# Patient Record
Sex: MALE | Race: WHITE | NOT HISPANIC OR LATINO | Employment: OTHER | URBAN - METROPOLITAN AREA
[De-identification: names, ages, dates, MRNs, and addresses within clinical notes are randomized per-mention and may not be internally consistent; named-entity substitution may affect disease eponyms.]

---

## 2017-02-07 ENCOUNTER — TRANSCRIBE ORDERS (OUTPATIENT)
Dept: ADMINISTRATIVE | Facility: HOSPITAL | Age: 77
End: 2017-02-07

## 2017-02-07 ENCOUNTER — HOSPITAL ENCOUNTER (OUTPATIENT)
Dept: GENERAL RADIOLOGY | Facility: HOSPITAL | Age: 77
Discharge: HOME | End: 2017-02-07
Attending: INTERNAL MEDICINE | Admitting: INTERNAL MEDICINE

## 2017-02-07 DIAGNOSIS — M19.072 PRIMARY OSTEOARTHRITIS OF LEFT ANKLE: Primary | ICD-10-CM

## 2017-02-07 PROCEDURE — 73610 X-RAY EXAM OF ANKLE: CPT

## 2018-10-14 ENCOUNTER — APPOINTMENT (OUTPATIENT)
Dept: GENERAL RADIOLOGY | Facility: HOSPITAL | Age: 78
End: 2018-10-14

## 2018-10-14 ENCOUNTER — APPOINTMENT (OUTPATIENT)
Dept: CT IMAGING | Facility: HOSPITAL | Age: 78
End: 2018-10-14

## 2018-10-14 ENCOUNTER — HOSPITAL ENCOUNTER (EMERGENCY)
Facility: HOSPITAL | Age: 78
Discharge: HOME | End: 2018-10-15
Attending: EMERGENCY MEDICINE | Admitting: EMERGENCY MEDICINE

## 2018-10-14 DIAGNOSIS — I48.92 ATRIAL FLUTTER, UNSPECIFIED TYPE (HCC): ICD-10-CM

## 2018-10-14 DIAGNOSIS — E03.9 HYPOTHYROIDISM, UNSPECIFIED TYPE: ICD-10-CM

## 2018-10-14 DIAGNOSIS — R10.30 LOWER ABDOMINAL PAIN: Primary | ICD-10-CM

## 2018-10-14 LAB
ALBUMIN SERPL-MCNC: 4.64 G/DL (ref 3.2–4.8)
ALBUMIN/GLOB SERPL: 1.8 G/DL (ref 1.5–2.5)
ALP SERPL-CCNC: 57 U/L (ref 25–100)
ALT SERPL W P-5'-P-CCNC: 25 U/L (ref 7–40)
ANION GAP SERPL CALCULATED.3IONS-SCNC: 9 MMOL/L (ref 3–11)
APTT PPP: 36 SECONDS (ref 24–31)
AST SERPL-CCNC: 32 U/L (ref 0–33)
BASOPHILS # BLD AUTO: 0.02 10*3/MM3 (ref 0–0.2)
BASOPHILS NFR BLD AUTO: 0.3 % (ref 0–1)
BILIRUB SERPL-MCNC: 0.6 MG/DL (ref 0.3–1.2)
BNP SERPL-MCNC: 337 PG/ML (ref 0–100)
BUN BLD-MCNC: 15 MG/DL (ref 9–23)
BUN/CREAT SERPL: 14.7 (ref 7–25)
CALCIUM SPEC-SCNC: 9.4 MG/DL (ref 8.7–10.4)
CHLORIDE SERPL-SCNC: 105 MMOL/L (ref 99–109)
CO2 SERPL-SCNC: 24 MMOL/L (ref 20–31)
CREAT BLD-MCNC: 1.02 MG/DL (ref 0.6–1.3)
DEPRECATED RDW RBC AUTO: 46.4 FL (ref 37–54)
EOSINOPHIL # BLD AUTO: 0.12 10*3/MM3 (ref 0–0.3)
EOSINOPHIL NFR BLD AUTO: 1.9 % (ref 0–3)
ERYTHROCYTE [DISTWIDTH] IN BLOOD BY AUTOMATED COUNT: 14.3 % (ref 11.3–14.5)
GFR SERPL CREATININE-BSD FRML MDRD: 70 ML/MIN/1.73
GLOBULIN UR ELPH-MCNC: 2.6 GM/DL
GLUCOSE BLD-MCNC: 108 MG/DL (ref 70–100)
HCT VFR BLD AUTO: 47.5 % (ref 38.9–50.9)
HGB BLD-MCNC: 15.7 G/DL (ref 13.1–17.5)
HOLD SPECIMEN: NORMAL
HOLD SPECIMEN: NORMAL
IMM GRANULOCYTES # BLD: 0.05 10*3/MM3 (ref 0–0.03)
IMM GRANULOCYTES NFR BLD: 0.8 % (ref 0–0.6)
INR PPP: 1.16 (ref 0.91–1.09)
LYMPHOCYTES # BLD AUTO: 2.21 10*3/MM3 (ref 0.6–4.8)
LYMPHOCYTES NFR BLD AUTO: 35.5 % (ref 24–44)
MAGNESIUM SERPL-MCNC: 2 MG/DL (ref 1.3–2.7)
MCH RBC QN AUTO: 29.8 PG (ref 27–31)
MCHC RBC AUTO-ENTMCNC: 33.1 G/DL (ref 32–36)
MCV RBC AUTO: 90.1 FL (ref 80–99)
MONOCYTES # BLD AUTO: 0.38 10*3/MM3 (ref 0–1)
MONOCYTES NFR BLD AUTO: 6.1 % (ref 0–12)
NEUTROPHILS # BLD AUTO: 3.49 10*3/MM3 (ref 1.5–8.3)
NEUTROPHILS NFR BLD AUTO: 56.2 % (ref 41–71)
PLATELET # BLD AUTO: 216 10*3/MM3 (ref 150–450)
PMV BLD AUTO: 10.4 FL (ref 6–12)
POTASSIUM BLD-SCNC: 4.2 MMOL/L (ref 3.5–5.5)
PROT SERPL-MCNC: 7.2 G/DL (ref 5.7–8.2)
PROTHROMBIN TIME: 12.2 SECONDS (ref 9.6–11.5)
RBC # BLD AUTO: 5.27 10*6/MM3 (ref 4.2–5.76)
SODIUM BLD-SCNC: 138 MMOL/L (ref 132–146)
T4 FREE SERPL-MCNC: 0.84 NG/DL (ref 0.89–1.76)
TROPONIN I SERPL-MCNC: 0.07 NG/ML
TROPONIN I SERPL-MCNC: 0.07 NG/ML
TSH SERPL DL<=0.05 MIU/L-ACNC: 14.42 MIU/ML (ref 0.35–5.35)
WBC NRBC COR # BLD: 6.22 10*3/MM3 (ref 3.5–10.8)
WHOLE BLOOD HOLD SPECIMEN: NORMAL
WHOLE BLOOD HOLD SPECIMEN: NORMAL

## 2018-10-14 PROCEDURE — 96374 THER/PROPH/DIAG INJ IV PUSH: CPT

## 2018-10-14 PROCEDURE — 74177 CT ABD & PELVIS W/CONTRAST: CPT

## 2018-10-14 PROCEDURE — 85730 THROMBOPLASTIN TIME PARTIAL: CPT | Performed by: EMERGENCY MEDICINE

## 2018-10-14 PROCEDURE — 25010000002 IOPAMIDOL 61 % SOLUTION: Performed by: EMERGENCY MEDICINE

## 2018-10-14 PROCEDURE — 25010000002 HYDROMORPHONE PER 4 MG: Performed by: EMERGENCY MEDICINE

## 2018-10-14 PROCEDURE — 71045 X-RAY EXAM CHEST 1 VIEW: CPT

## 2018-10-14 PROCEDURE — 83735 ASSAY OF MAGNESIUM: CPT | Performed by: EMERGENCY MEDICINE

## 2018-10-14 PROCEDURE — 85025 COMPLETE CBC W/AUTO DIFF WBC: CPT

## 2018-10-14 PROCEDURE — 96375 TX/PRO/DX INJ NEW DRUG ADDON: CPT

## 2018-10-14 PROCEDURE — 84443 ASSAY THYROID STIM HORMONE: CPT | Performed by: EMERGENCY MEDICINE

## 2018-10-14 PROCEDURE — 83880 ASSAY OF NATRIURETIC PEPTIDE: CPT

## 2018-10-14 PROCEDURE — 80053 COMPREHEN METABOLIC PANEL: CPT

## 2018-10-14 PROCEDURE — 84439 ASSAY OF FREE THYROXINE: CPT | Performed by: EMERGENCY MEDICINE

## 2018-10-14 PROCEDURE — 81003 URINALYSIS AUTO W/O SCOPE: CPT | Performed by: EMERGENCY MEDICINE

## 2018-10-14 PROCEDURE — 36415 COLL VENOUS BLD VENIPUNCTURE: CPT

## 2018-10-14 PROCEDURE — 25010000002 ONDANSETRON PER 1 MG: Performed by: EMERGENCY MEDICINE

## 2018-10-14 PROCEDURE — 85610 PROTHROMBIN TIME: CPT | Performed by: EMERGENCY MEDICINE

## 2018-10-14 PROCEDURE — 96361 HYDRATE IV INFUSION ADD-ON: CPT

## 2018-10-14 PROCEDURE — 93005 ELECTROCARDIOGRAM TRACING: CPT | Performed by: EMERGENCY MEDICINE

## 2018-10-14 PROCEDURE — 0 DIATRIZOATE MEGLUMINE & SODIUM PER 1 ML: Performed by: EMERGENCY MEDICINE

## 2018-10-14 PROCEDURE — 84484 ASSAY OF TROPONIN QUANT: CPT | Performed by: EMERGENCY MEDICINE

## 2018-10-14 PROCEDURE — 99285 EMERGENCY DEPT VISIT HI MDM: CPT

## 2018-10-14 RX ORDER — LEVOTHYROXINE SODIUM 0.07 MG/1
75 TABLET ORAL DAILY
COMMUNITY
End: 2018-10-19

## 2018-10-14 RX ORDER — ESMOLOL HYDROCHLORIDE 10 MG/ML
50-300 INJECTION, SOLUTION INTRAVENOUS
Status: DISCONTINUED | OUTPATIENT
Start: 2018-10-14 | End: 2018-10-15 | Stop reason: HOSPADM

## 2018-10-14 RX ORDER — HYDROMORPHONE HYDROCHLORIDE 1 MG/ML
0.25 INJECTION, SOLUTION INTRAMUSCULAR; INTRAVENOUS; SUBCUTANEOUS ONCE
Status: COMPLETED | OUTPATIENT
Start: 2018-10-14 | End: 2018-10-14

## 2018-10-14 RX ORDER — ONDANSETRON 2 MG/ML
4 INJECTION INTRAMUSCULAR; INTRAVENOUS ONCE
Status: COMPLETED | OUTPATIENT
Start: 2018-10-14 | End: 2018-10-14

## 2018-10-14 RX ORDER — SODIUM CHLORIDE 0.9 % (FLUSH) 0.9 %
10 SYRINGE (ML) INJECTION AS NEEDED
Status: DISCONTINUED | OUTPATIENT
Start: 2018-10-14 | End: 2018-10-15 | Stop reason: HOSPADM

## 2018-10-14 RX ORDER — SODIUM CHLORIDE 9 MG/ML
125 INJECTION, SOLUTION INTRAVENOUS CONTINUOUS
Status: DISCONTINUED | OUTPATIENT
Start: 2018-10-14 | End: 2018-10-15 | Stop reason: HOSPADM

## 2018-10-14 RX ADMIN — ONDANSETRON HYDROCHLORIDE 4 MG: 2 INJECTION, SOLUTION INTRAMUSCULAR; INTRAVENOUS at 21:37

## 2018-10-14 RX ADMIN — HYDROMORPHONE HYDROCHLORIDE 0.25 MG: 1 INJECTION, SOLUTION INTRAMUSCULAR; INTRAVENOUS; SUBCUTANEOUS at 21:38

## 2018-10-14 RX ADMIN — IOPAMIDOL 95 ML: 612 INJECTION, SOLUTION INTRAVENOUS at 22:47

## 2018-10-14 RX ADMIN — SODIUM CHLORIDE 125 ML/HR: 9 INJECTION, SOLUTION INTRAVENOUS at 22:22

## 2018-10-14 RX ADMIN — SODIUM CHLORIDE 1000 ML: 9 INJECTION, SOLUTION INTRAVENOUS at 21:32

## 2018-10-14 RX ADMIN — DIATRIZOATE MEGLUMINE AND DIATRIZOATE SODIUM 15 ML: 660; 100 LIQUID ORAL; RECTAL at 21:34

## 2018-10-15 VITALS
DIASTOLIC BLOOD PRESSURE: 98 MMHG | TEMPERATURE: 97.4 F | SYSTOLIC BLOOD PRESSURE: 165 MMHG | WEIGHT: 230 LBS | BODY MASS INDEX: 30.48 KG/M2 | HEIGHT: 73 IN | RESPIRATION RATE: 20 BRPM | OXYGEN SATURATION: 95 % | HEART RATE: 88 BPM

## 2018-10-15 LAB
BILIRUB UR QL STRIP: NEGATIVE
CLARITY UR: CLEAR
COLOR UR: YELLOW
GLUCOSE UR STRIP-MCNC: NEGATIVE MG/DL
HGB UR QL STRIP.AUTO: NEGATIVE
KETONES UR QL STRIP: NEGATIVE
LEUKOCYTE ESTERASE UR QL STRIP.AUTO: NEGATIVE
NITRITE UR QL STRIP: NEGATIVE
PH UR STRIP.AUTO: 5.5 [PH] (ref 5–8)
PROT UR QL STRIP: NEGATIVE
SP GR UR STRIP: 1.02 (ref 1–1.03)
UROBILINOGEN UR QL STRIP: NORMAL

## 2018-10-15 RX ORDER — DILTIAZEM HYDROCHLORIDE 120 MG/1
120 CAPSULE, COATED, EXTENDED RELEASE ORAL
Status: DISCONTINUED | OUTPATIENT
Start: 2018-10-15 | End: 2018-10-15 | Stop reason: HOSPADM

## 2018-10-15 RX ORDER — DILTIAZEM HYDROCHLORIDE 120 MG/1
120 CAPSULE, COATED, EXTENDED RELEASE ORAL DAILY
Qty: 30 CAPSULE | Refills: 0 | Status: ON HOLD | OUTPATIENT
Start: 2018-10-15 | End: 2018-11-26

## 2018-10-15 RX ORDER — POLYETHYLENE GLYCOL 3350 17 G/17G
17 POWDER, FOR SOLUTION ORAL DAILY
Qty: 225 G | Refills: 0 | Status: ON HOLD | OUTPATIENT
Start: 2018-10-15 | End: 2018-11-26

## 2018-10-15 RX ORDER — ONDANSETRON 8 MG/1
8 TABLET, ORALLY DISINTEGRATING ORAL EVERY 8 HOURS PRN
Qty: 15 TABLET | Refills: 0 | Status: ON HOLD | OUTPATIENT
Start: 2018-10-15 | End: 2018-11-26

## 2018-10-15 RX ORDER — HYDROCODONE BITARTRATE AND ACETAMINOPHEN 5; 325 MG/1; MG/1
1 TABLET ORAL EVERY 4 HOURS PRN
Qty: 15 TABLET | Refills: 0 | Status: ON HOLD | OUTPATIENT
Start: 2018-10-15 | End: 2018-11-26

## 2018-10-15 RX ADMIN — APIXABAN 5 MG: 5 TABLET, FILM COATED ORAL at 01:04

## 2018-10-15 RX ADMIN — DILTIAZEM HYDROCHLORIDE 120 MG: 120 CAPSULE, COATED, EXTENDED RELEASE ORAL at 01:04

## 2018-10-15 NOTE — DISCHARGE INSTRUCTIONS
Rest with no stimulant medications including decongestants or caffeine.  Push plenty fluids    Take the Eliquis 5 mg twice a day as prescribed.  This is a blood thinner and increases her risk of bleeding.  If you have any rectal bleeding stop this at once and come to the ED for evaluation.    Take the Cardizem  mg daily.  Your next dose will be Monday evening    Take Norco for pain only as needed.  This is a narcotic and may nauseate you are constipate you.  He may take Zofran if you get nauseated.  Take MiraLAX to prevent constipation.    Follow-up in the atrial fibrillation clinic of the [facility] [place] tomorrow.  They should call you by 10:00.  If they do not call by noon please call them.    Call Dr. Campbell tomorrow in the office to make an appointment for follow-up, but also for instructions on your thyroid medicine as you're still hypothyroid today    Return to the ED at once if you have worsening abdominal pain, fever with abdominal pain, vomiting, or any other acute, urgent emergent or progressive symptoms as discussed including GI bleeding.

## 2018-10-15 NOTE — ED PROVIDER NOTES
Subjective   Saul Sal is a 78 y.o.male who presents to the ED with his wife with c/o flank pain with onset 2 days ago that has worsened. He developed left sided dull flank pain 2 days ago then experienced right side flank pain yesterday and now experiences bilateral flank pain. His flank pain is a 7/10 in severity and denies any aggravating or alleviating factors. He denies any abdominal pain experiences abdominal distension that he attributed to gas build up. He has a h/o bloating and gassiness so he fasted today with mild relief of his distension. He normally has a bowel movement everyday at 1900 and today he was unable to move his bowels. He took a suppository with no relief. He has experienced increasing HTN and tachycardia. He reports he normally has a rate in the 70s to 80s and a normal blood pressure with medication but states his pain is causing him HTN and tachycardia. He denies any bloody stools, melena, bowel or bladder incontinence, dysuria, frequency, hematuria, cough, congestion, fevers, chills, dyspnea, chest pain, lower extremity edema or any additional acute complaints at this time. He has a h/o cardiac bypass x5 in 2003 and is a patient of Dr. Azul MD but reports he has not had an appointment with him in many years.         History provided by:  Patient  Flank Pain   Pain location:  L flank and R flank  Pain quality: dull    Pain radiates to:  Does not radiate  Pain severity:  Moderate  Onset quality:  Gradual  Duration:  2 days  Timing:  Constant  Progression:  Worsening  Chronicity:  New  Context: not eating, not recent illness and not sick contacts    Relieved by:  Nothing  Worsened by:  Nothing  Ineffective treatments:  Eating, lying down and movement  Associated symptoms: constipation    Associated symptoms: no chest pain, no chills, no cough, no diarrhea, no dysuria, no fever, no hematemesis, no hematochezia, no hematuria, no melena, no nausea, no shortness of breath, no sore throat  and no vomiting    Risk factors: obesity    Risk factors: has not had multiple surgeries and no recent hospitalization        Review of Systems   Constitutional: Negative for appetite change, chills and fever.   HENT: Negative for congestion, rhinorrhea and sore throat.    Respiratory: Negative for cough and shortness of breath.    Cardiovascular: Positive for palpitations (fast). Negative for chest pain.   Gastrointestinal: Positive for abdominal distention and constipation. Negative for abdominal pain, blood in stool, diarrhea, hematemesis, hematochezia, melena, nausea and vomiting.   Genitourinary: Positive for flank pain. Negative for decreased urine volume, dysuria, hematuria and urgency.   Skin: Negative for rash.   All other systems reviewed and are negative.      Past Medical History:   Diagnosis Date   • Disease of thyroid gland    • Hypertension        No Known Allergies    Past Surgical History:   Procedure Laterality Date   • CARDIAC SURGERY      BYPASS X5       History reviewed. No pertinent family history.    Social History     Social History   • Marital status:      Social History Main Topics   • Smoking status: Former Smoker   • Smokeless tobacco: Never Used   • Alcohol use No   • Drug use: No   • Sexual activity: Defer     Other Topics Concern   • Not on file         Objective   Physical Exam   Constitutional: He is oriented to person, place, and time. He appears well-developed and well-nourished. No distress.   HENT:   Head: Normocephalic and atraumatic.   Right Ear: External ear normal.   Left Ear: External ear normal.   Nose: Nose normal.   Eyes: Conjunctivae are normal. No scleral icterus.   Neck: Normal range of motion. Neck supple.   Cardiovascular: Regular rhythm, normal heart sounds and intact distal pulses.  Tachycardia present.  Exam reveals no gallop and no friction rub.    No murmur heard.  Good femoral pulses.    Pulmonary/Chest: Effort normal and breath sounds normal. No  "respiratory distress. He has no wheezes. He has no rales.   Abdominal: Soft. Bowel sounds are normal. He exhibits no distension. There is no tenderness. There is no rebound, no guarding and no CVA tenderness.   No CVA tenderness.   No abdominal tenderness.    Musculoskeletal: Normal range of motion. He exhibits no edema or tenderness.   No C/C or E.   No stigmata of DVT.    Neurological: He is alert and oriented to person, place, and time.   Skin: Skin is warm and dry. No rash noted. He is not diaphoretic.   Psychiatric: He has a normal mood and affect. His behavior is normal.   Nursing note and vitals reviewed.      Procedures         ED Course  ED Course as of Oct 15 0132   Mon Oct 15, 2018   0104 Patient is serially reevaluated throughout the ED course with last reevaluation now.  He is treated with a very small amount of analgesics here in the ED.  History with Zofran 4 mg IV and hydrated and feels significant improved and well enough to go home.I discussed his atrial flutter.  He had a history of \"skipped beats\" in the remote past but has not had a Holter in years.  He did have a GI bleed after taking aspirin but his wife felt that this was from another medication as well.  He was tachycardic on arrival but is heart rate normalized without any specific rate limiting medications.  He remained in the high 80s and 90s from heart rate that would increase greater than 100 transiently.I discussed case in depth with Dr. Sampson, on call for Dr. Liang and cardiology.  We discussed his troponins which are stable and intermediate, thyroids, and laboratory evaluation.  He is recommended anticoagulation and I already discussed this with the patient.  His recommend follow-up in the dysrhythmia clinic tomorrow, with Cardizem for rate control.I discussed this with the patient and discussed the risk of bleeding with the anticoagulation.  At the current time however I still feel this is indicated.  His chadsVasc is 3, and it " appears he'll need cardioversion, and will need to be anticoagulated prior to that.  In further discussion with the patient he has had his elevated heart rate and palpitations for at least 48 hours, probably longer, and with no date of onset would be out of the range for cardioversion here in the ED.I discussed follow-up in the office with Dr. Campbell as well for attention to his hypothyroidism and follow-up of his abdominal pain.I discussed indications for immediate return with the patient and spouse and stressed the need for prompt follow-up with both cardiology and Dr. Campbell.Patient at the time of last reevaluation and examination has no peritoneal findings, minimal tenderness at best.  []   0104 Patient feels well enough for the current time to go home.Very pleasant and responsible patient verbalizes understanding agreement with plan of care, need for follow-up, and indications for return.  Wife is in agreement.  []      ED Course User Index  [] Rory Mckeon MD     Recent Results (from the past 24 hour(s))   Comprehensive Metabolic Panel    Collection Time: 10/14/18  9:01 PM   Result Value Ref Range    Glucose 108 (H) 70 - 100 mg/dL    BUN 15 9 - 23 mg/dL    Creatinine 1.02 0.60 - 1.30 mg/dL    Sodium 138 132 - 146 mmol/L    Potassium 4.2 3.5 - 5.5 mmol/L    Chloride 105 99 - 109 mmol/L    CO2 24.0 20.0 - 31.0 mmol/L    Calcium 9.4 8.7 - 10.4 mg/dL    Total Protein 7.2 5.7 - 8.2 g/dL    Albumin 4.64 3.20 - 4.80 g/dL    ALT (SGPT) 25 7 - 40 U/L    AST (SGOT) 32 0 - 33 U/L    Alkaline Phosphatase 57 25 - 100 U/L    Total Bilirubin 0.6 0.3 - 1.2 mg/dL    eGFR Non African Amer 70 >60 mL/min/1.73    Globulin 2.6 gm/dL    A/G Ratio 1.8 1.5 - 2.5 g/dL    BUN/Creatinine Ratio 14.7 7.0 - 25.0    Anion Gap 9.0 3.0 - 11.0 mmol/L   BNP    Collection Time: 10/14/18  9:01 PM   Result Value Ref Range    .0 (H) 0.0 - 100.0 pg/mL   Light Blue Top    Collection Time: 10/14/18  9:01 PM   Result Value Ref Range     Extra Tube hold for add-on    Green Top (Gel)    Collection Time: 10/14/18  9:01 PM   Result Value Ref Range    Extra Tube Hold for add-ons.    Lavender Top    Collection Time: 10/14/18  9:01 PM   Result Value Ref Range    Extra Tube hold for add-on    Gold Top - SST    Collection Time: 10/14/18  9:01 PM   Result Value Ref Range    Extra Tube Hold for add-ons.    CBC Auto Differential    Collection Time: 10/14/18  9:01 PM   Result Value Ref Range    WBC 6.22 3.50 - 10.80 10*3/mm3    RBC 5.27 4.20 - 5.76 10*6/mm3    Hemoglobin 15.7 13.1 - 17.5 g/dL    Hematocrit 47.5 38.9 - 50.9 %    MCV 90.1 80.0 - 99.0 fL    MCH 29.8 27.0 - 31.0 pg    MCHC 33.1 32.0 - 36.0 g/dL    RDW 14.3 11.3 - 14.5 %    RDW-SD 46.4 37.0 - 54.0 fl    MPV 10.4 6.0 - 12.0 fL    Platelets 216 150 - 450 10*3/mm3    Neutrophil % 56.2 41.0 - 71.0 %    Lymphocyte % 35.5 24.0 - 44.0 %    Monocyte % 6.1 0.0 - 12.0 %    Eosinophil % 1.9 0.0 - 3.0 %    Basophil % 0.3 0.0 - 1.0 %    Immature Grans % 0.8 (H) 0.0 - 0.6 %    Neutrophils, Absolute 3.49 1.50 - 8.30 10*3/mm3    Lymphocytes, Absolute 2.21 0.60 - 4.80 10*3/mm3    Monocytes, Absolute 0.38 0.00 - 1.00 10*3/mm3    Eosinophils, Absolute 0.12 0.00 - 0.30 10*3/mm3    Basophils, Absolute 0.02 0.00 - 0.20 10*3/mm3    Immature Grans, Absolute 0.05 (H) 0.00 - 0.03 10*3/mm3   Protime-INR    Collection Time: 10/14/18  9:01 PM   Result Value Ref Range    Protime 12.2 (H) 9.6 - 11.5 Seconds    INR 1.16 (H) 0.91 - 1.09   aPTT    Collection Time: 10/14/18  9:01 PM   Result Value Ref Range    PTT 36.0 (H) 24.0 - 31.0 seconds   Magnesium    Collection Time: 10/14/18  9:01 PM   Result Value Ref Range    Magnesium 2.0 1.3 - 2.7 mg/dL   TSH    Collection Time: 10/14/18  9:01 PM   Result Value Ref Range    TSH 14.424 (H) 0.350 - 5.350 mIU/mL   T4, Free    Collection Time: 10/14/18  9:01 PM   Result Value Ref Range    Free T4 0.84 (L) 0.89 - 1.76 ng/dL   Troponin    Collection Time: 10/14/18  9:01 PM   Result Value Ref  Range    Troponin I 0.069 (H) <=0.039 ng/mL   Troponin    Collection Time: 10/14/18 11:14 PM   Result Value Ref Range    Troponin I 0.066 (H) <=0.039 ng/mL   Urinalysis With Microscopic If Indicated (No Culture) - Urine, Clean Catch    Collection Time: 10/14/18 11:48 PM   Result Value Ref Range    Color, UA Yellow Yellow, Straw    Appearance, UA Clear Clear    pH, UA 5.5 5.0 - 8.0    Specific Gravity, UA 1.025 1.001 - 1.030    Glucose, UA Negative Negative    Ketones, UA Negative Negative    Bilirubin, UA Negative Negative    Blood, UA Negative Negative    Protein, UA Negative Negative    Leuk Esterase, UA Negative Negative    Nitrite, UA Negative Negative    Urobilinogen, UA 0.2 E.U./dL 0.2 - 1.0 E.U./dL     Note: In addition to lab results from this visit, the labs listed above may include labs taken at another facility or during a different encounter within the last 24 hours. Please correlate lab times with ED admission and discharge times for further clarification of the services performed during this visit.    XR Chest 1 View   Final Result      Hazy density LEFT lung base, likely secondary to patient positioning although    minimal LEFT pleural effusion not totally excluded on this image.      Cardiac silhouette appears enlarged, again, likely accentuated by patient    positioning.      Please note that limited imaging of the lung bases on CT abdomen pelvis    performed same day demonstrated no significant pleural effusion, infiltrate, or    atelectasis in the lung bases and heart appears normal in size; therefore, the    above findings are positional/technical and no repeat imaging is needed at this    time unless there are subsequent changes in patient's clinical condition.      THIS DOCUMENT HAS BEEN ELECTRONICALLY SIGNED BY VICTOR HUGO TALBOT MD      CT Abdomen Pelvis With Contrast   Final Result      No definite acute abnormality demonstrated.      Nonacute findings as noted above.      THIS DOCUMENT HAS BEEN  ELECTRONICALLY SIGNED BY VICTOR HUGO TALBOT MD        Vitals:    10/14/18 2200 10/14/18 2230 10/15/18 0030 10/15/18 0102   BP: (!) 137/106 135/100  165/98   BP Location:    Left arm   Patient Position:       Pulse: 78 85 119 88   Resp:       Temp:       TempSrc:       SpO2: 94% 93% 95% 95%   Weight:       Height:         Medications   sodium chloride 0.9 % flush 10 mL (not administered)   sodium chloride 0.9 % infusion (0 mL/hr Intravenous Stopped 10/15/18 0106)   esmolol (BREVIBLOC) 2500 mg/250 mL (10 mg/mL) infusion (0 mcg/kg/min × 104 kg Intravenous Hold 10/14/18 2205)   diltiaZEM CD (CARDIZEM CD) 24 hr capsule 120 mg (120 mg Oral Given 10/15/18 0104)   diatrizoate meglumine-sodium (GASTROGRAFIN) 66-10 % solution 15 mL (15 mL Oral Given 10/14/18 2134)   sodium chloride 0.9 % bolus 1,000 mL (0 mL Intravenous Stopped 10/14/18 2221)   HYDROmorphone (DILAUDID) injection 0.25 mg (0.25 mg Intravenous Given 10/14/18 2138)   ondansetron (ZOFRAN) injection 4 mg (4 mg Intravenous Given 10/14/18 2137)   iopamidol (ISOVUE-300) 61 % injection 100 mL (95 mL Intravenous Given 10/14/18 2247)   apixaban (ELIQUIS) tablet 5 mg (5 mg Oral Given 10/15/18 0104)     ECG/EMG Results (last 24 hours)     Procedure Component Value Units Date/Time    ECG 12 Lead [[id number]] Collected:  10/14/18 2100     Updated:  10/14/18 2136    Narrative:       Test Reason : soa  Blood Pressure : **/** mmHG  Vent. Rate : 102 BPM     Atrial Rate : 250 BPM     P-R Int : 000 ms          QRS Dur : 158 ms      QT Int : 384 ms       P-R-T Axes : 000 -51 -12 degrees     QTc Int : 500 ms    Atrial flutter with variable AV block  Right bundle branch block  Left anterior fascicular block  ** Bifascicular block **  Cannot rule out Inferior infarct (masked by fascicular block?) , age  undetermined  Abnormal ECG  No previous ECGs available  Confirmed by FRANSISCO PÉREZ MD ([id number]) on 10/14/2018 9:36:16 PM    Referred By:  ED MD           Confirmed By:FRANSISCO PÉREZ MD    ECG 12  Lead [[id number]] Collected:  10/14/18 2219     Updated:  10/14/18 2219                        [facility]    Final diagnoses:   Lower abdominal pain   Atrial flutter, unspecified type (CMS/HCC)   Hypothyroidism, unspecified type       Documentation assistance provided by conner Sheehan.  Information recorded by the scribe was done at my direction and has been verified and validated by me.     Elliot Sheehan  10/14/18 2106       Elliot Sheehan  10/14/18 2257       Rory Mckeon MD  10/15/18 0132

## 2018-10-19 ENCOUNTER — HOSPITAL ENCOUNTER (OUTPATIENT)
Dept: CARDIOLOGY | Facility: HOSPITAL | Age: 78
Discharge: HOME | End: 2018-10-19
Admitting: NURSE PRACTITIONER

## 2018-10-19 ENCOUNTER — OFFICE VISIT (OUTPATIENT)
Dept: CARDIOLOGY | Facility: HOSPITAL | Age: 78
End: 2018-10-19

## 2018-10-19 VITALS
TEMPERATURE: 97.8 F | HEIGHT: 72 IN | DIASTOLIC BLOOD PRESSURE: 85 MMHG | WEIGHT: 235.2 LBS | OXYGEN SATURATION: 97 % | SYSTOLIC BLOOD PRESSURE: 114 MMHG | RESPIRATION RATE: 18 BRPM | BODY MASS INDEX: 31.86 KG/M2 | HEART RATE: 60 BPM

## 2018-10-19 DIAGNOSIS — E03.9 HYPOTHYROIDISM, UNSPECIFIED TYPE: ICD-10-CM

## 2018-10-19 DIAGNOSIS — G47.33 OSA (OBSTRUCTIVE SLEEP APNEA): ICD-10-CM

## 2018-10-19 DIAGNOSIS — Z87.19 HISTORY OF GI BLEED: ICD-10-CM

## 2018-10-19 DIAGNOSIS — I25.10 CORONARY ARTERY DISEASE INVOLVING NATIVE CORONARY ARTERY OF NATIVE HEART, ANGINA PRESENCE UNSPECIFIED: ICD-10-CM

## 2018-10-19 DIAGNOSIS — I48.3 TYPICAL ATRIAL FLUTTER (HCC): ICD-10-CM

## 2018-10-19 DIAGNOSIS — I10 ESSENTIAL HYPERTENSION: ICD-10-CM

## 2018-10-19 DIAGNOSIS — B02.9 HERPES ZOSTER WITHOUT COMPLICATION: ICD-10-CM

## 2018-10-19 DIAGNOSIS — I47.29 NSVT (NONSUSTAINED VENTRICULAR TACHYCARDIA) (HCC): ICD-10-CM

## 2018-10-19 DIAGNOSIS — I48.3 TYPICAL ATRIAL FLUTTER (HCC): Primary | ICD-10-CM

## 2018-10-19 PROBLEM — E78.5 DYSLIPIDEMIA: Status: ACTIVE | Noted: 2018-10-19

## 2018-10-19 PROBLEM — I48.92 ATRIAL FLUTTER (HCC): Status: ACTIVE | Noted: 2018-10-19

## 2018-10-19 PROCEDURE — 93005 ELECTROCARDIOGRAM TRACING: CPT | Performed by: NURSE PRACTITIONER

## 2018-10-19 PROCEDURE — 93010 ELECTROCARDIOGRAM REPORT: CPT | Performed by: INTERNAL MEDICINE

## 2018-10-19 PROCEDURE — 99214 OFFICE O/P EST MOD 30 MIN: CPT | Performed by: NURSE PRACTITIONER

## 2018-10-19 RX ORDER — LEVOTHYROXINE SODIUM 88 UG/1
88 TABLET ORAL DAILY
COMMUNITY
End: 2019-04-08 | Stop reason: SDUPTHER

## 2018-10-19 NOTE — PROGRESS NOTES
[facility]  [facility]      Encounter Date:10/19/2018     [address] [place] [address]  [phone]    1940    Saul Campbell MD    Saul Sal is a 78 y.o. male.      Subjective:     Chief Complaint:  Atrial Flutter       HPI     Patient presented to [facility] ED on 10/14/18 with groin and flank pain.  Described as painful, stabbing pain. Now associated with rash. Patient had noted increased hypertension and tachycardia.  Normal heart rates her 70s to 80s.  Symptoms worsened over 2 days.  Revealed to be in atrial flutter.  Pt started on CCB and Eliquis.  f/u today.  Has a history of CAD status post bypass 2003.  Former patient of Dr. Liang that has not seen in many years.   has a history of a GI bleed several years ago on aspirin. Pt has been tolerating ASA 81 mg over the last 3 months, no problems.  History of obstructive sleep apnea, unable to tolerate CPAP.    Denies chest pain, pressure, dyspnea, palpitations, dizziness, syncope, edema.  Unaware of atrial fibrillation/atrial flutter.    Patient Active Problem List    Diagnosis   • CAD (coronary artery disease) [I25.10]   • Atrial flutter (CMS/HCC) [I48.92]   • Hypertension [I10]   • Hypothyroid [E03.9]   • Dyslipidemia [E78.5]   • ELIE (obstructive sleep apnea) [G47.33]         Past Surgical History:   Procedure Laterality Date   • CARDIAC SURGERY      BYPASS X5       No Known Allergies      Current Outpatient Prescriptions:   •  apixaban (ELIQUIS) 5 MG tablet tablet, Take 1 tablet by mouth 2 (Two) Times a Day., Disp: 60 tablet, Rfl: 0  •  diltiaZEM CD (CARDIZEM CD) 120 MG 24 hr capsule, Take 1 capsule by mouth Daily., Disp: 30 capsule, Rfl: 0  •  HYDROcodone-acetaminophen (NORCO) 5-325 MG per tablet, Take 1 tablet by mouth Every 4 (Four) Hours As Needed for Moderate Pain ., Disp: 15 tablet, Rfl: 0  •  levothyroxine (SYNTHROID, LEVOTHROID) 88 MCG tablet, Take 88 mcg by mouth Daily., Disp: , Rfl:  "  •  ondansetron ODT (ZOFRAN-ODT) 8 MG disintegrating tablet, Take 1 tablet by mouth Every 8 (Eight) Hours As Needed for Nausea or Vomiting., Disp: 15 tablet, Rfl: 0  •  polyethylene glycol (MIRALAX) powder, Take 17 g by mouth Daily., Disp: 225 g, Rfl: 0    The following portions of the patient's history were reviewed and updated as appropriate: allergies, current medications, past family history, past medical history, past social history, past surgical history and problem list.    Review of Systems   Cardiovascular: Positive for irregular heartbeat.   Musculoskeletal: Positive for back pain.   All other systems reviewed and are negative.      Objective:     Vitals:    10/19/18 1240 10/19/18 1243 10/19/18 1245   BP: 142/97 141/92 114/85   BP Location: Right arm Left arm Left arm   Patient Position: Sitting Sitting Standing   Pulse: 120  60   Resp: 18     Temp: 97.8 °F (36.6 °C)     TempSrc: Temporal Artery      SpO2: 97%     Weight: 107 kg (235 lb 3.2 oz)     Height: 182.9 cm (72\")           Physical Exam   Constitutional: He is oriented to person, place, and time. He appears well-developed and well-nourished. No distress.   HENT:   Head: Normocephalic and atraumatic.   Mouth/Throat: Oropharynx is clear and moist.   Eyes: Pupils are equal, round, and reactive to light. Conjunctivae are normal. No scleral icterus.   Neck: No hepatojugular reflux and no JVD present. Carotid bruit is not present. No tracheal deviation present. No thyromegaly present.   Cardiovascular: Normal rate, normal heart sounds and intact distal pulses.  An irregularly irregular rhythm present. Exam reveals no friction rub.    No murmur heard.  Pulmonary/Chest: Effort normal and breath sounds normal.   Abdominal: Soft. Bowel sounds are normal. He exhibits no distension. There is no tenderness.   Musculoskeletal: He exhibits no edema.   Lymphadenopathy:     He has no cervical adenopathy.   Neurological: He is alert and oriented to person, place, " and time.   Skin: Skin is warm, dry and intact. Rash (grouped Vesicular rash following L3 dermatone.) noted. No cyanosis or erythema. No pallor.   Psychiatric: He has a normal mood and affect. His behavior is normal. Thought content normal.   Vitals reviewed.      Lab and Diagnostic Review:  10/14/18: Atrial flutter with variable A-V block, PVCs, heart rate 86 bpm    Admission on 10/14/2018, Discharged on 10/15/2018   Component Date Value Ref Range Status   • Glucose 10/14/2018 108* 70 - 100 mg/dL Final   • BUN 10/14/2018 15  9 - 23 mg/dL Final   • Creatinine 10/14/2018 1.02  0.60 - 1.30 mg/dL Final   • Sodium 10/14/2018 138  132 - 146 mmol/L Final   • Potassium 10/14/2018 4.2  3.5 - 5.5 mmol/L Final   • Chloride 10/14/2018 105  99 - 109 mmol/L Final   • CO2 10/14/2018 24.0  20.0 - 31.0 mmol/L Final   • Calcium 10/14/2018 9.4  8.7 - 10.4 mg/dL Final   • Total Protein 10/14/2018 7.2  5.7 - 8.2 g/dL Final   • Albumin 10/14/2018 4.64  3.20 - 4.80 g/dL Final   • ALT (SGPT) 10/14/2018 25  7 - 40 U/L Final   • AST (SGOT) 10/14/2018 32  0 - 33 U/L Final   • Alkaline Phosphatase 10/14/2018 57  25 - 100 U/L Final   • Total Bilirubin 10/14/2018 0.6  0.3 - 1.2 mg/dL Final   • eGFR Non African Amer 10/14/2018 70  >60 mL/min/1.73 Final   • Globulin 10/14/2018 2.6  gm/dL Final   • A/G Ratio 10/14/2018 1.8  1.5 - 2.5 g/dL Final   • BUN/Creatinine Ratio 10/14/2018 14.7  7.0 - 25.0 Final   • Anion Gap 10/14/2018 9.0  3.0 - 11.0 mmol/L Final   • BNP 10/14/2018 337.0* 0.0 - 100.0 pg/mL Final    Results may be falsely decreased if patient taking Biotin.   • Extra Tube 10/14/2018 hold for add-on   Final    Auto resulted   • Extra Tube 10/14/2018 Hold for add-ons.   Final    Auto resulted.   • Extra Tube 10/14/2018 hold for add-on   Final    Auto resulted   • Extra Tube 10/14/2018 Hold for add-ons.   Final    Auto resulted.   • WBC 10/14/2018 6.22  3.50 - 10.80 10*3/mm3 Final   • RBC 10/14/2018 5.27  4.20 - 5.76 10*6/mm3 Final   •  Hemoglobin 10/14/2018 15.7  13.1 - 17.5 g/dL Final   • Hematocrit 10/14/2018 47.5  38.9 - 50.9 % Final   • MCV 10/14/2018 90.1  80.0 - 99.0 fL Final   • MCH 10/14/2018 29.8  27.0 - 31.0 pg Final   • MCHC 10/14/2018 33.1  32.0 - 36.0 g/dL Final   • RDW 10/14/2018 14.3  11.3 - 14.5 % Final   • RDW-SD 10/14/2018 46.4  37.0 - 54.0 fl Final   • MPV 10/14/2018 10.4  6.0 - 12.0 fL Final   • Platelets 10/14/2018 216  150 - 450 10*3/mm3 Final   • Neutrophil % 10/14/2018 56.2  41.0 - 71.0 % Final   • Lymphocyte % 10/14/2018 35.5  24.0 - 44.0 % Final   • Monocyte % 10/14/2018 6.1  0.0 - 12.0 % Final   • Eosinophil % 10/14/2018 1.9  0.0 - 3.0 % Final   • Basophil % 10/14/2018 0.3  0.0 - 1.0 % Final   • Immature Grans % 10/14/2018 0.8* 0.0 - 0.6 % Final   • Neutrophils, Absolute 10/14/2018 3.49  1.50 - 8.30 10*3/mm3 Final   • Lymphocytes, Absolute 10/14/2018 2.21  0.60 - 4.80 10*3/mm3 Final   • Monocytes, Absolute 10/14/2018 0.38  0.00 - 1.00 10*3/mm3 Final   • Eosinophils, Absolute 10/14/2018 0.12  0.00 - 0.30 10*3/mm3 Final   • Basophils, Absolute 10/14/2018 0.02  0.00 - 0.20 10*3/mm3 Final   • Immature Grans, Absolute 10/14/2018 0.05* 0.00 - 0.03 10*3/mm3 Final   • Color, UA 10/14/2018 Yellow  Yellow, Straw Final   • Appearance, UA 10/14/2018 Clear  Clear Final   • pH, UA 10/14/2018 5.5  5.0 - 8.0 Final   • Specific Gravity, UA 10/14/2018 1.025  1.001 - 1.030 Final   • Glucose, UA 10/14/2018 Negative  Negative Final   • Ketones, UA 10/14/2018 Negative  Negative Final   • Bilirubin, UA 10/14/2018 Negative  Negative Final   • Blood, UA 10/14/2018 Negative  Negative Final   • Protein, UA 10/14/2018 Negative  Negative Final   • Leuk Esterase, UA 10/14/2018 Negative  Negative Final   • Nitrite, UA 10/14/2018 Negative  Negative Final   • Urobilinogen, UA 10/14/2018 0.2 E.U./dL  0.2 - 1.0 E.U./dL Final   • Protime 10/14/2018 12.2* 9.6 - 11.5 Seconds Final   • INR 10/14/2018 1.16* 0.91 - 1.09 Final   • PTT 10/14/2018 36.0* 24.0 -  31.0 seconds Final   • Magnesium 10/14/2018 2.0  1.3 - 2.7 mg/dL Final   • TSH 10/14/2018 14.424* 0.350 - 5.350 mIU/mL Final   • Free T4 10/14/2018 0.84* 0.89 - 1.76 ng/dL Final   • Troponin I 10/14/2018 0.069* <=0.039 ng/mL Final    Results may be falsely decreased if patient taking Biotin.   • Troponin I 10/14/2018 0.066* <=0.039 ng/mL Final    Results may be falsely decreased if patient taking Biotin.       Assessment and Plan:         1. Typical atrial flutter (CMS/HCC)    - ECG 12 Lead;Atrial flutter with verbal AV block, PVCs, 99 bpm  Continue:  - apixaban (ELIQUIS) 5 MG tablet tablet; Take 1 tablet by mouth 2 (Two) Times a Day.  Dispense: 60 tablet; Refill: 0    Continue CBB    A. fib education completed: What is atrial fibrillation/flutter, causes, triggers, signs and symptoms, medication management (rate control versus rhythm control) and stroke prevention, procedural management and indications, and the role of the atrial fibrillation center and when to call.    Ambulatory referral to cardiology    Echo to be scheduled.   2. Coronary artery disease involving native coronary artery of native heart, angina presence unspecified  Pt currently not on statin or asa.  Not willing to start until seeing Cardiology    3. Essential hypertension  stable    4. Hypothyroidism, unspecified type  Recent increase of synthroid    5.  Hx of NSVT    6,.  Hx of GI bleed.  Discussed starting PPI.  Patient refused.    7.  Shingles  F/u with PCP later today for treatment    F/u PRN  *Please note that portions of this note were completed with a voice recognition program. Efforts were made to edit the dictations, but occasionally words are mistranscribed.

## 2018-11-02 ENCOUNTER — HOSPITAL ENCOUNTER (OUTPATIENT)
Dept: CARDIOLOGY | Facility: HOSPITAL | Age: 78
Discharge: HOME | End: 2018-11-02
Admitting: NURSE PRACTITIONER

## 2018-11-02 VITALS — HEIGHT: 72 IN | BODY MASS INDEX: 31.83 KG/M2 | WEIGHT: 235 LBS

## 2018-11-02 DIAGNOSIS — I47.29 NSVT (NONSUSTAINED VENTRICULAR TACHYCARDIA) (HCC): ICD-10-CM

## 2018-11-02 DIAGNOSIS — I48.3 TYPICAL ATRIAL FLUTTER (HCC): ICD-10-CM

## 2018-11-02 LAB
BH CV ECHO MEAS - AI DEC SLOPE: 238 CM/SEC^2
BH CV ECHO MEAS - AI MAX PG: 75 MMHG
BH CV ECHO MEAS - AI MAX VEL: 433 CM/SEC
BH CV ECHO MEAS - AI P1/2T: 532.9 MSEC
BH CV ECHO MEAS - AO MAX PG (FULL): 7.1 MMHG
BH CV ECHO MEAS - AO MAX PG: 9 MMHG
BH CV ECHO MEAS - AO MEAN PG (FULL): 5 MMHG
BH CV ECHO MEAS - AO MEAN PG: 6 MMHG
BH CV ECHO MEAS - AO ROOT AREA (BSA CORRECTED): 1.8
BH CV ECHO MEAS - AO ROOT AREA: 12.6 CM^2
BH CV ECHO MEAS - AO ROOT DIAM: 4 CM
BH CV ECHO MEAS - AO V2 MAX: 153 CM/SEC
BH CV ECHO MEAS - AO V2 MEAN: 112 CM/SEC
BH CV ECHO MEAS - AO V2 VTI: 26.4 CM
BH CV ECHO MEAS - AVA(I,A): 2.5 CM^2
BH CV ECHO MEAS - AVA(I,D): 2.5 CM^2
BH CV ECHO MEAS - AVA(V,A): 2.6 CM^2
BH CV ECHO MEAS - AVA(V,D): 2.6 CM^2
BH CV ECHO MEAS - BSA(HAYCOCK): 2.4 M^2
BH CV ECHO MEAS - BSA: 2.3 M^2
BH CV ECHO MEAS - BZI_BMI: 31.9 KILOGRAMS/M^2
BH CV ECHO MEAS - BZI_METRIC_HEIGHT: 182.9 CM
BH CV ECHO MEAS - BZI_METRIC_WEIGHT: 106.6 KG
BH CV ECHO MEAS - EDV(CUBED): 232.6 ML
BH CV ECHO MEAS - EDV(MOD-SP2): 142 ML
BH CV ECHO MEAS - EDV(MOD-SP4): 218 ML
BH CV ECHO MEAS - EDV(TEICH): 190.4 ML
BH CV ECHO MEAS - EF(CUBED): 20.4 %
BH CV ECHO MEAS - EF(MOD-BP): 39 %
BH CV ECHO MEAS - EF(MOD-SP2): 39.4 %
BH CV ECHO MEAS - EF(MOD-SP4): 47.7 %
BH CV ECHO MEAS - EF(TEICH): 16 %
BH CV ECHO MEAS - ESV(CUBED): 185.2 ML
BH CV ECHO MEAS - ESV(MOD-SP2): 86 ML
BH CV ECHO MEAS - ESV(MOD-SP4): 114 ML
BH CV ECHO MEAS - ESV(TEICH): 160 ML
BH CV ECHO MEAS - FS: 7.3 %
BH CV ECHO MEAS - IVS/LVPW: 1
BH CV ECHO MEAS - IVSD: 1.4 CM
BH CV ECHO MEAS - LA DIMENSION: 3.7 CM
BH CV ECHO MEAS - LA/AO: 0.93
BH CV ECHO MEAS - LAD MAJOR: 6.7 CM
BH CV ECHO MEAS - LAT PEAK E' VEL: 9 CM/SEC
BH CV ECHO MEAS - LATERAL E/E' RATIO: 6.6
BH CV ECHO MEAS - LV DIASTOLIC VOL/BSA (35-75): 95.5 ML/M^2
BH CV ECHO MEAS - LV MASS(C)D: 395.6 GRAMS
BH CV ECHO MEAS - LV MASS(C)DI: 173.4 GRAMS/M^2
BH CV ECHO MEAS - LV MAX PG: 1.9 MMHG
BH CV ECHO MEAS - LV MEAN PG: 1 MMHG
BH CV ECHO MEAS - LV SYSTOLIC VOL/BSA (12-30): 50 ML/M^2
BH CV ECHO MEAS - LV V1 MAX: 68.4 CM/SEC
BH CV ECHO MEAS - LV V1 MEAN: 44.6 CM/SEC
BH CV ECHO MEAS - LV V1 VTI: 11.5 CM
BH CV ECHO MEAS - LVIDD: 6.2 CM
BH CV ECHO MEAS - LVIDS: 5.7 CM
BH CV ECHO MEAS - LVLD AP2: 9.4 CM
BH CV ECHO MEAS - LVLD AP4: 8.9 CM
BH CV ECHO MEAS - LVLS AP2: 7.7 CM
BH CV ECHO MEAS - LVLS AP4: 8.1 CM
BH CV ECHO MEAS - LVOT AREA (M): 5.7 CM^2
BH CV ECHO MEAS - LVOT AREA: 5.7 CM^2
BH CV ECHO MEAS - LVOT DIAM: 2.7 CM
BH CV ECHO MEAS - LVPWD: 1.4 CM
BH CV ECHO MEAS - MED PEAK E' VEL: 9.8 CM/SEC
BH CV ECHO MEAS - MEDIAL E/E' RATIO: 6.1
BH CV ECHO MEAS - MV A MAX VEL: 63.7 CM/SEC
BH CV ECHO MEAS - MV E MAX VEL: 59.2 CM/SEC
BH CV ECHO MEAS - MV E/A: 0.93
BH CV ECHO MEAS - PA ACC SLOPE: 773 CM/SEC^2
BH CV ECHO MEAS - PA ACC TIME: 0.12 SEC
BH CV ECHO MEAS - PA MAX PG: 9.7 MMHG
BH CV ECHO MEAS - PA PR(ACCEL): 26.8 MMHG
BH CV ECHO MEAS - PA V2 MAX: 156 CM/SEC
BH CV ECHO MEAS - PI END-D VEL: 149 CM/SEC
BH CV ECHO MEAS - RAP SYSTOLE: 3 MMHG
BH CV ECHO MEAS - RVSP: 23 MMHG
BH CV ECHO MEAS - SI(AO): 145.4 ML/M^2
BH CV ECHO MEAS - SI(CUBED): 20.8 ML/M^2
BH CV ECHO MEAS - SI(LVOT): 28.9 ML/M^2
BH CV ECHO MEAS - SI(MOD-SP2): 24.5 ML/M^2
BH CV ECHO MEAS - SI(MOD-SP4): 45.6 ML/M^2
BH CV ECHO MEAS - SI(TEICH): 13.3 ML/M^2
BH CV ECHO MEAS - SV(AO): 331.8 ML
BH CV ECHO MEAS - SV(CUBED): 47.4 ML
BH CV ECHO MEAS - SV(LVOT): 65.8 ML
BH CV ECHO MEAS - SV(MOD-SP2): 56 ML
BH CV ECHO MEAS - SV(MOD-SP4): 104 ML
BH CV ECHO MEAS - SV(TEICH): 30.4 ML
BH CV ECHO MEAS - TAPSE (>1.6): 1.3 CM2
BH CV ECHO MEAS - TR MAX PG: 20 MMHG
BH CV ECHO MEAS - TR MAX VEL: 226 CM/SEC
BH CV ECHO MEASUREMENTS AVERAGE E/E' RATIO: 6.3
BH CV VAS BP RIGHT ARM: NORMAL MMHG
BH CV XLRA - RV BASE: 3.9 CM
BH CV XLRA - RV LENGTH: 7.9 CM
BH CV XLRA - RV MID: 3.1 CM
BH CV XLRA - TDI S': 12 CM/SEC
LV EF 2D ECHO EST: 30 %

## 2018-11-02 PROCEDURE — 93306 TTE W/DOPPLER COMPLETE: CPT

## 2018-11-02 PROCEDURE — 93306 TTE W/DOPPLER COMPLETE: CPT | Performed by: INTERNAL MEDICINE

## 2018-11-05 DIAGNOSIS — I42.0 DILATED CARDIOMYOPATHY (HCC): ICD-10-CM

## 2018-11-05 DIAGNOSIS — I50.21 ACUTE SYSTOLIC HEART FAILURE (HCC): ICD-10-CM

## 2018-11-05 DIAGNOSIS — I48.3 TYPICAL ATRIAL FLUTTER (HCC): Primary | ICD-10-CM

## 2018-11-05 NOTE — PROGRESS NOTES
Called and reviewed echo results with pt.     · The left ventricular cavity is moderately dilated.  · Left ventricular wall thickness is consistent with mild-to-moderate concentric hypertrophy.  · Mild aortic valve regurgitation is present.  · Mild-to-moderate mitral valve regurgitation is present.  · Mild tricuspid valve regurgitation is present.  · Mild pulmonic valve regurgitation is present.  · Calculated right ventricular systolic pressure from tricuspid regurgitation is 23 mmHg.  · Left ventricular systolic function is severely decreased. Estimated EF = 30%.  · The findings are consistent with dilated cardiomyopathy.  · Mildly reduced right ventricular systolic function noted.  · Mild dilation of the aortic root and sinuses of Valsalva present.  · There is no evidence of pericardial effusion.  · No evidence of pulmonary hypertension is present.  · The aortic valve exhibits sclerosis.      Pt denies CP, pressure, dyspnea.      Pt has agreed to proceed with stress testing.  F/u with scheduled with [facility]

## 2018-11-16 ENCOUNTER — HOSPITAL ENCOUNTER (OUTPATIENT)
Dept: CARDIOLOGY | Facility: HOSPITAL | Age: 78
Discharge: HOME | End: 2018-11-16

## 2018-11-16 VITALS
DIASTOLIC BLOOD PRESSURE: 82 MMHG | BODY MASS INDEX: 31.95 KG/M2 | SYSTOLIC BLOOD PRESSURE: 140 MMHG | WEIGHT: 235.89 LBS | HEART RATE: 65 BPM | HEIGHT: 72 IN

## 2018-11-16 DIAGNOSIS — I42.0 DILATED CARDIOMYOPATHY (HCC): ICD-10-CM

## 2018-11-16 DIAGNOSIS — I48.3 TYPICAL ATRIAL FLUTTER (HCC): ICD-10-CM

## 2018-11-16 DIAGNOSIS — I50.21 ACUTE SYSTOLIC HEART FAILURE (HCC): ICD-10-CM

## 2018-11-16 LAB
BH CV NUCLEAR PRIOR STUDY: 3
BH CV STRESS BP STAGE 2: NORMAL
BH CV STRESS BP STAGE 4: NORMAL
BH CV STRESS COMMENTS STAGE 1: NORMAL
BH CV STRESS DOSE REGADENOSON STAGE 1: 0.4
BH CV STRESS DURATION MIN STAGE 1: 1
BH CV STRESS DURATION MIN STAGE 2: 1
BH CV STRESS DURATION MIN STAGE 3: 1
BH CV STRESS DURATION MIN STAGE 4: 1
BH CV STRESS DURATION SEC STAGE 1: 0
BH CV STRESS DURATION SEC STAGE 2: 0
BH CV STRESS DURATION SEC STAGE 3: 0
BH CV STRESS DURATION SEC STAGE 4: 0
BH CV STRESS HR STAGE 1: 118
BH CV STRESS HR STAGE 2: 120
BH CV STRESS HR STAGE 3: 120
BH CV STRESS HR STAGE 4: 120
BH CV STRESS O2 STAGE 1: 98
BH CV STRESS O2 STAGE 2: 98
BH CV STRESS O2 STAGE 3: 98
BH CV STRESS O2 STAGE 4: 98
BH CV STRESS PROTOCOL 1: NORMAL
BH CV STRESS RECOVERY BP: NORMAL MMHG
BH CV STRESS RECOVERY HR: 114 BPM
BH CV STRESS RECOVERY O2: 97 %
BH CV STRESS STAGE 1: 1
BH CV STRESS STAGE 2: 2
BH CV STRESS STAGE 3: 3
BH CV STRESS STAGE 4: 4
LV EF NUC BP: 28 %
MAXIMAL PREDICTED HEART RATE: 141 BPM
PERCENT MAX PREDICTED HR: 91.49 %
STRESS BASELINE BP: NORMAL MMHG
STRESS BASELINE HR: 100 BPM
STRESS O2 SAT REST: 93 %
STRESS PERCENT HR: 108 %
STRESS POST ESTIMATED WORKLOAD: 1 METS
STRESS POST EXERCISE DUR MIN: 4 MIN
STRESS POST EXERCISE DUR SEC: 0 SEC
STRESS POST O2 SAT PEAK: 98 %
STRESS POST PEAK BP: NORMAL MMHG
STRESS POST PEAK HR: 129 BPM
STRESS TARGET HR: 120 BPM

## 2018-11-16 PROCEDURE — 78452 HT MUSCLE IMAGE SPECT MULT: CPT

## 2018-11-16 PROCEDURE — 0 TECHNETIUM SESTAMIBI: Performed by: NURSE PRACTITIONER

## 2018-11-16 PROCEDURE — 25010000002 REGADENOSON 0.4 MG/5ML SOLUTION: Performed by: NURSE PRACTITIONER

## 2018-11-16 PROCEDURE — 93017 CV STRESS TEST TRACING ONLY: CPT

## 2018-11-16 PROCEDURE — 93018 CV STRESS TEST I&R ONLY: CPT | Performed by: INTERNAL MEDICINE

## 2018-11-16 PROCEDURE — 78452 HT MUSCLE IMAGE SPECT MULT: CPT | Performed by: INTERNAL MEDICINE

## 2018-11-16 PROCEDURE — A9500 TC99M SESTAMIBI: HCPCS | Performed by: NURSE PRACTITIONER

## 2018-11-16 RX ADMIN — TECHNETIUM TC 99M SESTAMIBI 1 DOSE: 1 INJECTION INTRAVENOUS at 09:45

## 2018-11-16 RX ADMIN — REGADENOSON 0.4 MG: 0.08 INJECTION, SOLUTION INTRAVENOUS at 09:45

## 2018-11-16 RX ADMIN — TECHNETIUM TC 99M SESTAMIBI 1 DOSE: 1 INJECTION INTRAVENOUS at 08:00

## 2018-11-19 ENCOUNTER — DOCUMENTATION (OUTPATIENT)
Dept: CARDIOLOGY | Facility: HOSPITAL | Age: 78
End: 2018-11-19

## 2018-11-19 NOTE — PROGRESS NOTES
Patient has follow-up appointment tomorrow on 11/20/18.  Dr. Sampson will address stress test results and further management of atrial flutter

## 2018-11-20 ENCOUNTER — CONSULT (OUTPATIENT)
Dept: CARDIOLOGY | Facility: CLINIC | Age: 78
End: 2018-11-20

## 2018-11-20 ENCOUNTER — APPOINTMENT (OUTPATIENT)
Dept: PREADMISSION TESTING | Facility: HOSPITAL | Age: 78
End: 2018-11-20

## 2018-11-20 VITALS
HEART RATE: 121 BPM | HEIGHT: 72 IN | DIASTOLIC BLOOD PRESSURE: 108 MMHG | BODY MASS INDEX: 31.56 KG/M2 | WEIGHT: 233 LBS | SYSTOLIC BLOOD PRESSURE: 164 MMHG

## 2018-11-20 DIAGNOSIS — I48.3 TYPICAL ATRIAL FLUTTER (HCC): Primary | ICD-10-CM

## 2018-11-20 DIAGNOSIS — I50.22 CHRONIC SYSTOLIC CONGESTIVE HEART FAILURE (HCC): ICD-10-CM

## 2018-11-20 DIAGNOSIS — I10 ESSENTIAL HYPERTENSION: ICD-10-CM

## 2018-11-20 DIAGNOSIS — I48.3 TYPICAL ATRIAL FLUTTER (HCC): ICD-10-CM

## 2018-11-20 DIAGNOSIS — I25.10 CORONARY ARTERY DISEASE INVOLVING NATIVE CORONARY ARTERY OF NATIVE HEART, ANGINA PRESENCE UNSPECIFIED: ICD-10-CM

## 2018-11-20 LAB
ALBUMIN SERPL-MCNC: 4.54 G/DL (ref 3.2–4.8)
ALBUMIN/GLOB SERPL: 2.1 G/DL (ref 1.5–2.5)
ALP SERPL-CCNC: 51 U/L (ref 25–100)
ALT SERPL W P-5'-P-CCNC: 21 U/L (ref 7–40)
ANION GAP SERPL CALCULATED.3IONS-SCNC: 10 MMOL/L (ref 3–11)
AST SERPL-CCNC: 31 U/L (ref 0–33)
BILIRUB SERPL-MCNC: 0.5 MG/DL (ref 0.3–1.2)
BUN BLD-MCNC: 16 MG/DL (ref 9–23)
BUN/CREAT SERPL: 14.7 (ref 7–25)
CALCIUM SPEC-SCNC: 9 MG/DL (ref 8.7–10.4)
CHLORIDE SERPL-SCNC: 105 MMOL/L (ref 99–109)
CO2 SERPL-SCNC: 25 MMOL/L (ref 20–31)
CREAT BLD-MCNC: 1.09 MG/DL (ref 0.6–1.3)
DEPRECATED RDW RBC AUTO: 49 FL (ref 37–54)
ERYTHROCYTE [DISTWIDTH] IN BLOOD BY AUTOMATED COUNT: 14.6 % (ref 11.3–14.5)
GFR SERPL CREATININE-BSD FRML MDRD: 65 ML/MIN/1.73
GLOBULIN UR ELPH-MCNC: 2.2 GM/DL
GLUCOSE BLD-MCNC: 106 MG/DL (ref 70–100)
HCT VFR BLD AUTO: 47.5 % (ref 38.9–50.9)
HGB BLD-MCNC: 15.5 G/DL (ref 13.1–17.5)
INR PPP: 1.12 (ref 0.91–1.09)
MCH RBC QN AUTO: 30.1 PG (ref 27–31)
MCHC RBC AUTO-ENTMCNC: 32.6 G/DL (ref 32–36)
MCV RBC AUTO: 92.2 FL (ref 80–99)
PLATELET # BLD AUTO: 187 10*3/MM3 (ref 150–450)
PMV BLD AUTO: 10.3 FL (ref 6–12)
POTASSIUM BLD-SCNC: 4.7 MMOL/L (ref 3.5–5.5)
PROT SERPL-MCNC: 6.7 G/DL (ref 5.7–8.2)
PROTHROMBIN TIME: 11.8 SECONDS (ref 9.6–11.5)
RBC # BLD AUTO: 5.15 10*6/MM3 (ref 4.2–5.76)
SODIUM BLD-SCNC: 140 MMOL/L (ref 132–146)
WBC NRBC COR # BLD: 8.18 10*3/MM3 (ref 3.5–10.8)

## 2018-11-20 PROCEDURE — 93000 ELECTROCARDIOGRAM COMPLETE: CPT | Performed by: INTERNAL MEDICINE

## 2018-11-20 PROCEDURE — 99205 OFFICE O/P NEW HI 60 MIN: CPT | Performed by: INTERNAL MEDICINE

## 2018-11-20 PROCEDURE — 85027 COMPLETE CBC AUTOMATED: CPT | Performed by: INTERNAL MEDICINE

## 2018-11-20 PROCEDURE — 85610 PROTHROMBIN TIME: CPT | Performed by: INTERNAL MEDICINE

## 2018-11-20 PROCEDURE — 80053 COMPREHEN METABOLIC PANEL: CPT | Performed by: INTERNAL MEDICINE

## 2018-11-20 PROCEDURE — 36415 COLL VENOUS BLD VENIPUNCTURE: CPT

## 2018-11-20 NOTE — DISCHARGE INSTRUCTIONS
"Dear Patient,    Do NOT eat, drink, or smoke after midnight the night before your procedure.   Take your medications as instructed by your doctor.    Glasses and jewelry may be worn, but dentures must be removed prior to your procedure.    Leave any items you consider valuable at home.      MORNING of your Procedure, please bring the following:     -Photo ID and insurance card(s)    -ALL medications in their ORIGINAL CONTAINERS    -Co-pay and/or deductible required by your insurance   -Copy of living will or power of  document (if not brought to    Pre-Admission Testing department)   -CPAP mask and tubing, not your machine (if applicable)    -Relaxation aids (music, books, magazines)   -Skin Prep Instruction Sheet (if applicable)   -Relaxation Aids    Check in on the 2nd floor in the [address] [facility].  Your procedure will be performed in the cath lab or EP lab.  During your procedure, your family will wait in the cath lab waiting area where you checked in.      Need to make arrangements for transportation prior to discharge.    A handout regarding \"Heart Healthy Eating\" was provided today to encourage healthy eating habits.    Booklet published by [employer] was given in Pre-Admission testing.  This booklet is for informational purposes only.  If you have any questions about your procedure, please speak with your physician.      Please note:  If you are scheduled to have one of the following procedures: Pulmonary Vein Ablation, Lead Extraction, MitraClip, Cerebral Coilings or Embolization, please let your family know that after your procedure you will be going to recovery unit on the 2nd floor of the [facility].  When the physician is finished speaking with your family after your procedure is completed, your family will be directed or escorted to the surgery waiting area in the [facility].  This is where your family will wait until you are given a room assignment and then your family will be directed to the " appropriate unit.    The following instructions given during Pre Admission Testing visit:    Do not eat or drink anything after MN except for sips of water with your a.m. Prescription meds unless otherwise instructed by your physician.    Glasses and jewelry may be worn, but dentures must be removed prior to cath/procedure.    Leave any items you consider valuable at home.    Family members may wait in [facility] waiting area during procedure.    Bring all medications in their original containers the day of procedure.    Bring photo ID and insurance cards on the day of procedure.    Need to make arrangements for transportation prior to discharge.    The following handouts were given:     Heart Cath pathway (if applicable)   Cardiac Cath booklet published by [employer]    OR appropriate [facility] procedure booklet    If applicable, pt instructed to bring CPAP mask and tubing the day of procedure.

## 2018-11-20 NOTE — PAT
"Pt states \" I really do not know what he is doing exactly, I just know I am supposed to have something done on my heart\". Permit not signed in pat.    5 weeks ago patient was treated for shingles. When inspected in PAT, no active open skin lesions. Left hip to groin and upper thigh .   "

## 2018-11-26 ENCOUNTER — HOSPITAL ENCOUNTER (OUTPATIENT)
Facility: HOSPITAL | Age: 78
Discharge: HOME | End: 2018-11-27
Attending: INTERNAL MEDICINE | Admitting: INTERNAL MEDICINE

## 2018-11-26 DIAGNOSIS — I48.3 TYPICAL ATRIAL FLUTTER (HCC): ICD-10-CM

## 2018-11-26 PROCEDURE — 99152 MOD SED SAME PHYS/QHP 5/>YRS: CPT | Performed by: INTERNAL MEDICINE

## 2018-11-26 PROCEDURE — 93621 COMP EP EVL L PAC&REC C SINS: CPT | Performed by: INTERNAL MEDICINE

## 2018-11-26 PROCEDURE — C1894 INTRO/SHEATH, NON-LASER: HCPCS | Performed by: INTERNAL MEDICINE

## 2018-11-26 PROCEDURE — S0260 H&P FOR SURGERY: HCPCS | Performed by: INTERNAL MEDICINE

## 2018-11-26 PROCEDURE — 25010000002 FENTANYL CITRATE (PF) 100 MCG/2ML SOLUTION: Performed by: INTERNAL MEDICINE

## 2018-11-26 PROCEDURE — 93609 INTRA-VNTR MAPG TCHYCAR SITE: CPT | Performed by: INTERNAL MEDICINE

## 2018-11-26 PROCEDURE — 94770: CPT

## 2018-11-26 PROCEDURE — 93653 COMPRE EP EVAL TX SVT: CPT | Performed by: INTERNAL MEDICINE

## 2018-11-26 PROCEDURE — C1733 CATH, EP, OTHR THAN COOL-TIP: HCPCS | Performed by: INTERNAL MEDICINE

## 2018-11-26 PROCEDURE — C1730 CATH, EP, 19 OR FEW ELECT: HCPCS | Performed by: INTERNAL MEDICINE

## 2018-11-26 PROCEDURE — 25010000002 MIDAZOLAM PER 1 MG: Performed by: INTERNAL MEDICINE

## 2018-11-26 PROCEDURE — C1731 CATH, EP, 20 OR MORE ELEC: HCPCS | Performed by: INTERNAL MEDICINE

## 2018-11-26 RX ORDER — MIDAZOLAM HYDROCHLORIDE 1 MG/ML
INJECTION INTRAMUSCULAR; INTRAVENOUS AS NEEDED
Status: DISCONTINUED | OUTPATIENT
Start: 2018-11-26 | End: 2018-11-26 | Stop reason: HOSPADM

## 2018-11-26 RX ORDER — OXYCODONE HYDROCHLORIDE AND ACETAMINOPHEN 5; 325 MG/1; MG/1
1 TABLET ORAL EVERY 4 HOURS PRN
Status: DISCONTINUED | OUTPATIENT
Start: 2018-11-26 | End: 2018-11-27 | Stop reason: HOSPADM

## 2018-11-26 RX ORDER — FENTANYL CITRATE 50 UG/ML
INJECTION, SOLUTION INTRAMUSCULAR; INTRAVENOUS AS NEEDED
Status: DISCONTINUED | OUTPATIENT
Start: 2018-11-26 | End: 2018-11-26 | Stop reason: HOSPADM

## 2018-11-26 RX ORDER — ACETAMINOPHEN 650 MG/1
650 SUPPOSITORY RECTAL EVERY 4 HOURS PRN
Status: DISCONTINUED | OUTPATIENT
Start: 2018-11-26 | End: 2018-11-27 | Stop reason: HOSPADM

## 2018-11-26 RX ORDER — GABAPENTIN 100 MG/1
100 CAPSULE ORAL 2 TIMES DAILY
Status: DISCONTINUED | OUTPATIENT
Start: 2018-11-26 | End: 2018-11-27 | Stop reason: HOSPADM

## 2018-11-26 RX ORDER — LEVOTHYROXINE SODIUM 88 UG/1
88 TABLET ORAL
Status: DISCONTINUED | OUTPATIENT
Start: 2018-11-27 | End: 2018-11-27 | Stop reason: HOSPADM

## 2018-11-26 RX ORDER — IBUPROFEN 400 MG/1
400 TABLET ORAL EVERY 6 HOURS PRN
Status: DISCONTINUED | OUTPATIENT
Start: 2018-11-26 | End: 2018-11-27 | Stop reason: HOSPADM

## 2018-11-26 RX ORDER — ACETAMINOPHEN 325 MG/1
650 TABLET ORAL EVERY 4 HOURS PRN
Status: DISCONTINUED | OUTPATIENT
Start: 2018-11-26 | End: 2018-11-27 | Stop reason: HOSPADM

## 2018-11-26 RX ORDER — ACETAMINOPHEN 160 MG/5ML
650 SOLUTION ORAL EVERY 4 HOURS PRN
Status: DISCONTINUED | OUTPATIENT
Start: 2018-11-26 | End: 2018-11-27 | Stop reason: HOSPADM

## 2018-11-26 RX ORDER — GABAPENTIN 100 MG/1
100 CAPSULE ORAL 2 TIMES DAILY
COMMUNITY
End: 2018-12-18

## 2018-11-26 RX ORDER — OXYCODONE AND ACETAMINOPHEN 7.5; 325 MG/1; MG/1
2 TABLET ORAL EVERY 4 HOURS PRN
Status: DISCONTINUED | OUTPATIENT
Start: 2018-11-26 | End: 2018-11-27 | Stop reason: HOSPADM

## 2018-11-26 RX ORDER — SODIUM CHLORIDE 9 MG/ML
INJECTION, SOLUTION INTRAVENOUS CONTINUOUS PRN
Status: COMPLETED | OUTPATIENT
Start: 2018-11-26 | End: 2018-11-26

## 2018-11-26 NOTE — PROCEDURES
PRE-ELECTROPHYSIOLOGY STUDY DIAGNOSES  1. Typical atrial flutter.     PROCEDURE PERFORMED  1. Electrophysiology testing with right-sided atrial flutter ablation.  2. Left atrial pacing recording from the coronary sinus.  3. Interatrial mapping.    Anesthesia:    I was present with the patient for the duration of moderate sedation and supervised staff who had no other duties and monitored the patient for the entire procedure     Name of independent trained observer: Mechelle Holly RN  Intra-Service start time: 1253  Intra-Service end time: 1323     Estimated Blood Loss: Less than 10 mL     Specimens: None     PROCEDURE IN DETAIL: The patient was brought into the EP lab in a fasting  state. The right and left groins were prepped and draped in the usual  sterile fashion. Access was obtained in the right femoral vein via the  Seldinger technique over which an 8 and 7-Chilean sheath was placed.  Access was obtained in the left femoral vein via the Seldinger technique  over which a 5-Chilean sheath was placed. Through the 7-Chilean sheath, a  Halo mapping catheter was placed in the high right atrium. Through the  5-Chilean sheath, a 5-Chilean catheter was placed at the RV apex. Through  the 8-Chilean sheath, a large curved 8 mm Blazer II ablation catheter was  placed in the coronary sinus. The flutter circuit was mapped out, was  found to be right-sided isthmus-dependent in nature. Right-sided atrial  flutter line was then performed using 70 W of energy, 60 degree heat for  120 seconds. Two lines were placed. The flutter terminated at the  completion of the first line to normal sinus heart rhythm. Ablation  catheter was then placed back into the coronary sinus. Pacing from the  coronary sinus showed bidirectional block across the tricuspid annulus.  The His bundle was then mapped out and formal electrophysiology test was  performed.     1. Baseline rhythm showed normal sinus heart rhythm with an R-R interval of 846,   2. SD  interval of 220,   3. QRS of 161,   4. QT of 440,   5. AH of 110,   6. HV of 56.    7. Sinus node recovery time at 600 was 1023 at 400 was 765.   8. The AV Wenckebach cycle length was 500.   9. AV node refractory period at 600 was 370,  10. The VA Wenckebach cycle length was 450.   11. VA node refractory period at 600 was <270,   12. The ventricular effective refractory period at 600 was 270, at 400 was 250.     The sheaths were then pulled. Hemostasis was achieved. The patient recovered from his sedation, transferred from the lab in a stable condition.     IMPRESSION: Successful catheter mapping ablation of right-sided  isthmus-dependent atrial flutter.

## 2018-11-26 NOTE — H&P
Referring Physician: DOMONIQUE Villarreal     Chief Complaint: aflutter       Subjective:     Patient is a 78 y.o. male who presents with atrial flutter for EPS +/- RFA. He was seen in the office last week and has not had any changes. No issues with fevers, chills, syncope, chest pain.      History:  This is a 78-year-old patient who presented to the emergency room in 2018.  His found to be in atrial flutter at the time.  He was started on anti-coagulation and rate control.  He reports having ongoing issues with shortness of breath and tachycardia despite medical therapy.  He has symptoms on a near daily basis.        Cardiac PMH: (Old records have been reviewed and summarized below)  1.  Coronary artery disease-status post CABG  2.  Atrial flutter  3.  Nuclear Stress Test - 2018 - Left ventricular ejection fraction is severely reduced (calculated EF = 28%); Myocardial perfusion imaging indicates a medium-sized infarct located in the inferior wall and basal inferior lateral wall with no significant ischemia noted         Past Medical History:   Diagnosis Date   • Arthritis    • Atrial flutter (CMS/HCC)    • CHF (congestive heart failure) (CMS/HCC)    • Coronary artery disease    • Disease of thyroid gland    • GI bleed    • Hyperlipidemia    • Hypertension    • Shingles    • Skin cancer     squamous    • Wears eyeglasses     readers   • Wears hearing aid       Past Surgical History:   Procedure Laterality Date   • CARDIAC CATHETERIZATION     • CARDIAC SURGERY      BYPASS X5   • COLONOSCOPY     • CORONARY ARTERY BYPASS GRAFT      x5 [facility]    • EYE SURGERY Right     cataract   • SKIN BIOPSY     • VASECTOMY        Allergies   Allergen Reactions   • Milk-Related Compounds Other (See Comments)     Headache     Social History     Tobacco Use   • Smoking status: Former Smoker     Types: Cigarettes     Last attempt to quit: 1964     Years since quittin.0   • Smokeless tobacco: Never Used    Substance Use Topics   • Alcohol use: No      FH:   Family History   Problem Relation Age of Onset   • Diabetes Mother    • Heart disease Mother         No current facility-administered medications for this encounter.     Review of Systems  Review of Systems:  General: no recent weight loss/gain, weakness or fatigue  Skin: no rashes, lumps, or other skin changes  HEENT: no dizziness, lightheadedness, or vision changes  Respiratory: no cough or hemoptysis  Cardiovascular: + palpitations, and tachycardia  Gastrointestinal: no black/tarry stools or diarrhea  Urinary: no change in frequency or urgency  Peripheral Vascular: no claudication or leg cramps  Musculoskeletal: no muscle or joint pain/stiffness  Psychiatric: no depression or excessive stress  Neurological: no sensory or motor loss, no syncope  Hematologic: no anemia, easy bruising or bleeding  Endocrine: no thyroid problems, nor heat or cold intolerance        Objective:       There were no vitals taken for this visit.    No intake/output data recorded.  No intake/output data recorded.    Physical Exam:  General-Well Nourished, Well developed  Eyes - PERRLA  Neck- supple, No mass  CV- irregular rate and rhythm, no MRG  Lung- clear bilaterally  Abd- soft, +BS  Musc/skel - Norm strength and range of motion  Skin- warm and dry  Neuro - Alert & Oriented x 3, appropriate mood.      ECG: (I have reviewed the EKG/Tracing from today and listed the findings below) unchanged from previous tracings     Results Review:     I reviewed the patient's new clinical results.    Results from last 7 days   Lab Units  11/20/18   1309   WBC 10*3/mm3  8.18   HEMOGLOBIN g/dL  15.5   HEMATOCRIT %  47.5   PLATELETS 10*3/mm3  187     Results from last 7 days   Lab Units  11/20/18   1309   SODIUM mmol/L  140   POTASSIUM mmol/L  4.7   CHLORIDE mmol/L  105   CO2 mmol/L  25.0   BUN mg/dL  16   CREATININE mg/dL  1.09   CALCIUM mg/dL  9.0   BILIRUBIN mg/dL  0.5   ALK PHOS U/L  51   ALT  (SGPT) U/L  21   AST (SGOT) U/L  31   GLUCOSE mg/dL  106*     Results from last 7 days   Lab Units  11/20/18   1309   SODIUM mmol/L  140   POTASSIUM mmol/L  4.7   CHLORIDE mmol/L  105   CO2 mmol/L  25.0   BUN mg/dL  16   CREATININE mg/dL  1.09   GLUCOSE mg/dL  106*   CALCIUM mg/dL  9.0     Results from last 7 days   Lab Units  11/20/18   1309   INR   1.12*     No results found for: TROPONINT                  Assessment:     Typical atrial flutter (CMS/HCC)         Plan:   1. Typical Atrial Flutter- ongoing symptoms with difficulty controlling rates. Patient will undergo EPS +/- RFA today. Risks, benefits, and alternatives have been discussed and patient wishes to proceed. Continue Eliquis 5mg BID for anticoagulation.     2. Chronic systolic heart failure- recheck echo after ablation once rates are controlled.       Electronically signed by MAYNOR Patton, 11/26/18, 10:03 AM.

## 2018-11-27 VITALS
HEIGHT: 73 IN | RESPIRATION RATE: 16 BRPM | TEMPERATURE: 98.1 F | HEART RATE: 83 BPM | WEIGHT: 235.2 LBS | DIASTOLIC BLOOD PRESSURE: 90 MMHG | BODY MASS INDEX: 31.17 KG/M2 | OXYGEN SATURATION: 95 % | SYSTOLIC BLOOD PRESSURE: 128 MMHG

## 2018-11-27 PROCEDURE — A9270 NON-COVERED ITEM OR SERVICE: HCPCS | Performed by: INTERNAL MEDICINE

## 2018-11-27 PROCEDURE — 93005 ELECTROCARDIOGRAM TRACING: CPT | Performed by: INTERNAL MEDICINE

## 2018-11-27 PROCEDURE — 63710000001 ACETAMINOPHEN 325 MG TABLET: Performed by: INTERNAL MEDICINE

## 2018-11-27 PROCEDURE — 63710000001 LEVOTHYROXINE 88 MCG TABLET: Performed by: INTERNAL MEDICINE

## 2018-11-27 PROCEDURE — 63710000001 GABAPENTIN 100 MG CAPSULE: Performed by: INTERNAL MEDICINE

## 2018-11-27 PROCEDURE — 93010 ELECTROCARDIOGRAM REPORT: CPT | Performed by: INTERNAL MEDICINE

## 2018-11-27 PROCEDURE — 99213 OFFICE O/P EST LOW 20 MIN: CPT | Performed by: INTERNAL MEDICINE

## 2018-11-27 PROCEDURE — 63710000001 APIXABAN 5 MG TABLET: Performed by: INTERNAL MEDICINE

## 2018-11-27 RX ORDER — METOPROLOL SUCCINATE 25 MG/1
12.5 TABLET, EXTENDED RELEASE ORAL DAILY
Qty: 30 TABLET | Refills: 11 | Status: SHIPPED | OUTPATIENT
Start: 2018-11-27 | End: 2018-11-28 | Stop reason: SDUPTHER

## 2018-11-27 RX ORDER — LISINOPRIL 5 MG/1
5 TABLET ORAL DAILY
Qty: 30 TABLET | Refills: 11 | Status: SHIPPED | OUTPATIENT
Start: 2018-11-27 | End: 2018-11-28 | Stop reason: SDUPTHER

## 2018-11-27 RX ADMIN — LEVOTHYROXINE SODIUM 88 MCG: 88 TABLET ORAL at 06:55

## 2018-11-27 RX ADMIN — GABAPENTIN 100 MG: 100 CAPSULE ORAL at 09:06

## 2018-11-27 RX ADMIN — ACETAMINOPHEN 650 MG: 325 TABLET ORAL at 03:59

## 2018-11-27 RX ADMIN — APIXABAN 5 MG: 5 TABLET, FILM COATED ORAL at 09:06

## 2018-11-27 NOTE — PROGRESS NOTES
Discharge Planning Assessment  [facility]     Patient Name: Saul Sal  MRN: [id number]  Today's Date: 11/27/2018    Admit Date: 11/26/2018    Discharge Needs Assessment     Row Name 11/27/18 1136       Living Environment    Lives With  spouse    Name(s) of Who Lives With Patient  Shelia Sal [phone]    Current Living Arrangements  home/apartment/condo Lives in [facility]    Primary Care Provided by  self    Provides Primary Care For  no one    Family Caregiver if Needed  spouse    Family Caregiver Names  Shelia-spouse    Quality of Family Relationships  supportive;involved    Able to Return to Prior Arrangements  yes       Resource/Environmental Concerns    Resource/Environmental Concerns  none    Transportation Concerns  car, none       Transition Planning    Patient/Family Anticipates Transition to  home with family    Patient/Family Anticipated Services at Transition  none    Transportation Anticipated  family or friend will provide       Discharge Needs Assessment    Concerns to be Addressed  no discharge needs identified    Equipment Currently Used at Home  none    Anticipated Changes Related to Illness  none    Equipment Needed After Discharge  none        Discharge Plan     Row Name 11/27/18 1137       Plan    Plan  Home with wife    Patient/Family in Agreement with Plan  yes    Plan Comments  Pt lives with his wife in [facility]. Is independent of ADL's. No needs. CM will cont to follow.     Final Discharge Disposition Code  01 - home or self-care        Destination      No service coordination in this encounter.      Durable Medical Equipment      No service coordination in this encounter.      Dialysis/Infusion      No service coordination in this encounter.      Home Medical Care      No service coordination in this encounter.      Community Resources      No service coordination in this encounter.        Expected Discharge Date and Time     Expected Discharge Date Expected Discharge  Time    Nov 27, 2018         Demographic Summary     Row Name 11/27/18 1134       General Information    Referral Source  admission list    Preferred Language  English     Used During This Interaction  no    General Information Comments  PCP is Saul Campbell       Contact Information    Permission Granted to Share Info With          Functional Status     Row Name 11/27/18 1134       Functional Status    Usual Activity Tolerance  moderate    Current Activity Tolerance  moderate       Functional Status, IADL    Medications  independent    Meal Preparation  independent    Housekeeping  independent    Laundry  independent    Shopping  independent       Employment/    Employment/ Comments  Has [employer] Medicare with no concerns.         Psychosocial    No documentation.       Abuse/Neglect    No documentation.       Legal    No documentation.       Substance Abuse    No documentation.       Patient Forms    No documentation.           Shelia Miles

## 2018-11-27 NOTE — DISCHARGE SUMMARY
Physician Discharge Summary     Patient ID:  Saul Sal  [id number]  78 y.o.  1940    Admit date: 11/26/2018    Discharge date and time: No discharge date for patient encounter.     Admitting Physician: Tino Sampson MD     Primary Physician: Saul Campbell MD    Discharge Physician: Tino Sampson MD    Admission Diagnoses: Typical atrial flutter (CMS/HCC) [I48.3]  Typical atrial flutter (CMS/HCC) [I48.3]    Discharge Diagnoses:   Patient Active Problem List    Diagnosis   • *Typical atrial flutter (CMS/HCC) [I48.3]   • CAD (coronary artery disease) [I25.10]   • Atrial flutter (CMS/HCC) [I48.92]   • Hypertension [I10]   • Hypothyroid [E03.9]   • Dyslipidemia [E78.5]   • ELIE (obstructive sleep apnea) [G47.33]       Cardiology Procedures this admission:    1. Atrial Flutter Ablation    Hospital Course: This is a 78-year-old patient who presented to the emergency room in October 2018.  His found to be in atrial flutter at the time.  He was started on anti-coagulation and rate control.  He reports having ongoing issues with shortness of breath and tachycardia despite medical therapy.  He has symptoms on a near daily basis. As a result he under went an EP study with an Atrial Flutter Ablation        Discharge Exam:     Vitals:    11/27/18 0344   BP: 147/98   Pulse: 90   Resp: 16   Temp: 97.8 °F (36.6 °C)   SpO2: 92%     General-Well Nourished, Well developed  Eyes - PERRLA  Neck- supple, No mass  CV- regular rate and rhythm, no MRG  Lung- clear bilaterally  Abd- soft, +BS  Musc/skel - Norm strength and range of motion  Skin- warm and dry  Neuro - Alert & Oriented x 3, appropriate mood.    Disposition:   Home    Patient Instructions:      Your medication list      CONTINUE taking these medications      Instructions Last Dose Given Next Dose Due   CARDIOVID PLUS PO      Take 6 tablet/day by mouth.       COD LIVER OIL PO      Take 3 tablet/day by mouth.       ELIQUIS 5 MG tablet tablet  Generic drug:   apixaban      Take 1 tablet by mouth 2 (Two) Times a Day.       gabapentin 100 MG capsule  Commonly known as:  NEURONTIN      Take 100 mg by mouth 2 (Two) Times a Day.       levothyroxine 88 MCG tablet  Commonly known as:  SYNTHROID, LEVOTHROID      Take 88 mcg by mouth Daily.       NON FORMULARY      3 tablet/day. Mini amaro supplement       NON FORMULARY      6 tablet/day. cataplex f       NON FORMULARY      CALCIUM LACTATE 1885  6 TABLETS            Referenced discharge instructions provided by nursing for diet and activity.    Follow-up with Dr. Sampson in 3-4 weeks    Signed:  Tino Sampson MD  11/27/2018  8:25 AM

## 2018-11-28 RX ORDER — LISINOPRIL 5 MG/1
5 TABLET ORAL DAILY
Qty: 30 TABLET | Refills: 11 | Status: SHIPPED | OUTPATIENT
Start: 2018-11-28 | End: 2018-12-18 | Stop reason: ALTCHOICE

## 2018-11-28 RX ORDER — METOPROLOL SUCCINATE 25 MG/1
12.5 TABLET, EXTENDED RELEASE ORAL DAILY
Qty: 15 TABLET | Refills: 11 | Status: SHIPPED | OUTPATIENT
Start: 2018-11-28 | End: 2019-02-26

## 2018-12-04 ENCOUNTER — OFFICE VISIT (OUTPATIENT)
Dept: CARDIOLOGY | Facility: HOSPITAL | Age: 78
End: 2018-12-04

## 2018-12-04 ENCOUNTER — HOSPITAL ENCOUNTER (OUTPATIENT)
Dept: CARDIOLOGY | Facility: HOSPITAL | Age: 78
Discharge: HOME | End: 2018-12-04
Admitting: NURSE PRACTITIONER

## 2018-12-04 VITALS
SYSTOLIC BLOOD PRESSURE: 138 MMHG | BODY MASS INDEX: 28.23 KG/M2 | HEART RATE: 80 BPM | RESPIRATION RATE: 18 BRPM | OXYGEN SATURATION: 98 % | DIASTOLIC BLOOD PRESSURE: 88 MMHG | HEIGHT: 73 IN | WEIGHT: 213 LBS | TEMPERATURE: 97.5 F

## 2018-12-04 DIAGNOSIS — I48.3 TYPICAL ATRIAL FLUTTER (HCC): Primary | ICD-10-CM

## 2018-12-04 DIAGNOSIS — I48.3 TYPICAL ATRIAL FLUTTER (HCC): ICD-10-CM

## 2018-12-04 DIAGNOSIS — I25.10 CORONARY ARTERY DISEASE INVOLVING NATIVE CORONARY ARTERY OF NATIVE HEART, ANGINA PRESENCE UNSPECIFIED: ICD-10-CM

## 2018-12-04 DIAGNOSIS — I10 ESSENTIAL HYPERTENSION: ICD-10-CM

## 2018-12-04 DIAGNOSIS — I42.0 DILATED CARDIOMYOPATHY (HCC): ICD-10-CM

## 2018-12-04 PROCEDURE — 99214 OFFICE O/P EST MOD 30 MIN: CPT | Performed by: NURSE PRACTITIONER

## 2018-12-04 PROCEDURE — 93005 ELECTROCARDIOGRAM TRACING: CPT | Performed by: NURSE PRACTITIONER

## 2018-12-04 NOTE — PROGRESS NOTES
[facility]      Encounter Date:12/04/2018     [address] [place] [address]  [unfilled]    1940    Saul Campbell MD    Saul Sal is a 78 y.o. male.      Subjective:     Chief Complaint:  Follow-up (s/p PVA for Atrial Flutter 11/26/18)       HPI     78-year-old male with a history of atrial flutter diagnosed October 2018.  Patient remains symptomatic despite rate control.  No history of CVA TIA.  Patient status post flutter ablation   on 11/27/18    Pt reports worsen dyspnea the first 1-3 days after d/c.  Now improving.  Denies CP, pressure, feelings of palpitations, dizziness, syncope, edema, Dysphagia, dyspepsia, abdominal pain, bruising and insertion sites, melena, hematuria, dysuria.     Patient Active Problem List    Diagnosis Date Noted   • Typical atrial flutter (CMS/HCC) 11/20/2018     Note Last Updated: 11/20/2018     Added automatically from request for surgery [id number]     • CAD (coronary artery disease) 10/19/2018     Note Last Updated: 10/19/2018     · CABG X5 2003     • Atrial flutter (CMS/HCC) 10/19/2018     Note Last Updated: 11/19/2018     · Dx 10/14/18  · Chadsvasc 4 (Age, HTN, CAD)  · Echocardiogram 11-18: EF 30%, mild AR, mild to moderate MR  · MPS 11/16/18: Atrial flutter with AV block EF 28%, medium-sized infarct in the inferior wall and basal inferior lateral wall with no significant ischemia     • Hypertension 10/19/2018   • Hypothyroid 10/19/2018   • Dyslipidemia 10/19/2018   • ELIE (obstructive sleep apnea) 10/19/2018         Past Surgical History:   Procedure Laterality Date   • CARDIAC CATHETERIZATION     • CARDIAC SURGERY      BYPASS X5   • COLONOSCOPY     • CORONARY ARTERY BYPASS GRAFT      x5 [facility] 2003   • EYE SURGERY Right     cataract   • SKIN BIOPSY     • VASECTOMY         Allergies   Allergen Reactions   • Milk-Related Compounds Other (See Comments)     Headache         Current Outpatient Medications:   •   "apixaban (ELIQUIS) 5 MG tablet tablet, Take 1 tablet by mouth 2 (Two) Times a Day., Disp: 60 tablet, Rfl: 0  •  COD LIVER OIL PO, Take 3 tablet/day by mouth., Disp: , Rfl:   •  DHA-EPA-Vit B6-B12-Folic Acid (CARDIOVID PLUS PO), Take 6 tablet/day by mouth., Disp: , Rfl:   •  gabapentin (NEURONTIN) 100 MG capsule, Take 100 mg by mouth 2 (Two) Times a Day., Disp: , Rfl:   •  levothyroxine (SYNTHROID, LEVOTHROID) 88 MCG tablet, Take 88 mcg by mouth Daily., Disp: , Rfl:   •  lisinopril (PRINIVIL,ZESTRIL) 5 MG tablet, Take 1 tablet by mouth Daily., Disp: 30 tablet, Rfl: 11  •  metoprolol succinate XL (TOPROL-XL) 25 MG 24 hr tablet, Take 0.5 tablets by mouth Daily., Disp: 15 tablet, Rfl: 11  •  NON FORMULARY, 3 tablet/day. Mini amaro supplement, Disp: , Rfl:   •  NON FORMULARY, 6 tablet/day. cataplex f, Disp: , Rfl:   •  NON FORMULARY, CALCIUM LACTATE 1885 6 TABLETS, Disp: , Rfl:     The following portions of the patient's history were reviewed and updated as appropriate: allergies, current medications, past family history, past medical history, past social history, past surgical history and problem list.    Review of Systems   Respiratory: Positive for shortness of breath.    All other systems reviewed and are negative.      Objective:     Vitals:    12/04/18 1500   BP: 138/88   BP Location: Left arm   Patient Position: Sitting   Pulse: 80   Resp: 18   Temp: 97.5 °F (36.4 °C)   TempSrc: Temporal   SpO2: 98%   Weight: 96.6 kg (213 lb)   Height: 185.4 cm (72.99\")         Physical Exam   Constitutional: He is oriented to person, place, and time. He appears well-developed and well-nourished. No distress.   HENT:   Head: Normocephalic and atraumatic.   Mouth/Throat: Oropharynx is clear and moist.   Eyes: Conjunctivae are normal. Pupils are equal, round, and reactive to light. No scleral icterus.   Neck: No hepatojugular reflux and no JVD present. Carotid bruit is not present. No tracheal deviation present. No thyromegaly " present.   Cardiovascular: Normal rate, regular rhythm, normal heart sounds and intact distal pulses. Exam reveals no friction rub.   No murmur heard.  Pulmonary/Chest: Effort normal and breath sounds normal.   Abdominal: Soft. Bowel sounds are normal. He exhibits no distension. There is no tenderness.   Musculoskeletal: He exhibits no edema.   Lymphadenopathy:     He has no cervical adenopathy.   Neurological: He is alert and oriented to person, place, and time.   Skin: Skin is warm, dry and intact. No rash noted. No cyanosis or erythema. No pallor.   Psychiatric: He has a normal mood and affect. His behavior is normal. Thought content normal.   Vitals reviewed.      Lab and Diagnostic Review:    Assessment and Plan:         1. Typical atrial flutter (CMS/HCC)  1 wee post flutter ablation    - ECG 12 Lead; ? Atrial flutter with PVC (final read per cardiology, pending)    2. Dilated cardiomyopathy (CMS/HCC)  EF 30 %  Felt tachyinduced.  Recent stress, no LHC per cardiology    Reviewed s/s HF worsening and when to call  Ace  BB    3. Essential hypertension  Continue to monitor    4. Coronary artery disease involving native coronary artery of native heart, angina presence unspecified    F/u 3 months or sooner if needed.          *Please note that portions of this note were completed with a voice recognition program. Efforts were made to edit the dictations, but occasionally words are mistranscribed.

## 2018-12-18 ENCOUNTER — OFFICE VISIT (OUTPATIENT)
Dept: CARDIOLOGY | Facility: CLINIC | Age: 78
End: 2018-12-18

## 2018-12-18 VITALS
HEART RATE: 86 BPM | BODY MASS INDEX: 30.67 KG/M2 | WEIGHT: 231.4 LBS | HEIGHT: 73 IN | DIASTOLIC BLOOD PRESSURE: 90 MMHG | SYSTOLIC BLOOD PRESSURE: 142 MMHG

## 2018-12-18 DIAGNOSIS — I25.10 CORONARY ARTERY DISEASE INVOLVING NATIVE CORONARY ARTERY OF NATIVE HEART, ANGINA PRESENCE UNSPECIFIED: ICD-10-CM

## 2018-12-18 DIAGNOSIS — I48.3 TYPICAL ATRIAL FLUTTER (HCC): Primary | ICD-10-CM

## 2018-12-18 DIAGNOSIS — I10 ESSENTIAL HYPERTENSION: ICD-10-CM

## 2018-12-18 PROCEDURE — 99214 OFFICE O/P EST MOD 30 MIN: CPT | Performed by: INTERNAL MEDICINE

## 2018-12-18 RX ORDER — LOSARTAN POTASSIUM 25 MG/1
25 TABLET ORAL DAILY
Qty: 30 TABLET | Refills: 5 | Status: SHIPPED | OUTPATIENT
Start: 2018-12-18 | End: 2019-02-26

## 2018-12-18 NOTE — PROGRESS NOTES
Saul Sal  1940  PCP: Saul Campbell MD    SUBJECTIVE:   Saul Sal is a 78 y.o. male seen for a follow up visit regarding the following:     Chief Complaint: Follow up for typical atrial flutter     HPI:    Since last visit the patient has continued to have shortness of breath and occasional tachycardia since his RFA. His EF was noted to be 30% on echo when he was in atrial flutter. No CP, syncope, or LE edema     History:     This is a 78-year-old patient who presented to the emergency room in October 2018.  His found to be in atrial flutter at the time.  He was started on anti-coagulation and rate control.  He reports having ongoing issues with shortness of breath and tachycardia despite medical therapy.  He has symptoms on a near daily basis.        Cardiac PMH: (Old records have been reviewed and summarized below)    1.  Coronary artery disease-status post CABG  2.  Atrial flutter  3.  Nuclear Stress Test - Nov 2018 - Left ventricular ejection fraction is severely reduced (calculated EF = 28%); Myocardial perfusion imaging indicates a medium-sized infarct located in the inferior wall and basal inferior lateral wall with no significant ischemia noted   4. RFA of Typical Atrial Flutter 11/26/18             Current Outpatient Medications:   •  apixaban (ELIQUIS) 5 MG tablet tablet, Take 1 tablet by mouth 2 (Two) Times a Day., Disp: 60 tablet, Rfl: 0  •  COD LIVER OIL PO, Take 3 tablet/day by mouth Daily., Disp: , Rfl:   •  DHA-EPA-Vit B6-B12-Folic Acid (CARDIOVID PLUS PO), Take 6 tablet/day by mouth., Disp: , Rfl:   •  levothyroxine (SYNTHROID, LEVOTHROID) 88 MCG tablet, Take 88 mcg by mouth Daily., Disp: , Rfl:   •  metoprolol succinate XL (TOPROL-XL) 25 MG 24 hr tablet, Take 0.5 tablets by mouth Daily. (Patient taking differently: Take 12.5 mg by mouth As Needed.), Disp: 15 tablet, Rfl: 11  •  NON FORMULARY, 3 tablet/day. Mini amaro supplement, Disp: , Rfl:   •  NON FORMULARY, 6 tablet/day.  "cataplex f, Disp: , Rfl:   •  NON FORMULARY, CALCIUM LACTATE 1885 6 TABLETS, Disp: , Rfl:   •  losartan (COZAAR) 25 MG tablet, Take 1 tablet by mouth Daily., Disp: 30 tablet, Rfl: 5    Past Medical History, Past Surgical History, Family history, Social History, and Medications were all reviewed with the patient today and updated as necessary.       Patient Active Problem List   Diagnosis   • CAD (coronary artery disease)   • Atrial flutter (CMS/HCC)   • Hypertension   • Hypothyroid   • Dyslipidemia   • ELIE (obstructive sleep apnea)   • Typical atrial flutter (CMS/HCC)     Allergies   Allergen Reactions   • Milk-Related Compounds Other (See Comments)     Headache     Past Medical History:   Diagnosis Date   • Arthritis    • Atrial flutter (CMS/HCC)    • CHF (congestive heart failure) (CMS/HCC)    • Coronary artery disease    • Disease of thyroid gland    • GI bleed    • Hyperlipidemia    • Hypertension    • Shingles    • Skin cancer     squamous    • Wears eyeglasses     readers   • Wears hearing aid      Past Surgical History:   Procedure Laterality Date   • CARDIAC CATHETERIZATION     • CARDIAC SURGERY      BYPASS X5   • COLONOSCOPY     • CORONARY ARTERY BYPASS GRAFT      x5 [facility]    • EYE SURGERY Right     cataract   • SKIN BIOPSY     • VASECTOMY       Family History   Problem Relation Age of Onset   • Diabetes Mother    • Heart disease Mother      Social History     Tobacco Use   • Smoking status: Former Smoker     Years: 8.00     Types: Cigarettes     Last attempt to quit: 1964     Years since quittin.1   • Smokeless tobacco: Never Used   Substance Use Topics   • Alcohol use: No         PHYSICAL EXAM:    /90 (BP Location: Left arm, Patient Position: Sitting)   Pulse 86   Ht 185.4 cm (73\")   Wt 105 kg (231 lb 6.4 oz)   BMI 30.53 kg/m²        Wt Readings from Last 5 Encounters:   18 105 kg (231 lb 6.4 oz)   18 96.6 kg (213 lb)   18 107 kg (235 lb 3.2 oz)   18 " 106 kg (233 lb)   11/16/18 107 kg (235 lb 14.3 oz)       BP Readings from Last 5 Encounters:   12/18/18 142/90   12/04/18 138/88   11/27/18 128/90   11/20/18 (!) 164/108   11/16/18 140/82       General-Well Nourished, Well developed  Eyes - PERRLA  Neck- supple, No mass  CV- regular rate and rhythm, no MRG, No edema  Lung- clear bilaterally  Abd- soft, +BS  Musc/skel - Norm strength and range of motion  Skin- warm and dry  Neuro - Alert & Oriented x 3, appropriate mood.        Medical problems and test results were reviewed with the patient today.     No results found for this or any previous visit (from the past 672 hour(s)).      EKG: (EKG has been independently visualized by me and summarized below)    Procedures     ASSESSMENT and PLAN    1. Atrial Flutter- s/p RFA of typical appearing aflutter. Patient still reporting occasional episodes of tachycardia. Will place Zio patch to assess for continued atrial arrhythmias. Continue Eliquis 5mg BID   2. Chronic Systolic heart failure - Recheck ECHO. Continue BB and ACE/ARB.  3. CAD/CABG - Followed by Dr. Liang in the past.   4. HTN - will change Lisinopril to Losartan with reports of cough.           Return in about 6 weeks (around 1/29/2019).        Shelia Patel PA-C  Cardiology/Electrophysiology  12/18/2018  1:52 PM

## 2019-01-16 ENCOUNTER — LAB REQUISITION (OUTPATIENT)
Dept: LAB | Facility: HOSPITAL | Age: 79
End: 2019-01-16

## 2019-01-16 ENCOUNTER — OFFICE VISIT (OUTPATIENT)
Dept: INTERNAL MEDICINE | Facility: CLINIC | Age: 79
End: 2019-01-16

## 2019-01-16 VITALS
WEIGHT: 231 LBS | DIASTOLIC BLOOD PRESSURE: 90 MMHG | TEMPERATURE: 98 F | SYSTOLIC BLOOD PRESSURE: 172 MMHG | HEART RATE: 72 BPM | BODY MASS INDEX: 30.62 KG/M2 | HEIGHT: 73 IN

## 2019-01-16 DIAGNOSIS — I10 BENIGN ESSENTIAL HYPERTENSION: ICD-10-CM

## 2019-01-16 DIAGNOSIS — I48.3 TYPICAL ATRIAL FLUTTER (HCC): ICD-10-CM

## 2019-01-16 DIAGNOSIS — G47.33 OSA (OBSTRUCTIVE SLEEP APNEA): ICD-10-CM

## 2019-01-16 DIAGNOSIS — Z00.00 ROUTINE GENERAL MEDICAL EXAMINATION AT A HEALTH CARE FACILITY: ICD-10-CM

## 2019-01-16 DIAGNOSIS — E03.9 HYPOTHYROIDISM (ACQUIRED): ICD-10-CM

## 2019-01-16 DIAGNOSIS — I25.10 CORONARY ARTERY DISEASE INVOLVING NATIVE CORONARY ARTERY OF NATIVE HEART, ANGINA PRESENCE UNSPECIFIED: Primary | ICD-10-CM

## 2019-01-16 DIAGNOSIS — I10 ESSENTIAL HYPERTENSION: ICD-10-CM

## 2019-01-16 DIAGNOSIS — E78.2 MIXED HYPERLIPIDEMIA: ICD-10-CM

## 2019-01-16 LAB
ALBUMIN SERPL-MCNC: 4.68 G/DL (ref 3.2–4.8)
ALBUMIN/GLOB SERPL: 2.2 G/DL (ref 1.5–2.5)
ALP SERPL-CCNC: 55 U/L (ref 25–100)
ALT SERPL W P-5'-P-CCNC: 22 U/L (ref 7–40)
ANION GAP SERPL CALCULATED.3IONS-SCNC: 8 MMOL/L (ref 3–11)
ARTICHOKE IGE QN: 159 MG/DL (ref 0–130)
AST SERPL-CCNC: 28 U/L (ref 0–33)
BILIRUB SERPL-MCNC: 0.7 MG/DL (ref 0.3–1.2)
BUN BLD-MCNC: 21 MG/DL (ref 9–23)
BUN/CREAT SERPL: 18.4 (ref 7–25)
CALCIUM SPEC-SCNC: 9.6 MG/DL (ref 8.7–10.4)
CHLORIDE SERPL-SCNC: 103 MMOL/L (ref 99–109)
CHOLEST SERPL-MCNC: 206 MG/DL (ref 0–200)
CO2 SERPL-SCNC: 27 MMOL/L (ref 20–31)
CREAT BLD-MCNC: 1.14 MG/DL (ref 0.6–1.3)
GFR SERPL CREATININE-BSD FRML MDRD: 62 ML/MIN/1.73
GLOBULIN UR ELPH-MCNC: 2.1 GM/DL
GLUCOSE BLD-MCNC: 98 MG/DL (ref 70–100)
HDLC SERPL-MCNC: 41 MG/DL (ref 40–60)
POTASSIUM BLD-SCNC: 5.2 MMOL/L (ref 3.5–5.5)
PROT SERPL-MCNC: 6.8 G/DL (ref 5.7–8.2)
SODIUM BLD-SCNC: 138 MMOL/L (ref 132–146)
TRIGL SERPL-MCNC: 184 MG/DL (ref 0–150)

## 2019-01-16 PROCEDURE — 80053 COMPREHEN METABOLIC PANEL: CPT | Performed by: INTERNAL MEDICINE

## 2019-01-16 PROCEDURE — 80061 LIPID PANEL: CPT | Performed by: INTERNAL MEDICINE

## 2019-01-16 PROCEDURE — 36415 COLL VENOUS BLD VENIPUNCTURE: CPT | Performed by: INTERNAL MEDICINE

## 2019-01-16 PROCEDURE — 99214 OFFICE O/P EST MOD 30 MIN: CPT | Performed by: INTERNAL MEDICINE

## 2019-01-16 RX ORDER — LISINOPRIL 5 MG/1
1 TABLET ORAL DAILY
COMMUNITY
Start: 2018-11-29 | End: 2019-02-26

## 2019-01-16 NOTE — PROGRESS NOTES
[facility]     Saul GALLEGOS Doron  1940   [id number]      Patient Care Team:  Saul Campbell MD as PCP - General (Internal Medicine)    Chief Complaint::   Chief Complaint   Patient presents with   • Follow-up     fasting lab             HPI  Patient is a 79-year-old male presents presents for follow-up of his atrial flutter and symptomatic herpes zoster left hip and underwent an ablation at [facility] by Dr. Colunga on 18 December for atrial flutter he's now and sinus rhythm.  The patient developed herpes zoster left hip was treated with acyclovir developed some post herpetic neuralgia that is improved he's no longer on medication.  Does have some mild scarring but that is improved.    Chronic Conditions:  Patient's chronic conditions were discussed including atrial flutter which is now sinus rhythm after ablation coronary disease is hypertension hypothyroidism and his mixed hyperlipidemia which she is not on therapy    Patient Active Problem List   Diagnosis   • CAD (coronary artery disease)   • Atrial flutter (CMS/HCC)   • Hypertension   • Hypothyroid   • Dyslipidemia   • ELIE (obstructive sleep apnea)   • Typical atrial flutter (CMS/HCC)   • Hypothyroidism (acquired)   • Benign essential hypertension   • Mixed hyperlipidemia   • Obesity (BMI 30-39.9)        Past Medical History:   Diagnosis Date   • Arthritis    • Atrial flutter (CMS/HCC)    • Benign essential hypertension    • CHF (congestive heart failure) (CMS/HCC)    • Coronary artery disease    • Disease of thyroid gland    • GI bleed    • Hyperlipidemia    • Hypertension    • Hypothyroidism (acquired)    • Mixed hyperlipidemia    • Obesity (BMI 30-39.9)    • Shingles    • Skin cancer     squamous    • Wears eyeglasses     readers   • Wears hearing aid        Past Surgical History:   Procedure Laterality Date   • ANGIOPLASTY  1985   • CARDIAC CATHETERIZATION     • CARDIAC ELECTROPHYSIOLOGY PROCEDURE N/A 11/26/2018    Procedure:  Ablation atrial flutter;  Surgeon: Tino Sampson MD;  Location: [facility] INVASIVE LOCATION;  Service: Cardiovascular   • CARDIAC SURGERY      BYPASS X5   • COLONOSCOPY     • CORONARY [facility] GRAFT  2003    [facility]   • DENTAL PROCEDURE      dental surg and crowns   • EYE SURGERY Right     cataract   • SKIN BIOPSY     • VASECTOMY         Family History   Problem Relation Age of Onset   • Diabetes Mother    • Heart disease Mother    • Coronary artery disease Other    • Stroke Other    • Diabetes Other    • Rheumatic fever Other    • Other Other         Valvular CV Disease       Social History     Socioeconomic History   • Marital status:      Spouse name: Not on file   • Number of children: Not on file   • Years of education: Not on file   • Highest education level: Not on file   Social Needs   • Financial resource strain: Not hard at all   • Food insecurity - worry: Never true   • Food insecurity - inability: Never true   • Transportation needs - medical: No   • Transportation needs - non-medical: No   Occupational History   • Not on file   Tobacco Use   • Smoking status: Former Smoker     Years: 8.00     Types: Cigarettes     Last attempt to quit: 1964     Years since quittin.1   • Smokeless tobacco: Never Used   Substance and Sexual Activity   • Alcohol use: No   • Drug use: No   • Sexual activity: Defer   Other Topics Concern   • Not on file   Social History Narrative    Caffeine: None    Patient lives at his home with   His wife       Allergies   Allergen Reactions   • Amlodipine Besylate Unknown (See Comments)     unknown   • Atenolol Unknown (See Comments)     unknown   • Metoprolol Unknown (See Comments)     unknown   • Milk-Related Compounds Other (See Comments)     Headache       Review of Systems    HEENT: Denies headache dizziness lightheadedness ear nose mouth or throat trouble  NECK:  Denies dysphagia tenderness or soreness  CHEST: Denies cough or wheeze is  "mildly short of breath with extended effort  CARDIAC: Denies chest pain or pressure remote stenting and remote abdomen ablation for atrial flutter currently in sinus rhythm denies chest pain pressure tightness or palpitations.  ABD: Denies nausea vomiting constipation or diarrhea  : Denies dysuria  NEURO:  Denies syncope or paresthesias  PSYCH:  Stable  EXTREM: Mild arthritic change some of edema    Vital Signs  Vitals:    01/16/19 0929   BP: 172/90   BP Location: Left arm   Patient Position: Sitting   Cuff Size: Adult   Pulse: 72   Temp: 98 °F (36.7 °C)   TempSrc: Temporal   Weight: 105 kg (231 lb)   Height: 185.4 cm (72.99\")   PainSc: 0-No pain         Current Outpatient Medications:   •  apixaban (ELIQUIS) 5 MG tablet tablet, Take 1 tablet by mouth 2 (Two) Times a Day., Disp: 60 tablet, Rfl: 0  •  COD LIVER OIL PO, Take 3 tablet/day by mouth Daily., Disp: , Rfl:   •  DHA-EPA-Vit B6-B12-Folic Acid (CARDIOVID PLUS PO), Take 6 tablet/day by mouth., Disp: , Rfl:   •  levothyroxine (SYNTHROID, LEVOTHROID) 88 MCG tablet, Take 88 mcg by mouth Daily., Disp: , Rfl:   •  lisinopril (PRINIVIL,ZESTRIL) 5 MG tablet, Take 1 tablet by mouth Daily., Disp: , Rfl:   •  metoprolol succinate XL (TOPROL-XL) 25 MG 24 hr tablet, Take 0.5 tablets by mouth Daily. (Patient taking differently: Take 12.5 mg by mouth As Needed.), Disp: 15 tablet, Rfl: 11  •  NON FORMULARY, 3 tablet/day. Mini amaro supplement, Disp: , Rfl:   •  NON FORMULARY, 6 tablet/day. cataplex f, Disp: , Rfl:   •  NON FORMULARY, CALCIUM LACTATE 1885 6 TABLETS, Disp: , Rfl:   •  losartan (COZAAR) 25 MG tablet, Take 1 tablet by mouth Daily., Disp: 30 tablet, Rfl: 5    Physical Exam:  HEENT: No asymmetry pharynx is clear  NECK:  No masses or bruit  CHEST: Distant breath sounds but clear to P&A without rales wheezes or rhonchi  CARDIAC: Regular sinus rhythm soft systolic murmur no gallop or rub  ABD: Obese liver spleen normal  : Deferred  NEURO:  Intact  PSYCH:  " Normal  EXTREM: Mild arthritic change left hip healing scars of herpes zoster  Skin: Left hip and flank healing scars of herpes zoster     Results Review:    Fasting lab of CMP and lipid are pending  Procedures    Medication Review: Medications reviewed and noted    Patient wellness counseling  Exercise: Patient is physically active he does not exercise on a regular daily basis encouraged to do so  Diet: Encouraged more of a heart healthy diet with reduced carbs and weight loss  Smoking: Nonsmoker  Alcohol: Nonalcohol  Screening: Screening done    Assessment/Plan:  #1 history of coronary disease and history of atrial flutter having recently undergone an ablation December 18 now in sinus rhythm without symptoms  #2 essential hypertension blood pressure repeated 138/86 and 132/82 no change therapy  #3 hypothyroidism stable on therapy  #4 mixed hyperlipidemia not on therapy fasting lab pending  #5 overweight obese suggest diet and weight loss with BMI 30.5.  #6 resolved herpes zoster infection left flank and hip with residual scars no evidence of postherpetic neuropathy.    Plan fasting lab pending of CMP and lipid notify results continue therapy exercise weight loss suggested see the patient back in 6 months or when necessary.  Plan of care reviewed with patient at the conclusion of today's visit. Education was provided regarding diagnosis, management, and any prescribed or recommended OTC medications.Patient verbalizes understanding of and agreement with management plan.         Saul Campbell MD

## 2019-02-05 ENCOUNTER — APPOINTMENT (OUTPATIENT)
Dept: CARDIOLOGY | Facility: HOSPITAL | Age: 79
End: 2019-02-05

## 2019-02-12 ENCOUNTER — HOSPITAL ENCOUNTER (OUTPATIENT)
Dept: CARDIOLOGY | Facility: HOSPITAL | Age: 79
Discharge: HOME | End: 2019-02-12
Admitting: PHYSICIAN ASSISTANT

## 2019-02-12 VITALS — HEIGHT: 72 IN | WEIGHT: 231 LBS | BODY MASS INDEX: 31.29 KG/M2

## 2019-02-12 DIAGNOSIS — I48.3 TYPICAL ATRIAL FLUTTER (HCC): ICD-10-CM

## 2019-02-12 PROCEDURE — 93306 TTE W/DOPPLER COMPLETE: CPT | Performed by: INTERNAL MEDICINE

## 2019-02-12 PROCEDURE — 93306 TTE W/DOPPLER COMPLETE: CPT

## 2019-02-18 LAB
BH CV ECHO MEAS - AO ROOT AREA (BSA CORRECTED): 1.9
BH CV ECHO MEAS - AO ROOT AREA: 14.9 CM^2
BH CV ECHO MEAS - AO ROOT DIAM: 4.3 CM
BH CV ECHO MEAS - BSA(HAYCOCK): 2.3 M^2
BH CV ECHO MEAS - BSA: 2.3 M^2
BH CV ECHO MEAS - BZI_BMI: 31.3 KILOGRAMS/M^2
BH CV ECHO MEAS - BZI_METRIC_HEIGHT: 182.9 CM
BH CV ECHO MEAS - BZI_METRIC_WEIGHT: 104.8 KG
BH CV ECHO MEAS - EDV(CUBED): 232.2 ML
BH CV ECHO MEAS - EDV(MOD-SP2): 194 ML
BH CV ECHO MEAS - EDV(MOD-SP4): 239 ML
BH CV ECHO MEAS - EDV(TEICH): 190.2 ML
BH CV ECHO MEAS - EF(CUBED): 0.63 %
BH CV ECHO MEAS - EF(MOD-BP): 24 %
BH CV ECHO MEAS - EF(MOD-SP2): 28.4 %
BH CV ECHO MEAS - EF(MOD-SP4): 21.3 %
BH CV ECHO MEAS - EF(TEICH): 0.48 %
BH CV ECHO MEAS - ESV(CUBED): 230.7 ML
BH CV ECHO MEAS - ESV(MOD-SP2): 139 ML
BH CV ECHO MEAS - ESV(MOD-SP4): 188 ML
BH CV ECHO MEAS - ESV(TEICH): 189.3 ML
BH CV ECHO MEAS - FS: 0.21 %
BH CV ECHO MEAS - IVS/LVPW: 0.77
BH CV ECHO MEAS - IVSD: 1.1 CM
BH CV ECHO MEAS - LA DIMENSION: 4.1 CM
BH CV ECHO MEAS - LA/AO: 0.93
BH CV ECHO MEAS - LAD MAJOR: 5.8 CM
BH CV ECHO MEAS - LAT PEAK E' VEL: 5.4 CM/SEC
BH CV ECHO MEAS - LATERAL E/E' RATIO: 9.3
BH CV ECHO MEAS - LV DIASTOLIC VOL/BSA (35-75): 105.5 ML/M^2
BH CV ECHO MEAS - LV MASS(C)D: 336.4 GRAMS
BH CV ECHO MEAS - LV MASS(C)DI: 148.5 GRAMS/M^2
BH CV ECHO MEAS - LV MAX PG: 1.4 MMHG
BH CV ECHO MEAS - LV MEAN PG: 0.81 MMHG
BH CV ECHO MEAS - LV SYSTOLIC VOL/BSA (12-30): 83 ML/M^2
BH CV ECHO MEAS - LV V1 MAX: 59.7 CM/SEC
BH CV ECHO MEAS - LV V1 MEAN: 42.8 CM/SEC
BH CV ECHO MEAS - LV V1 VTI: 11.8 CM
BH CV ECHO MEAS - LVIDD: 6.1 CM
BH CV ECHO MEAS - LVIDS: 6.1 CM
BH CV ECHO MEAS - LVLD AP2: 8.9 CM
BH CV ECHO MEAS - LVLD AP4: 9.8 CM
BH CV ECHO MEAS - LVLS AP2: 8.1 CM
BH CV ECHO MEAS - LVLS AP4: 9.3 CM
BH CV ECHO MEAS - LVOT AREA (M): 6.2 CM^2
BH CV ECHO MEAS - LVOT AREA: 6.1 CM^2
BH CV ECHO MEAS - LVOT DIAM: 2.8 CM
BH CV ECHO MEAS - LVPWD: 1.4 CM
BH CV ECHO MEAS - MED PEAK E' VEL: 4.4 CM/SEC
BH CV ECHO MEAS - MEDIAL E/E' RATIO: 11.3
BH CV ECHO MEAS - MV A MAX VEL: 79 CM/SEC
BH CV ECHO MEAS - MV DEC TIME: 0.18 SEC
BH CV ECHO MEAS - MV E MAX VEL: 51.3 CM/SEC
BH CV ECHO MEAS - MV E/A: 0.65
BH CV ECHO MEAS - PA ACC SLOPE: 542 CM/SEC^2
BH CV ECHO MEAS - PA ACC TIME: 0.11 SEC
BH CV ECHO MEAS - PA PR(ACCEL): 31.5 MMHG
BH CV ECHO MEAS - PI END-D VEL: 141 CM/SEC
BH CV ECHO MEAS - RAP SYSTOLE: 8 MMHG
BH CV ECHO MEAS - RVDD: 3.6 CM
BH CV ECHO MEAS - RVSP: 32 MMHG
BH CV ECHO MEAS - SI(CUBED): 0.65 ML/M^2
BH CV ECHO MEAS - SI(LVOT): 31.8 ML/M^2
BH CV ECHO MEAS - SI(MOD-SP2): 24.3 ML/M^2
BH CV ECHO MEAS - SI(MOD-SP4): 22.5 ML/M^2
BH CV ECHO MEAS - SI(TEICH): 0.4 ML/M^2
BH CV ECHO MEAS - SV(CUBED): 1.5 ML
BH CV ECHO MEAS - SV(LVOT): 71.9 ML
BH CV ECHO MEAS - SV(MOD-SP2): 55 ML
BH CV ECHO MEAS - SV(MOD-SP4): 51 ML
BH CV ECHO MEAS - SV(TEICH): 0.91 ML
BH CV ECHO MEAS - TAPSE (>1.6): 1 CM2
BH CV ECHO MEAS - TR MAX PG: 24 MMHG
BH CV ECHO MEAS - TR MAX VEL: 244.3 CM/SEC
BH CV ECHO MEASUREMENTS AVERAGE E/E' RATIO: 10.47
BH CV VAS BP LEFT ARM: NORMAL MMHG
BH CV XLRA - RV BASE: 5.4 CM
BH CV XLRA - RV LENGTH: 7.6 CM
BH CV XLRA - RV MID: 3.8 CM
BH CV XLRA - TDI S': 12 CM/SEC
LEFT ATRIUM VOLUME INDEX: 28.7 ML/M^2
LEFT ATRIUM VOLUME: 65 ML
MAXIMAL PREDICTED HEART RATE: 140 BPM
STRESS TARGET HR: 119 BPM

## 2019-02-26 ENCOUNTER — OFFICE VISIT (OUTPATIENT)
Dept: CARDIOLOGY | Facility: CLINIC | Age: 79
End: 2019-02-26

## 2019-02-26 VITALS
BODY MASS INDEX: 31.01 KG/M2 | WEIGHT: 234 LBS | SYSTOLIC BLOOD PRESSURE: 160 MMHG | HEART RATE: 69 BPM | DIASTOLIC BLOOD PRESSURE: 80 MMHG | HEIGHT: 73 IN | OXYGEN SATURATION: 96 %

## 2019-02-26 DIAGNOSIS — I50.22 CHRONIC SYSTOLIC CONGESTIVE HEART FAILURE (HCC): ICD-10-CM

## 2019-02-26 DIAGNOSIS — I48.19 PERSISTENT ATRIAL FIBRILLATION (HCC): Primary | ICD-10-CM

## 2019-02-26 PROCEDURE — 99214 OFFICE O/P EST MOD 30 MIN: CPT | Performed by: INTERNAL MEDICINE

## 2019-02-26 RX ORDER — LISINOPRIL 10 MG/1
10 TABLET ORAL DAILY
Qty: 90 TABLET | Refills: 3 | Status: SHIPPED | OUTPATIENT
Start: 2019-02-26 | End: 2019-04-08 | Stop reason: DRUGHIGH

## 2019-02-26 RX ORDER — AMIODARONE HYDROCHLORIDE 200 MG/1
200 TABLET ORAL 2 TIMES DAILY
Qty: 60 TABLET | Refills: 5 | Status: SHIPPED | OUTPATIENT
Start: 2019-02-26 | End: 2019-07-17

## 2019-02-26 RX ORDER — METOPROLOL SUCCINATE 25 MG/1
25 TABLET, EXTENDED RELEASE ORAL DAILY
Qty: 30 TABLET | Refills: 11 | Status: SHIPPED | OUTPATIENT
Start: 2019-02-26 | End: 2019-07-17

## 2019-02-26 NOTE — PROGRESS NOTES
Saul Sal  1940  PCP: Saul Campbell MD    SUBJECTIVE:   Saul Sal is a 79 y.o. male seen for a consultation visit regarding the following:     Chief Complaint:   Chief Complaint   Patient presents with   • Atrial Flutter        HPI:  Patient has been stable from tachycardia but having periods of SOB/LUNDBERG    History:  This is a 79-year-old patient who presented to the emergency room in October 2018.  His found to be in atrial flutter at the time.  He was started on anti-coagulation and rate control.  He reports having ongoing issues with shortness of breath and tachycardia despite medical therapy.  He has symptoms on a near daily basis.      Cardiac PMH: (Old records have been reviewed and summarized below)  1.  Coronary artery disease-status post CABG  2.  Atrial flutter  3.  Nuclear Stress Test - Nov 2018 - Left ventricular ejection fraction is severely reduced (calculated EF = 28%); Myocardial perfusion imaging indicates a medium-sized infarct located in the inferior wall and basal inferior lateral wall with no significant ischemia noted   4.  Monitor - Dec 2018 - 63% A. Fib [place] -   5.  ECHO - Dec 2018 - Estimated EF appears to be in the range of 21 - 25%    Past Medical History, Past Surgical History, Family history, Social History, and Medications were all reviewed with the patient today and updated as necessary.       Current Outpatient Medications:   •  aspirin 81 MG tablet, Take 81 mg by mouth Daily., Disp: , Rfl:   •  COD LIVER OIL PO, Take 3 tablet/day by mouth Daily., Disp: , Rfl:   •  DHA-EPA-Vit B6-B12-Folic Acid (CARDIOVID PLUS PO), Take 6 tablet/day by mouth., Disp: , Rfl:   •  levothyroxine (SYNTHROID, LEVOTHROID) 88 MCG tablet, Take 88 mcg by mouth Daily., Disp: , Rfl:   •  NON FORMULARY, 3 tablet/day. Mini amaro supplement, Disp: , Rfl:   •  NON FORMULARY, 3 tablet/day. cataplex f/b, Disp: , Rfl:   •  amiodarone (PACERONE) 200 MG tablet, Take 1 tablet by mouth 2 (Two) Times a Day.,  Disp: 60 tablet, Rfl: 5  •  lisinopril (PRINIVIL,ZESTRIL) 10 MG tablet, Take 1 tablet by mouth Daily., Disp: 90 tablet, Rfl: 3  •  metoprolol succinate XL (TOPROL-XL) 25 MG 24 hr tablet, Take 1 tablet by mouth Daily., Disp: 30 tablet, Rfl: 11    Allergies   Allergen Reactions   • Amlodipine Besylate Unknown (See Comments)     unknown   • Atenolol Unknown (See Comments)     unknown   • Eliquis [Apixaban] Other (See Comments)     Excessive bleeding   • Metoprolol Unknown (See Comments)     unknown   • Milk-Related Compounds Other (See Comments)     Headache         Past Medical History:   Diagnosis Date   • Arthritis    • Atrial flutter (CMS/HCC)    • Benign essential hypertension    • CHF (congestive heart failure) (CMS/HCC)    • Coronary artery disease    • Disease of thyroid gland    • GI bleed    • Hyperlipidemia    • Hypertension    • Hypothyroidism (acquired)    • Mixed hyperlipidemia    • Obesity (BMI 30-39.9)    • Shingles    • Skin cancer     squamous    • Wears eyeglasses     readers   • Wears hearing aid      Past Surgical History:   Procedure Laterality Date   • ANGIOPLASTY  1985   • CARDIAC CATHETERIZATION     • CARDIAC ELECTROPHYSIOLOGY PROCEDURE N/A 11/26/2018    Procedure: Ablation atrial flutter;  Surgeon: Tino Sampson MD;  Location: [facility] INVASIVE LOCATION;  Service: Cardiovascular   • CARDIAC SURGERY      BYPASS X5   • COLONOSCOPY     • CORONARY ARTERY BYPASS GRAFT  2003    x5 [facility] 2003   • DENTAL PROCEDURE  2010    dental surg and crowns   • EYE SURGERY Right     cataract   • SKIN BIOPSY     • VASECTOMY       Family History   Problem Relation Age of Onset   • Diabetes Mother    • Heart disease Mother    • Coronary artery disease Other    • Stroke Other    • Diabetes Other    • Rheumatic fever Other    • Other Other         Valvular CV Disease     Social History     Tobacco Use   • Smoking status: Former Smoker     Years: 8.00     Types: Cigarettes     Last attempt to quit:  "1964     Years since quittin.3   • Smokeless tobacco: Never Used   Substance Use Topics   • Alcohol use: No           PHYSICAL EXAM:   /80 (BP Location: Right arm, Patient Position: Sitting)   Pulse 69   Ht 185.4 cm (73\")   Wt 106 kg (234 lb)   SpO2 96%   BMI 30.87 kg/m²      Wt Readings from Last 5 Encounters:   19 106 kg (234 lb)   19 105 kg (231 lb)   19 105 kg (231 lb)   18 105 kg (231 lb 6.4 oz)   18 96.6 kg (213 lb)     BP Readings from Last 5 Encounters:   19 160/80   19 172/90   18 142/90   18 138/88   18 128/90       General-Well Nourished, Well developed  Eyes - PERRLA  Neck- supple, No mass  CV- Tachycardia rate and rhythm, no MRG  Lung- clear bilaterally  Abd- soft, +BS  Musc/skel - Norm strength and range of motion  Skin- warm and dry  Neuro - Alert & Oriented x 3, appropriate mood.    Medical problems and test results were reviewed with the patient today.     Results for orders placed or performed during the hospital encounter of 19   Adult Transthoracic Echo Complete W/ Cont if Necessary Per Protocol   Result Value Ref Range    BSA 2.3 m^2     CV ECHO MARIA GUADALUPE - RVDD 3.6 cm    IVSd 1.1 cm    LVIDd 6.1 cm    LVIDs 6.1 cm    LVPWd 1.4 cm    IVS/LVPW 0.77     FS 0.21 %    EDV(Teich) 190.2 ml    ESV(Teich) 189.3 ml    EF(Teich) 0.48 %    EDV(cubed) 232.2 ml    ESV(cubed) 230.7 ml    EF(cubed) 0.63 %    LV mass(C)d 336.4 grams    LV mass(C)dI 148.5 grams/m^2    SV(Teich) 0.91 ml    SI(Teich) 0.4 ml/m^2    SV(cubed) 1.5 ml    SI(cubed) 0.65 ml/m^2    Ao root diam 4.3 cm    Ao root area 14.9 cm^2    LA dimension 4.1 cm    LA/Ao 0.93     LVOT diam 2.8 cm    LVOT area 6.1 cm^2    LVOT area(traced) 6.2 cm^2    LAd major 5.8 cm    LVLd ap4 9.8 cm    EDV(MOD-sp4) 239.0 ml    LVLs ap4 9.3 cm    ESV(MOD-sp4) 188.0 ml    EF(MOD-sp4) 21.3 %    LVLd ap2 8.9 cm    EDV(MOD-sp2) 194.0 ml    LVLs ap2 8.1 cm    ESV(MOD-sp2) 139.0 ml    " EF(MOD-sp2) 28.4 %    LA volume 65.0 ml    EF(MOD-bp) 24.0 %    SV(MOD-sp4) 51.0 ml    SI(MOD-sp4) 22.5 ml/m^2    SV(MOD-sp2) 55.0 ml    SI(MOD-sp2) 24.3 ml/m^2    Ao root area (BSA corrected) 1.9     LV Forrester Vol (BSA corrected) 105.5 ml/m^2    LV Sys Vol (BSA corrected) 83.0 ml/m^2    LA Volume Index 28.7 ml/m^2    MV E max cornell 51.3 cm/sec    MV A max cornell 79.0 cm/sec    MV E/A 0.65     MV dec time 0.18 sec    LV V1 max PG 1.4 mmHg    LV V1 mean PG 0.81 mmHg    LV V1 max 59.7 cm/sec    LV V1 mean 42.8 cm/sec    LV V1 VTI 11.8 cm    SV(LVOT) 71.9 ml    SI(LVOT) 31.8 ml/m^2    PA acc slope 542.0 cm/sec^2    PA acc time 0.11 sec    PI end-d cornell 141.0 cm/sec    TR max cornell 244.3 cm/sec     CV ECHO MARIA GUADALUPE - TR MAX PG 24.0 mmHg    RVSP(TR) 32.0 mmHg    RAP systole 8.0 mmHg    PA pr(Accel) 31.5 mmHg    Lat E/e'  9.3     Med E/e' 11.3     Lat Peak E' Cornell 5.4 cm/sec    Med Peak E' Cornell 4.4 cm/sec     CV ECHO MARIA GUADALUPE - BZI_BMI 31.3 kilograms/m^2     CV ECHO MARIA GUADALUPE - BSA([place]OCK) 2.3 m^2     CV ECHO MARIA GUADALUPE - BZI_METRIC_WEIGHT 104.8 kg     CV ECHO MARIA GUADALUPE - BZI_METRIC_HEIGHT 182.9 cm    Avg E/e' ratio 10.47     Target HR (85%) 119 bpm    Max. Pred. HR (100%) 140 bpm     CV VAS BP LEFT /123 mmHg    TDI S' 12.00 cm/sec    RV Base 5.40 cm    RV Length 7.60 cm    RV Mid 3.80 cm    TAPSE (>1.6) 1.00 cm2         Lab Results   Component Value Date    CHOL 206 (H) 01/16/2019    HDL 41 01/16/2019     (H) 01/16/2019       EKG:  (EKG/Tracing has been independently visualized by me and summarized below)    Procedures    ASSESSMENT and PLAN  1. Atrial Flutter- Post Ablation  2. A. Fib with RVR - Unable to tolerate NOAC secondary to bleeding. On ASA, Start Amio 200mg BID  3. Chronic Systolic heart failure - Recheck ECHO after controled rates. May need Biv/ICD with AV Node Ablation. Start Metoprolol XL 25mg qam and Lisinopril 10mg qam  4. CAD/CABG - Followed by Dr. Liang in the past.   5. Non-compliance - Patient has refused  medications in the past. And reports that all medications cause him problems. Will retry.       Return in about 6 weeks (around 4/9/2019).          Tino Sampson M.D., F.ANTON.C.C, F.H.R.S.  Cardiology/Electrophysiology  02/26/19  9:35 AM

## 2019-02-28 ENCOUNTER — TELEPHONE (OUTPATIENT)
Dept: INTERNAL MEDICINE | Facility: CLINIC | Age: 79
End: 2019-02-28

## 2019-03-01 NOTE — TELEPHONE ENCOUNTER
I call patient this day and discussed with him his thyroid functions and the reason for not increasing his levothyroxine because of his history of the atrial fibrillation and I think slightly undertreated hypothyroidism is the best for him at this time he also reiterated that he was not going to take the amiodarone because of the potential side effects that he read picked up the medication and he was rather adamant about not taking the medication suggested no other change in medication or therapy.

## 2019-04-08 RX ORDER — LISINOPRIL 40 MG/1
40 TABLET ORAL DAILY
Status: CANCELLED | OUTPATIENT
Start: 2019-04-08

## 2019-04-08 RX ORDER — LEVOTHYROXINE SODIUM 88 UG/1
88 TABLET ORAL DAILY
Qty: 90 TABLET | Refills: 3 | Status: SHIPPED | OUTPATIENT
Start: 2019-04-08 | End: 2020-04-20

## 2019-04-08 RX ORDER — LISINOPRIL 5 MG/1
5 TABLET ORAL DAILY
Qty: 90 TABLET | Refills: 3 | Status: SHIPPED | OUTPATIENT
Start: 2019-04-08 | End: 2019-07-17

## 2019-04-08 NOTE — TELEPHONE ENCOUNTER
There was some confusion over the dose of the patient's Lisinopril.  I confirmed with him that he is on 5 mg daily.  Will refill as requested.

## 2019-04-08 NOTE — TELEPHONE ENCOUNTER
PT'S WIFE CALLED STATING THAT PT WAS GIVEN LISINOPRIL 5 MG  IN THE ER PT WANTS YOU TO TAKE OVER THE PRESCRIPTION    PT  HAS REFILLS BUT THE PHARMACY INFORMED THEM THAT THEY NEEDED A NEW PRESCRIPTION FROM YOU

## 2019-07-02 LAB
ALBUMIN SERPL-MCNC: 4.68 G/DL (ref 3.5–5.2)
ALBUMIN/GLOB SERPL: 2.2 G/DL
ALP SERPL-CCNC: 55 U/L (ref 39–117)
AST SERPL-CCNC: 28 U/L (ref 1–40)
BILIRUB SERPL-MCNC: 0.7 MG/DL (ref 0.2–1.2)
BUN SERPL-MCNC: 21 MG/DL (ref 8–23)
BUN/CREAT SERPL: 18.4 (ref 7–25)
CALCIUM SERPL-MCNC: 9.6 MG/DL (ref 8.6–10.5)
CHLORIDE SERPL-SCNC: 103 MMOL/L (ref 98–107)
CHOLEST SERPL-MCNC: 206 MG/DL (ref 0–200)
CREAT SERPL-MCNC: 1.14 MG/DL (ref 0.76–1.27)
GLOBULIN SER CALC-MCNC: 2.1 G/DL
GLUCOSE SERPL-MCNC: 98 MG/DL (ref 65–99)
HDLC SERPL-MCNC: 41 MG/DL (ref 40–60)
LDLC SERPL CALC-MCNC: 159 MG/DL (ref 0–99)
POTASSIUM SERPL-SCNC: 5.2 MMOL/L (ref 3.5–5.2)
PROT SERPL-MCNC: 6.8 G/DL (ref 6–8.5)
SODIUM SERPL-SCNC: 138 MMOL/L (ref 136–145)
TRIGL SERPL-MCNC: 184 MG/DL (ref 0–150)
VLDLC SERPL CALC-MCNC: 37 MG/DL

## 2019-07-09 PROBLEM — M35.00 SJOGREN'S SYNDROME (HCC): Status: ACTIVE | Noted: 2019-07-09

## 2019-07-09 PROBLEM — M19.90 OSTEOARTHRITIS: Status: ACTIVE | Noted: 2019-07-09

## 2019-07-09 PROBLEM — K21.9 GERD (GASTROESOPHAGEAL REFLUX DISEASE): Status: ACTIVE | Noted: 2019-07-09

## 2019-07-09 PROBLEM — I77.819 AORTIC ECTASIA (HCC): Status: ACTIVE | Noted: 2019-07-09

## 2019-07-09 PROBLEM — B02.9 HERPES ZOSTER WITHOUT COMPLICATION: Status: ACTIVE | Noted: 2019-07-09

## 2019-07-09 PROBLEM — I50.9 HEART FAILURE, UNSPECIFIED (HCC): Status: ACTIVE | Noted: 2019-07-09

## 2019-07-09 PROBLEM — I25.9 ISCHEMIC HEART DISEASE: Status: ACTIVE | Noted: 2019-07-09

## 2019-07-09 PROBLEM — Z77.090 H/O ASBESTOS EXPOSURE: Status: ACTIVE | Noted: 2019-07-09

## 2019-07-09 PROBLEM — Z86.010 HISTORY OF COLON POLYPS: Status: ACTIVE | Noted: 2019-07-09

## 2019-07-09 PROBLEM — B02.29 POSTHERPETIC NEURALGIA: Status: ACTIVE | Noted: 2019-07-09

## 2019-07-09 PROBLEM — S69.92XA LEFT WRIST INJURY: Status: ACTIVE | Noted: 2019-07-09

## 2019-07-09 PROBLEM — M15.9 PRIMARY OSTEOARTHRITIS INVOLVING MULTIPLE JOINTS: Status: ACTIVE | Noted: 2019-07-09

## 2019-07-09 PROBLEM — E78.00 HYPERCHOLESTEROLEMIA: Status: ACTIVE | Noted: 2019-07-09

## 2019-07-09 PROBLEM — I48.92 PAROXYSMAL ATRIAL FLUTTER: Status: ACTIVE | Noted: 2019-07-09

## 2019-07-09 PROBLEM — I50.32 CHRONIC DIASTOLIC (CONGESTIVE) HEART FAILURE (HCC): Status: ACTIVE | Noted: 2019-07-09

## 2019-07-17 ENCOUNTER — OFFICE VISIT (OUTPATIENT)
Dept: INTERNAL MEDICINE | Facility: CLINIC | Age: 79
End: 2019-07-17

## 2019-07-17 ENCOUNTER — LAB REQUISITION (OUTPATIENT)
Dept: LAB | Facility: HOSPITAL | Age: 79
End: 2019-07-17

## 2019-07-17 VITALS
WEIGHT: 226 LBS | HEART RATE: 84 BPM | BODY MASS INDEX: 30.61 KG/M2 | SYSTOLIC BLOOD PRESSURE: 142 MMHG | HEIGHT: 72 IN | DIASTOLIC BLOOD PRESSURE: 86 MMHG

## 2019-07-17 DIAGNOSIS — Z00.00 ROUTINE GENERAL MEDICAL EXAMINATION AT A HEALTH CARE FACILITY: ICD-10-CM

## 2019-07-17 DIAGNOSIS — I10 ESSENTIAL HYPERTENSION: ICD-10-CM

## 2019-07-17 DIAGNOSIS — Z00.00 MEDICARE ANNUAL WELLNESS VISIT, SUBSEQUENT: Primary | ICD-10-CM

## 2019-07-17 DIAGNOSIS — E78.2 MIXED HYPERLIPIDEMIA: ICD-10-CM

## 2019-07-17 DIAGNOSIS — Z12.5 PROSTATE CANCER SCREENING: ICD-10-CM

## 2019-07-17 PROCEDURE — 96160 PT-FOCUSED HLTH RISK ASSMT: CPT | Performed by: NURSE PRACTITIONER

## 2019-07-17 PROCEDURE — G0439 PPPS, SUBSEQ VISIT: HCPCS | Performed by: NURSE PRACTITIONER

## 2019-07-17 PROCEDURE — 36415 COLL VENOUS BLD VENIPUNCTURE: CPT | Performed by: NURSE PRACTITIONER

## 2019-07-17 NOTE — PATIENT INSTRUCTIONS
Medicare Wellness  Personal Prevention Plan of Service     Date of Office Visit:  2019  Encounter Provider:  DOMONIQUE Nicole  Place of Service:  [facility] INTERNAL MEDICINE  Patient Name: Salu Sal  :  1940    As part of the Medicare Wellness portion of your visit today, we are providing you with this personalized preventive plan of services (PPPS). This plan is based upon recommendations of the [place] Preventive Services Task Force (USPSTF) and the Advisory Committee on Immunization Practices (ACIP).    This lists the preventive care services that should be considered, and provides dates of when you are due. Items listed as completed are up-to-date and do not require any further intervention.    Health Maintenance   Topic Date Due   • ZOSTER VACCINE (1 of 2) 1989   • PNEUMOCOCCAL VACCINES (65+ LOW/MEDIUM RISK) (2 of 2 - PPSV23) 2016   • MEDICARE ANNUAL WELLNESS  2019   • INFLUENZA VACCINE  2019   • LIPID PANEL  2020   • TDAP/TD VACCINES (2 - Td) 2022       Orders Placed This Encounter   Procedures   • Comprehensive Metabolic Panel     Standing Status:   Future     Standing Expiration Date:   2020   • Lipid Panel     Standing Status:   Future     Standing Expiration Date:   2020   • TSH Rfx On Abnormal To Free T4     Standing Status:   Future     Standing Expiration Date:   2020   • Microalbumin / Creatinine Urine Ratio - Urine, Clean Catch     Standing Status:   Future     Standing Expiration Date:   2020   • PSA Screen     Standing Status:   Future     Standing Expiration Date:   2020   • CBC & Differential     Standing Status:   Future     Standing Expiration Date:   2020     Order Specific Question:   Manual Differential     Answer:   No       No Follow-up on file.      BMI for Adults  Body mass index (BMI) is a number that is calculated from a person's weight and height. In most adults, the  number is used to find how much of an adult's weight is made up of fat. BMI is not as accurate as a direct measure of body fat.  How is BMI calculated?  BMI is calculated by dividing weight in kilograms by height in meters squared. It can also be calculated by dividing weight in pounds by height in inches squared, then multiplying the resulting number by 703. Charts are available to help you find your BMI quickly and easily without doing this calculation.  How is BMI interpreted?  Health care professionals use BMI charts to identify whether an adult is underweight, at a normal weight, or overweight based on the following guidelines:  · Underweight: BMI less than 18.5.  · Normal weight: BMI between 18.5 and 24.9.  · Overweight: BMI between 25 and 29.9.  · Obese: BMI of 30 and above.    BMI is usually interpreted the same for males and females.  Weight includes both fat and muscle, so someone with a muscular build, such as an athlete, may have a BMI that is higher than 24.9. In cases like these, BMI may not accurately depict body fat. To determine if excess body fat is the cause of a BMI of 25 or higher, further assessments may need to be done by a health care provider.  Why is BMI a useful tool?  BMI is used to identify a possible weight problem that may be related to a medical problem or may increase the risk for medical problems. BMI can also be used to promote changes to reach a healthy weight.  This information is not intended to replace advice given to you by your health care provider. Make sure you discuss any questions you have with your health care provider.  Document Released: 08/29/2005 Document Revised: 04/27/2017 Document Reviewed: 05/15/2015  [employer] Patient Education © 2018 [employer]

## 2019-07-17 NOTE — PROGRESS NOTES
The ABCs of the Annual Wellness Visit  Subsequent Medicare Wellness Visit    Chief Complaint   Patient presents with   • Annual Exam     Patient is fasting        Subjective   History of Present Illness:  Saul Sal is a 79 y.o. male who presents for a Subsequent Medicare Wellness Visit.    HEALTH RISK ASSESSMENT    Recent Hospitalizations:  Recently treated at the following:  [facility]    Current Medical Providers:  Patient Care Team:  Saul Campbell MD as PCP - General (Internal Medicine)  Tino Sampson MD as Consulting Physician (Cardiology)    Smoking Status:  Social History     Tobacco Use   Smoking Status Former Smoker   • Packs/day: 1.00   • Years: 8.00   • Pack years: 8.00   • Types: Cigarettes   • Last attempt to quit: 1964   • Years since quittin.6   Smokeless Tobacco Never Used       Alcohol Consumption:  Social History     Substance and Sexual Activity   Alcohol Use No       Depression Screen:   PHQ-2/PHQ-9 Depression Screening 2019   Little interest or pleasure in doing things 0   Feeling down, depressed, or hopeless 1   Total Score 1       Fall Risk Screen:  STEADI Fall Risk Assessment was completed, and patient is at MODERATE risk for falls. Assessment completed on:2019    Health Habits and Functional and Cognitive Screening:  Functional & Cognitive Status 2019   Do you have difficulty preparing food and eating? No   Do you have difficulty bathing yourself, getting dressed or grooming yourself? No   Do you have difficulty using the toilet? No   Do you have difficulty moving around from place to place? No   Do you have trouble with steps or getting out of a bed or a chair? No   Current Diet Well Balanced Diet   Dental Exam Up to date   Eye Exam Up to date   Exercise (times per week) 3 times per week   Current Exercise Activities Include Stationary Bicycling/Spin Class   Do you need help using the phone?  No   Are you deaf or do you have serious  difficulty hearing?  Yes   Do you need help with transportation? No   Do you need help shopping? No   Do you need help preparing meals?  No   Do you need help with housework?  No   Do you need help with laundry? No   Do you need help taking your medications? No   Do you need help managing money? No   Do you ever drive or ride in a car without wearing a seat belt? No   Have you felt unusual stress, anger or loneliness in the last month? No   Who do you live with? Spouse   If you need help, do you have trouble finding someone available to you? No   Have you been bothered in the last four weeks by sexual problems? No   Do you have difficulty concentrating, remembering or making decisions? No         Does the patient have evidence of cognitive impairment? No    Asprin use counseling:Taking ASA appropriately as indicated    Age-appropriate Screening Schedule:  Refer to the list below for future screening recommendations based on patient's age, sex and/or medical conditions. Orders for these recommended tests are listed in the plan section. The patient has been provided with a written plan.    Health Maintenance   Topic Date Due   • ZOSTER VACCINE (1 of 2) 01/04/1989   • PNEUMOCOCCAL VACCINES (65+ LOW/MEDIUM RISK) (2 of 2 - PPSV23) 09/29/2016   • INFLUENZA VACCINE  08/01/2019   • LIPID PANEL  01/16/2020   • TDAP/TD VACCINES (2 - Td) 08/16/2022          The following portions of the patient's history were reviewed and updated as appropriate: allergies, current medications, past family history, past medical history, past social history, past surgical history and problem list.    Outpatient Medications Prior to Visit   Medication Sig Dispense Refill   • aspirin 81 MG tablet Take 81 mg by mouth Daily.     • COD LIVER OIL PO Take 3 tablet/day by mouth Daily.     • DHA-EPA-Vit B6-B12-Folic Acid (CARDIOVID PLUS PO) Take 6 tablet/day by mouth.     • levothyroxine (SYNTHROID, LEVOTHROID) 88 MCG tablet Take 1 tablet by mouth Daily.  90 tablet 3   • NON FORMULARY 3 tablet/day. Mini amaro supplement     • NON FORMULARY 3 tablet/day. cataplex f/b     • amiodarone (PACERONE) 200 MG tablet Take 1 tablet by mouth 2 (Two) Times a Day. 60 tablet 5   • lisinopril (PRINIVIL,ZESTRIL) 5 MG tablet Take 1 tablet by mouth Daily. 90 tablet 3   • metoprolol succinate XL (TOPROL-XL) 25 MG 24 hr tablet Take 1 tablet by mouth Daily. 30 tablet 11     No facility-administered medications prior to visit.        Patient Active Problem List   Diagnosis   • CAD (coronary artery disease)   • Atrial flutter (CMS/HCC)   • Hypertension   • Hypothyroid   • Dyslipidemia   • ELIE (obstructive sleep apnea)   • Typical atrial flutter (CMS/HCC)   • Hypothyroidism (acquired)   • Benign essential hypertension   • Mixed hyperlipidemia   • Obesity (BMI 30-39.9)   • GERD (gastroesophageal reflux disease)   • Aortic ectasia (CMS/HCC)   • Chronic diastolic (congestive) heart failure (CMS/HCC)   • H/O asbestos exposure   • Heart failure, unspecified (CMS/HCC)   • Herpes zoster without complication   • History of colon polyps   • Hypercholesterolemia   • Ischemic heart disease   • Left wrist injury   • Osteoarthritis   • Paroxysmal atrial flutter (CMS/HCC)   • Postherpetic neuralgia   • Primary osteoarthritis involving multiple joints   • Sjogren's syndrome (CMS/HCC)       Advanced Care Planning:  Patient has an advance directive - a copy has been provided and is visible in patient header    Review of Systems   Constitutional: Negative for chills, fatigue and fever.   HENT: Negative for congestion, ear pain and sinus pressure.    Respiratory: Positive for shortness of breath. Negative for cough, chest tightness and wheezing.    Cardiovascular: Negative for chest pain and palpitations.   Gastrointestinal: Negative for abdominal pain, blood in stool and constipation.   Skin: Negative for color change.   Allergic/Immunologic: Positive for environmental allergies.   Neurological: Negative for  "dizziness, speech difficulty and headaches.   Psychiatric/Behavioral: Negative for confusion. The patient is not nervous/anxious.        Compared to one year ago, the patient feels his physical health is the same.  Compared to one year ago, the patient feels his mental health is the same.    Reviewed chart for potential of high risk medication in the elderly: no  Reviewed chart for potential of harmful drug interactions in the elderly:no       Vitals:    07/17/19 0926   BP: 142/86   BP Location: Left arm   Patient Position: Sitting   Cuff Size: Adult   Pulse: 84   Weight: 103 kg (226 lb)   Height: 183 cm (72.05\")   PainSc: 0-No pain       Body mass index is 30.61 kg/m².  Discussed the patient's BMI with him. The BMI is above average; no BMI management plan is appropriate..          Assessment/Plan   Medicare Risks and Personalized Health Plan  CMS Preventative Services Quick Reference  Cardiovascular risk    The above risks/problems have been discussed with the patient.  Pertinent information has been shared with the patient in the After Visit Summary.  Follow up plans and orders are seen below in the Assessment/Plan Section.    Diagnoses and all orders for this visit:    1. Medicare annual wellness visit, subsequent (Primary)    2. Essential hypertension  -     CBC & Differential; Future  -     Comprehensive Metabolic Panel; Future    3. Mixed hyperlipidemia  -     Lipid Panel; Future  -     TSH Rfx On Abnormal To Free T4; Future  -     Microalbumin / Creatinine Urine Ratio - Urine, Clean Catch; Future    4. Prostate cancer screening  -     PSA Screen; Future      Follow Up:  Return for Annual physical, Next scheduled follow up, Labs this visit.     An After Visit Summary and PPPS were given to the patient.             "

## 2019-07-18 LAB
ALBUMIN SERPL-MCNC: 4.8 G/DL (ref 3.5–5.2)
ALBUMIN/CREAT UR: 116.1 MG/G CREAT (ref 0–30)
ALBUMIN/GLOB SERPL: 2.4 G/DL
ALP SERPL-CCNC: 44 U/L (ref 39–117)
ALT SERPL-CCNC: 20 U/L (ref 1–41)
AST SERPL-CCNC: 28 U/L (ref 1–40)
BASOPHILS # BLD AUTO: 0.02 10*3/MM3 (ref 0–0.2)
BASOPHILS NFR BLD AUTO: 0.3 % (ref 0–1.5)
BILIRUB SERPL-MCNC: 0.7 MG/DL (ref 0.2–1.2)
BUN SERPL-MCNC: 15 MG/DL (ref 8–23)
BUN/CREAT SERPL: 11.5 (ref 7–25)
CALCIUM SERPL-MCNC: 9 MG/DL (ref 8.6–10.5)
CHLORIDE SERPL-SCNC: 102 MMOL/L (ref 98–107)
CHOLEST SERPL-MCNC: 168 MG/DL (ref 0–200)
CO2 SERPL-SCNC: 25.8 MMOL/L (ref 22–29)
CREAT SERPL-MCNC: 1.3 MG/DL (ref 0.76–1.27)
CREAT UR-MCNC: 53.9 MG/DL
EOSINOPHIL # BLD AUTO: 0.08 10*3/MM3 (ref 0–0.4)
EOSINOPHIL NFR BLD AUTO: 1.2 % (ref 0.3–6.2)
ERYTHROCYTE [DISTWIDTH] IN BLOOD BY AUTOMATED COUNT: 15 % (ref 12.3–15.4)
GLOBULIN SER CALC-MCNC: 2 GM/DL
GLUCOSE SERPL-MCNC: 107 MG/DL (ref 65–99)
HCT VFR BLD AUTO: 48 % (ref 37.5–51)
HDLC SERPL-MCNC: 51 MG/DL (ref 40–60)
HGB BLD-MCNC: 15 G/DL (ref 13–17.7)
IMM GRANULOCYTES # BLD AUTO: 0.02 10*3/MM3 (ref 0–0.05)
IMM GRANULOCYTES NFR BLD AUTO: 0.3 % (ref 0–0.5)
LDLC SERPL CALC-MCNC: 93 MG/DL (ref 0–100)
LYMPHOCYTES # BLD AUTO: 2.34 10*3/MM3 (ref 0.7–3.1)
LYMPHOCYTES NFR BLD AUTO: 35.4 % (ref 19.6–45.3)
MCH RBC QN AUTO: 29.8 PG (ref 26.6–33)
MCHC RBC AUTO-ENTMCNC: 31.3 G/DL (ref 31.5–35.7)
MCV RBC AUTO: 95.4 FL (ref 79–97)
MICROALBUMIN UR-MCNC: 62.6 UG/ML
MONOCYTES # BLD AUTO: 0.56 10*3/MM3 (ref 0.1–0.9)
MONOCYTES NFR BLD AUTO: 8.5 % (ref 5–12)
NEUTROPHILS # BLD AUTO: 3.59 10*3/MM3 (ref 1.7–7)
NEUTROPHILS NFR BLD AUTO: 54.3 % (ref 42.7–76)
NRBC BLD AUTO-RTO: 0 /100 WBC (ref 0–0.2)
PLATELET # BLD AUTO: 165 10*3/MM3 (ref 140–450)
POTASSIUM SERPL-SCNC: 4.6 MMOL/L (ref 3.5–5.2)
PROT SERPL-MCNC: 6.8 G/DL (ref 6–8.5)
PSA SERPL-MCNC: 0.9 NG/ML (ref 0–4)
RBC # BLD AUTO: 5.03 10*6/MM3 (ref 4.14–5.8)
SODIUM SERPL-SCNC: 142 MMOL/L (ref 136–145)
T4 FREE SERPL-MCNC: 1.07 NG/DL (ref 0.93–1.7)
TRIGL SERPL-MCNC: 122 MG/DL (ref 0–150)
TSH SERPL DL<=0.005 MIU/L-ACNC: 13.1 MIU/ML (ref 0.27–4.2)
VLDLC SERPL CALC-MCNC: 24.4 MG/DL
WBC # BLD AUTO: 6.61 10*3/MM3 (ref 3.4–10.8)

## 2019-07-24 ENCOUNTER — OFFICE VISIT (OUTPATIENT)
Dept: INTERNAL MEDICINE | Facility: CLINIC | Age: 79
End: 2019-07-24

## 2019-07-24 VITALS
SYSTOLIC BLOOD PRESSURE: 140 MMHG | WEIGHT: 226 LBS | BODY MASS INDEX: 30.61 KG/M2 | DIASTOLIC BLOOD PRESSURE: 78 MMHG | HEART RATE: 80 BPM | HEIGHT: 72 IN

## 2019-07-24 DIAGNOSIS — I25.10 CORONARY ARTERY DISEASE INVOLVING NATIVE CORONARY ARTERY OF NATIVE HEART, ANGINA PRESENCE UNSPECIFIED: ICD-10-CM

## 2019-07-24 DIAGNOSIS — I10 BENIGN ESSENTIAL HYPERTENSION: Primary | ICD-10-CM

## 2019-07-24 DIAGNOSIS — E78.2 MIXED HYPERLIPIDEMIA: ICD-10-CM

## 2019-07-24 DIAGNOSIS — I48.92 PAROXYSMAL ATRIAL FLUTTER (HCC): ICD-10-CM

## 2019-07-24 DIAGNOSIS — E66.9 OBESITY (BMI 30-39.9): ICD-10-CM

## 2019-07-24 DIAGNOSIS — I48.3 TYPICAL ATRIAL FLUTTER (HCC): ICD-10-CM

## 2019-07-24 DIAGNOSIS — R06.02 SHORTNESS OF BREATH: ICD-10-CM

## 2019-07-24 DIAGNOSIS — E03.9 HYPOTHYROIDISM (ACQUIRED): ICD-10-CM

## 2019-07-24 DIAGNOSIS — I77.819 AORTIC ECTASIA (HCC): ICD-10-CM

## 2019-07-24 PROCEDURE — 93000 ELECTROCARDIOGRAM COMPLETE: CPT | Performed by: INTERNAL MEDICINE

## 2019-07-24 PROCEDURE — 99214 OFFICE O/P EST MOD 30 MIN: CPT | Performed by: INTERNAL MEDICINE

## 2019-07-24 NOTE — PATIENT INSTRUCTIONS
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

## 2019-07-24 NOTE — ASSESSMENT & PLAN NOTE
Patient's current weight is 226 pounds he is down from 234 his current BMI is 30.6 encouraged low carbohydrate smaller portion diet and weight loss.

## 2019-07-24 NOTE — ASSESSMENT & PLAN NOTE
Patient has a history of coronary artery disease and remote elevation of LDL and cholesterol cholesterol most recently was 168 and LDL was 93 patient has declined statin therapy encouraged weight loss and low-carb low-cholesterol diet.

## 2019-07-24 NOTE — ASSESSMENT & PLAN NOTE
Essential hypertension is currently not on therapy he does check his blood pressure in the morning and generally mildly elevated as high as 145 90 but by later in the afternoon on his recheck it is normal.  Suggest no change in potential therapy which he declines.

## 2019-07-24 NOTE — ASSESSMENT & PLAN NOTE
She had a 5 Vessel Coronary Pass grafting in 2003 currently stable without chest pain or pressure EKG is labile hypertension and a remote history of mildly elevated cholesterol and LDL he declines statin clearance blood pressure medication ejection fraction declines Entresto.

## 2019-07-24 NOTE — ASSESSMENT & PLAN NOTE
Echocardiogram results discussed with low EF she declines therapy but this is the cause of his breathlessness and shortness of breath with minor physical activity and weight loss and continue medications

## 2019-07-24 NOTE — ASSESSMENT & PLAN NOTE
Patient is a history of mild shortness of breath occurring with physical exercise and sustained activity denying chest pain or pressure he was a 5-year smoker quitting around age mid 20s no recent respiratory infections I believe this is related to his low ejection fraction no change therapy.

## 2019-07-24 NOTE — PROGRESS NOTES
"[facility]     Saul Sal  1940   [id number]      Patient Care Team:  Saul Campbell MD as PCP - General (Internal Medicine)  Tino Sampson MD as Consulting Physician (Cardiology)    Chief Complaint::   Chief Complaint   Patient presents with   • Annual Exam     patient had preventative visit on 7/17/19 with DOMONIQUE Nicole   • Shortness of Breath     intermittent and not associated with exertion.  Patient states this is related to his \"nerves\"           HPI  Patient is an 80 yo male presenting with shortness of breath. He rides a bike 7 miles three days a week. He gets a little short of breath sometimes regardless of what activity he's doing. He states he thinks it is due to his nerves being affected from shingles. He does not feel palpitations. He takes his medications regularly. He had right cataract surgery previously, around 2006-07. He has new hearing aids coming in August. He got tested and they said his hearing hasn't changed too much but he is getting better quality hearing aids. He does not drink alcohol or caffeine. His left ankle swells some and it hurts to walk. He has hurt it playing sports as a young man. No leg cramping and it doesn't give way. He thinks his last colonoscopy was 10 years ago and his last cologuard was 2-3 years ago. He checks his blood pressure at home and it runs 148-150/88-92 in the am then goes down and is around 109/69 in the evening. He is not taking any blood pressure medication. He has been drinking lots of water every day.       Patient Active Problem List   Diagnosis   • CAD (coronary artery disease)   • Atrial flutter (CMS/HCC)   • Dyslipidemia   • ELIE (obstructive sleep apnea)   • Typical atrial flutter (CMS/HCC)   • Hypothyroidism (acquired)   • Benign essential hypertension   • Mixed hyperlipidemia   • Obesity (BMI 30-39.9)   • GERD (gastroesophageal reflux disease)   • Aortic ectasia (CMS/HCC)   • Chronic diastolic (congestive) heart " failure (CMS/HCC)   • H/O asbestos exposure   • Herpes zoster without complication   • History of colon polyps   • Ischemic heart disease   • Left wrist injury   • Paroxysmal atrial flutter (CMS/HCC)   • Postherpetic neuralgia   • Primary osteoarthritis involving multiple joints   • Shortness of breath        Past Medical History:   Diagnosis Date   • Arthritis    • Atrial flutter (CMS/HCC)      LOW EF=28 %   • Benign essential hypertension    • CHF (congestive heart failure) (CMS/HCC)    • Coronary artery disease    • Disease of thyroid gland    • Diverticulosis    • GERD (gastroesophageal reflux disease)    • GI bleed    • Hyperlipidemia    • Hypertension    • Hypothyroidism (acquired)    • Mixed hyperlipidemia    • Obesity (BMI 30-39.9)    • Osteoarthritis    • Prostatic hypertrophy     Benign    • Shingles    • Skin cancer     squamous    • Wears eyeglasses     readers   • Wears hearing aid        Past Surgical History:   Procedure Laterality Date   • ANGIOPLASTY  1985   • CARDIAC CATHETERIZATION     • CARDIAC ELECTROPHYSIOLOGY PROCEDURE N/A 11/26/2018    Procedure: Ablation atrial flutter;  Surgeon: Tino Sampson MD;  Location: [facility] INVASIVE LOCATION;  Service: Cardiovascular   • CARDIAC SURGERY      BYPASS X5   • COLONOSCOPY  2009   • COLONOSCOPY  2005   • CORONARY ARTERY BYPASS GRAFT  2003    x5 [facility] 2003   • DENTAL PROCEDURE  2010    dental surg and crowns   • EYE SURGERY Right     cataract   • SKIN BIOPSY     • VASECTOMY         Family History   Problem Relation Age of Onset   • Diabetes Mother    • Heart disease Mother    • Coronary artery disease Other    • Stroke Other    • Diabetes Other    • Rheumatic fever Other    • Other Other         Valvular CV Disease       Social History     Socioeconomic History   • Marital status:      Spouse name: Not on file   • Number of children: Not on file   • Years of education: Not on file   • Highest education level: Not on file   Social Needs    • Financial resource strain: Not hard at all   • Food insecurity:     Worry: Never true     Inability: Never true   • Transportation needs:     Medical: No     Non-medical: No   Tobacco Use   • Smoking status: Former Smoker     Packs/day: 1.00     Years: 8.00     Pack years: 8.00     Types: Cigarettes     Last attempt to quit: 1964     Years since quittin.7   • Smokeless tobacco: Never Used   Substance and Sexual Activity   • Alcohol use: No   • Drug use: No   • Sexual activity: Defer   Lifestyle   • Physical activity:     Days per week: 0 days     Minutes per session: 0 min   • Stress: Not at all   Relationships   • Social connections:     Talks on phone: Once a week     Gets together: Once a week     Attends Religion service: 1 to 4 times per year     Active member of club or organization: No     Attends meetings of clubs or organizations: Never     Relationship status:    Social History Narrative    Caffeine: None    Patient lives at his home with   His wife       Allergies   Allergen Reactions   • Amlodipine Besylate Unknown (See Comments)     Unknown  Norvasc   • Atenolol Unknown (See Comments)     unknown   • Eliquis [Apixaban] Other (See Comments)     Excessive bleeding   • Metoprolol Unknown (See Comments)     Unknown  Lopressor    • Milk-Related Compounds Other (See Comments)     Headache       Review of Systems   Constitutional: Negative for chills, fatigue and fever.   HENT: Negative for congestion, ear pain and sinus pressure.    Respiratory: Positive for shortness of breath. Negative for cough, chest tightness and wheezing.    Cardiovascular: Negative for chest pain and palpitations.   Gastrointestinal: Negative for abdominal pain, blood in stool and constipation.   Musculoskeletal: Negative for back pain.   Skin: Negative for color change.   Allergic/Immunologic: Negative for environmental allergies.   Neurological: Negative for dizziness, speech difficulty and headache.  "  Psychiatric/Behavioral: Negative for decreased concentration. The patient is not nervous/anxious.        Vital Signs  Vitals:    07/24/19 0859 07/24/19 0928 07/24/19 0929 07/24/19 0934   BP: 140/86 140/82 140/82 140/78   BP Location: Left arm Right arm Left arm Right arm   Patient Position: Sitting Sitting Sitting Lying   Cuff Size: Adult Adult Adult Adult   Pulse: 80      Weight: 103 kg (226 lb)      Height: 183 cm (72.05\")      PainSc: 0-No pain        Body mass index is 30.61 kg/m².      Current Outpatient Medications:   •  aspirin 81 MG tablet, Take 81 mg by mouth Daily., Disp: , Rfl:   •  COD LIVER OIL PO, Take 3 tablet/day by mouth Daily., Disp: , Rfl:   •  DHA-EPA-Vit B6-B12-Folic Acid (CARDIOVID PLUS PO), Take 6 tablet/day by mouth., Disp: , Rfl:   •  levothyroxine (SYNTHROID, LEVOTHROID) 88 MCG tablet, Take 1 tablet by mouth Daily., Disp: 90 tablet, Rfl: 3  •  NON FORMULARY, 3 tablet/day. Mini amaro supplement, Disp: , Rfl:   •  NON FORMULARY, 3 tablet/day. cataplex f/b, Disp: , Rfl:     Physical Exam   Constitutional: He is oriented to person, place, and time.   HENT:   Head: Normocephalic.   Nose: Nose normal.   Eyes: Conjunctivae are normal. Pupils are equal, round, and reactive to light.   Neck: Normal range of motion. Carotid bruit is not present. No thyroid mass and no thyromegaly present.   Cardiovascular: Normal rate, regular rhythm and normal heart sounds.   No murmur heard.  Pulmonary/Chest: Effort normal and breath sounds normal.   Abdominal: Soft. Bowel sounds are normal. There is no splenomegaly or hepatomegaly. There is no tenderness.   Musculoskeletal: Normal range of motion. He exhibits no edema.     Vascular Status -  His right foot exhibits normal foot vasculature  and no edema. His left foot exhibits normal foot vasculature  and no edema.  Neurological: He is alert and oriented to person, place, and time.   Skin: Skin is warm and dry.   Psychiatric: He has a normal mood and affect. His " behavior is normal.        ACE III MINI         Results Review:    Lab of July 17 noted copy given discussed  EKG sinus rhythm with frequent ventricular premature complexes and right bundle branch block with left anterior fascicular block no ischemia no change from previous    ECG 12 Lead  Date/Time: 7/24/2019 5:53 PM  Performed by: Saul Campbell MD  Authorized by: Saul Campbell MD   Comparison: not compared with previous ECG   Rhythm: sinus rhythm  Ectopy: infrequent PVCs  Rate: normal  Conduction: right bundle branch block and left anterior fascicular block    Clinical impression: abnormal EKG  Comments: EKG of July 24 shows sinus rhythm with frequent premature ventricular complexes, right bundle branch block, left anterior fascicular block, no acute ischemic change Intermatic.          Patient Wellness Counseling:   Plan of care reviewed with patient at the conclusion of today's visit. Education was provided in regards to diagnosis, diet and exercise, prostate cancer screening discussed including benefit of early detection, potential need for follow-up, and harms associated with additional management. Patient agrees to screening.    Nutrition, physical activity, healthy weight,ways to reduce stress, adequate sleep, injury prevention, misuse of tobacco, alcohol and drugs, sexual behavior and STD's, dental health, mental health, and immunizations.    Plan of care reviewed with patient at the conclusion of today's visit. Education was provided regarding diagnosis, management and any prescribed or recommended OTC medications.  Patient verbalizes understanding of and agreement with management plan.      Medication Review: Medications reviewed and noted    Patient wellness counseling  Exercise: Continue regular exercise  Diet: Encouraged low-fat and low-sugar diet  Smoking: Not current smoker  Alcohol: No alcohol use  Screening:    Assessment/Plan:    Problem List Items Addressed This Visit        Cardiovascular and  Mediastinum    CAD (coronary artery disease)    Overview     · CABG X5 2003         Current Assessment & Plan     She had a 5 Vessel Coronary Pass grafting in 2003 currently stable without chest pain or pressure EKG is labile hypertension and a remote history of mildly elevated cholesterol and LDL he declines statin clearance blood pressure medication ejection fraction declines Entresto.  Patient had a presentation of atrial flutter fall 2018 underwent nuclear stress and echo with low ejection fraction in the 25% range we again discussed these findings cardiology the patient declines therapy as he states he feels well.         Benign essential hypertension - Primary    Current Assessment & Plan     Essential hypertension is currently not on therapy he does check his blood pressure in the morning and generally mildly elevated as high as 145 90 but by later in the afternoon on his recheck it is normal.  Suggest no change in potential therapy which he declines.         Relevant Orders    ECG 12 Lead    Mixed hyperlipidemia    Current Assessment & Plan     Patient has a history of coronary artery disease and remote elevation of LDL and cholesterol cholesterol most recently was 168 and LDL was 93 patient has declined statin therapy encouraged weight loss and low-carb low-cholesterol diet.         Aortic ectasia (CMS/HCC)    Overview     CT scan of October 2018 of abdomen and pelvis aortic ectasia no aneurysm stable         Paroxysmal atrial flutter (CMS/HCC)    Current Assessment & Plan     Past history of paroxysmal atrial flutter currently in sinus rhythm denies palpitations chest pain pressure tightness he is on aspirin 81 mg continue current therapy.            Respiratory    Shortness of breath    Current Assessment & Plan     Patient is a history of mild shortness of breath occurring with physical exercise and sustained activity denying chest pain or pressure he was a 5-year smoker quitting around age mid 20s no  recent respiratory infections I believe this is related to his low ejection fraction no change therapy.            Digestive    Obesity (BMI 30-39.9)    Current Assessment & Plan     Patient's current weight is 226 pounds he is down from 234 his current BMI is 30.6 encouraged low carbohydrate smaller portion diet and weight loss.            Endocrine    Hypothyroidism (acquired)    Current Assessment & Plan     History of hypothyroidism currently on replacement therapy of levothyroxine 88 MCG's recent T4 level was normal, and TSH level was elevated, would not suggest increasing the levothyroxine- T4 or adding T3 therapy as the patient has ectopy on his EKG and would rather the patient be slightly hypo-treated.         Relevant Medications    levothyroxine (SYNTHROID, LEVOTHROID) 88 MCG tablet      Other Visit Diagnoses                   Patient Instructions   Lab work of July 17 discussed copy given  He discussed with right bundle branch block left anterior fascicular block and ventricular ectopy patient is asymptomatic declines therapy  Encouraged healthy cardiac diet with reduced carbs and weight loss  To continue all current medications  Return visit in 6 months or as needed       Plan of care reviewed with patient at the conclusion of today's visit. Education was provided regarding diagnosis, management, and any prescribed or recommended OTC medications.Patient verbalizes understanding of and agreement with management plan.         Saul Campbell MD      Note: Part of this note may be an electronic transcription/translation of spoken language to printed text using the Dragon Dictation system.

## 2019-10-01 ENCOUNTER — TELEPHONE (OUTPATIENT)
Dept: INTERNAL MEDICINE | Facility: CLINIC | Age: 79
End: 2019-10-01

## 2019-10-01 ENCOUNTER — HOSPITAL ENCOUNTER (OUTPATIENT)
Dept: GENERAL RADIOLOGY | Facility: HOSPITAL | Age: 79
Discharge: HOME | End: 2019-10-01
Admitting: INTERNAL MEDICINE

## 2019-10-01 ENCOUNTER — OFFICE VISIT (OUTPATIENT)
Dept: INTERNAL MEDICINE | Facility: CLINIC | Age: 79
End: 2019-10-01

## 2019-10-01 VITALS
HEIGHT: 72 IN | WEIGHT: 228 LBS | BODY MASS INDEX: 30.88 KG/M2 | DIASTOLIC BLOOD PRESSURE: 94 MMHG | HEART RATE: 92 BPM | SYSTOLIC BLOOD PRESSURE: 138 MMHG | OXYGEN SATURATION: 96 %

## 2019-10-01 DIAGNOSIS — E78.2 MIXED HYPERLIPIDEMIA: ICD-10-CM

## 2019-10-01 DIAGNOSIS — I10 BENIGN ESSENTIAL HYPERTENSION: ICD-10-CM

## 2019-10-01 DIAGNOSIS — I50.23 ACUTE ON CHRONIC SYSTOLIC CONGESTIVE HEART FAILURE (HCC): ICD-10-CM

## 2019-10-01 DIAGNOSIS — I48.92 PAROXYSMAL ATRIAL FLUTTER (HCC): ICD-10-CM

## 2019-10-01 DIAGNOSIS — I50.23 ACUTE ON CHRONIC SYSTOLIC CONGESTIVE HEART FAILURE (HCC): Primary | ICD-10-CM

## 2019-10-01 PROCEDURE — 99214 OFFICE O/P EST MOD 30 MIN: CPT | Performed by: INTERNAL MEDICINE

## 2019-10-01 PROCEDURE — 71046 X-RAY EXAM CHEST 2 VIEWS: CPT

## 2019-10-01 RX ORDER — FUROSEMIDE 40 MG/1
40 TABLET ORAL DAILY
Qty: 30 TABLET | Refills: 1 | Status: SHIPPED | OUTPATIENT
Start: 2019-10-01 | End: 2019-10-24 | Stop reason: SDUPTHER

## 2019-10-01 NOTE — TELEPHONE ENCOUNTER
"Spouse calls.  States patient is SOB and up \"most of the night\".  Appt scheduled with Dr. Campbell for today at 3:45.  Wife aware.   "

## 2019-10-01 NOTE — PROGRESS NOTES
"[facility]     Saul Sal  1940   [id number]      Patient Care Team:  Saul Campbell MD as PCP - General (Internal Medicine)  Tino Sampson MD as Consulting Physician (Cardiology)    Chief Complaint::   Chief Complaint   Patient presents with   • Shortness of Breath     not exertional.  Will occur spontaneously while sitting.  Not sleeping well because he gets breathless. Sleeping sitting up. Some lower extremity edema   • Bloated     Not eating much because he \"feels so bloated\"  Bowels not moving regularly.   • Hypothyroidism     per [facility]. Has had multiple lab test            HPI  Patient is an 79-year-old male with a history of coronary disease and coronary bypass grafting labile hypertension mixed hyperlipidemia hypothyroidism cardiac arrhythmia of atrial fibrillation flutter with a low ejection fraction of 25 to 30% complaining of shortness of breath dyspnea leg swelling abdominal bloating is a consultations with cardiology at [facility] and consultations with cardiology and internal medicine at the [facility] therapeutic regimes have been suggested the patient is uncomfortable taking amiodarone and has declined therapy.  Currently the patient at rest is comfortable but with physical exertion become short of breath he does have significant lower leg edema and abdominal bloating he is trying to eat low-salt diet remains overweight.  He is currently denying chest pain pressure or palpitations.      Patient Active Problem List   Diagnosis   • CAD (coronary artery disease)   • Atrial flutter (CMS/HCC)   • Dyslipidemia   • ELIE (obstructive sleep apnea)   • Typical atrial flutter (CMS/HCC)   • Hypothyroidism (acquired)   • Benign essential hypertension   • Mixed hyperlipidemia   • Obesity (BMI 30-39.9)   • GERD (gastroesophageal reflux disease)   • Aortic ectasia (CMS/HCC)   • Chronic diastolic (congestive) heart failure (CMS/HCC)   • H/O asbestos exposure   • Herpes zoster without complication   • " History of colon polyps   • Ischemic heart disease   • Left wrist injury   • Paroxysmal atrial flutter (CMS/HCC)   • Postherpetic neuralgia   • Primary osteoarthritis involving multiple joints   • Shortness of breath   • Acute on chronic systolic congestive heart failure (CMS/HCC)   • Acute on chronic systolic congestive heart failure (CMS/HCC)        Past Medical History:   Diagnosis Date   • Arthritis    • Atrial flutter (CMS/HCC)      LOW EF=28 %   • Benign essential hypertension    • CHF (congestive heart failure) (CMS/HCC)    • Coronary artery disease    • Disease of thyroid gland    • Diverticulosis    • GERD (gastroesophageal reflux disease)    • GI bleed    • Hyperlipidemia    • Hypertension    • Hypothyroidism (acquired)    • Mixed hyperlipidemia    • Obesity (BMI 30-39.9)    • Osteoarthritis    • Prostatic hypertrophy     Benign    • Shingles    • Skin cancer     squamous    • Wears eyeglasses     readers   • Wears hearing aid        Past Surgical History:   Procedure Laterality Date   • ANGIOPLASTY  1985   • CARDIAC CATHETERIZATION     • CARDIAC ELECTROPHYSIOLOGY PROCEDURE N/A 11/26/2018    Procedure: Ablation atrial flutter;  Surgeon: Tion Sampson MD;  Location: [facility] INVASIVE LOCATION;  Service: Cardiovascular   • CARDIAC SURGERY      BYPASS X5   • COLONOSCOPY  2009   • COLONOSCOPY  2005   • CORONARY ARTERY BYPASS GRAFT  2003    x5 [facility] 2003   • DENTAL PROCEDURE  2010    dental surg and crowns   • EYE SURGERY Right     cataract   • SKIN BIOPSY     • VASECTOMY         Family History   Problem Relation Age of Onset   • Diabetes Mother    • Heart disease Mother    • Coronary artery disease Other    • Stroke Other    • Diabetes Other    • Rheumatic fever Other    • Other Other         Valvular CV Disease       Social History     Socioeconomic History   • Marital status:      Spouse name: Not on file   • Number of children: Not on file   • Years of education: Not on file   • Highest  education level: Not on file   Social Needs   • Financial resource strain: Not hard at all   • Food insecurity:     Worry: Never true     Inability: Never true   • Transportation needs:     Medical: No     Non-medical: No   Tobacco Use   • Smoking status: Former Smoker     Packs/day: 1.00     Years: 8.00     Pack years: 8.00     Types: Cigarettes     Last attempt to quit: 1964     Years since quittin.9   • Smokeless tobacco: Never Used   Substance and Sexual Activity   • Alcohol use: No   • Drug use: No   • Sexual activity: Defer   Lifestyle   • Physical activity:     Days per week: 0 days     Minutes per session: 0 min   • Stress: Not at all   Relationships   • Social connections:     Talks on phone: Once a week     Gets together: Once a week     Attends Restorationist service: 1 to 4 times per year     Active member of club or organization: No     Attends meetings of clubs or organizations: Never     Relationship status:    Social History Narrative    Caffeine: None    Patient lives at his home with   His wife       Allergies   Allergen Reactions   • Amlodipine Besylate Unknown (See Comments)     Unknown  Norvasc   • Atenolol Unknown (See Comments)     unknown   • Eliquis [Apixaban] Other (See Comments)     Excessive bleeding   • Metoprolol Unknown (See Comments)     Unknown  Lopressor    • Milk-Related Compounds Other (See Comments)     Headache       Review of Systems     HEENT: Denies headache or dizzy  NECK: Denies dysphasia or reflux  CHEST: Non-smoker complains of shortness of breath  CARDIAC: Denies chest pain or pressure complains of LUNDBERG  ABD: Complains of bloating  : Denies dysuria frequency  NEURO: No syncope or concussion  PSYCH: No significant anxiety depression  EXTREM: Complains of edema both lower legs    Vital Signs  Vitals:    10/01/19 1558   BP: 138/94   BP Location: Left arm   Patient Position: Sitting   Cuff Size: Adult   Pulse: 92   SpO2: 96%   Weight: 103 kg (228 lb)   Height:  "183 cm (72.05\")   PainSc: 0-No pain     Body mass index is 30.88 kg/m².    Current Outpatient Medications:   •  aspirin 81 MG tablet, Take 81 mg by mouth Daily., Disp: , Rfl:   •  COD LIVER OIL PO, Take 3 tablet/day by mouth Daily., Disp: , Rfl:   •  DHA-EPA-Vit B6-B12-Folic Acid (CARDIOVID PLUS PO), Take 6 tablet/day by mouth., Disp: , Rfl:   •  levothyroxine (SYNTHROID, LEVOTHROID) 88 MCG tablet, Take 1 tablet by mouth Daily., Disp: 90 tablet, Rfl: 3  •  NON FORMULARY, 3 tablet/day. Mini amaro supplement, Disp: , Rfl:   •  NON FORMULARY, 3 tablet/day. cataplex f/b, Disp: , Rfl:   •  furosemide (LASIX) 40 MG tablet, Take 1 tablet by mouth Daily., Disp: 30 tablet, Rfl: 1    Physical Exam     ACE III MINI         HEENT: Pupils equal and reactive no asymmetry  NECK: Neck veins are slightly distended  CHEST: Distant breath sounds with a few crackles at the base  CARDIAC: Regular rhythm with occasional ectopic  ABD: Obese  :   NEURO: CNS is intact no neuropathy  PSYCH: Patient has mild anxiety  EXTREM: 2+ lower leg edema  Skin: Clear     Results Review:    No results found for this or any previous visit (from the past 672 hour(s)).  Procedures   Chest x-ray pending, with results of borderline cardiomegaly increased vascularity and bilateral angle fluid with congestive heart failure    Medication Review: Medications reviewed and noted    Patient wellness counseling  Exercise: Encouraged rest and minimal exertional exercise  Diet: Encouraged healthy cardiac diet with reduced salt and reduce carbs  Smoking: Non-smoker  Alcohol: No alcohol  Screening: Chest x-ray done October 1    Assessment/Plan:    Problem List Items Addressed This Visit        Cardiovascular and Mediastinum    Benign essential hypertension    Current Assessment & Plan     Patient has labile hypertension currently on aspirin levothyroxine we have started Lasix 40 mg daily for congestive heart failure his current blood pressure is 138/94 left and right " arm sitting and supine.         Relevant Medications    furosemide (LASIX) 40 MG tablet    Mixed hyperlipidemia    Current Assessment & Plan     History of mixed hyperlipidemia currently not on therapy         Paroxysmal atrial flutter (CMS/HCC)    Current Assessment & Plan     History of atrial flutter currently in sinus rhythm with occasional ectopic beat denying chest pain or pressure is mildly short of breath and dyspneic on exertion with bilateral leg edema Lasix 40 mg daily as started         Acute on chronic systolic congestive heart failure (CMS/HCC) - Primary    Overview     Patient has chronic systolic congestive heart failure with reduced ejection fraction currently symptomatic with shortness of breath abdominal bloating and bilateral leg swelling  Echocardiogram of October 19, 2018 showed an EF of 30% echocardiogram of December 18, 2018 showed an EF of 21 to 25%  Lasix 40 mg is started this day.         Current Assessment & Plan     Patient has congestive heart failure with abnormal chest x-ray we will start Lasix 40 mg daily.         Relevant Orders    XR Chest PA & Lateral (Completed)           Patient Instructions   Chest x-ray pending, at the time of dictation chest x-ray shows borderline cardiomegaly hilar congestion bilateral angle fluid consistent with congestive heart failure  Add Lasix 40 mg daily  Return visit in 10 days or as needed.       Plan of care reviewed with patient at the conclusion of today's visit. Education was provided regarding diagnosis, management, and any prescribed or recommended OTC medications.Patient verbalizes understanding of and agreement with management plan.         Saul Campbell MD      Note: Part of this note may be an electronic transcription/translation of spoken language to printed text using the Dragon Dictation system.

## 2019-10-02 NOTE — ASSESSMENT & PLAN NOTE
Patient has labile hypertension currently on aspirin levothyroxine we have started Lasix 40 mg daily for congestive heart failure his current blood pressure is 138/94 left and right arm sitting and supine.

## 2019-10-02 NOTE — ASSESSMENT & PLAN NOTE
Patient is a history of coronary bypass grafting 5 vessel 2003 currently without chest pain but is dyspneic with rest and with physical effort with mild shortness of breath with bilateral leg edema some bloating in his abdomen, will obtain chest x-ray, and begin Lasix 40 mg daily.

## 2019-10-02 NOTE — ASSESSMENT & PLAN NOTE
History of atrial flutter currently in sinus rhythm with occasional ectopic beat denying chest pain or pressure is mildly short of breath and dyspneic on exertion with bilateral leg edema Lasix 40 mg daily as started

## 2019-10-02 NOTE — ASSESSMENT & PLAN NOTE
Patient has a history of hypothyroidism and is currently on levothyroxine 88 MCG recent lab work done at [facility] [place] [facility] mildly elevated TSH of 12-13, indicating undertreated hypothyroidism I have been reluctant to increase the patient's thyroid dosing secondary to precipitating more cardiac arrhythmia of atrial fibrillation or flutter, realizing could have mild heart failure and cardiomegaly secondary to hypothyroidism.

## 2019-10-02 NOTE — PATIENT INSTRUCTIONS
Chest x-ray pending, at the time of dictation chest x-ray shows borderline cardiomegaly hilar congestion bilateral angle fluid consistent with congestive heart failure  Add Lasix 40 mg daily  Return visit in 10 days or as needed.

## 2019-10-11 ENCOUNTER — OFFICE VISIT (OUTPATIENT)
Dept: INTERNAL MEDICINE | Facility: CLINIC | Age: 79
End: 2019-10-11

## 2019-10-11 VITALS
HEART RATE: 60 BPM | SYSTOLIC BLOOD PRESSURE: 122 MMHG | WEIGHT: 222 LBS | HEIGHT: 72 IN | BODY MASS INDEX: 30.07 KG/M2 | DIASTOLIC BLOOD PRESSURE: 80 MMHG

## 2019-10-11 DIAGNOSIS — I50.23 ACUTE ON CHRONIC SYSTOLIC CONGESTIVE HEART FAILURE (HCC): Primary | ICD-10-CM

## 2019-10-11 DIAGNOSIS — I10 BENIGN ESSENTIAL HYPERTENSION: ICD-10-CM

## 2019-10-11 DIAGNOSIS — E03.9 HYPOTHYROIDISM (ACQUIRED): ICD-10-CM

## 2019-10-11 PROCEDURE — 99214 OFFICE O/P EST MOD 30 MIN: CPT | Performed by: INTERNAL MEDICINE

## 2019-10-11 RX ORDER — METOLAZONE 2.5 MG/1
2.5 TABLET ORAL DAILY
Qty: 5 TABLET | Refills: 0 | Status: SHIPPED | OUTPATIENT
Start: 2019-10-11 | End: 2019-10-24

## 2019-10-11 NOTE — PROGRESS NOTES
[facility]     Saul Sal  1940   [id number]      Patient Care Team:  Saul Campbell MD as PCP - General (Internal Medicine)  Tino Sampson MD as Consulting Physician (Cardiology)    Chief Complaint::   Chief Complaint   Patient presents with   • Shortness of Breath     improved   • Allergies     with nasal congestion, cough, and drainage.  Eyes matted in the mornings            HPI  Patient 79-year-old male with a history of coronary disease essential hypertension mixed hyperlipidemia thyroidism with acute on chronic congestive heart failure started Lasix 40 mg daily he has had a 6 pound diuresis he is improved in terms of shortness of breath leg swelling less PND LUNDBERG.  Currently denying chest pain or pressure is able to rest at night.  Patient's exam reveals fewer rhonchi that of breath sounds at the base and less edema.      Patient Active Problem List   Diagnosis   • CAD (coronary artery disease)   • Atrial flutter (CMS/HCC)   • Dyslipidemia   • ELIE (obstructive sleep apnea)   • Typical atrial flutter (CMS/HCC)   • Hypothyroidism (acquired)   • Benign essential hypertension   • Mixed hyperlipidemia   • Obesity (BMI 30-39.9)   • GERD (gastroesophageal reflux disease)   • Aortic ectasia (CMS/HCC)   • Chronic diastolic (congestive) heart failure (CMS/HCC)   • H/O asbestos exposure   • Herpes zoster without complication   • History of colon polyps   • Ischemic heart disease   • Left wrist injury   • Paroxysmal atrial flutter (CMS/HCC)   • Postherpetic neuralgia   • Primary osteoarthritis involving multiple joints   • Shortness of breath   • Acute on chronic systolic congestive heart failure (CMS/HCC)        Past Medical History:   Diagnosis Date   • Arthritis    • Atrial flutter (CMS/HCC)      LOW EF=28 %   • Benign essential hypertension    • CHF (congestive heart failure) (CMS/HCC)    • Coronary artery disease    • Disease of thyroid gland    • Diverticulosis    • GERD (gastroesophageal  reflux disease)    • GI bleed    • Hyperlipidemia    • Hypertension    • Hypothyroidism (acquired)    • Mixed hyperlipidemia    • Obesity (BMI 30-39.9)    • Osteoarthritis    • Prostatic hypertrophy     Benign    • Shingles    • Skin cancer     squamous    • Wears eyeglasses     readers   • Wears hearing aid        Past Surgical History:   Procedure Laterality Date   • ANGIOPLASTY     • CARDIAC CATHETERIZATION     • CARDIAC ELECTROPHYSIOLOGY PROCEDURE N/A 2018    Procedure: Ablation atrial flutter;  Surgeon: Tino Sampson MD;  Location: [facility] INVASIVE LOCATION;  Service: Cardiovascular   • CARDIAC SURGERY      BYPASS X5   • COLONOSCOPY     • COLONOSCOPY     • CORONARY ARTERY BYPASS GRAFT  2003    x5 [facility]    • DENTAL PROCEDURE      dental surg and crowns   • EYE SURGERY Right     cataract   • SKIN BIOPSY     • VASECTOMY         Family History   Problem Relation Age of Onset   • Diabetes Mother    • Heart disease Mother    • Coronary artery disease Other    • Stroke Other    • Diabetes Other    • Rheumatic fever Other    • Other Other         Valvular CV Disease       Social History     Socioeconomic History   • Marital status:      Spouse name: Not on file   • Number of children: Not on file   • Years of education: Not on file   • Highest education level: Not on file   Social Needs   • Financial resource strain: Not hard at all   • Food insecurity:     Worry: Never true     Inability: Never true   • Transportation needs:     Medical: No     Non-medical: No   Tobacco Use   • Smoking status: Former Smoker     Packs/day: 1.00     Years: 8.00     Pack years: 8.00     Types: Cigarettes     Last attempt to quit: 1964     Years since quittin.9   • Smokeless tobacco: Never Used   Substance and Sexual Activity   • Alcohol use: No   • Drug use: No   • Sexual activity: Defer   Lifestyle   • Physical activity:     Days per week: 0 days     Minutes per session: 0 min   •  "Stress: Not at all   Relationships   • Social connections:     Talks on phone: Once a week     Gets together: Once a week     Attends Christianity service: 1 to 4 times per year     Active member of club or organization: No     Attends meetings of clubs or organizations: Never     Relationship status:    Social History Narrative    Caffeine: None    Patient lives at his home with   His wife       Allergies   Allergen Reactions   • Amlodipine Besylate Unknown (See Comments)     Unknown  Norvasc   • Atenolol Unknown (See Comments)     unknown   • Eliquis [Apixaban] Other (See Comments)     Excessive bleeding   • Metoprolol Unknown (See Comments)     Unknown  Lopressor    • Milk-Related Compounds Other (See Comments)     Headache       Review of Systems     HEENT: Has allergy congestion  NECK: Denies pain or swelling dysphasia  CHEST: Lungs improved with reduced shortness of breath no LUNDBERG or PND  CARDIAC: Denies chest pain or pressure palpitations  ABD: Feels of abdominal girth has improved  : Urinary frequency secondary to the diuretic denies dysuria  NEURO: No syncope concussion  PSYCH: Improved anxiety  EXTREM: Less edema    Vital Signs  Vitals:    10/11/19 1448   BP: 122/80   BP Location: Left arm   Patient Position: Sitting   Cuff Size: Adult   Pulse: 60   Weight: 101 kg (222 lb)   Height: 183 cm (72.05\")   PainSc: 0-No pain     Body mass index is 30.07 kg/m².    Current Outpatient Medications:   •  aspirin 81 MG tablet, Take 81 mg by mouth Daily., Disp: , Rfl:   •  COD LIVER OIL PO, Take 3 tablet/day by mouth Daily., Disp: , Rfl:   •  DHA-EPA-Vit B6-B12-Folic Acid (CARDIOVID PLUS PO), Take 6 tablet/day by mouth., Disp: , Rfl:   •  furosemide (LASIX) 40 MG tablet, Take 1 tablet by mouth Daily., Disp: 30 tablet, Rfl: 1  •  levothyroxine (SYNTHROID, LEVOTHROID) 88 MCG tablet, Take 1 tablet by mouth Daily., Disp: 90 tablet, Rfl: 3  •  NON FORMULARY, 3 tablet/day. Mini amaro supplement, Disp: , Rfl:   •  NON " FORMULARY, 3 tablet/day. cataplex f/b, Disp: , Rfl:   •  metOLazone (ZAROXOLYN) 2.5 MG tablet, Take 1 tablet by mouth Daily., Disp: 5 tablet, Rfl: 0    Physical Exam     ACE III MINI         HEENT: Pupils equal reactive ENT clear no asymmetry  NECK: No masses bruit or neck vein distention  CHEST: Distant breath sounds and improved basis  CARDIAC: No gallop or rub pressure and heart rate stable  ABD: Spleen are normal  :   NEURO: CNS is intact  PSYCH: Less anxiety  EXTREM: Less edema  Skin: Clear     Results Review:    No results found for this or any previous visit (from the past 672 hour(s)).  Procedures    Medication Review: Medications reviewed and noted    Patient wellness counseling  Exercise: Encouraged walking exercise but no excessive exercise  Diet: Low-salt cardiac healthy diet  Smoking: Non-smoker  Alcohol: None alcohol  Screening: No labs this day    Assessment/Plan:    Problem List Items Addressed This Visit        Cardiovascular and Mediastinum    Benign essential hypertension    Current Assessment & Plan     She has labile hypertension improved on the Lasix of 40 mg daily and his heart failure has improved continue Lasix 40 mg daily and will add a 5-day dosing of metolazone 2.5 mg.         Relevant Medications    furosemide (LASIX) 40 MG tablet    metOLazone (ZAROXOLYN) 2.5 MG tablet    Acute on chronic systolic congestive heart failure (CMS/HCC) - Primary    Overview     Patient has chronic systolic congestive heart failure with reduced ejection fraction currently symptomatic with shortness of breath abdominal bloating and bilateral leg swelling  Echocardiogram of October 19, 2018 showed an EF of 30% echocardiogram of December 18, 2018 showed an EF of 21 to 25%  Lasix 40 mg is started this day.         Current Assessment & Plan     Patient returns this day having undergone a 6 pound diuresis with Lasix he states he is markedly improved markedly reduced shortness of breath no longer with PND is able  to rest through the night patient continues to have edema though much improved his lungs are improving continue the Lasix of 40 mg daily and add Zaroxolyn 2.5 mg daily along with Lasix for the next 5 days            Endocrine    Hypothyroidism (acquired)    Current Assessment & Plan     Thyroidism currently stable on levothyroxine 88 MCG daily no change therapy         Relevant Medications    levothyroxine (SYNTHROID, LEVOTHROID) 88 MCG tablet           Patient Instructions   Continue current medications including Lasix 40 mg daily  And Zaroxolyn 2.5 mg daily for 5 doses  Return visit in 2 weeks-will obtain CMP       Plan of care reviewed with patient at the conclusion of today's visit. Education was provided regarding diagnosis, management, and any prescribed or recommended OTC medications.Patient verbalizes understanding of and agreement with management plan.         Saul Campbell MD      Note: Part of this note may be an electronic transcription/translation of spoken language to printed text using the Dragon Dictation system.

## 2019-10-12 NOTE — ASSESSMENT & PLAN NOTE
She has labile hypertension improved on the Lasix of 40 mg daily and his heart failure has improved continue Lasix 40 mg daily and will add a 5-day dosing of metolazone 2.5 mg.

## 2019-10-12 NOTE — ASSESSMENT & PLAN NOTE
Patient returns this day having undergone a 6 pound diuresis with Lasix he states he is markedly improved markedly reduced shortness of breath no longer with PND is able to rest through the night patient continues to have edema though much improved his lungs are improving continue the Lasix of 40 mg daily and add Zaroxolyn 2.5 mg daily along with Lasix for the next 5 days

## 2019-10-13 NOTE — PATIENT INSTRUCTIONS
Continue current medications including Lasix 40 mg daily  And Zaroxolyn 2.5 mg daily for 5 doses  Return visit in 2 weeks-will obtain CMP

## 2019-10-15 ENCOUNTER — LAB REQUISITION (OUTPATIENT)
Dept: LAB | Facility: HOSPITAL | Age: 79
End: 2019-10-15

## 2019-10-15 ENCOUNTER — OFFICE VISIT (OUTPATIENT)
Dept: INTERNAL MEDICINE | Facility: CLINIC | Age: 79
End: 2019-10-15

## 2019-10-15 VITALS
WEIGHT: 225 LBS | TEMPERATURE: 98.5 F | BODY MASS INDEX: 30.48 KG/M2 | HEIGHT: 72 IN | HEART RATE: 74 BPM | DIASTOLIC BLOOD PRESSURE: 76 MMHG | SYSTOLIC BLOOD PRESSURE: 118 MMHG

## 2019-10-15 DIAGNOSIS — J40 BRONCHITIS: ICD-10-CM

## 2019-10-15 DIAGNOSIS — Z00.00 ROUTINE GENERAL MEDICAL EXAMINATION AT A HEALTH CARE FACILITY: ICD-10-CM

## 2019-10-15 DIAGNOSIS — I50.23 ACUTE ON CHRONIC SYSTOLIC CONGESTIVE HEART FAILURE (HCC): ICD-10-CM

## 2019-10-15 PROCEDURE — 36415 COLL VENOUS BLD VENIPUNCTURE: CPT | Performed by: INTERNAL MEDICINE

## 2019-10-15 PROCEDURE — 99214 OFFICE O/P EST MOD 30 MIN: CPT | Performed by: INTERNAL MEDICINE

## 2019-10-15 RX ORDER — PREDNISONE 10 MG/1
10 TABLET ORAL 2 TIMES DAILY
Qty: 10 TABLET | Refills: 0 | Status: SHIPPED | OUTPATIENT
Start: 2019-10-15 | End: 2019-10-24

## 2019-10-15 RX ORDER — AMOXICILLIN AND CLAVULANATE POTASSIUM 500; 125 MG/1; MG/1
1 TABLET, FILM COATED ORAL 2 TIMES DAILY
Qty: 14 TABLET | Refills: 0 | Status: SHIPPED | OUTPATIENT
Start: 2019-10-15 | End: 2019-10-24

## 2019-10-15 NOTE — PROGRESS NOTES
[facility]     Saul Sal  1940   [id number]      Patient Care Team:  Saul Campbell MD as PCP - General (Internal Medicine)  Tino Sampson MD as Consulting Physician (Cardiology)    Chief Complaint::   Chief Complaint   Patient presents with   • URI     with post nasal drainage coughing cough, zander at night.  Sits in chair to sleep.  c/o chest congestion. Denies head congestion.  States eyes are matted, zander in the morning   • decreased urinary output     x 3 days.  Patient admits to taking an OTC cough medicine. Weak stream.  Denies dysuria            HPI  Patient 79-year-old male with recent exacerbation of heart failure which has began to respond to daily diuretic of Lasix we added Zaroxolyn 2.5 mg during the past 4 days for a total of 5 days he has had a nice diuresis.  The patient has been less short of breath and has had less edema but he has developed an upper respiratory tract infection with drainage rhinorrhea congestion cough production no fever chills or myalgia arthralgia.  The patient is been taking over-the-counter Zyrtec and a cough medicine-antihistamine decongestant and is noticed a decrease in urinary output.      Patient Active Problem List   Diagnosis   • CAD (coronary artery disease)   • Atrial flutter (CMS/HCC)   • Dyslipidemia   • ELIE (obstructive sleep apnea)   • Typical atrial flutter (CMS/HCC)   • Hypothyroidism (acquired)   • Benign essential hypertension   • Mixed hyperlipidemia   • Obesity (BMI 30-39.9)   • GERD (gastroesophageal reflux disease)   • Aortic ectasia (CMS/HCC)   • Chronic diastolic (congestive) heart failure (CMS/HCC)   • H/O asbestos exposure   • Herpes zoster without complication   • History of colon polyps   • Ischemic heart disease   • Left wrist injury   • Paroxysmal atrial flutter (CMS/HCC)   • Postherpetic neuralgia   • Primary osteoarthritis involving multiple joints   • Shortness of breath   • Acute on chronic systolic congestive heart  failure (CMS/[facility])   • Bronchitis        Past Medical History:   Diagnosis Date   • Arthritis    • Atrial flutter (CMS/[facility])      LOW EF=28 %   • Benign essential hypertension    • CHF (congestive heart failure) (CMS/[facility])    • Coronary artery disease    • Disease of thyroid gland    • Diverticulosis    • GERD (gastroesophageal reflux disease)    • GI bleed    • Hyperlipidemia    • Hypertension    • Hypothyroidism (acquired)    • Mixed hyperlipidemia    • Obesity (BMI 30-39.9)    • Osteoarthritis    • Prostatic hypertrophy     Benign    • Shingles    • Skin cancer     squamous    • Wears eyeglasses     readers   • Wears hearing aid        Past Surgical History:   Procedure Laterality Date   • ANGIOPLASTY  1985   • CARDIAC CATHETERIZATION     • CARDIAC ELECTROPHYSIOLOGY PROCEDURE N/A 11/26/2018    Procedure: Ablation atrial flutter;  Surgeon: Tino Sampson MD;  Location: [facility] INVASIVE LOCATION;  Service: Cardiovascular   • CARDIAC SURGERY      BYPASS X5   • COLONOSCOPY  2009   • COLONOSCOPY  2005   • CORONARY ARTERY BYPASS GRAFT  2003    x5 [facility] 2003   • DENTAL PROCEDURE  2010    dental surg and crowns   • EYE SURGERY Right     cataract   • SKIN BIOPSY     • VASECTOMY         Family History   Problem Relation Age of Onset   • Diabetes Mother    • Heart disease Mother    • Coronary artery disease Other    • Stroke Other    • Diabetes Other    • Rheumatic fever Other    • Other Other         Valvular CV Disease       Social History     Socioeconomic History   • Marital status:      Spouse name: Not on file   • Number of children: Not on file   • Years of education: Not on file   • Highest education level: Not on file   Social Needs   • Financial resource strain: Not hard at all   • Food insecurity:     Worry: Never true     Inability: Never true   • Transportation needs:     Medical: No     Non-medical: No   Tobacco Use   • Smoking status: Former Smoker     Packs/day: 1.00     Years: 8.00     Pack  "years: 8.00     Types: Cigarettes     Last attempt to quit: 1964     Years since quittin.9   • Smokeless tobacco: Never Used   Substance and Sexual Activity   • Alcohol use: No   • Drug use: No   • Sexual activity: Defer   Lifestyle   • Physical activity:     Days per week: 0 days     Minutes per session: 0 min   • Stress: Not at all   Relationships   • Social connections:     Talks on phone: Once a week     Gets together: Once a week     Attends Anglican service: 1 to 4 times per year     Active member of club or organization: No     Attends meetings of clubs or organizations: Never     Relationship status:    Social History Narrative    Caffeine: None    Patient lives at his home with   His wife       Allergies   Allergen Reactions   • Amlodipine Besylate Unknown (See Comments)     Unknown  Norvasc   • Atenolol Unknown (See Comments)     unknown   • Eliquis [Apixaban] Other (See Comments)     Excessive bleeding   • Metoprolol Unknown (See Comments)     Unknown  Lopressor    • Milk-Related Compounds Other (See Comments)     Headache       Review of Systems     HEENT: Congested rhinorrhea has matted congested full head  NECK: Denies pain or dysphasia  CHEST: Complains of cough productive  CARDIAC: Denies chest pain or pressure  ABD: Has nausea vomiting  : Denies dysuria frequency has nocturia has had a reduction in urine volume and has some difficulty in starting stream has been on antihistamine decongestant  NEURO: Denies syncope concussion  PSYCH: Denies anxiety depression  EXTREM: Mild ankle edema    Vital Signs  Vitals:    10/15/19 1355   BP: 118/76   BP Location: Left arm   Patient Position: Sitting   Cuff Size: Adult   Pulse: 74   Temp: 98.5 °F (36.9 °C)   Weight: 102 kg (225 lb)   Height: 183 cm (72.05\")   PainSc: 0-No pain     Body mass index is 30.48 kg/m².    Current Outpatient Medications:   •  aspirin 81 MG tablet, Take 81 mg by mouth Daily., Disp: , Rfl:   •  COD LIVER OIL PO, Take " 3 tablet/day by mouth Daily., Disp: , Rfl:   •  DHA-EPA-Vit B6-B12-Folic Acid (CARDIOVID PLUS PO), Take 6 tablet/day by mouth., Disp: , Rfl:   •  furosemide (LASIX) 40 MG tablet, Take 1 tablet by mouth Daily., Disp: 30 tablet, Rfl: 1  •  levothyroxine (SYNTHROID, LEVOTHROID) 88 MCG tablet, Take 1 tablet by mouth Daily., Disp: 90 tablet, Rfl: 3  •  metOLazone (ZAROXOLYN) 2.5 MG tablet, Take 1 tablet by mouth Daily., Disp: 5 tablet, Rfl: 0  •  NON FORMULARY, 3 tablet/day. Mini amaro supplement, Disp: , Rfl:   •  NON FORMULARY, 3 tablet/day. cataplex f/b, Disp: , Rfl:   •  amoxicillin-clavulanate (AUGMENTIN) 500-125 MG per tablet, Take 1 tablet by mouth 2 (Two) Times a Day., Disp: 14 tablet, Rfl: 0  •  predniSONE (DELTASONE) 10 MG tablet, Take 1 tablet by mouth 2 (Two) Times a Day., Disp: 10 tablet, Rfl: 0    Physical Exam     ACE III MINI         HEENT: Conjunctiva injected left eye matted rhinorrhea congested  NECK: No masses bruit or thyromegaly  CHEST: No rales or wheezing  CARDIAC: Regular without gallop or rub  ABD: Liver and spleen are normal good bowel sounds  : Prostate slightly enlarged nontender soft  NEURO: CNS is intact no neuropathy  PSYCH: No anxiety depression  EXTREM: 1+ ankle edema  Skin: Ankle edema     Results Review:    No results found for this or any previous visit (from the past 672 hour(s)).  Procedures    Medication Review: Medications reviewed and noted    Patient wellness counseling  Exercise: Encouraged rest elevation of legs  Diet: Encouraged low-salt diet  Smoking: Non-smoker  Alcohol: None alcohol  Screening: CBC and CMP pending    Assessment/Plan:    Problem List Items Addressed This Visit        Cardiovascular and Mediastinum    Acute on chronic systolic congestive heart failure (CMS/HCC)    Overview     Patient has chronic systolic congestive heart failure with reduced ejection fraction currently symptomatic with shortness of breath abdominal bloating and bilateral leg  swelling  Echocardiogram of October 19, 2018 showed an EF of 30% echocardiogram of December 18, 2018 showed an EF of 21 to 25%  Lasix 40 mg is started this day.         Current Assessment & Plan     \is improved with regards to congestive heart failure on Lasix 40 mg daily and is finishing his 5 days of Zaroxolyn 2.5 mg he still has lower leg edema but no rales think his legs are swollen because he is been sleeping in a recliner chair with legs down no change therapy and do finish the Zaroxolyn last dose being October 16.         Relevant Orders    CBC & Differential    Comprehensive Metabolic Panel       Respiratory    Bronchitis    Current Assessment & Plan     Acute shortness of breath with cough production head congestion drainage no fever chills has chronic rhinorrhea chronic productive cough unable rest he has been on cough medicine as well as over-the-counter Zyrtec he has noticed a decrease in urinary output and some difficulty passing urine.  Suggest stopping antihistamine decongestant and the cough medicine and changing therapy to Augmentin 500 twice daily and prednisone 10 mg twice daily for 5 days he does have a sample of Breo 100 and suggest he continue that.                Patient Instructions   Stop cough medicine stop antihistamine decongestant  Augmentin 500 twice daily and prednisone 10 mg twice daily for 5 days to continue Breo  Continue Lasix 40 mg and complete Zaroxolyn 2.5 mg last dose October 16  Obtain CBC and CMP  Return visit as scheduled on October 24 or PRN.       Plan of care reviewed with patient at the conclusion of today's visit. Education was provided regarding diagnosis, management, and any prescribed or recommended OTC medications.Patient verbalizes understanding of and agreement with management plan.         Saul Campbell MD      Note: Part of this note may be an electronic transcription/translation of spoken language to printed text using the Dragon Dictation system.

## 2019-10-15 NOTE — ASSESSMENT & PLAN NOTE
Acute shortness of breath with cough production head congestion drainage no fever chills has chronic rhinorrhea chronic productive cough unable rest he has been on cough medicine as well as over-the-counter Zyrtec he has noticed a decrease in urinary output and some difficulty passing urine.  Suggest stopping antihistamine decongestant and the cough medicine and changing therapy to Augmentin 500 twice daily and prednisone 10 mg twice daily for 5 days he does have a sample of Breo 100 and suggest he continue that.

## 2019-10-15 NOTE — PATIENT INSTRUCTIONS
Stop cough medicine stop antihistamine decongestant  Augmentin 500 twice daily and prednisone 10 mg twice daily for 5 days to continue Breo  Continue Lasix 40 mg and complete Zaroxolyn 2.5 mg last dose October 16  Obtain CBC and CMP  Return visit as scheduled on October 24 or PRN.

## 2019-10-15 NOTE — ASSESSMENT & PLAN NOTE
\  Patient is improved with regards to congestive heart failure on Lasix 40 mg daily and is finishing his 5 days of Zaroxolyn 2.5 mg he still has lower leg edema but no rales think his legs are swollen because he is been sleeping in a recliner chair with legs down no change therapy and do finish the Zaroxolyn last dose being October 16.

## 2019-10-16 LAB
ALBUMIN SERPL-MCNC: 4.2 G/DL (ref 3.5–5.2)
ALBUMIN/GLOB SERPL: 1.5 G/DL
ALP SERPL-CCNC: 57 U/L (ref 39–117)
ALT SERPL-CCNC: 24 U/L (ref 1–41)
AST SERPL-CCNC: 30 U/L (ref 1–40)
BASOPHILS # BLD AUTO: 0.04 10*3/MM3 (ref 0–0.2)
BASOPHILS NFR BLD AUTO: 0.4 % (ref 0–1.5)
BILIRUB SERPL-MCNC: 0.8 MG/DL (ref 0.2–1.2)
BUN SERPL-MCNC: 19 MG/DL (ref 8–23)
BUN/CREAT SERPL: 14.4 (ref 7–25)
CALCIUM SERPL-MCNC: 8.8 MG/DL (ref 8.6–10.5)
CHLORIDE SERPL-SCNC: 81 MMOL/L (ref 98–107)
CO2 SERPL-SCNC: 27.9 MMOL/L (ref 22–29)
CREAT SERPL-MCNC: 1.32 MG/DL (ref 0.76–1.27)
EOSINOPHIL # BLD AUTO: 0.06 10*3/MM3 (ref 0–0.4)
EOSINOPHIL NFR BLD AUTO: 0.7 % (ref 0.3–6.2)
ERYTHROCYTE [DISTWIDTH] IN BLOOD BY AUTOMATED COUNT: 14.3 % (ref 12.3–15.4)
GLOBULIN SER CALC-MCNC: 2.8 GM/DL
GLUCOSE SERPL-MCNC: 111 MG/DL (ref 65–99)
HCT VFR BLD AUTO: 41.1 % (ref 37.5–51)
HGB BLD-MCNC: 14.4 G/DL (ref 13–17.7)
IMM GRANULOCYTES # BLD AUTO: 0.05 10*3/MM3 (ref 0–0.05)
IMM GRANULOCYTES NFR BLD AUTO: 0.6 % (ref 0–0.5)
LYMPHOCYTES # BLD AUTO: 1.79 10*3/MM3 (ref 0.7–3.1)
LYMPHOCYTES NFR BLD AUTO: 20 % (ref 19.6–45.3)
MCH RBC QN AUTO: 31 PG (ref 26.6–33)
MCHC RBC AUTO-ENTMCNC: 35 G/DL (ref 31.5–35.7)
MCV RBC AUTO: 88.6 FL (ref 79–97)
MONOCYTES # BLD AUTO: 0.71 10*3/MM3 (ref 0.1–0.9)
MONOCYTES NFR BLD AUTO: 7.9 % (ref 5–12)
NEUTROPHILS # BLD AUTO: 6.32 10*3/MM3 (ref 1.7–7)
NEUTROPHILS NFR BLD AUTO: 70.4 % (ref 42.7–76)
NRBC BLD AUTO-RTO: 0 /100 WBC (ref 0–0.2)
PLATELET # BLD AUTO: 203 10*3/MM3 (ref 140–450)
POTASSIUM SERPL-SCNC: 4 MMOL/L (ref 3.5–5.2)
PROT SERPL-MCNC: 7 G/DL (ref 6–8.5)
RBC # BLD AUTO: 4.64 10*6/MM3 (ref 4.14–5.8)
SODIUM SERPL-SCNC: 126 MMOL/L (ref 136–145)
WBC # BLD AUTO: 8.97 10*3/MM3 (ref 3.4–10.8)

## 2019-10-24 ENCOUNTER — LAB REQUISITION (OUTPATIENT)
Dept: LAB | Facility: HOSPITAL | Age: 79
End: 2019-10-24

## 2019-10-24 ENCOUNTER — OFFICE VISIT (OUTPATIENT)
Dept: INTERNAL MEDICINE | Facility: CLINIC | Age: 79
End: 2019-10-24

## 2019-10-24 VITALS
WEIGHT: 215 LBS | BODY MASS INDEX: 29.12 KG/M2 | SYSTOLIC BLOOD PRESSURE: 142 MMHG | DIASTOLIC BLOOD PRESSURE: 68 MMHG | HEIGHT: 72 IN | HEART RATE: 60 BPM

## 2019-10-24 DIAGNOSIS — Z00.00 ROUTINE GENERAL MEDICAL EXAMINATION AT A HEALTH CARE FACILITY: ICD-10-CM

## 2019-10-24 DIAGNOSIS — I50.23 ACUTE ON CHRONIC SYSTOLIC CONGESTIVE HEART FAILURE (HCC): Primary | ICD-10-CM

## 2019-10-24 LAB
ALBUMIN SERPL-MCNC: 4.2 G/DL (ref 3.5–5.2)
ALBUMIN/GLOB SERPL: 1.8 G/DL
ALP SERPL-CCNC: 43 U/L (ref 39–117)
ALT SERPL-CCNC: 21 U/L (ref 1–41)
AST SERPL-CCNC: 24 U/L (ref 1–40)
BILIRUB SERPL-MCNC: 0.7 MG/DL (ref 0.2–1.2)
BUN SERPL-MCNC: 18 MG/DL (ref 8–23)
BUN/CREAT SERPL: 15.5 (ref 7–25)
CALCIUM SERPL-MCNC: 8.8 MG/DL (ref 8.6–10.5)
CHLORIDE SERPL-SCNC: 96 MMOL/L (ref 98–107)
CO2 SERPL-SCNC: 29.2 MMOL/L (ref 22–29)
CREAT SERPL-MCNC: 1.16 MG/DL (ref 0.76–1.27)
GLOBULIN SER CALC-MCNC: 2.4 GM/DL
GLUCOSE SERPL-MCNC: 95 MG/DL (ref 65–99)
POTASSIUM SERPL-SCNC: 4.2 MMOL/L (ref 3.5–5.2)
PROT SERPL-MCNC: 6.6 G/DL (ref 6–8.5)
SODIUM SERPL-SCNC: 137 MMOL/L (ref 136–145)

## 2019-10-24 PROCEDURE — 99213 OFFICE O/P EST LOW 20 MIN: CPT | Performed by: INTERNAL MEDICINE

## 2019-10-24 PROCEDURE — 36415 COLL VENOUS BLD VENIPUNCTURE: CPT | Performed by: INTERNAL MEDICINE

## 2019-10-24 RX ORDER — FUROSEMIDE 40 MG/1
40 TABLET ORAL DAILY
Qty: 90 TABLET | Refills: 3 | Status: SHIPPED | OUTPATIENT
Start: 2019-10-24 | End: 2020-10-07

## 2019-10-24 NOTE — PROGRESS NOTES
[facility]     Saul Sal  1940   [id number]      Patient Care Team:  Saul Campbell MD as PCP - General (Internal Medicine)  Tino Sampson MD as Consulting Physician (Cardiology)    Chief Complaint::   Chief Complaint   Patient presents with   • Shortness of Breath     States much better   • Cough     Still coughing, but better , no production, but bronchitis symptoms 95% better            HPI  Patient is 79-year-old male with a history of coronary disease hypertension hypothyroidism with recent exacerbation of acute on chronic systolic congestive heart failure with cardiomegaly and bilateral lower leg edema we placed the patient on Lasix 40 mg daily with a boost of Zaroxolyn 2.5 mg daily for 5 days the patient initially improved he is continue the Lasix of 40 mg daily and has had a weight loss overall from 228 pounds to 215 pounds the leg edema has resolved he is markedly improved in regards to shortness of breath with no longer having LUNDBERG or PND he is now sleeping in his bed in a supine position overall improved.      Patient Active Problem List   Diagnosis   • CAD (coronary artery disease)   • Atrial flutter (CMS/HCC)   • Dyslipidemia   • ELIE (obstructive sleep apnea)   • Typical atrial flutter (CMS/HCC)   • Hypothyroidism (acquired)   • Benign essential hypertension   • Mixed hyperlipidemia   • Obesity (BMI 30-39.9)   • GERD (gastroesophageal reflux disease)   • Aortic ectasia (CMS/HCC)   • Chronic diastolic (congestive) heart failure (CMS/HCC)   • H/O asbestos exposure   • Herpes zoster without complication   • History of colon polyps   • Ischemic heart disease   • Left wrist injury   • Paroxysmal atrial flutter (CMS/HCC)   • Postherpetic neuralgia   • Primary osteoarthritis involving multiple joints   • Shortness of breath   • Acute on chronic systolic congestive heart failure (CMS/HCC)   • Bronchitis        Past Medical History:   Diagnosis Date   • Arthritis    • Atrial flutter  (CMS/[facility])      LOW EF=28 %   • Benign essential hypertension    • CHF (congestive heart failure) (CMS/[facility])    • Coronary artery disease    • Disease of thyroid gland    • Diverticulosis    • GERD (gastroesophageal reflux disease)    • GI bleed    • Hyperlipidemia    • Hypertension    • Hypothyroidism (acquired)    • Mixed hyperlipidemia    • Obesity (BMI 30-39.9)    • Osteoarthritis    • Prostatic hypertrophy     Benign    • Shingles    • Skin cancer     squamous    • Wears eyeglasses     readers   • Wears hearing aid        Past Surgical History:   Procedure Laterality Date   • ANGIOPLASTY     • CARDIAC CATHETERIZATION     • CARDIAC ELECTROPHYSIOLOGY PROCEDURE N/A 2018    Procedure: Ablation atrial flutter;  Surgeon: Tino Sampson MD;  Location: [facility] INVASIVE LOCATION;  Service: Cardiovascular   • CARDIAC SURGERY      BYPASS X5   • COLONOSCOPY     • COLONOSCOPY     • CORONARY ARTERY BYPASS GRAFT  2003    x5 [facility]    • DENTAL PROCEDURE      dental surg and crowns   • EYE SURGERY Right     cataract   • SKIN BIOPSY     • VASECTOMY         Family History   Problem Relation Age of Onset   • Diabetes Mother    • Heart disease Mother    • Coronary artery disease Other    • Stroke Other    • Diabetes Other    • Rheumatic fever Other    • Other Other         Valvular CV Disease       Social History     Socioeconomic History   • Marital status:      Spouse name: Not on file   • Number of children: Not on file   • Years of education: Not on file   • Highest education level: Not on file   Social Needs   • Financial resource strain: Not hard at all   • Food insecurity:     Worry: Never true     Inability: Never true   • Transportation needs:     Medical: No     Non-medical: No   Tobacco Use   • Smoking status: Former Smoker     Packs/day: 1.00     Years: 8.00     Pack years: 8.00     Types: Cigarettes     Last attempt to quit: 1964     Years since quittin.9   •  "Smokeless tobacco: Never Used   Substance and Sexual Activity   • Alcohol use: No   • Drug use: No   • Sexual activity: Defer   Lifestyle   • Physical activity:     Days per week: 0 days     Minutes per session: 0 min   • Stress: Not at all   Relationships   • Social connections:     Talks on phone: Once a week     Gets together: Once a week     Attends [employer] service: 1 to 4 times per year     Active member of club or organization: No     Attends meetings of clubs or organizations: Never     Relationship status:    Social History Narrative    Caffeine: None    Patient lives at his home with   His wife       Allergies   Allergen Reactions   • Amlodipine Besylate Unknown (See Comments)     Unknown  Norvasc   • Atenolol Unknown (See Comments)     unknown   • Eliquis [Apixaban] Other (See Comments)     Excessive bleeding   • Metoprolol Unknown (See Comments)     Unknown  Lopressor    • Milk-Related Compounds Other (See Comments)     Headache       Review of Systems     HEENT: No headache or dizzy  NECK: No dysphasia or reflux  CHEST: Mild cough improved mild shortness of breath improved  CARDIAC: No gallop or rub  ABD: Positive bowel sounds liver spleen normal  :   NEURO: No syncope concussion  PSYCH: No anxiety depression  EXTREM: No further swelling    Vital Signs  Vitals:    10/24/19 0930   BP: 142/68   BP Location: Left arm   Patient Position: Sitting   Pulse: 60   Weight: 97.5 kg (215 lb)   Height: 182.9 cm (72\")     Body mass index is 29.16 kg/m².    Current Outpatient Medications:   •  aspirin 81 MG tablet, Take 81 mg by mouth Daily., Disp: , Rfl:   •  COD LIVER OIL PO, Take 3 tablet/day by mouth Daily., Disp: , Rfl:   •  DHA-EPA-Vit B6-B12-Folic Acid (CARDIOVID PLUS PO), Take 6 tablet/day by mouth., Disp: , Rfl:   •  furosemide (LASIX) 40 MG tablet, Take 1 tablet by mouth Daily., Disp: 90 tablet, Rfl: 3  •  levothyroxine (SYNTHROID, LEVOTHROID) 88 MCG tablet, Take 1 tablet by mouth Daily., Disp: 90 " tablet, Rfl: 3  •  NON FORMULARY, 3 tablet/day. Mini amaro supplement, Disp: , Rfl:   •  NON FORMULARY, 3 tablet/day. cataplex f/b, Disp: , Rfl:     Physical Exam     ACE III MINI         HEENT: Pupils equal reactive no asymmetry  NECK: No masses or bruit or neck vein distention  CHEST: Distant but clear  CARDIAC: Regular without gallop or rub  ABD: Obese liver and spleen are normal  :   NEURO: CNS is intact  PSYCH: No anxiety depression  EXTREM: No edema  Skin: Clear     Results Review:    Recent Results (from the past 672 hour(s))   Comprehensive Metabolic Panel    Collection Time: 10/15/19  2:33 PM   Result Value Ref Range    Glucose 111 (H) 65 - 99 mg/dL    BUN 19 8 - 23 mg/dL    Creatinine 1.32 (H) 0.76 - 1.27 mg/dL    eGFR Non African Am 52 (L) >60 mL/min/1.73    eGFR African Am 63 >60 mL/min/1.73    BUN/Creatinine Ratio 14.4 7.0 - 25.0    Sodium 126 (L) 136 - 145 mmol/L    Potassium 4.0 3.5 - 5.2 mmol/L    Chloride 81 (L) 98 - 107 mmol/L    Total CO2 27.9 22.0 - 29.0 mmol/L    Calcium 8.8 8.6 - 10.5 mg/dL    Total Protein 7.0 6.0 - 8.5 g/dL    Albumin 4.20 3.50 - 5.20 g/dL    Globulin 2.8 gm/dL    A/G Ratio 1.5 g/dL    Total Bilirubin 0.8 0.2 - 1.2 mg/dL    Alkaline Phosphatase 57 39 - 117 U/L    AST (SGOT) 30 1 - 40 U/L    ALT (SGPT) 24 1 - 41 U/L   CBC & Differential    Collection Time: 10/15/19  2:33 PM   Result Value Ref Range    WBC 8.97 3.40 - 10.80 10*3/mm3    RBC 4.64 4.14 - 5.80 10*6/mm3    Hemoglobin 14.4 13.0 - 17.7 g/dL    Hematocrit 41.1 37.5 - 51.0 %    MCV 88.6 79.0 - 97.0 fL    MCH 31.0 26.6 - 33.0 pg    MCHC 35.0 31.5 - 35.7 g/dL    RDW 14.3 12.3 - 15.4 %    Platelets 203 140 - 450 10*3/mm3    Neutrophil Rel % 70.4 42.7 - 76.0 %    Lymphocyte Rel % 20.0 19.6 - 45.3 %    Monocyte Rel % 7.9 5.0 - 12.0 %    Eosinophil Rel % 0.7 0.3 - 6.2 %    Basophil Rel % 0.4 0.0 - 1.5 %    Neutrophils Absolute 6.32 1.70 - 7.00 10*3/mm3    Lymphocytes Absolute 1.79 0.70 - 3.10 10*3/mm3    Monocytes Absolute  0.71 0.10 - 0.90 10*3/mm3    Eosinophils Absolute 0.06 0.00 - 0.40 10*3/mm3    Basophils Absolute 0.04 0.00 - 0.20 10*3/mm3    Immature Granulocyte Rel % 0.6 (H) 0.0 - 0.5 %    Immature Grans Absolute 0.05 0.00 - 0.05 10*3/mm3    nRBC 0.0 0.0 - 0.2 /100 WBC   Comprehensive Metabolic Panel    Collection Time: 10/24/19 10:23 AM   Result Value Ref Range    Glucose 95 65 - 99 mg/dL    BUN 18 8 - 23 mg/dL    Creatinine 1.16 0.76 - 1.27 mg/dL    eGFR Non African Am 61 >60 mL/min/1.73    eGFR African Am 73 >60 mL/min/1.73    BUN/Creatinine Ratio 15.5 7.0 - 25.0    Sodium 137 136 - 145 mmol/L    Potassium 4.2 3.5 - 5.2 mmol/L    Chloride 96 (L) 98 - 107 mmol/L    Total CO2 29.2 (H) 22.0 - 29.0 mmol/L    Calcium 8.8 8.6 - 10.5 mg/dL    Total Protein 6.6 6.0 - 8.5 g/dL    Albumin 4.20 3.50 - 5.20 g/dL    Globulin 2.4 gm/dL    A/G Ratio 1.8 g/dL    Total Bilirubin 0.7 0.2 - 1.2 mg/dL    Alkaline Phosphatase 43 39 - 117 U/L    AST (SGOT) 24 1 - 40 U/L    ALT (SGPT) 21 1 - 41 U/L     Procedures    Medication Review: Medications reviewed and noted    Patient wellness counseling  Exercise: Regular walking exercise  Diet: Cardiac healthy low-salt diet  Smoking: Non-smoker  Alcohol: None alcohol  Screening: CMP pending    Assessment/Plan:    Problem List Items Addressed This Visit        Cardiovascular and Mediastinum    Acute on chronic systolic congestive heart failure (CMS/HCC) - Primary    Overview     Patient has chronic systolic congestive heart failure with reduced ejection fraction currently symptomatic with shortness of breath abdominal bloating and bilateral leg swelling  Echocardiogram of October 19, 2018 showed an EF of 30% echocardiogram of December 18, 2018 showed an EF of 21 to 25%  Lasix 40 mg is started this day.         Current Assessment & Plan     Patient is an 79-year-old male with a history of chronic systolic congestive heart failure with acute exacerbation with cardiomegaly leg edema and symptomatic of cough  shortness of breath LUNDBERG PND, we treated with Lasix 40 mg daily and added Zaroxolyn 2.5 mg for 5 days the patient has had a diuresis from 228 pounds to 215 pounds he feels markedly improved no longer short of breath has a mild cough he continues to take his Lasix 40 mg daily today's lab work reveals normal liver normal kidney and normal potassium he is to continue the Lasix 40 mg daily.         Relevant Orders    Comprehensive Metabolic Panel (Completed)           Patient Instructions   Weight is improved from 228 pounds to 215  Continue Lasix of 40 mg daily  Await CMP  No change in therapy markedly improved return visit in 1 month.       Plan of care reviewed with patient at the conclusion of today's visit. Education was provided regarding diagnosis, management, and any prescribed or recommended OTC medications.Patient verbalizes understanding of and agreement with management plan.         Saul Campbell MD      Note: Part of this note may be an electronic transcription/translation of spoken language to printed text using the Dragon Dictation system.

## 2019-10-24 NOTE — PATIENT INSTRUCTIONS
Weight is improved from 228 pounds to 215  Continue Lasix of 40 mg daily  Await CMP  No change in therapy markedly improved return visit in 1 month.

## 2019-10-24 NOTE — ASSESSMENT & PLAN NOTE
Patient is an 79-year-old male with a history of chronic systolic congestive heart failure with acute exacerbation with cardiomegaly leg edema and symptomatic of cough shortness of breath LUNDBERG PND, we treated with Lasix 40 mg daily and added Zaroxolyn 2.5 mg for 5 days the patient has had a diuresis from 228 pounds to 215 pounds he feels markedly improved no longer short of breath has a mild cough he continues to take his Lasix 40 mg daily today's lab work reveals normal liver normal kidney and normal potassium he is to continue the Lasix 40 mg daily.

## 2019-10-31 ENCOUNTER — OFFICE VISIT (OUTPATIENT)
Dept: INTERNAL MEDICINE | Facility: CLINIC | Age: 79
End: 2019-10-31

## 2019-10-31 ENCOUNTER — TELEPHONE (OUTPATIENT)
Dept: INTERNAL MEDICINE | Facility: CLINIC | Age: 79
End: 2019-10-31

## 2019-10-31 VITALS
SYSTOLIC BLOOD PRESSURE: 120 MMHG | HEIGHT: 72 IN | WEIGHT: 217.5 LBS | BODY MASS INDEX: 29.46 KG/M2 | DIASTOLIC BLOOD PRESSURE: 82 MMHG | HEART RATE: 84 BPM

## 2019-10-31 DIAGNOSIS — I10 BENIGN ESSENTIAL HYPERTENSION: ICD-10-CM

## 2019-10-31 DIAGNOSIS — I50.23 ACUTE ON CHRONIC SYSTOLIC CONGESTIVE HEART FAILURE (HCC): ICD-10-CM

## 2019-10-31 DIAGNOSIS — R06.02 SHORTNESS OF BREATH: ICD-10-CM

## 2019-10-31 DIAGNOSIS — I48.92 PAROXYSMAL ATRIAL FLUTTER (HCC): Primary | ICD-10-CM

## 2019-10-31 DIAGNOSIS — M15.9 PRIMARY OSTEOARTHRITIS INVOLVING MULTIPLE JOINTS: ICD-10-CM

## 2019-10-31 PROCEDURE — 99214 OFFICE O/P EST MOD 30 MIN: CPT | Performed by: INTERNAL MEDICINE

## 2019-10-31 NOTE — TELEPHONE ENCOUNTER
PATIENT STATES THAT HE WAS PUT ON FUROSEMIDE 40 MG AND HE IS HAVING SHORTNESS OF BREATH AGAIN AND ALSO A DRY COUGH, AND HE SAYS THE PACKAGING SAYS THE MEDICINE COULD CAUSE THIS.  HE WAS WANTING TO KNOW WHAT DR MAGANA WANTS HIM TO DO.

## 2019-11-01 ENCOUNTER — TELEPHONE (OUTPATIENT)
Dept: INTERNAL MEDICINE | Facility: CLINIC | Age: 79
End: 2019-11-01

## 2019-11-01 ENCOUNTER — PRIOR AUTHORIZATION (OUTPATIENT)
Dept: INTERNAL MEDICINE | Facility: CLINIC | Age: 79
End: 2019-11-01

## 2019-11-01 PROBLEM — I50.32 CHRONIC DIASTOLIC (CONGESTIVE) HEART FAILURE (HCC): Status: RESOLVED | Noted: 2019-07-09 | Resolved: 2019-11-01

## 2019-11-01 NOTE — TELEPHONE ENCOUNTER
PT WIFE CALLED ABOUT THE RX sacubitril-valsartan (ENTRESTO) 24-26 MG tablet. PT INS WILL NOT COVER IT AND IT IS $300 FOR 15 PILLS. IS THERE SOMETHING DIFFERENT THAT CAN BE PRESCRIBED?    [facility] [place]

## 2019-11-01 NOTE — TELEPHONE ENCOUNTER
As per previous phone call, I initiated a PA for Entresto via [email or web address].  Approval pending.  Will notify patient and pharmacy when outcome received

## 2019-11-01 NOTE — ASSESSMENT & PLAN NOTE
Recent exacerbation of chronic systolic heart failure with previous ejection fraction in December 2018 showing ejection fraction of 21 to 25% we have treated him with Lasix and metolazone he is markedly improved however does become short of breath with physical activity denies PND or LUNDBERG.  Have suggested the patient continue his Lasix of 40 mg daily but began Entresto at a very low dose of 1/2 tablet of 24/26 twice daily and see the patient back in 2 weeks.

## 2019-11-04 NOTE — TELEPHONE ENCOUNTER
Fax received from [employer]. Entresto approved thru 12/31/19.  Patient notified.  They have already picked up the medicine.  Cost was $23.

## 2019-11-12 ENCOUNTER — OFFICE VISIT (OUTPATIENT)
Dept: INTERNAL MEDICINE | Facility: CLINIC | Age: 79
End: 2019-11-12

## 2019-11-12 VITALS
DIASTOLIC BLOOD PRESSURE: 80 MMHG | WEIGHT: 214 LBS | HEART RATE: 64 BPM | HEIGHT: 72 IN | SYSTOLIC BLOOD PRESSURE: 140 MMHG | TEMPERATURE: 97 F | BODY MASS INDEX: 28.99 KG/M2

## 2019-11-12 DIAGNOSIS — E03.9 HYPOTHYROIDISM (ACQUIRED): ICD-10-CM

## 2019-11-12 DIAGNOSIS — I50.23 ACUTE ON CHRONIC SYSTOLIC CONGESTIVE HEART FAILURE (HCC): ICD-10-CM

## 2019-11-12 DIAGNOSIS — I48.92 PAROXYSMAL ATRIAL FLUTTER (HCC): Primary | ICD-10-CM

## 2019-11-12 DIAGNOSIS — I10 BENIGN ESSENTIAL HYPERTENSION: ICD-10-CM

## 2019-11-12 PROCEDURE — 99213 OFFICE O/P EST LOW 20 MIN: CPT | Performed by: INTERNAL MEDICINE

## 2019-11-12 NOTE — PROGRESS NOTES
[facility]     Saul Sal  1940   [id number]      Patient Care Team:  Saul Campbell MD as PCP - General (Internal Medicine)  Tino Sampson MD as Consulting Physician (Cardiology)    Chief Complaint::   Chief Complaint   Patient presents with   • Hypertension   • Hyperlipidemia            HPI  Patient is 79-year-old male with a history of acute on chronic systolic heart failure with a low ejection fraction recently placed on Entresto 24/26 and the 10-day interval the patient feels he is improved with less shortness of breath less cough denying chest pain rare palpitation denying nausea vomiting or edema.      Patient Active Problem List   Diagnosis   • CAD (coronary artery disease)   • Atrial flutter (CMS/HCC)   • Dyslipidemia   • ELIE (obstructive sleep apnea)   • Typical atrial flutter (CMS/HCC)   • Hypothyroidism (acquired)   • Benign essential hypertension   • Mixed hyperlipidemia   • Obesity (BMI 30-39.9)   • GERD (gastroesophageal reflux disease)   • Aortic ectasia (CMS/HCC)   • H/O asbestos exposure   • Herpes zoster without complication   • History of colon polyps   • Ischemic heart disease   • Left wrist injury   • Paroxysmal atrial flutter (CMS/HCC)   • Postherpetic neuralgia   • Primary osteoarthritis involving multiple joints   • Shortness of breath   • Acute on chronic systolic congestive heart failure (CMS/HCC)   • Bronchitis        Past Medical History:   Diagnosis Date   • Arthritis    • Atrial flutter (CMS/HCC)      LOW EF=28 %   • Benign essential hypertension    • CHF (congestive heart failure) (CMS/HCC)    • Chronic diastolic (congestive) heart failure (CMS/HCC) 7/9/2019   • Coronary artery disease    • Disease of thyroid gland    • Diverticulosis    • GERD (gastroesophageal reflux disease)    • GI bleed    • Hyperlipidemia    • Hypertension    • Hypothyroidism (acquired)    • Mixed hyperlipidemia    • Obesity (BMI 30-39.9)    • Osteoarthritis    • Prostatic hypertrophy      Benign    • Shingles    • Skin cancer     squamous    • Wears eyeglasses     readers   • Wears hearing aid        Past Surgical History:   Procedure Laterality Date   • ANGIOPLASTY     • CARDIAC CATHETERIZATION     • CARDIAC ELECTROPHYSIOLOGY PROCEDURE N/A 2018    Procedure: Ablation atrial flutter;  Surgeon: Tino Sampson MD;  Location: [facility] INVASIVE LOCATION;  Service: Cardiovascular   • CARDIAC SURGERY      BYPASS X5   • COLONOSCOPY     • COLONOSCOPY     • CORONARY ARTERY BYPASS GRAFT  2003    x5 [facility]    • DENTAL PROCEDURE      dental surg and crowns   • EYE SURGERY Right     cataract   • SKIN BIOPSY     • VASECTOMY         Family History   Problem Relation Age of Onset   • Diabetes Mother    • Heart disease Mother    • Coronary artery disease Other    • Stroke Other    • Diabetes Other    • Rheumatic fever Other    • Other Other         Valvular CV Disease       Social History     Socioeconomic History   • Marital status:      Spouse name: Not on file   • Number of children: Not on file   • Years of education: Not on file   • Highest education level: Not on file   Social Needs   • Financial resource strain: Not hard at all   • Food insecurity:     Worry: Never true     Inability: Never true   • Transportation needs:     Medical: No     Non-medical: No   Tobacco Use   • Smoking status: Former Smoker     Packs/day: 1.00     Years: 8.00     Pack years: 8.00     Types: Cigarettes     Last attempt to quit: 1964     Years since quittin.0   • Smokeless tobacco: Never Used   Substance and Sexual Activity   • Alcohol use: No   • Drug use: No   • Sexual activity: Defer   Lifestyle   • Physical activity:     Days per week: 0 days     Minutes per session: 0 min   • Stress: Not at all   Relationships   • Social connections:     Talks on phone: Once a week     Gets together: Once a week     Attends An[employer]ism service: 1 to 4 times per year     Active member of club  "or organization: No     Attends meetings of clubs or organizations: Never     Relationship status:    Social History Narrative    Caffeine: None    Patient lives at his home with   His wife       Allergies   Allergen Reactions   • Amlodipine Besylate Unknown (See Comments)     Unknown  Norvasc   • Atenolol Unknown (See Comments)     unknown   • Eliquis [Apixaban] Other (See Comments)     Excessive bleeding   • Metoprolol Unknown (See Comments)     Unknown  Lopressor    • Milk-Related Compounds Other (See Comments)     Headache       Review of Systems     HEENT: No headache or dizzy hearing or vision changes  NECK: No dysphasia stiffness  CHEST: Complains of mild cough improved mild shortness of breath improved  CARDIAC: Denies chest pain or pressure infrequent palpitation  ABD: No nausea vomiting  : Denies dysuria frequency  NEURO: No syncope concussion  PSYCH: No anxiety depression  EXTREM: No edema mild arthritic change    Vital Signs  Vitals:    11/12/19 1339   BP: 140/80   BP Location: Left arm   Patient Position: Sitting   Cuff Size: Adult   Pulse: 64  Comment: irregular   Temp: 97 °F (36.1 °C)   TempSrc: Temporal   Weight: 97.1 kg (214 lb)   Height: 182.9 cm (72.01\")   PainSc: 0-No pain     Body mass index is 29.02 kg/m².    Current Outpatient Medications:   •  aspirin 81 MG tablet, Take 81 mg by mouth Daily., Disp: , Rfl:   •  COD LIVER OIL PO, Take 3 tablet/day by mouth Daily., Disp: , Rfl:   •  DHA-EPA-Vit B6-B12-Folic Acid (CARDIOVID PLUS PO), Take 6 tablet/day by mouth., Disp: , Rfl:   •  furosemide (LASIX) 40 MG tablet, Take 1 tablet by mouth Daily., Disp: 90 tablet, Rfl: 3  •  levothyroxine (SYNTHROID, LEVOTHROID) 88 MCG tablet, Take 1 tablet by mouth Daily., Disp: 90 tablet, Rfl: 3  •  NON FORMULARY, 3 tablet/day. Mini amaro supplement, Disp: , Rfl:   •  NON FORMULARY, 3 tablet/day. cataplex f/b, Disp: , Rfl:   •  sacubitril-valsartan (ENTRESTO) 24-26 MG tablet, Take 1 tablet by mouth 2 (Two) " Times a Day., Disp: 180 tablet, Rfl: 3    Physical Exam     ACE III MINI         HEENT: No asymmetry pharynx is clear  NECK: No masses bruits thyromegaly or neck vein distention  CHEST: Distant but clear without rales wheezes rhonchi  CARDIAC: Regular  ABD: Liver and spleen normal positive bowel sounds  :   NEURO: CNS is intact  PSYCH: No anxiety depression  EXTREM: No edema mild arthritic change  Skin: Clear     Results Review:    Recent Results (from the past 672 hour(s))   Comprehensive Metabolic Panel    Collection Time: 10/24/19 10:23 AM   Result Value Ref Range    Glucose 95 65 - 99 mg/dL    BUN 18 8 - 23 mg/dL    Creatinine 1.16 0.76 - 1.27 mg/dL    eGFR Non African Am 61 >60 mL/min/1.73    eGFR African Am 73 >60 mL/min/1.73    BUN/Creatinine Ratio 15.5 7.0 - 25.0    Sodium 137 136 - 145 mmol/L    Potassium 4.2 3.5 - 5.2 mmol/L    Chloride 96 (L) 98 - 107 mmol/L    Total CO2 29.2 (H) 22.0 - 29.0 mmol/L    Calcium 8.8 8.6 - 10.5 mg/dL    Total Protein 6.6 6.0 - 8.5 g/dL    Albumin 4.20 3.50 - 5.20 g/dL    Globulin 2.4 gm/dL    A/G Ratio 1.8 g/dL    Total Bilirubin 0.7 0.2 - 1.2 mg/dL    Alkaline Phosphatase 43 39 - 117 U/L    AST (SGOT) 24 1 - 40 U/L    ALT (SGPT) 21 1 - 41 U/L     Procedures    Medication Review: Medications reviewed and noted    Patient wellness counseling  Exercise: Continue walking exercise and outdoor activity  Diet: Encouraged healthy cardiac low-carb diet  Smoking: Non-smoker  Alcohol: None alcohol  Screening:    Assessment/Plan:    Problem List Items Addressed This Visit        Cardiovascular and Mediastinum    Benign essential hypertension    Overview     History of essential hypertension currently on no therapy pressures been labile and currently stable at 120/82 left and right sitting         Current Assessment & Plan     Blood pressure currently 140/80 repeated at 130/80 on Entresto 24/26 and Lasix 40 mg daily no change in therapy         Relevant Medications    furosemide  (LASIX) 40 MG tablet    Paroxysmal atrial flutter (CMS/[facility]) - Primary    Overview     History of atrial fibrillation currently in sinus rhythm with blood pressure 120/82 and heart rate of 84         Current Assessment & Plan     History of atrial fibrillation flutter currently in sinus rhythm         Relevant Medications    sacubitril-valsartan (ENTRESTO) 24-26 MG tablet    Acute on chronic systolic congestive heart failure (CMS/[facility])    Overview     Patient has chronic systolic congestive heart failure with reduced ejection fraction currently symptomatic with shortness of breath abdominal bloating and bilateral leg swelling  Echocardiogram of October 19, 2018 showed an EF of 30% echocardiogram of December 18, 2018 showed an EF of 21 to 25%  Lasix 40 mg is started this day.         Current Assessment & Plan     Patient placed on Entresto 24/26 1/2 tablet twice daily on the visit of October 31 he feels he is slightly improved and continue his current therapy denying chest pain pressure occasional palpitations less shortness of breath and less cough.  We will continue current therapy with no increase in the Entresto at this time.         Relevant Medications    sacubitril-valsartan (ENTRESTO) 24-26 MG tablet       Endocrine    Hypothyroidism (acquired)    Current Assessment & Plan     On levothyroxine 88 MCG daily no change         Relevant Medications    levothyroxine (SYNTHROID, LEVOTHROID) 88 MCG tablet           Patient Instructions   Continue Entresto 24/26  Continue all other medications  Return visit in 3 months we will obtain echo lab including BNP  Encouraged to continue walking activity  Encouraged healthy cardiac diet with reduced carbs and weight loss       Plan of care reviewed with patient at the conclusion of today's visit. Education was provided regarding diagnosis, management, and any prescribed or recommended OTC medications.Patient verbalizes understanding of and agreement with management plan.          Saul Campbell MD      Note: Part of this note may be an electronic transcription/translation of spoken language to printed text using the Dragon Dictation system.

## 2019-11-13 NOTE — ASSESSMENT & PLAN NOTE
Blood pressure currently 140/80 repeated at 130/80 on Entresto 24/26 and Lasix 40 mg daily no change in therapy

## 2019-11-13 NOTE — PATIENT INSTRUCTIONS
Continue Entresto 24/26  Continue all other medications  Return visit in 3 months we will obtain echo lab including BNP  Encouraged to continue walking activity  Encouraged healthy cardiac diet with reduced carbs and weight loss

## 2019-11-13 NOTE — ASSESSMENT & PLAN NOTE
Patient placed on Entresto 24/26 1/2 tablet twice daily on the visit of October 31 he feels he is slightly improved and continue his current therapy denying chest pain pressure occasional palpitations less shortness of breath and less cough.  We will continue current therapy with no increase in the Entresto at this time.

## 2020-01-01 NOTE — ASSESSMENT & PLAN NOTE
History of hypothyroidism currently on replacement therapy of levothyroxine 88 MCG's recent T4 level was normal, and TSH level was elevated, would not suggest increasing the levothyroxine- T4 or adding T3 therapy as the patient has ectopy on his EKG and would rather the patient be slightly hypo-treated.   No

## 2020-01-21 NOTE — PROGRESS NOTES
Nephrology Saul Sal  1940  PCP: Saul Campbell MD    SUBJECTIVE:   Saul Sal is a 78 y.o. male seen for a consultation visit regarding the following:     Chief Complaint:   Chief Complaint   Patient presents with   • Typical atrial flutter (CMS/HCC)          Consultation is requested by DOMONIQUE Villarreal for evaluation of Typical atrial flutter (CMS/HCC)        History:  This is a 78-year-old patient who presented to the emergency room in October 2018.  His found to be in atrial flutter at the time.  He was started on anti-coagulation and rate control.  He reports having ongoing issues with shortness of breath and tachycardia despite medical therapy.  He has symptoms on a near daily basis.      Cardiac PMH: (Old records have been reviewed and summarized below)  1.  Coronary artery disease-status post CABG  2.  Atrial flutter  3.  Nuclear Stress Test - Nov 2018 - Left ventricular ejection fraction is severely reduced (calculated EF = 28%); Myocardial perfusion imaging indicates a medium-sized infarct located in the inferior wall and basal inferior lateral wall with no significant ischemia noted     Past Medical History, Past Surgical History, Family history, Social History, and Medications were all reviewed with the patient today and updated as necessary.       Current Outpatient Medications:   •  apixaban (ELIQUIS) 5 MG tablet tablet, Take 1 tablet by mouth 2 (Two) Times a Day., Disp: 60 tablet, Rfl: 0  •  diltiaZEM CD (CARDIZEM CD) 120 MG 24 hr capsule, Take 1 capsule by mouth Daily., Disp: 30 capsule, Rfl: 0  •  HYDROcodone-acetaminophen (NORCO) 5-325 MG per tablet, Take 1 tablet by mouth Every 4 (Four) Hours As Needed for Moderate Pain ., Disp: 15 tablet, Rfl: 0  •  levothyroxine (SYNTHROID, LEVOTHROID) 88 MCG tablet, Take 88 mcg by mouth Daily., Disp: , Rfl:   •  ondansetron ODT (ZOFRAN-ODT) 8 MG disintegrating tablet, Take 1 tablet by mouth Every 8 (Eight) Hours As Needed for Nausea or Vomiting., Disp: 15  tablet, Rfl: 0  •  polyethylene glycol (MIRALAX) powder, Take 17 g by mouth Daily., Disp: 225 g, Rfl: 0  •  COD LIVER OIL PO, Take 3 tablet/day by mouth., Disp: , Rfl:   •  DHA-EPA-Vit B6-B12-Folic Acid (CARDIOVID PLUS PO), Take 6 tablet/day by mouth., Disp: , Rfl:   •  NON FORMULARY, 3 tablet/day. Mini amaro supplement, Disp: , Rfl:   •  NON FORMULARY, 6 tablet/day. cataplex f, Disp: , Rfl:     Allergies   Allergen Reactions   • Milk-Related Compounds Other (See Comments)     Headache         Past Medical History:   Diagnosis Date   • Arthritis    • Atrial flutter (CMS/HCC)    • CHF (congestive heart failure) (CMS/HCC)    • Coronary artery disease    • Disease of thyroid gland    • GI bleed    • Hyperlipidemia    • Hypertension    • Shingles    • Skin cancer     squamous    • Wears eyeglasses     readers   • Wears hearing aid      Past Surgical History:   Procedure Laterality Date   • CARDIAC CATHETERIZATION     • CARDIAC SURGERY      BYPASS X5   • COLONOSCOPY     • CORONARY ARTERY BYPASS GRAFT      x5 [facility]    • EYE SURGERY Right     cataract   • SKIN BIOPSY     • VASECTOMY       Family History   Problem Relation Age of Onset   • Diabetes Mother    • Heart disease Mother      Social History     Tobacco Use   • Smoking status: Former Smoker     Types: Cigarettes     Last attempt to quit: 1964     Years since quittin.0   • Smokeless tobacco: Never Used   Substance Use Topics   • Alcohol use: No       ROS:    General: no recent weight loss/gain, weakness or fatigue  Skin: no rashes, lumps, or other skin changes  HEENT: no dizziness, lightheadedness, or vision changes  Respiratory: no cough or hemoptysis  Cardiovascular: + palpitations, and tachycardia  Gastrointestinal: no black/tarry stools or diarrhea  Urinary: no change in frequency or urgency  Peripheral Vascular: no claudication or leg cramps  Musculoskeletal: no muscle or joint pain/stiffness  Psychiatric: no depression or excessive  "stress  Neurological: no sensory or motor loss, no syncope  Hematologic: no anemia, easy bruising or bleeding  Endocrine: no thyroid problems, nor heat or cold intolerance       PHYSICAL EXAM:   BP (!) 164/108 (BP Location: Left arm, Patient Position: Sitting)   Pulse (!) 121   Ht 182.9 cm (72\")   Wt 106 kg (233 lb)   BMI 31.60 kg/m²      Wt Readings from Last 5 Encounters:   11/20/18 106 kg (233 lb)   11/16/18 107 kg (235 lb 14.3 oz)   11/02/18 107 kg (235 lb)   10/19/18 107 kg (235 lb 3.2 oz)   10/14/18 104 kg (230 lb)     BP Readings from Last 5 Encounters:   11/20/18 (!) 164/108   11/16/18 140/82   10/19/18 114/85   10/15/18 165/98   10/29/15 152/78       General-Well Nourished, Well developed  Eyes - PERRLA  Neck- supple, No mass  CV- Tachycardia rate and rhythm, no MRG  Lung- clear bilaterally  Abd- soft, +BS  Musc/skel - Norm strength and range of motion  Skin- warm and dry  Neuro - Alert & Oriented x 3, appropriate mood.    Medical problems and test results were reviewed with the patient today.     Results for orders placed or performed during the hospital encounter of 11/16/18   Stress Test With Myocardial Perfusion (1 Day)   Result Value Ref Range    Target HR (85%) 120 bpm    Max. Pred. HR (100%) 141 bpm    BH CV STRESS PROTOCOL 1 Pharmacologic     Stage 1 1     Duration Min Stage 1 1     Duration Sec Stage 1 0     Stress Dose Regadenoson Stage 1 0.4     Stress Comments Stage 1 10 sec bolus injection     Stage 2 2     Duration Min Stage 2 1     Duration Sec Stage 2 0     Stage 3 3     Duration Min Stage 3 1     Duration Sec Stage 3 0     Stage 4 4     Duration Min Stage 4 1     Duration Sec Stage 4 0     Baseline  bpm    Baseline /100 mmHg    O2 sat rest 93 %    Exercise duration (min) 4 min    Exercise duration (sec) 0 sec    Estimated workload 1.0 METS    HR Stage 1 118     O2 Stage 1 98     HR Stage 2 120     BP Stage 2 140/90     O2 Stage 2 98     HR Stage 3 120     O2 Stage 3 98     " HR Stage 4 120     BP Stage 4 144/100     O2 Stage 4 98     Peak  bpm    Percent Max Pred HR 91.49 %    Percent Target  %    Peak /100 mmHg    O2 sat peak 98 %    Recovery  bpm    Recovery /98 mmHg    Recovery O2 97 %    Nuc Stress EF 28 %    Nuclear Prior Study 3          No results found for: CHOL, HDL, HDLC, LDL, LDLC, VLDL    EKG:  (EKG/Tracing has been independently visualized by me and summarized below)      ECG 12 Lead  Date/Time: 11/20/2018 3:12 PM  Performed by: Tino Sampson MD  Authorized by: Tino Sampson MD   Rhythm: atrial flutter  Rate: tachycardic  BPM: 121  Conduction: bifascicular block  Clinical impression: abnormal ECG            ASSESSMENT and PLAN  1. Atrial Flutter- Ongoing with difficult to control rates. We discussed potential Electrophysiology Study with possible ablation.  I described the procedures in detail including risks, alternatives, and benefits.  We also discussed that risks include bleeding, vascular damage, stroke, MI, esophageal damage, cardiac perforation, and even death. Patient has been on Eliquis for >4 weeks  2. Chronic Systolic heart failure - Recheck ECHO after ablation and controled rates  3. CAD/CABG - Followed by Dr. Liang in the past.   4. HTN - Readdress after ablation    Return for after procedure.    1. A. Flutter Ablation        Tino Sampson M.D., F.RANDYC, F.H.R.S.  Cardiology/Electrophysiology  11/20/18  3:16 PM

## 2020-02-10 ENCOUNTER — LAB REQUISITION (OUTPATIENT)
Dept: LAB | Facility: HOSPITAL | Age: 80
End: 2020-02-10

## 2020-02-10 ENCOUNTER — OFFICE VISIT (OUTPATIENT)
Dept: INTERNAL MEDICINE | Facility: CLINIC | Age: 80
End: 2020-02-10

## 2020-02-10 VITALS
DIASTOLIC BLOOD PRESSURE: 88 MMHG | BODY MASS INDEX: 29.8 KG/M2 | HEIGHT: 72 IN | WEIGHT: 220 LBS | HEART RATE: 94 BPM | SYSTOLIC BLOOD PRESSURE: 136 MMHG

## 2020-02-10 DIAGNOSIS — I50.23 ACUTE ON CHRONIC SYSTOLIC CONGESTIVE HEART FAILURE (HCC): Primary | ICD-10-CM

## 2020-02-10 DIAGNOSIS — E03.9 HYPOTHYROIDISM (ACQUIRED): ICD-10-CM

## 2020-02-10 DIAGNOSIS — I10 BENIGN ESSENTIAL HYPERTENSION: ICD-10-CM

## 2020-02-10 DIAGNOSIS — I48.3 TYPICAL ATRIAL FLUTTER (HCC): ICD-10-CM

## 2020-02-10 DIAGNOSIS — Z00.00 ROUTINE GENERAL MEDICAL EXAMINATION AT A HEALTH CARE FACILITY: ICD-10-CM

## 2020-02-10 PROCEDURE — 36415 COLL VENOUS BLD VENIPUNCTURE: CPT | Performed by: INTERNAL MEDICINE

## 2020-02-10 PROCEDURE — 99214 OFFICE O/P EST MOD 30 MIN: CPT | Performed by: INTERNAL MEDICINE

## 2020-02-10 NOTE — ASSESSMENT & PLAN NOTE
Blood pressure stable at 136/88 left and right sitting on Entresto 24/26 twice daily and Lasix 40 mg daily continue therapy

## 2020-02-10 NOTE — ASSESSMENT & PLAN NOTE
Remote coronary bypass grafting 5 vessel in 2003 on aspirin 81 mg a daily Lasix 40 mg daily and Entresto 24/26 twice daily

## 2020-02-10 NOTE — ASSESSMENT & PLAN NOTE
Patient now on Entresto 24/26 no longer complaining of shortness of breath PND has less leg edema and in general feels markedly improved without chest pain pressure or palpitations continue the Entresto, and Lasix 40 mg daily

## 2020-02-10 NOTE — PROGRESS NOTES
[facility]     Saul Sal  1940   [id number]      Patient Care Team:  Saul Campbell MD as PCP - General (Internal Medicine)  Tino Sampson MD as Consulting Physician (Cardiology)    Chief Complaint::   Chief Complaint   Patient presents with   • Hypertension     follow up   • Hyperlipidemia     follow up   • paroxysmal atrial flutter     follow up            HPI  Patient is 80-year-old male with a history of coronary artery disease and coronary bypass grafting 5 vessel in 2003 surgery acute on chronic congestive heart failure markedly improved on recent Entresto 24/26 twice daily and Lasix at 40 mg daily with a history of hypertension now well controlled on the Entresto and history of hypothyroidism weight and history of paroxysmal atrial flutter currently in sinus rhythm on aspirin 81 daily.      Patient Active Problem List   Diagnosis   • CAD (coronary artery disease)   • Atrial flutter (CMS/HCC)   • Dyslipidemia   • ELIE (obstructive sleep apnea)   • Typical atrial flutter (CMS/HCC)   • Hypothyroidism (acquired)   • Benign essential hypertension   • Mixed hyperlipidemia   • Obesity (BMI 30-39.9)   • GERD (gastroesophageal reflux disease)   • Aortic ectasia (CMS/HCC)   • H/O asbestos exposure   • Herpes zoster without complication   • History of colon polyps   • Ischemic heart disease   • Left wrist injury   • Paroxysmal atrial flutter (CMS/HCC)   • Postherpetic neuralgia   • Primary osteoarthritis involving multiple joints   • Shortness of breath   • Acute on chronic systolic congestive heart failure (CMS/HCC)   • Bronchitis        Past Medical History:   Diagnosis Date   • Arthritis    • Atrial flutter (CMS/HCC)      LOW EF=28 %   • Benign essential hypertension    • CHF (congestive heart failure) (CMS/HCC)    • Chronic diastolic (congestive) heart failure (CMS/HCC) 7/9/2019   • Coronary artery disease    • Disease of thyroid gland    • Diverticulosis    • GERD (gastroesophageal reflux  disease)    • GI bleed    • Hyperlipidemia    • Hypertension    • Hypothyroidism (acquired)    • Mixed hyperlipidemia    • Obesity (BMI 30-39.9)    • Osteoarthritis    • Prostatic hypertrophy     Benign    • Shingles    • Skin cancer     squamous    • Wears eyeglasses     readers   • Wears hearing aid        Past Surgical History:   Procedure Laterality Date   • ANGIOPLASTY     • CARDIAC CATHETERIZATION     • CARDIAC ELECTROPHYSIOLOGY PROCEDURE N/A 2018    Procedure: Ablation atrial flutter;  Surgeon: Tino Sampson MD;  Location: [facility] INVASIVE LOC[facility];  Service: Cardiovascular   • CARDIAC SURGERY      BYPASS X5   • COLONOSCOPY     • COLONOSCOPY     • CORONARY ARTERY BYPASS GRAFT  2003    x5 [facility]    • DENTAL PROCEDURE      dental surg and crowns   • EYE SURGERY Right     cataract   • SKIN BIOPSY     • VASECTOMY         Family History   Problem Relation Age of Onset   • Diabetes Mother    • Heart disease Mother    • Coronary artery disease Other    • Stroke Other    • Diabetes Other    • Rheumatic fever Other    • Other Other         Valvular CV Disease       Social History     Socioeconomic History   • Marital status:      Spouse name: Not on file   • Number of children: Not on file   • Years of education: Not on file   • Highest education level: Not on file   Social Needs   • Financial resource strain: Not hard at all   • Food insecurity:     Worry: Never true     Inability: Never true   • Transportation needs:     Medical: No     Non-medical: No   Tobacco Use   • Smoking status: Former Smoker     Packs/day: 1.00     Years: 8.00     Pack years: 8.00     Types: Cigarettes     Last attempt to quit: 1964     Years since quittin.2   • Smokeless tobacco: Never Used   Substance and Sexual Activity   • Alcohol use: No   • Drug use: No   • Sexual activity: Defer   Lifestyle   • Physical activity:     Days per week: 0 days     Minutes per session: 0 min   • Stress:  "Not at all   Relationships   • Social connections:     Talks on phone: Once a week     Gets together: Once a week     Attends Spiritism service: 1 to 4 times per year     Active member of club or organization: No     Attends meetings of clubs or organizations: Never     Relationship status:    Social History Narrative    Caffeine: None    Patient lives at his home with   His wife       Allergies   Allergen Reactions   • Eliquis [Apixaban] Other (See Comments)     Excessive bleeding   • Amlodipine Besylate Unknown (See Comments)     Unknown  Norvasc   • Atenolol Unknown (See Comments)     unknown   • Metoprolol Unknown (See Comments)     Unknown  Lopressor    • Milk-Related Compounds Other (See Comments)     Headache       Review of Systems     HEENT: Denies headache dizzy hearing or vision change  NECK: Denies pain stiffness swelling  CHEST: Denies cough wheeze or shortness of breath  CARDIAC: Denies chest pain or pressure history of hypertension and coronary disease congestive heart failure currently stable  ABD: Denies nausea vomiting abdominal pain  : Denies dysuria frequency  NEURO: Denies syncope concussion  PSYCH: Denies anxiety depression  EXTREM: Denies arthritic changes denies edema    Vital Signs  Vitals:    02/10/20 1016   BP: 136/88   BP Location: Right arm   Patient Position: Sitting   Cuff Size: Adult   Pulse: 94   Weight: 99.8 kg (220 lb)   Height: 182.9 cm (72.01\")   PainSc: 0-No pain     Body mass index is 29.83 kg/m².    Current Outpatient Medications:   •  aspirin 81 MG tablet, Take 81 mg by mouth Daily., Disp: , Rfl:   •  COD LIVER OIL PO, Take 3 tablet/day by mouth Daily., Disp: , Rfl:   •  DHA-EPA-Vit B6-B12-Folic Acid (CARDIOVID PLUS PO), Take 6 tablet/day by mouth., Disp: , Rfl:   •  furosemide (LASIX) 40 MG tablet, Take 1 tablet by mouth Daily., Disp: 90 tablet, Rfl: 3  •  levothyroxine (SYNTHROID, LEVOTHROID) 88 MCG tablet, Take 1 tablet by mouth Daily., Disp: 90 tablet, Rfl: 3  •  " NON FORMULARY, 3 tablet/day. Mini amaro supplement, Disp: , Rfl:   •  NON FORMULARY, 3 tablet/day. cataplex f/b, Disp: , Rfl:   •  sacubitril-valsartan (ENTRESTO) 24-26 MG tablet, Take 1 tablet by mouth 2 (Two) Times a Day., Disp: 180 tablet, Rfl: 3    Physical Exam     ACE III MINI         HEENT: No facial asymmetry pharynx is clear  NECK: No masses bruit or thyromegaly  CHEST: Clear without rales or wheezing  CARDIAC: Regular rhythm without gallop or rub  ABD: Liver and spleen normal positive bowel sounds  : Deferred  NEURO: CNS is intact no neuropathy  PSYCH: No anxiety depression  EXTREM: Minimal arthritic change no edema  Skin: Clear     Results Review:    No results found for this or any previous visit (from the past 672 hour(s)).  Procedures    Medication Review: Medications reviewed and noted    Patient wellness counseling  Exercise: Continue walking exercise  Diet: Continue healthy cardiac diet  Smoking: Non-smoker  Alcohol: None alcohol  Screening: CMP is pending    Assessment/Plan:    Problem List Items Addressed This Visit        Cardiovascular and Mediastinum    Atrial flutter (CMS/HCC)    Overview     · Dx 10/14/18  · Chadsvasc 4 (Age, HTN, CAD)  · Echocardiogram 11-18: EF 30%, mild AR, mild to moderate MR  · MPS 11/16/18: Atrial flutter with AV block EF 28%, medium-sized infarct in the inferior wall and basal inferior lateral wall with no significant ischemia         Current Assessment & Plan     History of remote atrial flutter currently in sinus rhythm on 81 mg daily therapy         Relevant Medications    sacubitril-valsartan (ENTRESTO) 24-26 MG tablet    Benign essential hypertension    Overview     History of essential hypertension currently on no therapy pressures been labile and currently stable at 120/82 left and right sitting         Current Assessment & Plan     Blood pressure stable at 136/88 left and right sitting on Entresto 24/26 twice daily and Lasix 40 mg daily continue therapy          Relevant Medications    furosemide (LASIX) 40 MG tablet    Other Relevant Orders    Comprehensive Metabolic Panel    Acute on chronic systolic congestive heart failure (CMS/HCC) - Primary    Overview     Patient has chronic systolic congestive heart failure with reduced ejection fraction currently symptomatic with shortness of breath abdominal bloating and bilateral leg swelling  Echocardiogram of October 19, 2018 showed an EF of 30% echocardiogram of December 18, 2018 showed an EF of 21 to 25%  Lasix 40 mg is started this day.         Current Assessment & Plan     Patient now on Entresto 24/26 no longer complaining of shortness of breath PND has less leg edema and in general feels markedly improved without chest pain pressure or palpitations continue the Entresto, and Lasix 40 mg daily         Relevant Medications    sacubitril-valsartan (ENTRESTO) 24-26 MG tablet    Other Relevant Orders    Comprehensive Metabolic Panel       Endocrine    Hypothyroidism (acquired)    Overview     History of hypothyroidism on Synthroid 88 MCG daily         Current Assessment & Plan     Continue Synthroid 88 MCG daily         Relevant Medications    levothyroxine (SYNTHROID, LEVOTHROID) 88 MCG tablet           Patient Instructions   Continue current therapy  Walking exercise  Encouraged healthy cardiac diet  CMP is pending  Return visit in 4 months or as needed       Plan of care reviewed with patient at the conclusion of today's visit. Education was provided regarding diagnosis, management, and any prescribed or recommended OTC medications.Patient verbalizes understanding of and agreement with management plan.         Saul Campbell MD      Note: Part of this note may be an electronic transcription/translation of spoken language to printed text using the Dragon Dictation system.

## 2020-02-10 NOTE — PATIENT INSTRUCTIONS
Continue current therapy  Walking exercise  Encouraged healthy cardiac diet  CMP is pending  Return visit in 4 months or as needed

## 2020-02-11 ENCOUNTER — TELEPHONE (OUTPATIENT)
Dept: INTERNAL MEDICINE | Facility: CLINIC | Age: 80
End: 2020-02-11

## 2020-02-11 LAB
ALBUMIN SERPL-MCNC: 4.5 G/DL (ref 3.5–5.2)
ALBUMIN/GLOB SERPL: 2 G/DL
ALP SERPL-CCNC: 48 U/L (ref 39–117)
ALT SERPL-CCNC: 8 U/L (ref 1–41)
AST SERPL-CCNC: 18 U/L (ref 1–40)
BILIRUB SERPL-MCNC: 0.6 MG/DL (ref 0.2–1.2)
BUN SERPL-MCNC: 21 MG/DL (ref 8–23)
BUN/CREAT SERPL: 16.3 (ref 7–25)
CALCIUM SERPL-MCNC: 9.3 MG/DL (ref 8.6–10.5)
CHLORIDE SERPL-SCNC: 100 MMOL/L (ref 98–107)
CO2 SERPL-SCNC: 25.7 MMOL/L (ref 22–29)
CREAT SERPL-MCNC: 1.29 MG/DL (ref 0.76–1.27)
GLOBULIN SER CALC-MCNC: 2.3 GM/DL
GLUCOSE SERPL-MCNC: 105 MG/DL (ref 65–99)
POTASSIUM SERPL-SCNC: 5.1 MMOL/L (ref 3.5–5.2)
PROT SERPL-MCNC: 6.8 G/DL (ref 6–8.5)
SODIUM SERPL-SCNC: 140 MMOL/L (ref 136–145)

## 2020-03-07 NOTE — ASSESSMENT & PLAN NOTE
Past history of paroxysmal atrial flutter currently in sinus rhythm denies palpitations chest pain pressure tightness he is on aspirin 81 mg continue current therapy.   Clear

## 2020-04-20 RX ORDER — LEVOTHYROXINE SODIUM 88 UG/1
TABLET ORAL
Qty: 90 TABLET | Refills: 3 | Status: SHIPPED | OUTPATIENT
Start: 2020-04-20 | End: 2020-10-27

## 2020-06-10 ENCOUNTER — OFFICE VISIT (OUTPATIENT)
Dept: INTERNAL MEDICINE | Facility: CLINIC | Age: 80
End: 2020-06-10

## 2020-06-10 ENCOUNTER — LAB REQUISITION (OUTPATIENT)
Dept: LAB | Facility: HOSPITAL | Age: 80
End: 2020-06-10

## 2020-06-10 VITALS
WEIGHT: 221.8 LBS | BODY MASS INDEX: 30.04 KG/M2 | DIASTOLIC BLOOD PRESSURE: 88 MMHG | HEART RATE: 84 BPM | HEIGHT: 72 IN | SYSTOLIC BLOOD PRESSURE: 132 MMHG | TEMPERATURE: 97.3 F

## 2020-06-10 DIAGNOSIS — I10 BENIGN ESSENTIAL HYPERTENSION: Primary | ICD-10-CM

## 2020-06-10 DIAGNOSIS — I48.92 PAROXYSMAL ATRIAL FLUTTER (HCC): ICD-10-CM

## 2020-06-10 DIAGNOSIS — Z00.00 ROUTINE GENERAL MEDICAL EXAMINATION AT A HEALTH CARE FACILITY: ICD-10-CM

## 2020-06-10 DIAGNOSIS — E03.9 HYPOTHYROIDISM (ACQUIRED): ICD-10-CM

## 2020-06-10 DIAGNOSIS — I50.23 ACUTE ON CHRONIC SYSTOLIC CONGESTIVE HEART FAILURE (HCC): ICD-10-CM

## 2020-06-10 PROCEDURE — 36415 COLL VENOUS BLD VENIPUNCTURE: CPT

## 2020-06-10 PROCEDURE — 99214 OFFICE O/P EST MOD 30 MIN: CPT | Performed by: INTERNAL MEDICINE

## 2020-06-10 NOTE — ASSESSMENT & PLAN NOTE
History of coronary disease and essential hypertension with current blood pressure 132/88 patient is on Entresto 24/26 1/2 tablet daily Lasix 40 mg daily suggest no change in therapy

## 2020-06-10 NOTE — PROGRESS NOTES
[facility]     Saul Sal  1940   [id number]      Patient Care Team:  Saul Campbell MD as PCP - General (Internal Medicine)  Tino Sampson MD as Consulting Physician (Cardiology)    Chief Complaint::   Chief Complaint   Patient presents with   • Hypertension     follow up   • Hyperlipidemia     follow up            HPI  80-year-old male with a history of coronary disease chronic congestive heart failure with a low ejection fraction on Entresto Lasix aspirin 81 overall feeling reasonably well definitely improved since beginning the Entresto even with extremely low-dose of half tablet of 24/26 daily (his choice) denying headache or dizzy denying cough or wheeze is now short of breath with extreme physical effort denying chest pain or palpitations denying nausea vomiting      Patient Active Problem List   Diagnosis   • CAD (coronary artery disease)   • Atrial flutter (CMS/HCC)   • Dyslipidemia   • ELIE (obstructive sleep apnea)   • Typical atrial flutter (CMS/HCC)   • Hypothyroidism (acquired)   • Benign essential hypertension   • Mixed hyperlipidemia   • Obesity (BMI 30-39.9)   • GERD (gastroesophageal reflux disease)   • Aortic ectasia (CMS/HCC)   • H/O asbestos exposure   • Herpes zoster without complication   • History of colon polyps   • Ischemic heart disease   • Left wrist injury   • Paroxysmal atrial flutter (CMS/HCC)   • Postherpetic neuralgia   • Primary osteoarthritis involving multiple joints   • Shortness of breath   • Acute on chronic systolic congestive heart failure (CMS/HCC)   • Bronchitis        Past Medical History:   Diagnosis Date   • Arthritis    • Atrial flutter (CMS/HCC)      LOW EF=28 %   • Benign essential hypertension    • CHF (congestive heart failure) (CMS/HCC)    • Chronic diastolic (congestive) heart failure (CMS/HCC) 7/9/2019   • Coronary artery disease    • Disease of thyroid gland    • Diverticulosis    • GERD (gastroesophageal reflux disease)    • GI bleed     • Hyperlipidemia    • Hypertension    • Hypothyroidism (acquired)    • Mixed hyperlipidemia    • Obesity (BMI 30-39.9)    • Osteoarthritis    • Prostatic hypertrophy     Benign    • Shingles    • Skin cancer     squamous    • Wears eyeglasses     readers   • Wears hearing aid        Past Surgical History:   Procedure Laterality Date   • ANGIOPLASTY     • CARDIAC CATHETERIZATION     • CARDIAC ELECTROPHYSIOLOGY PROCEDURE N/A 2018    Procedure: Ablation atrial flutter;  Surgeon: Tino Sampson MD;  Location: [facility] INVASIVE LOCATION;  Service: Cardiovascular   • CARDIAC SURGERY      BYPASS X5   • COLONOSCOPY     • COLONOSCOPY     • CORONARY ARTERY BYPASS GRAFT  2003    x5 [facility]    • DENTAL PROCEDURE      dental surg and crowns   • EYE SURGERY Right     cataract   • SKIN BIOPSY     • VASECTOMY         Family History   Problem Relation Age of Onset   • Diabetes Mother    • Heart disease Mother    • Coronary artery disease Other    • Stroke Other    • Diabetes Other    • Rheumatic fever Other    • Other Other         Valvular CV Disease       Social History     Socioeconomic History   • Marital status:      Spouse name: Not on file   • Number of children: Not on file   • Years of education: Not on file   • Highest education level: Not on file   Social Needs   • Financial resource strain: Not hard at all   • Food insecurity:     Worry: Never true     Inability: Never true   • Transportation needs:     Medical: No     Non-medical: No   Tobacco Use   • Smoking status: Former Smoker     Packs/day: 1.00     Years: 8.00     Pack years: 8.00     Types: Cigarettes     Last attempt to quit: 1964     Years since quittin.5   • Smokeless tobacco: Never Used   Substance and Sexual Activity   • Alcohol use: No   • Drug use: No   • Sexual activity: Defer   Lifestyle   • Physical activity:     Days per week: 0 days     Minutes per session: 0 min   • Stress: Not at all  "  Relationships   • Social connections:     Talks on phone: Once a week     Gets together: Once a week     Attends Alevism service: 1 to 4 times per year     Active member of club or organization: No     Attends meetings of clubs or organizations: Never     Relationship status:    Social History Narrative    Caffeine: None    Patient lives at his home with   His wife       Allergies   Allergen Reactions   • Eliquis [Apixaban] Other (See Comments)     Excessive bleeding   • Amlodipine Besylate Unknown (See Comments)     Unknown  Norvasc   • Atenolol Unknown (See Comments)     unknown   • Metoprolol Unknown (See Comments)     Unknown  Lopressor    • Milk-Related Compounds Other (See Comments)     Headache       Review of Systems     HEENT: Denies headache or dizzy vision or hearing change  NECK: Denies pain stiffness dysphasia  CHEST: Non-smoker denies cough or wheeze mild shortness of breath with physical effort  CARDIAC: Denies chest pain or pressure history of coronary disease history of hypertension history of heart failure currently improved and asymptomatic  ABD: Denies nausea vomiting abdominal pain  : Denies dysuria frequency  NEURO: Denies syncope concussion  PSYCH: Denies anxiety depression  EXTREM: Complains of mild lower leg edema no rash    Vital Signs  Vitals:    06/10/20 0959   BP: 132/88   BP Location: Left arm   Patient Position: Sitting   Cuff Size: Adult   Pulse: 84  Comment: irregular   Temp: 97.3 °F (36.3 °C)   Weight: 101 kg (221 lb 12.8 oz)   Height: 182.9 cm (72\")   PainSc: 0-No pain     Body mass index is 30.08 kg/m².  Patient's Body mass index is 30.08 kg/m². BMI is above normal parameters. Recommendations include: nutrition counseling.     Advance Care Planning         Current Outpatient Medications:   •  aspirin 81 MG tablet, Take 81 mg by mouth Daily., Disp: , Rfl:   •  COD LIVER OIL PO, Take 3 tablet/day by mouth Daily., Disp: , Rfl:   •  DHA-EPA-Vit B6-B12-Folic Acid " (CARDIOVID PLUS PO), Take 6 tablet/day by mouth., Disp: , Rfl:   •  furosemide (LASIX) 40 MG tablet, Take 1 tablet by mouth Daily., Disp: 90 tablet, Rfl: 3  •  levothyroxine (SYNTHROID, LEVOTHROID) 88 MCG tablet, TAKE 1 TABLET EVERY DAY, Disp: 90 tablet, Rfl: 3  •  NON FORMULARY, 3 tablet/day. Mini amaro supplement, Disp: , Rfl:   •  NON FORMULARY, 3 tablet/day. cataplex f/b, Disp: , Rfl:   •  sacubitril-valsartan (ENTRESTO) 24-26 MG tablet, Take 1 tablet by mouth 2 (Two) Times a Day., Disp: 180 tablet, Rfl: 3    Physical Exam     ACE III MINI         HEENT: Pupils equal reactive ENT clear no asymmetry  NECK: No mass bruit thyromegaly or neck vein distention  CHEST: Clear without rales or wheezing  CARDIAC: Regular rhythm without gallop or rub  ABD: Liver spleen are normal good bowel sounds nontender  : Deferred  NEURO: Intact  PSYCH: Normal  EXTREM: Trace edema  Skin: Clear     Results Review:    No results found for this or any previous visit (from the past 672 hour(s)).  Procedures    Medication Review: Medications reviewed and noted    Patient wellness counseling  Exercise: Encouraged walking exercise  Diet: Encouraged healthy cardiac diet  Smoking: Non-smoker  Alcohol: None alcohol  Screening:    Assessment/Plan:    Problem List Items Addressed This Visit        Cardiovascular and Mediastinum    Benign essential hypertension - Primary    Overview     History of essential hypertension currently on no therapy pressures been labile and currently stable at 120/82 left and right sitting         Current Assessment & Plan     History of coronary disease and essential hypertension with current blood pressure 132/88 patient is on Entresto 24/26 1/2 tablet daily Lasix 40 mg daily suggest no change in therapy         Relevant Medications    furosemide (LASIX) 40 MG tablet    Other Relevant Orders    Comprehensive Metabolic Panel    Paroxysmal atrial flutter (CMS/HCC)    Overview     History of atrial fibrillation  currently in sinus rhythm with blood pressure 120/82 and heart rate of 84         Current Assessment & Plan     Current heart rate is regular on aspirin 81 daily currently stable and asymptomatic         Relevant Medications    sacubitril-valsartan (ENTRESTO) 24-26 MG tablet    Acute on chronic systolic congestive heart failure (CMS/HCC)    Overview     Patient has chronic systolic congestive heart failure with reduced ejection fraction currently symptomatic with shortness of breath abdominal bloating and bilateral leg swelling  Echocardiogram of October 19, 2018 showed an EF of 30% echocardiogram of December 18, 2018 showed an EF of 21 to 25%  Lasix 40 mg is started this day.         Current Assessment & Plan     History of heart failure with low ejection fraction and symptomatic until patient was placed on Entresto 24/26 he is only taking 1/2 tablet daily his choice but he does seem improved with less shortness of breath fatigue denying chest pain or pressure and denying leg edema.         Relevant Medications    sacubitril-valsartan (ENTRESTO) 24-26 MG tablet       Endocrine    Hypothyroidism (acquired)    Overview     History of hypothyroidism on Synthroid 88 MCG daily         Current Assessment & Plan     History of hypothyroidism on levothyroxine 88 MCG daily continue therapy         Relevant Medications    levothyroxine (SYNTHROID, LEVOTHROID) 88 MCG tablet           Patient Instructions   Continue all current medications  Encouraged walking exercise but not excessive strenuous activity  Encouraged healthy cardiac diet  No lab this day  Return visit in 4 months for physical       Plan of care reviewed with patient at the conclusion of today's visit. Education was provided regarding diagnosis, management, and any prescribed or recommended OTC medications.Patient verbalizes understanding of and agreement with management plan.         Saul Campbell MD      Note: Part of this note may be an electronic  transcription/translation of spoken language to printed text using the Dragon Dictation system.

## 2020-06-10 NOTE — PATIENT INSTRUCTIONS
Continue all current medications  Encouraged walking exercise but not excessive strenuous activity  Encouraged healthy cardiac diet  No lab this day  Return visit in 4 months for physical

## 2020-06-10 NOTE — ASSESSMENT & PLAN NOTE
History of heart failure with low ejection fraction and symptomatic until patient was placed on Entresto 24/26 he is only taking 1/2 tablet daily his choice but he does seem improved with less shortness of breath fatigue denying chest pain or pressure and denying leg edema.

## 2020-06-11 LAB
ALBUMIN SERPL-MCNC: 4.5 G/DL (ref 3.5–5.2)
ALBUMIN/GLOB SERPL: 1.5 G/DL
ALP SERPL-CCNC: 43 U/L (ref 39–117)
ALT SERPL-CCNC: 13 U/L (ref 1–41)
AST SERPL-CCNC: 21 U/L (ref 1–40)
BILIRUB SERPL-MCNC: 0.7 MG/DL (ref 0.2–1.2)
BUN SERPL-MCNC: 30 MG/DL (ref 8–23)
BUN/CREAT SERPL: 21.6 (ref 7–25)
CALCIUM SERPL-MCNC: 9.6 MG/DL (ref 8.6–10.5)
CHLORIDE SERPL-SCNC: 104 MMOL/L (ref 98–107)
CO2 SERPL-SCNC: 26 MMOL/L (ref 22–29)
CREAT SERPL-MCNC: 1.39 MG/DL (ref 0.76–1.27)
GLOBULIN SER CALC-MCNC: 3.1 GM/DL
GLUCOSE SERPL-MCNC: 105 MG/DL (ref 65–99)
POTASSIUM SERPL-SCNC: 5.5 MMOL/L (ref 3.5–5.2)
PROT SERPL-MCNC: 7.6 G/DL (ref 6–8.5)
SODIUM SERPL-SCNC: 141 MMOL/L (ref 136–145)

## 2020-06-23 ENCOUNTER — TELEPHONE (OUTPATIENT)
Dept: INTERNAL MEDICINE | Facility: CLINIC | Age: 80
End: 2020-06-23

## 2020-06-23 NOTE — TELEPHONE ENCOUNTER
"This message was sent in patient's spouse's chart:     My , Saul Sal had his blood work dada 10, 2020.  Potassium was high.  Dr Hughes took him off furosemide.  He weighed 218 on the 10th.   Yesterday, he weighed 230 and he is not overeating. The last two nights, it has been very hard for him to sleep laying down as he is again having shortness of breath.   Could Dr Hughes or Alice please call him???   We need to know what to do.  Home number [phone]    Per Dr. Campbell:    Message noted, absolute no salt diet and no snacks, start Lasix 40 mg daily let us know how he is doing on Friday morning    Patient is notified.  Understands treatment plan.  Stressed to him the \"no salt diet\".  Advised to call on Friday and he said he will do this.      "

## 2020-09-28 ENCOUNTER — TELEPHONE (OUTPATIENT)
Dept: INTERNAL MEDICINE | Facility: CLINIC | Age: 80
End: 2020-09-28

## 2020-09-28 RX ORDER — METOLAZONE 2.5 MG/1
2.5 TABLET ORAL DAILY
Qty: 5 TABLET | Refills: 0 | Status: SHIPPED | OUTPATIENT
Start: 2020-09-28 | End: 2020-10-01 | Stop reason: SDUPTHER

## 2020-09-28 NOTE — TELEPHONE ENCOUNTER
From Mrs. Sal:    Alice:   Please relay to Dr. Hughes   Messaging you about Saul Sal 1940   He is having (again)lots of nights that he is short of breathe, coughs, clears his throat, feels bloated and can't lay in bed - is sitting in a chair a lot of the night.  He really isn't overeating but just after he does eat and sits down, this is when he has the shortness of breathe.  Usually he works several hours a day without a problem.  He has a fasting appointment in 2 to 3 weeks to see Dr. Hughes.  I am wondering if Dr. Hughes would have a window of time he  could see him sooner and if he would still need to fast?   Thanks Alice   Shelia Sal  [phone]     You have openings tomorrow if you want to see him.

## 2020-09-28 NOTE — TELEPHONE ENCOUNTER
Per JH:    Message noted I want the patient to rest and not do any activity or work outside until seen in the office #1 continue all his current medications but have added metolazone 2.5 mg daily including this day and take that until we see him and give him an appointment for this Thursday, October 1.    Patient's spouse was notified and appt scheduled.

## 2020-10-01 ENCOUNTER — OFFICE VISIT (OUTPATIENT)
Dept: INTERNAL MEDICINE | Facility: CLINIC | Age: 80
End: 2020-10-01

## 2020-10-01 VITALS
DIASTOLIC BLOOD PRESSURE: 70 MMHG | HEART RATE: 64 BPM | WEIGHT: 215.4 LBS | SYSTOLIC BLOOD PRESSURE: 128 MMHG | HEIGHT: 72 IN | BODY MASS INDEX: 29.17 KG/M2 | TEMPERATURE: 97.5 F

## 2020-10-01 DIAGNOSIS — I50.23 ACUTE ON CHRONIC SYSTOLIC CONGESTIVE HEART FAILURE (HCC): Primary | ICD-10-CM

## 2020-10-01 DIAGNOSIS — I10 BENIGN ESSENTIAL HYPERTENSION: ICD-10-CM

## 2020-10-01 DIAGNOSIS — E78.2 MIXED HYPERLIPIDEMIA: ICD-10-CM

## 2020-10-01 PROCEDURE — 99214 OFFICE O/P EST MOD 30 MIN: CPT | Performed by: INTERNAL MEDICINE

## 2020-10-01 RX ORDER — METOLAZONE 2.5 MG/1
TABLET ORAL
Qty: 30 TABLET | Refills: 2 | Status: SHIPPED | OUTPATIENT
Start: 2020-10-01 | End: 2020-12-30 | Stop reason: SDUPTHER

## 2020-10-01 NOTE — PATIENT INSTRUCTIONS
CHF symptoms have markedly improved will reduce and change the Zaroxolyn to 2.5 mg every Monday and Thursday and continue Lasix 40 mg and Entresto half tablet twice daily  Continue all other therapy  Encouraged to no longer do heavy lifting or heavy yard work and to rest in between outdoor activity jobs  Encouraged healthy cardiac diet  Encouraged walking exercise  Return visit on October 20 and 27 for annual wellness visit and physical

## 2020-10-01 NOTE — ASSESSMENT & PLAN NOTE
Patient had a recent recurrence of PND and shortness of breath with leg swelling the patient has been extremely active working in the yard lifting concrete steps and 90 pound bags of sand denies chest pain or pressure but was having PND and daytime shortness of breath we started Zaroxolyn 2.5 mg daily 4 days ago the patient has had a modest diuresis of approximately 4 to 5 pounds over the last 2 nights he has been able to sleep supine without awakening.  We will continue all therapy including Lasix 40 mg daily Entresto 24/26 1/2 tablet twice daily and change the metolazone to 2.5 mg every Monday and Thursday

## 2020-10-01 NOTE — ASSESSMENT & PLAN NOTE
History of essential hypertension with current blood pressure 128/70 on Entresto 24/20 6/2 tablet twice daily Lasix 40 mg daily metolazone 2.5 mg daily for the past 4 days, as the patient's heart failure is markedly improved with metolazone and no longer has PND will change the metolazone to 2.5 mg every Monday and Thursday

## 2020-10-01 NOTE — PROGRESS NOTES
[facility]     Saul Sal  1940   [id number]      Patient Care Team:  Saul Campbell MD as PCP - General (Internal Medicine)  Tino Sampson MD as Consulting Physician (Cardiology)    Chief Complaint::   Chief Complaint   Patient presents with   • Cough     shortness of breath  the fluid pill has helped  States lost 4 lbs after 4 doses of metolazone            HPI  Patient 80-year-old male with a history of coronary disease, congestive heart failure, with recent complaint of cough shortness of breath and PND the patient was treated for 4 days with Zaroxolyn 2.5 mg daily and has had a modest diuresis with improvement of his cough and shortness of breath.  Patient denies chest pain or pressure.  The patient is been very active in his yard doing heavy yard and garden work with considerable lifting prior to his recurrence congestive heart failure-he has been resting for the last 4 days and taking the diuretic as directed.  Denies headache or dizzy denies wheeze denies chest pain or palpitations denies nausea vomiting denies extremity pain or soreness.      Patient Active Problem List   Diagnosis   • CAD (coronary artery disease)   • Atrial flutter (CMS/HCC)   • Dyslipidemia   • ELIE (obstructive sleep apnea)   • Typical atrial flutter (CMS/HCC)   • Hypothyroidism (acquired)   • Benign essential hypertension   • Mixed hyperlipidemia   • Obesity (BMI 30-39.9)   • GERD (gastroesophageal reflux disease)   • Aortic ectasia (CMS/HCC)   • H/O asbestos exposure   • Herpes zoster without complication   • History of colon polyps   • Ischemic heart disease   • Left wrist injury   • Paroxysmal atrial flutter (CMS/HCC)   • Postherpetic neuralgia   • Primary osteoarthritis involving multiple joints   • Shortness of breath   • Acute on chronic systolic congestive heart failure (CMS/HCC)   • Bronchitis        Past Medical History:   Diagnosis Date   • Arthritis    • Atrial flutter (CMS/HCC)      LOW EF=28 %   •  Benign essential hypertension    • CHF (congestive heart failure) (CMS/[facility])    • Chronic diastolic (congestive) heart failure (CMS/[facility]) 7/9/2019   • Coronary artery disease    • Disease of thyroid gland    • Diverticulosis    • GERD (gastroesophageal reflux disease)    • GI bleed    • Hyperlipidemia    • Hypertension    • Hypothyroidism (acquired)    • Mixed hyperlipidemia    • Obesity (BMI 30-39.9)    • Osteoarthritis    • Prostatic hypertrophy     Benign    • Shingles    • Skin cancer     squamous    • Wears eyeglasses     readers   • Wears hearing aid        Past Surgical History:   Procedure Laterality Date   • ANGIOPLASTY  1985   • CARDIAC CATHETERIZATION     • CARDIAC ELECTROPHYSIOLOGY PROCEDURE N/A 11/26/2018    Procedure: Ablation atrial flutter;  Surgeon: Tino Sampson MD;  Location: [facility] INVASIVE LOCATION;  Service: Cardiovascular   • CARDIAC SURGERY      BYPASS X5   • COLONOSCOPY  2009   • COLONOSCOPY  2005   • CORONARY ARTERY BYPASS GRAFT  2003    x5 [facility] 2003   • DENTAL PROCEDURE  2010    dental surg and crowns   • EYE SURGERY Right     cataract   • SKIN BIOPSY     • VASECTOMY         Family History   Problem Relation Age of Onset   • Diabetes Mother    • Heart disease Mother    • Coronary artery disease Other    • Stroke Other    • Diabetes Other    • Rheumatic fever Other    • Other Other         Valvular CV Disease       Social History     Socioeconomic History   • Marital status:      Spouse name: Not on file   • Number of children: Not on file   • Years of education: Not on file   • Highest education level: Not on file   Social Needs   • Financial resource strain: Not hard at all   • Food insecurity     Worry: Never true     Inability: Never true   • Transportation needs     Medical: No     Non-medical: No   Tobacco Use   • Smoking status: Former Smoker     Packs/day: 1.00     Years: 8.00     Pack years: 8.00     Types: Cigarettes     Start date: 10/1/1956     Quit date:  1964     Years since quittin.9   • Smokeless tobacco: Never Used   Substance and Sexual Activity   • Alcohol use: No   • Drug use: No   • Sexual activity: Defer   Lifestyle   • Physical activity     Days per week: 0 days     Minutes per session: 0 min   • Stress: Not at all   Relationships   • Social connections     Talks on phone: Once a week     Gets together: Once a week     Attends [employer] witness service: 1 to 4 times per year     Active member of club or organization: No     Attends meetings of clubs or organizations: Never     Relationship status:    Social History Narrative    Caffeine: None    Patient lives at his home with   His wife       Allergies   Allergen Reactions   • Eliquis [Apixaban] Other (See Comments)     Excessive bleeding   • Amlodipine Besylate Unknown (See Comments)     Unknown  Norvasc   • Atenolol Unknown (See Comments)     unknown   • Metoprolol Unknown (See Comments)     Unknown  Lopressor    • Milk-Related Compounds Other (See Comments)     Headache       Review of Systems   Constitutional: Positive for fatigue. Negative for chills and fever.   HENT: Negative for congestion, ear pain and sinus pressure.    Respiratory: Positive for cough and shortness of breath. Negative for chest tightness and wheezing.    Cardiovascular: Negative for chest pain and palpitations.   Gastrointestinal: Positive for constipation. Negative for abdominal pain and blood in stool.   Skin: Negative for color change.   Allergic/Immunologic: Negative for environmental allergies.   Neurological: Negative for dizziness, speech difficulty and headache.   Psychiatric/Behavioral: Negative for decreased concentration. The patient is not nervous/anxious.      Vital Signs  Vitals:    10/01/20 1104   BP: 128/70   BP Location: Left arm   Patient Position: Sitting   Cuff Size: Adult   Pulse: 64  Comment: irreg pattern   Temp: 97.5 °F (36.4 °C)   TempSrc: Temporal   Weight: 97.7 kg (215 lb 6.4 oz)   Height:  "182.9 cm (72\")   PainSc: 0-No pain     Body mass index is 29.21 kg/m².  Patient's Body mass index is 29.21 kg/m². BMI is above normal parameters. Recommendations include: nutrition counseling.     Advance Care Planning         Current Outpatient Medications:   •  aspirin 81 MG tablet, Take 81 mg by mouth Daily., Disp: , Rfl:   •  COD LIVER OIL PO, Take 3 tablet/day by mouth Daily., Disp: , Rfl:   •  DHA-EPA-Vit B6-B12-Folic Acid (CARDIOVID PLUS PO), Take 6 tablet/day by mouth., Disp: , Rfl:   •  furosemide (LASIX) 40 MG tablet, Take 1 tablet by mouth Daily., Disp: 90 tablet, Rfl: 3  •  levothyroxine (SYNTHROID, LEVOTHROID) 88 MCG tablet, TAKE 1 TABLET EVERY DAY, Disp: 90 tablet, Rfl: 3  •  metOLazone (ZAROXOLYN) 2.5 MG tablet, Take one every Monday and thursday, Disp: 30 tablet, Rfl: 2  •  NON FORMULARY, 3 tablet/day. Mini amaro supplement, Disp: , Rfl:   •  NON FORMULARY, 3 tablet/day. cataplex f/b, Disp: , Rfl:   •  sacubitril-valsartan (ENTRESTO) 24-26 MG tablet, Take 1 tablet by mouth 2 (Two) Times a Day. (Patient taking differently: Take 0.5 tablets by mouth.), Disp: 180 tablet, Rfl: 3    Physical Exam     ACE III MINI         HEENT: Pupils equal reactive ENT clear no asymmetry  NECK: No masses thyromegaly bruits or neck vein distention good range of motion  CHEST: Distant breath sounds without rales wheezes rhonchi  CARDIAC: Regular rhythm without gallop or rub blood pressure stable  ABD: Liver spleen normal positive bowel sounds no bruit  : Deferred  NEURO: Intact  PSYCH: Normal  EXTREM: No edema arthritic changes mild  Skin: Clear     Results Review:    No results found for this or any previous visit (from the past 672 hour(s)).  Procedures    Medication Review: Medications reviewed and noted    Patient wellness counseling  Exercise: Encouraged rest and when working outside to avoid heavy lifting exertional work or working to the point of perspiring  Diet: Encouraged low-salt diet  Smoking: " Non-smoker  Alcohol: None alcohol  Screening: [facility] with cholesterol of 220 and mildly elevated creatinine of 1.43    Assessment/Plan:    Problem List Items Addressed This Visit        Cardiovascular and Mediastinum    Benign essential hypertension    Overview     History of essential hypertension currently on no therapy pressures been labile and currently stable at 120/82 left and right sitting         Current Assessment & Plan       History of essential hypertension with current blood pressure 128/70 on Entresto 24/20 6/2 tablet twice daily Lasix 40 mg daily metolazone 2.5 mg daily for the past 4 days, as the patient's heart failure is markedly improved with metolazone and no longer has PND will change the metolazone to 2.5 mg every Monday and Thursday         Relevant Medications    furosemide (LASIX) 40 MG tablet    metOLazone (ZAROXOLYN) 2.5 MG tablet    Mixed hyperlipidemia    Overview     History of mixed hyperlipidemia not on therapy         Current Assessment & Plan       History of hypertension coronary disease and congestive heart failure with mixed hyperlipidemia not on therapy with [facility] with cholesterol of 220 patient declines therapy         Acute on chronic systolic congestive heart failure (CMS/HCC) - Primary    Overview     Patient has chronic systolic congestive heart failure with reduced ejection fraction currently symptomatic with shortness of breath abdominal bloating and bilateral leg swelling  Echocardiogram of October 19, 2018 showed an EF of 30% echocardiogram of December 18, 2018 showed an EF of 21 to 25%  Lasix 40 mg is started this day.         Current Assessment & Plan       Patient had a recent recurrence of PND and shortness of breath with leg swelling the patient has been extremely active working in the yard lifting concrete steps and 90 pound bags of sand denies chest pain or pressure but was  having PND and daytime shortness of breath we started Zaroxolyn 2.5 mg daily 4 days ago the patient has had a modest diuresis of approximately 4 to 5 pounds over the last 2 nights he has been able to sleep supine without awakening.  We will continue all therapy including Lasix 40 mg daily Entresto 24/26 1/2 tablet twice daily and change the metolazone to 2.5 mg every Monday and Thursday         Relevant Medications    sacubitril-valsartan (ENTRESTO) 24-26 MG tablet           Patient Instructions   CHF symptoms have markedly improved will reduce and change the Zaroxolyn to 2.5 mg every Monday and Thursday and continue Lasix 40 mg and Entresto half tablet twice daily  Continue all other therapy  Encouraged to no longer do heavy lifting or heavy yard work and to rest in between outdoor activity jobs  Encouraged healthy cardiac diet  Encouraged walking exercise  Return visit on October 20 and 27 for annual wellness visit and physical       Plan of care reviewed with patient at the conclusion of today's visit. Education was provided regarding diagnosis, management, and any prescribed or recommended OTC medications.Patient verbalizes understanding of and agreement with management plan.         Saul Campbell MD      Note: Part of this note may be an electronic transcription/translation of spoken language to printed text using the Dragon Dictation system.

## 2020-10-01 NOTE — ASSESSMENT & PLAN NOTE
History of hypertension coronary disease and congestive heart failure with mixed hyperlipidemia not on therapy with last cholesterol obtained at the [facility] on August 17 with cholesterol of 220 patient declines therapy

## 2020-10-07 RX ORDER — FUROSEMIDE 40 MG/1
TABLET ORAL
Qty: 90 TABLET | Refills: 3 | Status: SHIPPED | OUTPATIENT
Start: 2020-10-07 | End: 2021-09-15 | Stop reason: SDUPTHER

## 2020-10-20 ENCOUNTER — OFFICE VISIT (OUTPATIENT)
Dept: INTERNAL MEDICINE | Facility: CLINIC | Age: 80
End: 2020-10-20

## 2020-10-20 ENCOUNTER — LAB REQUISITION (OUTPATIENT)
Dept: LAB | Facility: HOSPITAL | Age: 80
End: 2020-10-20

## 2020-10-20 VITALS
BODY MASS INDEX: 29.39 KG/M2 | SYSTOLIC BLOOD PRESSURE: 124 MMHG | DIASTOLIC BLOOD PRESSURE: 80 MMHG | HEART RATE: 72 BPM | WEIGHT: 217 LBS | HEIGHT: 72 IN | TEMPERATURE: 98.4 F

## 2020-10-20 DIAGNOSIS — I10 BENIGN ESSENTIAL HYPERTENSION: ICD-10-CM

## 2020-10-20 DIAGNOSIS — Z00.00 ROUTINE GENERAL MEDICAL EXAMINATION AT A HEALTH CARE FACILITY: ICD-10-CM

## 2020-10-20 DIAGNOSIS — E78.2 MIXED HYPERLIPIDEMIA: ICD-10-CM

## 2020-10-20 DIAGNOSIS — Z00.00 MEDICARE ANNUAL WELLNESS VISIT, SUBSEQUENT: Primary | ICD-10-CM

## 2020-10-20 DIAGNOSIS — R06.02 SHORTNESS OF BREATH: ICD-10-CM

## 2020-10-20 DIAGNOSIS — Z12.5 PROSTATE CANCER SCREENING: ICD-10-CM

## 2020-10-20 PROCEDURE — 36415 COLL VENOUS BLD VENIPUNCTURE: CPT

## 2020-10-20 PROCEDURE — G0439 PPPS, SUBSEQ VISIT: HCPCS | Performed by: NURSE PRACTITIONER

## 2020-10-20 PROCEDURE — 96160 PT-FOCUSED HLTH RISK ASSMT: CPT | Performed by: NURSE PRACTITIONER

## 2020-10-20 RX ORDER — SACUBITRIL AND VALSARTAN 24; 26 MG/1; MG/1
0.5 TABLET, FILM COATED ORAL 2 TIMES DAILY
Start: 2020-10-20 | End: 2020-10-27

## 2020-10-20 NOTE — PROGRESS NOTES
The ABCs of the Annual Wellness Visit  Subsequent Medicare Wellness Visit    Chief Complaint   Patient presents with   • Annual Exam     Patient is fasting        Subjective   History of Present Illness:  Saul Sal is a 80 y.o. male who presents for a Subsequent Medicare Wellness Visit.    HEALTH RISK ASSESSMENT    Recent Hospitalizations:  No hospitalization(s) within the last year.    Current Medical Providers:  Patient Care Team:  Saul Campbell MD as PCP - General (Internal Medicine)  Tino Sampson MD as Consulting Physician (Cardiology)    Smoking Status:  Social History     Tobacco Use   Smoking Status Former Smoker   • Packs/day: 1.00   • Years: 8.00   • Pack years: 8.00   • Types: Cigarettes   • Start date: 10/1/1956   • Quit date: 1964   • Years since quittin.9   Smokeless Tobacco Never Used       Alcohol Consumption:  Social History     Substance and Sexual Activity   Alcohol Use No   • Frequency: Never   • Drinks per session: 1 or 2   • Binge frequency: Never       Depression Screen:   PHQ-2/PHQ-9 Depression Screening 10/20/2020   Little interest or pleasure in doing things 0   Feeling down, depressed, or hopeless 0   Total Score 0       Fall Risk Screen:  STEADI Fall Risk Assessment was completed, and patient is at LOW risk for falls.Assessment completed on:10/20/2020    Health Habits and Functional and Cognitive Screening:  Functional & Cognitive Status 10/20/2020   Do you have difficulty preparing food and eating? No   Do you have difficulty bathing yourself, getting dressed or grooming yourself? No   Do you have difficulty using the toilet? No   Do you have difficulty moving around from place to place? No   Do you have trouble with steps or getting out of a bed or a chair? No   Current Diet Well Balanced Diet   Dental Exam Not up to date   Eye Exam Not up to date   Exercise (times per week) 3 times per week   Current Exercise Activities Include Yard Work   Do you need help using  the phone?  No   Are you deaf or do you have serious difficulty hearing?  Yes   Do you need help with transportation? No   Do you need help shopping? No   Do you need help preparing meals?  No   Do you need help with housework?  No   Do you need help with laundry? No   Do you need help taking your medications? No   Do you need help managing money? No   Do you ever drive or ride in a car without wearing a seat belt? No   Have you felt unusual stress, anger or loneliness in the last month? No   Who do you live with? Spouse   If you need help, do you have trouble finding someone available to you? No   Have you been bothered in the last four weeks by sexual problems? No   Do you have difficulty concentrating, remembering or making decisions? No         Does the patient have evidence of cognitive impairment? No    Asprin use counseling:Taking ASA appropriately as indicated    Age-appropriate Screening Schedule:  Refer to the list below for future screening recommendations based on patient's age, sex and/or medical conditions. Orders for these recommended tests are listed in the plan section. The patient has been provided with a written plan.    Health Maintenance   Topic Date Due   • ZOSTER VACCINE (1 of 2) 01/04/1989   • LIPID PANEL  07/17/2020   • INFLUENZA VACCINE  10/01/2021 (Originally 8/1/2020)   • TDAP/TD VACCINES (2 - Td) 08/16/2022          The following portions of the patient's history were reviewed and updated as appropriate: allergies, current medications, past family history, past medical history, past social history, past surgical history and problem list.    Outpatient Medications Prior to Visit   Medication Sig Dispense Refill   • aspirin 81 MG tablet Take 81 mg by mouth Daily.     • COD LIVER OIL PO Take 2 capsules in the morning and 1 capsule in the afternoon     • furosemide (LASIX) 40 MG tablet TAKE 1 TABLET EVERY DAY 90 tablet 3   • levothyroxine (SYNTHROID, LEVOTHROID) 88 MCG tablet TAKE 1 TABLET  EVERY DAY 90 tablet 3   • metOLazone (ZAROXOLYN) 2.5 MG tablet Take one every Monday and thursday 30 tablet 2   • NON FORMULARY Cardio-Plus 2080 : Take 4 tablets in the morning and 5 tablets in the evening     • NON FORMULARY Cataplex B 1250mg: Take 3 tablets BID     • NON FORMULARY Cataplex F 4200: Take 3 tablets BID     • sacubitril-valsartan (ENTRESTO) 24-26 MG tablet Take 1 tablet by mouth 2 (Two) Times a Day. (Patient taking differently: Take 0.5 tablets by mouth 2 (Two) Times a Day.) 180 tablet 3   • TURMERIC PO Take 1 tablet by mouth Daily. (Turmeric Forte)     • DHA-EPA-Vit B6-B12-Folic Acid (CARDIOVID PLUS PO) Take 6 tablet/day by mouth.       No facility-administered medications prior to visit.        Patient Active Problem List   Diagnosis   • CAD (coronary artery disease)   • Atrial flutter (CMS/HCC)   • Dyslipidemia   • ELIE (obstructive sleep apnea)   • Typical atrial flutter (CMS/HCC)   • Hypothyroidism (acquired)   • Benign essential hypertension   • Mixed hyperlipidemia   • Obesity (BMI 30-39.9)   • GERD (gastroesophageal reflux disease)   • Aortic ectasia (CMS/HCC)   • H/O asbestos exposure   • Herpes zoster without complication   • History of colon polyps   • Ischemic heart disease   • Left wrist injury   • Paroxysmal atrial flutter (CMS/HCC)   • Postherpetic neuralgia   • Primary osteoarthritis involving multiple joints   • Shortness of breath   • Acute on chronic systolic congestive heart failure (CMS/HCC)   • Bronchitis       Advanced Care Planning:  ACP discussion was held with the patient during this visit. Patient has an advance directive in EMR which is still valid.     Review of Systems   Constitutional: Negative for chills, fatigue and fever.   HENT: Negative for congestion, ear pain and sinus pressure.    Respiratory: Negative for cough, chest tightness, shortness of breath and wheezing.    Cardiovascular: Negative for chest pain and palpitations.   Gastrointestinal: Negative for  "abdominal pain, blood in stool and constipation.   Skin: Negative for color change.   Allergic/Immunologic: Negative for environmental allergies.   Neurological: Negative for dizziness, speech difficulty and headaches.   Psychiatric/Behavioral: Negative for confusion. The patient is not nervous/anxious.        Compared to one year ago, the patient feels his physical health is worse.  Compared to one year ago, the patient feels his mental health is the same.    Reviewed chart for potential of high risk medication in the elderly: yes  Reviewed chart for potential of harmful drug interactions in the elderly:yes    Objective         Vitals:    10/20/20 0905   BP: 124/80   BP Location: Left arm   Patient Position: Sitting   Cuff Size: Adult   Pulse: 72  Comment: irregular   Temp: 98.4 °F (36.9 °C)   TempSrc: Temporal   Weight: 98.4 kg (217 lb)   Height: 182.9 cm (72.01\")   PainSc: 0-No pain       Body mass index is 29.42 kg/m².  Discussed the patient's BMI with him. The BMI is above average; BMI management plan is completed.    Physical Exam          Assessment/Plan   Medicare Risks and Personalized Health Plan  CMS Preventative Services Quick Reference  Immunizations Discussed/Encouraged (specific immunizations; Influenza, Pneumococcal 23 and Shingrix )    The above risks/problems have been discussed with the patient.  Pertinent information has been shared with the patient in the After Visit Summary.  Follow up plans and orders are seen below in the Assessment/Plan Section.    Diagnoses and all orders for this visit:    1. Medicare annual wellness visit, subsequent (Primary)    2. Mixed hyperlipidemia  -     Lipid Panel; Future  -     TSH Rfx On Abnormal To Free T4; Future    3. Benign essential hypertension  -     CBC & Differential; Future  -     Comprehensive Metabolic Panel; Future  -     Microalbumin / Creatinine Urine Ratio - Urine, Clean Catch; Future    4. Prostate cancer screening  -     PSA Screen; " Future    5. Shortness of breath  -     BNP; Future      Follow Up:  Return for Labs this visit, Next scheduled follow up.     An After Visit Summary and PPPS were given to the patient.

## 2020-10-20 NOTE — PATIENT INSTRUCTIONS
"[facility].  Declines flu vaccine.      BMI for Adults  What is BMI?  Body mass index (BMI) is a number that is calculated from a person's weight and height. BMI can help estimate how much of a person's weight is composed of fat. BMI does not measure body fat directly. Rather, it is an alternative to procedures that directly measure body fat, which can be difficult and expensive.  BMI can help identify people who may be at higher risk for certain medical problems.  What are BMI measurements used for?  BMI is used as a screening tool to identify possible weight problems. It helps determine whether a person is obese, overweight, a healthy weight, or underweight.  BMI is useful for:  · Identifying a weight problem that may be related to a medical condition or may increase the risk for medical problems.  · Promoting changes, such as changes in diet and exercise, to help reach a healthy weight. BMI screening can be repeated to see if these changes are working.  How is BMI calculated?  BMI involves measuring your weight in relation to your height. Both height and weight are measured, and the BMI is calculated from those numbers. This can be done either in English (U.S[place] or metric measurements. Note that charts and online BMI calculators are available to help you find your BMI quickly and easily without having to do these calculations yourself.  To calculate your BMI in English (U.S.) measurements:    1. Measure your weight in pounds (lb).  2. Multiply the number of pounds by 703.  ? For example, for a person who weighs 180 lb, multiply that number by 703, which equals 126,540.  3. Measure your height in inches. Then multiply that number by itself to get a measurement called \"inches squared.\"  ? For example, for a person who is 70 inches tall, the \"inches squared\" measurement is 70 inches x 70 inches, which equals 4,900 inches squared.  4. Divide the total from step 2 (number of lb x 703) " "by the total from step 3 (inches squared): 126,540 ÷ 4,900 = 25.8. This is your BMI.  To calculate your BMI in metric measurements:  1. Measure your weight in kilograms (kg).  2. Measure your height in meters (m). Then multiply that number by itself to get a measurement called \"meters squared.\"  ? For example, for a person who is 1.75 m tall, the \"meters squared\" measurement is 1.75 m x 1.75 m, which is equal to 3.1 meters squared.  3. Divide the number of kilograms (your weight) by the meters squared number. In this example: 70 ÷ 3.1 = 22.6. This is your BMI.  What do the results mean?  BMI charts are used to identify whether you are underweight, normal weight, overweight, or obese. The following guidelines will be used:  · Underweight: BMI less than 18.5.  · Normal weight: BMI between 18.5 and 24.9.  · Overweight: BMI between 25 and 29.9.  · Obese: BMI of 30 or above.  Keep these notes in mind:  · Weight includes both fat and muscle, so someone with a muscular build, such as an athlete, may have a BMI that is higher than 24.9. In cases like these, BMI is not an accurate measure of body fat.  · To determine if excess body fat is the cause of a BMI of 25 or higher, further assessments may need to be done by a health care provider.  · BMI is usually interpreted in the same way for men and women.  Where to find more information  For more information about BMI, including tools to quickly calculate your BMI, go to these websites:  · Centers for Disease Control and Prevention: www.cdc.gov  · American Heart Association: www.heart.org  · National Heart, Lung, and Blood [place]: www.nhlbi.nih.gov  Summary  · Body mass index (BMI) is a number that is calculated from a person's weight and height.  · BMI may help estimate how much of a person's weight is composed of fat. BMI can help identify those who may be at higher risk for certain medical problems.  · BMI can be measured using English measurements or metric " measurements.  · BMI charts are used to identify whether you are underweight, normal weight, overweight, or obese.  This information is not intended to replace advice given to you by your health care provider. Make sure you discuss any questions you have with your health care provider.  Document Released: 2005 Document Revised: 2020 Document Reviewed: 2020  [employer] Patient Education ©  [employer]    Medicare Wellness  Personal Prevention Plan of Service     Date of Office Visit:  10/20/2020  Encounter Provider:  DOMONIQUE Nicole  Place of Service:  [facility] INTERNAL MEDICINE  Patient Name: Saul Sal  :  1940    As part of the Medicare Wellness portion of your visit today, we are providing you with this personalized preventive plan of services (PPPS). This plan is based upon recommendations of the [place] Preventive Services Task Force (USPSTF) and the Advisory Committee on Immunization Practices (ACIP).    This lists the preventive care services that should be considered, and provides dates of when you are due. Items listed as completed are up-to-date and do not require any further intervention.    Health Maintenance   Topic Date Due   • ZOSTER VACCINE (1 of 2) 1989   • Pneumococcal Vaccine 65+ (2 of 2 - PPSV23) 2016   • ANNUAL WELLNESS VISIT  2020   • LIPID PANEL  2020   • INFLUENZA VACCINE  10/01/2021 (Originally 2020)   • TDAP/TD VACCINES (2 - Td) 2022       Orders Placed This Encounter   Procedures   • Comprehensive Metabolic Panel     Standing Status:   Future     Standing Expiration Date:   10/20/2021   • Lipid Panel     Standing Status:   Future     Standing Expiration Date:   10/20/2021   • TSH Rfx On Abnormal To Free T4     Standing Status:   Future     Standing Expiration Date:   10/20/2021   • Microalbumin / Creatinine Urine Ratio - Urine, Clean Catch     Standing Status:   Future     Standing  Expiration Date:   10/20/2021   • PSA Screen     Standing Status:   Future     Standing Expiration Date:   10/20/2021   • BNP     Standing Status:   Future     Standing Expiration Date:   10/20/2021   • CBC & Differential     Standing Status:   Future     Standing Expiration Date:   10/20/2021     Order Specific Question:   Manual Differential     Answer:   No       No follow-ups on file.

## 2020-10-21 LAB
ALBUMIN SERPL-MCNC: 4.5 G/DL (ref 3.5–5.2)
ALBUMIN/GLOB SERPL: 2 G/DL
ALP SERPL-CCNC: 49 U/L (ref 39–117)
ALT SERPL-CCNC: 13 U/L (ref 1–41)
AST SERPL-CCNC: 19 U/L (ref 1–40)
BASOPHILS # BLD AUTO: 0.05 10*3/MM3 (ref 0–0.2)
BASOPHILS NFR BLD AUTO: 0.6 % (ref 0–1.5)
BILIRUB SERPL-MCNC: 0.6 MG/DL (ref 0–1.2)
BNP SERPL-MCNC: 796.7 PG/ML (ref 0–100)
BUN SERPL-MCNC: 23 MG/DL (ref 8–23)
BUN/CREAT SERPL: 18.5 (ref 7–25)
CALCIUM SERPL-MCNC: 9.1 MG/DL (ref 8.6–10.5)
CHLORIDE SERPL-SCNC: 99 MMOL/L (ref 98–107)
CHOLEST SERPL-MCNC: 193 MG/DL (ref 0–200)
CO2 SERPL-SCNC: 26.1 MMOL/L (ref 22–29)
CREAT SERPL-MCNC: 1.24 MG/DL (ref 0.76–1.27)
EOSINOPHIL # BLD AUTO: 0.1 10*3/MM3 (ref 0–0.4)
EOSINOPHIL NFR BLD AUTO: 1.3 % (ref 0.3–6.2)
ERYTHROCYTE [DISTWIDTH] IN BLOOD BY AUTOMATED COUNT: 13.4 % (ref 12.3–15.4)
GLOBULIN SER CALC-MCNC: 2.3 GM/DL
GLUCOSE SERPL-MCNC: 115 MG/DL (ref 65–99)
HCT VFR BLD AUTO: 46.8 % (ref 37.5–51)
HDLC SERPL-MCNC: 60 MG/DL (ref 40–60)
HGB BLD-MCNC: 15.8 G/DL (ref 13–17.7)
IMM GRANULOCYTES # BLD AUTO: 0.05 10*3/MM3 (ref 0–0.05)
IMM GRANULOCYTES NFR BLD AUTO: 0.6 % (ref 0–0.5)
LDLC SERPL CALC-MCNC: 109 MG/DL (ref 0–100)
LYMPHOCYTES # BLD AUTO: 2.78 10*3/MM3 (ref 0.7–3.1)
LYMPHOCYTES NFR BLD AUTO: 36.1 % (ref 19.6–45.3)
MCH RBC QN AUTO: 30.1 PG (ref 26.6–33)
MCHC RBC AUTO-ENTMCNC: 33.8 G/DL (ref 31.5–35.7)
MCV RBC AUTO: 89.1 FL (ref 79–97)
MONOCYTES # BLD AUTO: 0.54 10*3/MM3 (ref 0.1–0.9)
MONOCYTES NFR BLD AUTO: 7 % (ref 5–12)
NEUTROPHILS # BLD AUTO: 4.18 10*3/MM3 (ref 1.7–7)
NEUTROPHILS NFR BLD AUTO: 54.4 % (ref 42.7–76)
NRBC BLD AUTO-RTO: 0 /100 WBC (ref 0–0.2)
PLATELET # BLD AUTO: 201 10*3/MM3 (ref 140–450)
POTASSIUM SERPL-SCNC: 4.1 MMOL/L (ref 3.5–5.2)
PROT SERPL-MCNC: 6.8 G/DL (ref 6–8.5)
PSA SERPL-MCNC: 1.31 NG/ML (ref 0–4)
RBC # BLD AUTO: 5.25 10*6/MM3 (ref 4.14–5.8)
SODIUM SERPL-SCNC: 139 MMOL/L (ref 136–145)
T4 FREE SERPL-MCNC: 1.04 NG/DL (ref 0.93–1.7)
TRIGL SERPL-MCNC: 139 MG/DL (ref 0–150)
TSH SERPL DL<=0.005 MIU/L-ACNC: 17.9 UIU/ML (ref 0.27–4.2)
VLDLC SERPL CALC-MCNC: 24 MG/DL (ref 5–40)
WBC # BLD AUTO: 7.7 10*3/MM3 (ref 3.4–10.8)

## 2020-10-27 ENCOUNTER — OFFICE VISIT (OUTPATIENT)
Dept: INTERNAL MEDICINE | Facility: CLINIC | Age: 80
End: 2020-10-27

## 2020-10-27 VITALS
WEIGHT: 220.8 LBS | SYSTOLIC BLOOD PRESSURE: 134 MMHG | TEMPERATURE: 97.3 F | BODY MASS INDEX: 29.91 KG/M2 | HEART RATE: 68 BPM | HEIGHT: 72 IN | DIASTOLIC BLOOD PRESSURE: 78 MMHG

## 2020-10-27 DIAGNOSIS — I25.10 CORONARY ARTERY DISEASE INVOLVING NATIVE CORONARY ARTERY OF NATIVE HEART, ANGINA PRESENCE UNSPECIFIED: ICD-10-CM

## 2020-10-27 DIAGNOSIS — E03.9 HYPOTHYROIDISM (ACQUIRED): ICD-10-CM

## 2020-10-27 DIAGNOSIS — I50.23 ACUTE ON CHRONIC SYSTOLIC CONGESTIVE HEART FAILURE (HCC): Primary | ICD-10-CM

## 2020-10-27 DIAGNOSIS — E78.2 MIXED HYPERLIPIDEMIA: ICD-10-CM

## 2020-10-27 DIAGNOSIS — I10 BENIGN ESSENTIAL HYPERTENSION: ICD-10-CM

## 2020-10-27 PROCEDURE — 99214 OFFICE O/P EST MOD 30 MIN: CPT | Performed by: INTERNAL MEDICINE

## 2020-10-27 PROCEDURE — 93000 ELECTROCARDIOGRAM COMPLETE: CPT | Performed by: INTERNAL MEDICINE

## 2020-10-27 RX ORDER — SACUBITRIL AND VALSARTAN 24; 26 MG/1; MG/1
1 TABLET, FILM COATED ORAL 2 TIMES DAILY
Qty: 60 TABLET
Start: 2020-10-27 | End: 2021-01-12

## 2020-10-27 RX ORDER — LEVOTHYROXINE SODIUM 0.1 MG/1
100 TABLET ORAL DAILY
Qty: 90 TABLET | Refills: 3 | Status: SHIPPED | OUTPATIENT
Start: 2020-10-27 | End: 2020-12-30 | Stop reason: SDUPTHER

## 2020-10-27 NOTE — ASSESSMENT & PLAN NOTE
History of 5 vessel coronary bypass grafting 2003 currently without chest pain pressure palpitations does have ischemic heart disease with cardiomyopathy and chronic systolic heart failure we will increase Entresto 24/26 from 1/2 tablet twice daily to a whole tablet twice daily we will continue Lasix 40 mg daily and metolazone 2.5 mg Monday and Thursday

## 2020-10-27 NOTE — ASSESSMENT & PLAN NOTE
Essential hypertension with current blood pressure 134/78 on Lasix 40 mg daily Zaroxolyn 2.5 mg Monday and Thursday and Entresto 24/26 twice daily continue therapy

## 2020-10-27 NOTE — ASSESSMENT & PLAN NOTE
History of coronary disease with chronic cardiac ischemia and chronic systolic heart failure with low ejection fraction of 21% with last echocardiogram.  The patient is improved with Lasix 40 mg daily and Zaroxolyn 2.5 mg Monday and Thursday with stable potassium and creatinine will obtain repeat echocardiogram and continue current therapy will increase Entresto to a whole tablet of 24/26 twice daily

## 2020-10-27 NOTE — PATIENT INSTRUCTIONS
Labs noted EKG is noted  Increase levothyroxine from 88 MCG to 100 MCG  Increase Entresto 26/24-1 twice daily  Continue all other medications  Continue walking exercise and healthy cardiac diet reduce carbohydrates and cholesterol foods with weight loss  Obtain echocardiogram with comparison with previous ejection fraction 21%  Return visit in 4 months or as needed

## 2020-10-27 NOTE — PROGRESS NOTES
[facility]     Saul Sal  1940   [id number]      Patient Care Team:  Saul Campbell MD as PCP - General (Internal Medicine)  Tino Sampson MD as Consulting Physician (Cardiology)    Chief Complaint::   Chief Complaint   Patient presents with   • Hypertension     follow up.  Patient had preventative visit on 10/20/20 with DOMONIQUE Nicole  Patient is 80-year-old male retired, with a history of coronary bypass grafting 5 vessel in 2003 with a history of cardiomyopathy and chronic systolic heart failure with low ejection fraction, with recent episode of acute on chronic heart failure requiring significant diuresis and currently on Lasix of 40 mg daily metolazone 2.5 mg Monday and Thursday and Entresto 24/26 1/2 tablet twice daily patient is overall feeling better denies significant leg edema is no longer short of breath has no PND no longer coughing and is improved.  In addition the patient has hypothyroidism with recently elevated TSH we will increase the levothyroxine from .  Patient states he is doing significant work outside such as lifting including his driveway and doing yard work.  He is remote right eye cataract is hard of hearing and [facility] but he finds them ineffective he has not smoked in over 50 years drinks no alcohol does have a living well he has nocturia x2.      Patient Active Problem List   Diagnosis   • CAD (coronary artery disease)   • Atrial flutter (CMS/HCC)   • Dyslipidemia   • ELIE (obstructive sleep apnea)   • Typical atrial flutter (CMS/HCC)   • Hypothyroidism (acquired)   • Benign essential hypertension   • Mixed hyperlipidemia   • Obesity (BMI 30-39.9)   • GERD (gastroesophageal reflux disease)   • Aortic ectasia (CMS/HCC)   • H/O asbestos exposure   • Herpes zoster without complication   • History of colon polyps   • Ischemic heart disease   • Left wrist injury   • Paroxysmal atrial flutter (CMS/HCC)   •  Postherpetic neuralgia   • Primary osteoarthritis involving multiple joints   • Shortness of breath   • Acute on chronic systolic congestive heart failure (CMS/HCC)   • Bronchitis        Past Medical History:   Diagnosis Date   • Arthritis    • Atrial flutter (CMS/[facility])      LOW EF=28 %   • Benign essential hypertension    • CHF (congestive heart failure) (CMS/[facility])    • Chronic diastolic (congestive) heart failure (CMS/[facility]) 7/9/2019   • Coronary artery disease    • Disease of thyroid gland    • Diverticulosis    • GERD (gastroesophageal reflux disease)    • GI bleed    • Hyperlipidemia    • Hypertension    • Hypothyroidism (acquired)    • Mixed hyperlipidemia    • Obesity (BMI 30-39.9)    • Osteoarthritis    • Prostatic hypertrophy     Benign    • Shingles    • Skin cancer     squamous    • Wears eyeglasses     readers   • Wears hearing aid        Past Surgical History:   Procedure Laterality Date   • ANGIOPLASTY  1985   • CARDIAC CATHETERIZATION     • CARDIAC ELECTROPHYSIOLOGY PROCEDURE N/A 11/26/2018    Procedure: Ablation atrial flutter;  Surgeon: Tino Sampson MD;  Location: [facility] INVASIVE LOCATION;  Service: Cardiovascular   • CARDIAC SURGERY      BYPASS X5   • COLONOSCOPY  2009   • COLONOSCOPY  2005   • CORONARY ARTERY BYPASS GRAFT  2003    x5 [facility] 2003   • DENTAL PROCEDURE  2010    dental surg and crowns   • EYE SURGERY Right     cataract   • SKIN BIOPSY     • VASECTOMY         Family History   Problem Relation Age of Onset   • Diabetes Mother    • Heart disease Mother    • Coronary artery disease Other    • Stroke Other    • Diabetes Other    • Rheumatic fever Other    • Other Other         Valvular CV Disease       Social History     Socioeconomic History   • Marital status:      Spouse name: Not on file   • Number of children: Not on file   • Years of education: Not on file   • Highest education level: Not on file   Social Needs   • Financial resource strain: Not hard at all   • Food  insecurity     Worry: Never true     Inability: Never true   • Transportation needs     Medical: No     Non-medical: No   Tobacco Use   • Smoking status: Former Smoker     Packs/day: 1.00     Years: 8.00     Pack years: 8.00     Types: Cigarettes     Start date: 10/1/1956     Quit date: 1964     Years since quittin.9   • Smokeless tobacco: Never Used   Substance and Sexual Activity   • Alcohol use: No     Frequency: Never     Drinks per session: 1 or 2     Binge frequency: Never   • Drug use: No   • Sexual activity: Defer   Lifestyle   • Physical activity     Days per week: 0 days     Minutes per session: 0 min   • Stress: Not at all   Relationships   • Social connections     Talks on phone: Once a week     Gets together: Once a week     Attends Advent service: 1 to 4 times per year     Active member of club or organization: No     Attends meetings of clubs or organizations: Never     Relationship status:    Social History Narrative    Caffeine: None    Patient lives at his home with   His wife       Allergies   Allergen Reactions   • Eliquis [Apixaban] Other (See Comments)     Excessive bleeding   • Amlodipine Besylate Unknown (See Comments)     Unknown  Norvasc   • Atenolol Unknown (See Comments)     unknown   • Metoprolol Unknown (See Comments)     Unknown  Lopressor    • Milk-Related Compounds Other (See Comments)     Headache       Review of Systems   Constitutional: Negative for chills, fatigue and fever.   HENT: Negative for congestion, ear pain and sinus pressure.    Respiratory: Positive for cough. Negative for chest tightness, shortness of breath and wheezing.    Cardiovascular: Negative for chest pain and palpitations.   Gastrointestinal: Negative for abdominal pain, blood in stool and constipation.   Skin: Negative for color change.   Allergic/Immunologic: Negative for environmental allergies.   Neurological: Negative for dizziness, speech difficulty and headache.  "  Psychiatric/Behavioral: Negative for decreased concentration. The patient is not nervous/anxious.             Vital Signs  Vitals:    10/27/20 0940   BP: 134/78   BP Location: Left arm   Patient Position: Sitting   Cuff Size: Adult   Pulse: 68  Comment: irregular   Temp: 97.3 °F (36.3 °C)   Weight: 100 kg (220 lb 12.8 oz)   Height: 182.9 cm (72\")   PainSc: 0-No pain     Body mass index is 29.95 kg/m².  Patient's Body mass index is 29.95 kg/m². BMI is above normal parameters. Recommendations include: nutrition counseling.     Advance Care Planning   ACP discussion was held with the patient during this visit. Patient has an advance directive in EMR which is still valid.        Current Outpatient Medications:   •  aspirin 81 MG tablet, Take 81 mg by mouth Daily., Disp: , Rfl:   •  COD LIVER OIL PO, Take 2 capsules in the morning and 1 capsule in the afternoon, Disp: , Rfl:   •  furosemide (LASIX) 40 MG tablet, TAKE 1 TABLET EVERY DAY, Disp: 90 tablet, Rfl: 3  •  metOLazone (ZAROXOLYN) 2.5 MG tablet, Take one every Monday and thursday, Disp: 30 tablet, Rfl: 2  •  NON FORMULARY, Cardio-Plus 2080 : Take 4 tablets in the morning and 5 tablets in the evening, Disp: , Rfl:   •  NON FORMULARY, Cataplex B 1250mg: Take 3 tablets BID, Disp: , Rfl:   •  NON FORMULARY, Cataplex F 4200: Take 3 tablets BID, Disp: , Rfl:   •  TURMERIC PO, Take 1 tablet by mouth Daily. (Turmeric Forte), Disp: , Rfl:   •  levothyroxine (Synthroid) 100 MCG tablet, Take 1 tablet by mouth Daily., Disp: 90 tablet, Rfl: 3  •  sacubitril-valsartan (Entresto) 24-26 MG tablet, Take 1 tablet by mouth 2 (Two) Times a Day., Disp: 60 tablet, Rfl:     Physical Exam     ACE III MINI         HEENT: Pupils equal and reactive remote cataract right eye TMs are clear pharynx is clear  NECK: No mass bruit thyromegaly or neck vein distention  CHEST: Clear without rales or wheezing  CARDIAC: Regular rhythm without gallop rub or click  ABD: Obese liver spleen normal " vertebral sounds no bruit  : Deferred  NEURO: Intact  PSYCH: Normal  EXTREM: Minimal arthritic changes trace edema  Skin: Clear     Results Review:    Recent Results (from the past 672 hour(s))   PSA Screen    Collection Time: 10/20/20  9:42 AM    Specimen: Blood    BLOOD  MANUAL DIFFEREN   Result Value Ref Range    PSA 1.310 0.000 - 4.000 ng/mL   TSH Rfx On Abnormal To Free T4    Collection Time: 10/20/20  9:42 AM    Specimen: Blood    BLOOD  MANUAL DIFFEREN   Result Value Ref Range    TSH 17.900 (H) 0.270 - 4.200 uIU/mL   Lipid Panel    Collection Time: 10/20/20  9:42 AM    Specimen: Blood    BLOOD  MANUAL DIFFEREN   Result Value Ref Range    Total Cholesterol 193 0 - 200 mg/dL    Triglycerides 139 0 - 150 mg/dL    HDL Cholesterol 60 40 - 60 mg/dL    VLDL Cholesterol Mateo 24 5 - 40 mg/dL    LDL Chol Calc (NIH) 109 (H) 0 - 100 mg/dL   Comprehensive Metabolic Panel    Collection Time: 10/20/20  9:42 AM    Specimen: Blood    BLOOD  MANUAL DIFFEREN   Result Value Ref Range    Glucose 115 (H) 65 - 99 mg/dL    BUN 23 8 - 23 mg/dL    Creatinine 1.24 0.76 - 1.27 mg/dL    eGFR Non African Am 56 (L) >60 mL/min/1.73    eGFR African Am 68 >60 mL/min/1.73    BUN/Creatinine Ratio 18.5 7.0 - 25.0    Sodium 139 136 - 145 mmol/L    Potassium 4.1 3.5 - 5.2 mmol/L    Chloride 99 98 - 107 mmol/L    Total CO2 26.1 22.0 - 29.0 mmol/L    Calcium 9.1 8.6 - 10.5 mg/dL    Total Protein 6.8 6.0 - 8.5 g/dL    Albumin 4.50 3.50 - 5.20 g/dL    Globulin 2.3 gm/dL    A/G Ratio 2.0 g/dL    Total Bilirubin 0.6 0.0 - 1.2 mg/dL    Alkaline Phosphatase 49 39 - 117 U/L    AST (SGOT) 19 1 - 40 U/L    ALT (SGPT) 13 1 - 41 U/L   CBC & Differential    Collection Time: 10/20/20  9:42 AM    Specimen: Blood    BLOOD  MANUAL DIFFEREN   Result Value Ref Range    WBC 7.70 3.40 - 10.80 10*3/mm3    RBC 5.25 4.14 - 5.80 10*6/mm3    Hemoglobin 15.8 13.0 - 17.7 g/dL    Hematocrit 46.8 37.5 - 51.0 %    MCV 89.1 79.0 - 97.0 fL    MCH 30.1 26.6 - 33.0 pg    MCHC 33.8  31.5 - 35.7 g/dL    RDW 13.4 12.3 - 15.4 %    Platelets 201 140 - 450 10*3/mm3    Neutrophil Rel % 54.4 42.7 - 76.0 %    Lymphocyte Rel % 36.1 19.6 - 45.3 %    Monocyte Rel % 7.0 5.0 - 12.0 %    Eosinophil Rel % 1.3 0.3 - 6.2 %    Basophil Rel % 0.6 0.0 - 1.5 %    Neutrophils Absolute 4.18 1.70 - 7.00 10*3/mm3    Lymphocytes Absolute 2.78 0.70 - 3.10 10*3/mm3    Monocytes Absolute 0.54 0.10 - 0.90 10*3/mm3    Eosinophils Absolute 0.10 0.00 - 0.40 10*3/mm3    Basophils Absolute 0.05 0.00 - 0.20 10*3/mm3    Immature Granulocyte Rel % 0.6 (H) 0.0 - 0.5 %    Immature Grans Absolute 0.05 0.00 - 0.05 10*3/mm3    nRBC 0.0 0.0 - 0.2 /100 WBC   T4F    Collection Time: 10/20/20  9:42 AM    BLOOD  MANUAL DIFFEREN   Result Value Ref Range    Free T4 1.04 0.93 - 1.70 ng/dL   BNP    Collection Time: 10/20/20  9:42 AM    BLOOD  MANUAL DIFFEREN   Result Value Ref Range    .7 (H) 0.0 - 100.0 pg/mL       ECG 12 Lead    Date/Time: 10/27/2020 5:45 PM  Performed by: Saul Campbell MD  Authorized by: Saul Campbell MD   Comparison: compared with previous ECG from 7/24/2019  Comparison to previous ECG: Sinus rhythm with frequent ventricular premature complex right axis deviation right bundle branch block no ischemic change  Rhythm: sinus rhythm  Ectopy: unifocal PVCs and infrequent PVCs  Rate: normal  Conduction: right bundle branch block  QRS axis: right    Clinical impression: abnormal EKG  Comments: Sinus rhythm with frequent ventricular premature complexes right axis deviation right bundle branch block no ischemia no change from July 24, 2019        Patient Wellness Counseling:   Plan of care reviewed with patient at the conclusion of today's visit. Education was provided in regards to diagnosis, diet and exercise, prostate cancer screening discussed including benefit of early detection, potential need for follow-up, and harms associated with additional management. Patient agrees to screening.    Nutrition, physical activity,  healthy weight,ways to reduce stress, adequate sleep, injury prevention, misuse of tobacco, alcohol and drugs, sexual behavior and STD's, dental health, mental health, and immunizations.    Plan of care reviewed with patient at the conclusion of today's visit. Education was provided regarding diagnosis, management and any prescribed or recommended OTC medications.  Patient verbalizes understanding of and agreement with management plan.        Medication Review: Medications reviewed and noted    Patient wellness counseling  Exercise: Encouraged reduced exercise but active walking  Diet: Encouraged healthy cardiac diet  Smoking: Non-smoker 50 years  Alcohol: None alcohol  Screening: Recent labs discussed EKG discussed    Assessment/Plan:    Problem List Items Addressed This Visit        Cardiovascular and Mediastinum    CAD (coronary artery disease)    Overview     · CABG X5 2003         Current Assessment & Plan       History of 5 vessel coronary bypass grafting 2003 currently without chest pain pressure palpitations does have ischemic heart disease with cardiomyopathy and chronic systolic heart failure we will increase Entresto 24/26 from 1/2 tablet twice daily to a whole tablet twice daily we will continue Lasix 40 mg daily and metolazone 2.5 mg Monday and Thursday         Relevant Medications    sacubitril-valsartan (Entresto) 24-26 MG tablet    Other Relevant Orders    ECG 12 Lead    Benign essential hypertension    Overview     History of essential hypertension currently on no therapy pressures been labile and currently stable at 120/82 left and right sitting         Current Assessment & Plan       Essential hypertension with current blood pressure 134/78 on Lasix 40 mg daily Zaroxolyn 2.5 mg Monday and Thursday and Entresto 24/26 twice daily continue therapy         Relevant Medications    metOLazone (ZAROXOLYN) 2.5 MG tablet    furosemide (LASIX) 40 MG tablet    Other Relevant Orders    ECG 12 Lead    Mixed  hyperlipidemia    Overview     History of mixed hyperlipidemia not on therapy         Current Assessment & Plan       History of mixed hyperlipidemia with current cholesterol 193 and LDL of 109 patient declines statin therapy at this time, encouraged weight loss and healthy cardiac diet with reduce carbs and low-cholesterol and weight loss         Acute on chronic systolic congestive heart failure (CMS/HCC) - Primary    Overview     Patient has chronic systolic congestive heart failure with reduced ejection fraction currently symptomatic with shortness of breath abdominal bloating and bilateral leg swelling  Echocardiogram of October 19, 2018 showed an EF of 30% echocardiogram of December 18, 2018 showed an EF of 21 to 25%  Lasix 40 mg is started this day.         Current Assessment & Plan       History of coronary disease with chronic cardiac ischemia and chronic systolic heart failure with low ejection fraction of 21% with last echocardiogram.  The patient is improved with Lasix 40 mg daily and Zaroxolyn 2.5 mg Monday and Thursday with stable potassium and creatinine will obtain repeat echocardiogram and continue current therapy will increase Entresto to a whole tablet of 24/26 twice daily         Relevant Medications    sacubitril-valsartan (Entresto) 24-26 MG tablet    Other Relevant Orders    Adult Transthoracic Echo Complete W/ Cont if Necessary Per Protocol       Endocrine    Hypothyroidism (acquired)    Overview     History of hypothyroidism on Synthroid 88 MCG daily         Current Assessment & Plan     Recent TSH elevated at 17, will increase Synthroid from 88 MCG to 100 MCG daily         Relevant Medications    levothyroxine (Synthroid) 100 MCG tablet           Patient Instructions   Labs noted EKG is noted  Increase levothyroxine from 88 MCG to 100 MCG  Increase Entresto 26/24-1 twice daily  Continue all other medications  Continue walking exercise and healthy cardiac diet reduce carbohydrates and  cholesterol foods with weight loss  Obtain echocardiogram with comparison with previous ejection fraction 21%  Return visit in 4 months or as needed       Plan of care reviewed with patient at the conclusion of today's visit. Education was provided regarding diagnosis, management, and any prescribed or recommended OTC medications.Patient verbalizes understanding of and agreement with management plan.         Saul Campbell MD      Note: Part of this note may be an electronic transcription/translation of spoken language to printed text using the Dragon Dictation system.

## 2020-10-27 NOTE — ASSESSMENT & PLAN NOTE
History of mixed hyperlipidemia with current cholesterol 193 and LDL of 109 patient declines statin therapy at this time, encouraged weight loss and healthy cardiac diet with reduce carbs and low-cholesterol and weight loss

## 2020-12-01 ENCOUNTER — HOSPITAL ENCOUNTER (OUTPATIENT)
Dept: CARDIOLOGY | Facility: HOSPITAL | Age: 80
Discharge: HOME | End: 2020-12-01
Admitting: INTERNAL MEDICINE

## 2020-12-01 VITALS — WEIGHT: 220 LBS | BODY MASS INDEX: 29.8 KG/M2 | HEIGHT: 72 IN

## 2020-12-01 DIAGNOSIS — I50.23 ACUTE ON CHRONIC SYSTOLIC CONGESTIVE HEART FAILURE (HCC): ICD-10-CM

## 2020-12-01 PROCEDURE — 93306 TTE W/DOPPLER COMPLETE: CPT | Performed by: INTERNAL MEDICINE

## 2020-12-01 PROCEDURE — 93306 TTE W/DOPPLER COMPLETE: CPT

## 2020-12-01 PROCEDURE — 25010000002 SULFUR HEXAFLUORIDE MICROSPH 60.7-25 MG RECONSTITUTED SUSPENSION: Performed by: INTERNAL MEDICINE

## 2020-12-01 RX ADMIN — SULFUR HEXAFLUORIDE 4 ML: KIT at 10:20

## 2020-12-30 RX ORDER — LEVOTHYROXINE SODIUM 0.1 MG/1
100 TABLET ORAL DAILY
Qty: 90 TABLET | Refills: 3 | Status: SHIPPED | OUTPATIENT
Start: 2020-12-30 | End: 2021-11-03

## 2020-12-30 RX ORDER — METOLAZONE 2.5 MG/1
TABLET ORAL
Qty: 30 TABLET | Refills: 2 | Status: SHIPPED | OUTPATIENT
Start: 2020-12-30 | End: 2021-06-24

## 2020-12-30 NOTE — TELEPHONE ENCOUNTER
PTS WIFE CALLED REQUESTING A REFILL FOR:  levothyroxine (Synthroid) 100 MCG tablet    90 DAY SUPPLY    [facility] - [place], [place] - [address] - [phone] [facility] [phone] FX

## 2020-12-30 NOTE — TELEPHONE ENCOUNTER
Caller: Shelia Sal    Relationship: Emergency Contact    Best call back number: [phone]    Medication needed: METOLAZONE 2.5 MG PRN, MON AND THUR      When do you need the refill by: WITHIN THE NEXT 10 DAYS    Does the patient have less than a 3 day supply:  [] Yes  [x] No    What is the patient's preferred pharmacy: [facility] - [facility] [address] - [phone] [facility] [phone] FX

## 2021-01-04 LAB
ASCENDING AORTA: 3.5 CM
BH CV ECHO MEAS - AI DEC SLOPE: 245.7 CM/SEC^2
BH CV ECHO MEAS - AI MAX PG: 61.7 MMHG
BH CV ECHO MEAS - AI MAX VEL: 392.7 CM/SEC
BH CV ECHO MEAS - AI P1/2T: 468.2 MSEC
BH CV ECHO MEAS - AO MAX PG (FULL): 2.8 MMHG
BH CV ECHO MEAS - AO MAX PG: 4 MMHG
BH CV ECHO MEAS - AO MEAN PG (FULL): 1 MMHG
BH CV ECHO MEAS - AO MEAN PG: 2 MMHG
BH CV ECHO MEAS - AO ROOT AREA (BSA CORRECTED): 1.8
BH CV ECHO MEAS - AO ROOT AREA: 11.9 CM^2
BH CV ECHO MEAS - AO ROOT DIAM: 3.9 CM
BH CV ECHO MEAS - AO V2 MAX: 99.3 CM/SEC
BH CV ECHO MEAS - AO V2 MEAN: 70.7 CM/SEC
BH CV ECHO MEAS - AO V2 VTI: 18.5 CM
BH CV ECHO MEAS - AVA(I,A): 1.9 CM^2
BH CV ECHO MEAS - AVA(I,D): 1.9 CM^2
BH CV ECHO MEAS - AVA(V,A): 2.1 CM^2
BH CV ECHO MEAS - AVA(V,D): 2.1 CM^2
BH CV ECHO MEAS - BSA(HAYCOCK): 2.3 M^2
BH CV ECHO MEAS - BSA: 2.2 M^2
BH CV ECHO MEAS - BZI_BMI: 29.8 KILOGRAMS/M^2
BH CV ECHO MEAS - BZI_METRIC_HEIGHT: 182.9 CM
BH CV ECHO MEAS - BZI_METRIC_WEIGHT: 99.8 KG
BH CV ECHO MEAS - EDV(CUBED): 390.6 ML
BH CV ECHO MEAS - EDV(MOD-SP2): 255 ML
BH CV ECHO MEAS - EDV(MOD-SP4): 240 ML
BH CV ECHO MEAS - EDV(TEICH): 281.6 ML
BH CV ECHO MEAS - EF(CUBED): 14.4 %
BH CV ECHO MEAS - EF(MOD-BP): 17 %
BH CV ECHO MEAS - EF(MOD-SP2): 12.2 %
BH CV ECHO MEAS - EF(MOD-SP4): 11.3 %
BH CV ECHO MEAS - EF(TEICH): 11 %
BH CV ECHO MEAS - EPSS: 3.8 CM
BH CV ECHO MEAS - ESV(CUBED): 334.3 ML
BH CV ECHO MEAS - ESV(MOD-SP2): 224 ML
BH CV ECHO MEAS - ESV(MOD-SP4): 213 ML
BH CV ECHO MEAS - ESV(TEICH): 250.5 ML
BH CV ECHO MEAS - FS: 5.1 %
BH CV ECHO MEAS - IVS/LVPW: 1.1
BH CV ECHO MEAS - IVSD: 1.1 CM
BH CV ECHO MEAS - LA/AO: 1.2
BH CV ECHO MEAS - LAD MAJOR: 6.9 CM
BH CV ECHO MEAS - LAT PEAK E' VEL: 5.7 CM/SEC
BH CV ECHO MEAS - LATERAL E/E' RATIO: 15.9
BH CV ECHO MEAS - LV DIASTOLIC VOL/BSA (35-75): 108.2 ML/M^2
BH CV ECHO MEAS - LV IVRT: 0.03 SEC
BH CV ECHO MEAS - LV MASS(C)D: 371.1 GRAMS
BH CV ECHO MEAS - LV MASS(C)DI: 167.3 GRAMS/M^2
BH CV ECHO MEAS - LV MAX PG: 1.2 MMHG
BH CV ECHO MEAS - LV MEAN PG: 1 MMHG
BH CV ECHO MEAS - LV SYSTOLIC VOL/BSA (12-30): 96 ML/M^2
BH CV ECHO MEAS - LV V1 MAX: 55.6 CM/SEC
BH CV ECHO MEAS - LV V1 MEAN: 36.6 CM/SEC
BH CV ECHO MEAS - LV V1 VTI: 9.3 CM
BH CV ECHO MEAS - LVIDD: 7.3 CM
BH CV ECHO MEAS - LVIDS: 6.9 CM
BH CV ECHO MEAS - LVLD AP2: 10.1 CM
BH CV ECHO MEAS - LVLD AP4: 9.6 CM
BH CV ECHO MEAS - LVLS AP2: 9.8 CM
BH CV ECHO MEAS - LVLS AP4: 10 CM
BH CV ECHO MEAS - LVOT AREA (M): 3.8 CM^2
BH CV ECHO MEAS - LVOT AREA: 3.8 CM^2
BH CV ECHO MEAS - LVOT DIAM: 2.2 CM
BH CV ECHO MEAS - LVPWD: 1 CM
BH CV ECHO MEAS - MED PEAK E' VEL: 4.4 CM/SEC
BH CV ECHO MEAS - MEDIAL E/E' RATIO: 20.7
BH CV ECHO MEAS - MR MAX PG: 81 MMHG
BH CV ECHO MEAS - MR MAX VEL: 450 CM/SEC
BH CV ECHO MEAS - MR MEAN PG: 48 MMHG
BH CV ECHO MEAS - MR MEAN VEL: 317 CM/SEC
BH CV ECHO MEAS - MR VTI: 138 CM
BH CV ECHO MEAS - MV DEC SLOPE: 345 CM/SEC^2
BH CV ECHO MEAS - MV DEC TIME: 0.14 SEC
BH CV ECHO MEAS - MV E MAX VEL: 90.8 CM/SEC
BH CV ECHO MEAS - MV P1/2T MAX VEL: 93.6 CM/SEC
BH CV ECHO MEAS - MV P1/2T: 79.5 MSEC
BH CV ECHO MEAS - MVA P1/2T LCG: 2.4 CM^2
BH CV ECHO MEAS - MVA(P1/2T): 2.8 CM^2
BH CV ECHO MEAS - PA ACC SLOPE: 256 CM/SEC^2
BH CV ECHO MEAS - PA ACC TIME: 0.12 SEC
BH CV ECHO MEAS - PA PR(ACCEL): 23.7 MMHG
BH CV ECHO MEAS - PI END-D VEL: 187 CM/SEC
BH CV ECHO MEAS - RAP SYSTOLE: 15 MMHG
BH CV ECHO MEAS - RVDD: 4.1 CM
BH CV ECHO MEAS - RVSP: 54 MMHG
BH CV ECHO MEAS - SI(AO): 99.6 ML/M^2
BH CV ECHO MEAS - SI(CUBED): 25.4 ML/M^2
BH CV ECHO MEAS - SI(LVOT): 16 ML/M^2
BH CV ECHO MEAS - SI(MOD-SP2): 14 ML/M^2
BH CV ECHO MEAS - SI(MOD-SP4): 12.2 ML/M^2
BH CV ECHO MEAS - SI(TEICH): 14 ML/M^2
BH CV ECHO MEAS - SV(AO): 221 ML
BH CV ECHO MEAS - SV(CUBED): 56.4 ML
BH CV ECHO MEAS - SV(LVOT): 35.5 ML
BH CV ECHO MEAS - SV(MOD-SP2): 31 ML
BH CV ECHO MEAS - SV(MOD-SP4): 27 ML
BH CV ECHO MEAS - SV(TEICH): 31.1 ML
BH CV ECHO MEAS - TAPSE (>1.6): 1 CM
BH CV ECHO MEAS - TR MAX PG: 39 MMHG
BH CV ECHO MEAS - TR MAX VEL: 314 CM/SEC
BH CV ECHO MEASUREMENTS AVERAGE E/E' RATIO: 17.98
BH CV VAS BP LEFT ARM: NORMAL MMHG
BH CV XLRA - RV BASE: 6 CM
BH CV XLRA - RV LENGTH: 9.3 CM
BH CV XLRA - RV MID: 4.6 CM
BH CV XLRA - TDI S': 6.14 CM/SEC
LEFT ATRIUM VOLUME INDEX: 65.4 ML/M^2
LEFT ATRIUM VOLUME: 145 ML
LV EF 2D ECHO EST: 20 %
MAXIMAL PREDICTED HEART RATE: 139 BPM
STRESS TARGET HR: 118 BPM

## 2021-01-12 RX ORDER — SACUBITRIL AND VALSARTAN 24; 26 MG/1; MG/1
TABLET, FILM COATED ORAL
Qty: 180 TABLET | Refills: 1 | Status: SHIPPED | OUTPATIENT
Start: 2021-01-12 | End: 2021-07-23

## 2021-03-02 ENCOUNTER — OFFICE VISIT (OUTPATIENT)
Dept: INTERNAL MEDICINE | Facility: CLINIC | Age: 81
End: 2021-03-02

## 2021-03-02 VITALS
HEART RATE: 68 BPM | DIASTOLIC BLOOD PRESSURE: 70 MMHG | HEIGHT: 72 IN | BODY MASS INDEX: 30.48 KG/M2 | WEIGHT: 225 LBS | TEMPERATURE: 97.5 F | SYSTOLIC BLOOD PRESSURE: 112 MMHG

## 2021-03-02 DIAGNOSIS — I10 BENIGN ESSENTIAL HYPERTENSION: ICD-10-CM

## 2021-03-02 DIAGNOSIS — M70.61 TROCHANTERIC BURSITIS OF RIGHT HIP: ICD-10-CM

## 2021-03-02 DIAGNOSIS — E03.9 HYPOTHYROIDISM (ACQUIRED): ICD-10-CM

## 2021-03-02 DIAGNOSIS — I48.92 PAROXYSMAL ATRIAL FLUTTER (HCC): Primary | ICD-10-CM

## 2021-03-02 PROCEDURE — 99214 OFFICE O/P EST MOD 30 MIN: CPT | Performed by: INTERNAL MEDICINE

## 2021-03-02 NOTE — ASSESSMENT & PLAN NOTE
Current blood pressure 112/70 on Lasix 40 mg daily Zaroxolyn 2.52 days/week Entresto 24/26 twice daily continue therapy

## 2021-03-02 NOTE — PATIENT INSTRUCTIONS
Continue current medications  Encouraged heat application to the right hip  Suggest Aleve 220 mg twice daily for 1 week  Encouraged healthy cardiac diet  Encouraged walking exercise  Return visit in 3 months or as needed

## 2021-03-02 NOTE — PROGRESS NOTES
[facility]     Saul Sal  1940   [id number]      Patient Care Team:  Saul Campbell MD as PCP - General (Internal Medicine)  Tino Sampson MD as Consulting Physician (Cardiology)    Chief Complaint::   Chief Complaint   Patient presents with   • Hypertension     follow up   • Hyperlipidemia     follow up   • Leg Pain     right.  Pain at night and cannot get comfortable.  Wonders if could be sciatic discomfort            HPI  Patient is 81-year-old male with a history of coronary disease atrial fibrillation essential hypertension congestive heart failure with low ejection fraction overall doing well cardiac wise on aspirin 81 mg daily Entresto 24/26 twice daily Lasix 40 mg daily and metolazone 2.5 mg 2 days/week history of hypothyroidism on levothyroxine 100 MCG daily complaining of right hip discomfort and right lower leg discomfort lateral aspect for the past few days denies trauma or fall states he has difficulty at night sleeping on his right side and wonders if this is sciatic discomfort or general arthritis.      Patient Active Problem List   Diagnosis   • CAD (coronary artery disease)   • Atrial flutter (CMS/HCC)   • Dyslipidemia   • ELIE (obstructive sleep apnea)   • Typical atrial flutter (CMS/HCC)   • Hypothyroidism (acquired)   • Benign essential hypertension   • Mixed hyperlipidemia   • Obesity (BMI 30-39.9)   • GERD (gastroesophageal reflux disease)   • Aortic ectasia (CMS/HCC)   • H/O asbestos exposure   • Herpes zoster without complication   • History of colon polyps   • Ischemic heart disease   • Left wrist injury   • Paroxysmal atrial flutter (CMS/HCC)   • Postherpetic neuralgia   • Primary osteoarthritis involving multiple joints   • Shortness of breath   • Acute on chronic systolic congestive heart failure (CMS/HCC)   • Bronchitis   • Trochanteric bursitis of right hip        Past Medical History:   Diagnosis Date   • Arthritis    • Atrial flutter (CMS/HCC)      LOW EF=28  %   • Benign essential hypertension    • CHF (congestive heart failure) (CMS/[facility])    • Chronic diastolic (congestive) heart failure (CMS/HCC) 7/9/2019   • Coronary artery disease    • Disease of thyroid gland    • Diverticulosis    • GERD (gastroesophageal reflux disease)    • GI bleed    • Hyperlipidemia    • Hypertension    • Hypothyroidism (acquired)    • Mixed hyperlipidemia    • Obesity (BMI 30-39.9)    • Osteoarthritis    • Prostatic hypertrophy     Benign    • Shingles    • Skin cancer     squamous    • Wears eyeglasses     readers   • Wears hearing aid        Past Surgical History:   Procedure Laterality Date   • ANGIOPLASTY  1985   • CARDIAC CATHETERIZATION     • CARDIAC ELECTROPHYSIOLOGY PROCEDURE N/A 11/26/2018    Procedure: Ablation atrial flutter;  Surgeon: Tino Sampson MD;  Location: [facility] INVASIVE LOCATION;  Service: Cardiovascular   • CARDIAC SURGERY      BYPASS X5   • COLONOSCOPY  2009   • COLONOSCOPY  2005   • CORONARY ARTERY BYPASS GRAFT  2003    x5 [facility] 2003   • DENTAL PROCEDURE  2010    dental surg and crowns   • EYE SURGERY Right     cataract   • SKIN BIOPSY     • VASECTOMY         Family History   Problem Relation Age of Onset   • Diabetes Mother    • Heart disease Mother    • Coronary artery disease Other    • Stroke Other    • Diabetes Other    • Rheumatic fever Other    • Other Other         Valvular CV Disease       Social History     Socioeconomic History   • Marital status:      Spouse name: Not on file   • Number of children: Not on file   • Years of education: Not on file   • Highest education level: Not on file   Social Needs   • Financial resource strain: Not hard at all   • Food insecurity     Worry: Never true     Inability: Never true   • Transportation needs     Medical: No     Non-medical: No   Tobacco Use   • Smoking status: Former Smoker     Packs/day: 1.00     Years: 8.00     Pack years: 8.00     Types: Cigarettes     Start date: 10/1/1956     Quit  date: 1964     Years since quittin.3   • Smokeless tobacco: Never Used   Substance and Sexual Activity   • Alcohol use: No     Frequency: Never     Drinks per session: 1 or 2     Binge frequency: Never   • Drug use: No   • Sexual activity: Defer   Lifestyle   • Physical activity     Days per week: 0 days     Minutes per session: 0 min   • Stress: Not at all   Relationships   • Social connections     Talks on phone: Once a week     Gets together: Once a week     Attends Jainism service: 1 to 4 times per year     Active member of club or organization: No     Attends meetings of clubs or organizations: Never     Relationship status:    Social History Narrative    Caffeine: None    Patient lives at his home with   His wife       Allergies   Allergen Reactions   • Eliquis [Apixaban] Other (See Comments)     Excessive bleeding   • Amlodipine Besylate Unknown (See Comments)     Unknown  Norvasc   • Atenolol Unknown (See Comments)     unknown   • Metoprolol Unknown (See Comments)     Unknown  Lopressor    • Milk-Related Compounds Other (See Comments)     Headache       Review of Systems   Constitutional: Negative for chills, fatigue and fever.   HENT: Negative for congestion, ear pain and sinus pressure.    Respiratory: Negative for cough, chest tightness, shortness of breath and wheezing.    Cardiovascular: Positive for palpitations. Negative for chest pain.   Gastrointestinal: Negative for abdominal pain, blood in stool and constipation.   Skin: Negative for color change.   Allergic/Immunologic: Negative for environmental allergies.   Neurological: Negative for dizziness, speech difficulty and headache.   Psychiatric/Behavioral: Negative for decreased concentration. The patient is not nervous/anxious.             Vital Signs  Vitals:    21 1118   BP: 112/70   BP Location: Left arm   Patient Position: Sitting   Cuff Size: Adult   Pulse: 68  Comment: irregular   Temp: 97.5 °F (36.4 °C)   Weight:  "102 kg (225 lb)   Height: 182.9 cm (72\")   PainSc:   5   PainLoc: Leg     Body mass index is 30.52 kg/m².  Patient's Body mass index is 30.52 kg/m². BMI is above normal parameters. Recommendations include: nutrition counseling.     Advance Care Planning   ACP discussion was held with the patient during this visit. Patient has an advance directive in EMR which is still valid.        Current Outpatient Medications:   •  aspirin 81 MG tablet, Take 81 mg by mouth Daily., Disp: , Rfl:   •  COD LIVER OIL PO, Take 2 capsules in the morning and 1 capsule in the afternoon, Disp: , Rfl:   •  Entresto 24-26 MG tablet, TAKE 1 TABLET TWICE DAILY, Disp: 180 tablet, Rfl: 1  •  furosemide (LASIX) 40 MG tablet, TAKE 1 TABLET EVERY DAY, Disp: 90 tablet, Rfl: 3  •  levothyroxine (Synthroid) 100 MCG tablet, Take 1 tablet by mouth Daily., Disp: 90 tablet, Rfl: 3  •  metOLazone (ZAROXOLYN) 2.5 MG tablet, Take one every Monday and thursday, Disp: 30 tablet, Rfl: 2  •  NON FORMULARY, Cardio-Plus 2080 : Take 4 tablets in the morning and 5 tablets in the evening, Disp: , Rfl:   •  NON FORMULARY, Cataplex B 1250mg: Take 3 tablets BID, Disp: , Rfl:   •  NON FORMULARY, Cataplex F 4200: Take 3 tablets BID, Disp: , Rfl:   •  TURMERIC PO, Take 1 tablet by mouth Daily. (Turmeric Forte), Disp: , Rfl:     Physical Exam     ACE III MINI         HEENT: Pupils equal reactive no facial asymmetry pharynx is clear  NECK: No mass bruit thyromegaly or neck vein distention  CHEST: Clear breath sounds without rales wheezes or rhonchi  CARDIAC: Stable blood pressure heart rate and rhythm  ABD: Liver spleen normal positive bowel sounds no bruit  : Deferred  NEURO: Intact  PSYCH: Normal  EXTREM: No edema  Skin: Clear     Results Review:    No results found for this or any previous visit (from the past 672 hour(s)).  Procedures    Medication Review: Medications reviewed and noted    Patient wellness counseling  Exercise: Encouraged walking exercise  Diet: " Encouraged healthy cardiac diet  Smoking: Non-smoker  Alcohol: None alcohol  Screening:    Assessment/Plan:    Problem List Items Addressed This Visit        Cardiac and Vasculature    Benign essential hypertension    Overview     History of essential hypertension currently on no therapy pressures been labile and currently stable at 120/82 left and right sitting         Current Assessment & Plan       Current blood pressure 112/70 on Lasix 40 mg daily Zaroxolyn 2.52 days/week Entresto 24/26 twice daily continue therapy         Relevant Medications    furosemide (LASIX) 40 MG tablet    metOLazone (ZAROXOLYN) 2.5 MG tablet    Paroxysmal atrial flutter (CMS/HCC) - Primary    Overview     History of atrial fibrillation currently in sinus rhythm with blood pressure 120/82 and heart rate of 84         Current Assessment & Plan     Current sinus rhythm, with stable blood pressure 112/70 and heart rate of 70 on aspirin 81, Entresto 24/26 twice daily Lasix 40 mg daily and metolazone 2.5 mg Monday and Thursday patient is currently stable without chest pain pressure tightness or palpitations continue therapy         Relevant Medications    Entresto 24-26 MG tablet       Endocrine and Metabolic    Hypothyroidism (acquired)    Overview     History of hypothyroidism on Synthroid 88 MCG daily         Current Assessment & Plan     Hypothyroidism on levothyroxine 100 MCG daily continue therapy         Relevant Medications    levothyroxine (Synthroid) 100 MCG tablet       Musculoskeletal and Injuries    Trochanteric bursitis of right hip    Overview     Patient has discomfort and soreness right lower leg pain with tenderness of the right greater trochanter         Current Assessment & Plan     Right hip trochanteric bursitis with referred pain to right leg, the patient has negative straight leg raising and no evidence of significant lumbar for sciatic inflammation  Suggest heat application and Aleve twice daily for 1 week if not  improved notify us                Patient Instructions   Continue current medications  Encouraged heat application to the right hip  Suggest Aleve 220 mg twice daily for 1 week  Encouraged healthy cardiac diet  Encouraged walking exercise  Return visit in 3 months or as needed       Plan of care reviewed with patient at the conclusion of today's visit. Education was provided regarding diagnosis, management, and any prescribed or recommended OTC medications.Patient verbalizes understanding of and agreement with management plan.         aSul Campbell MD      Note: Part of this note may be an electronic transcription/translation of spoken language to printed text using the Dragon Dictation system.      Answers for HPI/ROS submitted by the patient on 3/2/2021   What is the primary reason for your visit?: Other  Please describe your symptoms.: check up from past  heart problems  and  nerve  problem in my leg  Have you had these symptoms before?: Yes  How long have you been having these symptoms?: 1-2 weeks  Please list any medications you are currently taking for this condition.: none  Please describe any probable cause for these symptoms. : pinched nerve?

## 2021-03-02 NOTE — ASSESSMENT & PLAN NOTE
Current sinus rhythm, with stable blood pressure 112/70 and heart rate of 70 on aspirin 81, Entresto 24/26 twice daily Lasix 40 mg daily and metolazone 2.5 mg Monday and Thursday patient is currently stable without chest pain pressure tightness or palpitations continue therapy

## 2021-03-02 NOTE — ASSESSMENT & PLAN NOTE
Right hip trochanteric bursitis with referred pain to right leg, the patient has negative straight leg raising and no evidence of significant lumbar for sciatic inflammation  Suggest heat application and Aleve twice daily for 1 week if not improved notify us

## 2021-04-21 ENCOUNTER — TELEPHONE (OUTPATIENT)
Dept: INTERNAL MEDICINE | Facility: CLINIC | Age: 81
End: 2021-04-21

## 2021-04-21 DIAGNOSIS — M17.11 ARTHRITIS OF RIGHT KNEE: ICD-10-CM

## 2021-04-21 DIAGNOSIS — M15.9 PRIMARY OSTEOARTHRITIS INVOLVING MULTIPLE JOINTS: Primary | ICD-10-CM

## 2021-04-21 NOTE — TELEPHONE ENCOUNTER
This message came in patient's spouse's portal:    Alice:   Please check with Dr Hughes:   The last time my  Saul Sal saw Dr. Hughes, he was having an issue with right knee pain.   They both thought given some time, it may clear itself up but it has gotten worse and wondered if he should see Dr. Hughes about this or would he recommend someone else???   Saul's  1940     Thanks Shelia Jenkins

## 2021-04-22 ENCOUNTER — TELEPHONE (OUTPATIENT)
Dept: INTERNAL MEDICINE | Facility: CLINIC | Age: 81
End: 2021-04-22

## 2021-04-22 NOTE — TELEPHONE ENCOUNTER
Per Dr. Campbell:    Message noted I do not believe the cataract surgery would interfere or cause any problems with regards to his cardiac status as the sedation is strictly local for the surgery.  Please inform patient    Spouse was notified of this message thru her portal.

## 2021-04-22 NOTE — TELEPHONE ENCOUNTER
This was received in patient's spouse's portal:    Alice:  Please run this by Dr. Hughes:  Saul Sal 1940  Knowing Saul's heart condition, would like his opinion:  Saul recently went to the eye Dr  for  regular check up.  He has scar tissue on the one eye he has had cataract  surgery on.  Will need that taken off in the near future.  The other eye needs cataract surgery and they have scheduled it for next Thursday.   We told the Dr that is doing the cataract surgery about his heart condition.  He does not seem concerned but I can't help but be.  What does Dr Hughes think???

## 2021-06-24 ENCOUNTER — OFFICE VISIT (OUTPATIENT)
Dept: INTERNAL MEDICINE | Facility: CLINIC | Age: 81
End: 2021-06-24

## 2021-06-24 VITALS
WEIGHT: 225.8 LBS | SYSTOLIC BLOOD PRESSURE: 110 MMHG | TEMPERATURE: 97.5 F | HEART RATE: 80 BPM | BODY MASS INDEX: 30.58 KG/M2 | DIASTOLIC BLOOD PRESSURE: 60 MMHG | HEIGHT: 72 IN

## 2021-06-24 DIAGNOSIS — E03.9 HYPOTHYROIDISM (ACQUIRED): ICD-10-CM

## 2021-06-24 DIAGNOSIS — I10 BENIGN ESSENTIAL HYPERTENSION: Primary | ICD-10-CM

## 2021-06-24 DIAGNOSIS — E78.2 MIXED HYPERLIPIDEMIA: ICD-10-CM

## 2021-06-24 DIAGNOSIS — I48.92 PAROXYSMAL ATRIAL FLUTTER (HCC): ICD-10-CM

## 2021-06-24 PROCEDURE — 99214 OFFICE O/P EST MOD 30 MIN: CPT | Performed by: INTERNAL MEDICINE

## 2021-06-24 NOTE — PROGRESS NOTES
[facility]     Saul Sal  1940   [id number]      Patient Care Team:  Saul Campbell MD as PCP - General (Internal Medicine)  Tino Sampson MD as Consulting Physician (Cardiology)    Chief Complaint::   Chief Complaint   Patient presents with   • Hyperlipidemia     follow up   • Hypertension     follow up            HPI  Patient is 81-year-old male with a history of coronary disease hypertension overweight mixed hyperlipidemia hypothyroidism with paroxysmal atrial fibrillation-flutter currently with regular rhythm and recent history of congestive heart failure improved with Entresto therapy and diuretic of Lasix and Zaroxolyn.  Overall patient feels well denying headache dizzy cough wheeze shortness of breath chest pain or pressure he does pretty easily fatigued with excessive yard work which he has reduced dramatically.  Overall patient feels well no longer has edema.      Patient Active Problem List   Diagnosis   • CAD (coronary artery disease)   • Atrial flutter (CMS/HCC)   • Dyslipidemia   • ELIE (obstructive sleep apnea)   • Typical atrial flutter (CMS/HCC)   • Hypothyroidism (acquired)   • Benign essential hypertension   • Mixed hyperlipidemia   • Obesity (BMI 30-39.9)   • GERD (gastroesophageal reflux disease)   • Aortic ectasia (CMS/HCC)   • H/O asbestos exposure   • Herpes zoster without complication   • History of colon polyps   • Ischemic heart disease   • Left wrist injury   • Paroxysmal atrial flutter (CMS/HCC)   • Postherpetic neuralgia   • Primary osteoarthritis involving multiple joints   • Shortness of breath   • Acute on chronic systolic congestive heart failure (CMS/HCC)   • Bronchitis   • Trochanteric bursitis of right hip        Past Medical History:   Diagnosis Date   • Arthritis    • Atrial flutter (CMS/HCC)      LOW EF=28 %   • Benign essential hypertension    • CHF (congestive heart failure) (CMS/HCC)    • Chronic diastolic (congestive) heart failure (CMS/HCC)  2019   • Coronary artery disease    • Disease of thyroid gland    • Diverticulosis    • GERD (gastroesophageal reflux disease)    • GI bleed    • Hyperlipidemia    • Hypertension    • Hypothyroidism (acquired)    • Mixed hyperlipidemia    • Obesity (BMI 30-39.9)    • Osteoarthritis    • Prostatic hypertrophy     Benign    • Shingles    • Skin cancer     squamous    • Wears eyeglasses     readers   • Wears hearing aid        Past Surgical History:   Procedure Laterality Date   • ANGIOPLASTY     • CARDIAC CATHETERIZATION     • CARDIAC ELECTROPHYSIOLOGY PROCEDURE N/A 2018    Procedure: Ablation atrial flutter;  Surgeon: Tino Sampson MD;  Location: [facility] INVASIVE LOCATION;  Service: Cardiovascular   • CARDIAC SURGERY      BYPASS X5   • COLONOSCOPY     • COLONOSCOPY     • CORONARY ARTERY BYPASS GRAFT  2003    x5 [facility]    • DENTAL PROCEDURE      dental surg and crowns   • EYE SURGERY Right     cataract   • SKIN BIOPSY     • VASECTOMY         Family History   Problem Relation Age of Onset   • Diabetes Mother    • Heart disease Mother    • Coronary artery disease Other    • Stroke Other    • Diabetes Other    • Rheumatic fever Other    • Other Other         Valvular CV Disease       Social History     Socioeconomic History   • Marital status:      Spouse name: Not on file   • Number of children: Not on file   • Years of education: Not on file   • Highest education level: Not on file   Tobacco Use   • Smoking status: Former Smoker     Packs/day: 1.00     Years: 8.00     Pack years: 8.00     Types: Cigarettes     Start date: 10/1/1956     Quit date: 1964     Years since quittin.6   • Smokeless tobacco: Never Used   Substance and Sexual Activity   • Alcohol use: No   • Drug use: No   • Sexual activity: Defer       Allergies   Allergen Reactions   • Eliquis [Apixaban] Other (See Comments)     Excessive bleeding   • Amlodipine Besylate Unknown (See Comments)      "Unknown  Norvasc   • Atenolol Unknown (See Comments)     unknown   • Metoprolol Unknown (See Comments)     Unknown  Lopressor    • Milk-Related Compounds Other (See Comments)     Headache       Review of Systems     HEENT: Denies headache or dizzy vision or hearing change  NECK: Denies pain or dysphagia  CHEST: Denies cough wheeze or PND no recent pulmonary infections  CARDIAC: Denies chest pain pressure palpitations no longer has edema  ABD: Denies nausea vomiting or abdominal pain  : Denies dysuria frequency  NEURO: Denies syncope concussion  PSYCH: Denies anxiety depression  EXTREM: Has mild arthritic soreness denies edema    Vital Signs  Vitals:    06/24/21 1335   BP: 110/60   BP Location: Left arm   Patient Position: Sitting   Cuff Size: Adult   Pulse: 80  Comment: irregular   Temp: 97.5 °F (36.4 °C)   Weight: 102 kg (225 lb 12.8 oz)   Height: 182.9 cm (72\")   PainSc: 0-No pain     Body mass index is 30.62 kg/m².  Patient's Body mass index is 30.62 kg/m². indicating that he is obese (BMI >30). Obesity-related health conditions include the following: hypertension and coronary heart disease. Obesity is unchanged. BMI is is above average; BMI management plan is completed. We discussed low calorie, low carb based diet program, portion control and increasing exercise..     Advance Care Planning   ACP discussion was held with the patient during this visit. Patient has an advance directive in EMR which is still valid.        Current Outpatient Medications:   •  aspirin 81 MG tablet, Take 81 mg by mouth Daily., Disp: , Rfl:   •  COD LIVER OIL PO, Take 2 capsules in the morning and 1 capsule in the afternoon, Disp: , Rfl:   •  Entresto 24-26 MG tablet, TAKE 1 TABLET TWICE DAILY, Disp: 180 tablet, Rfl: 1  •  furosemide (LASIX) 40 MG tablet, TAKE 1 TABLET EVERY DAY, Disp: 90 tablet, Rfl: 3  •  levothyroxine (Synthroid) 100 MCG tablet, Take 1 tablet by mouth Daily., Disp: 90 tablet, Rfl: 3  •  NON FORMULARY, Cardio-Plus " 2080 : Take 4 tablets in the morning and 5 tablets in the evening, Disp: , Rfl:   •  NON FORMULARY, Cataplex B 1250mg: Take 3 tablets BID, Disp: , Rfl:   •  NON FORMULARY, Cataplex F 4200: Take 3 tablets BID, Disp: , Rfl:   •  TURMERIC PO, Take 1 tablet by mouth Daily. (Turmeric Forte), Disp: , Rfl:     Physical Exam     ACE III MINI         HEENT: No asymmetry pharynx is clear  NECK: No mass.  Thyromegaly or neck vein distention  CHEST: Clear breath sounds without rales or wheezing  CARDIAC: Regular rate rhythm without gallop rub or click blood pressure heart rate stable  ABD: Liver spleen normal good bowel sounds  : Deferred  NEURO: Intact  PSYCH: Normal  EXTREM: No edema mild arthritic change  Skin: Clear     Results Review:    Recent Results (from the past 672 hour(s))   T3, Free    Collection Time: 06/24/21  2:05 PM    Specimen: Blood    BLOOD  RELEASE TO SARAH   Result Value Ref Range    T3, Free 2.5 2.0 - 4.4 pg/mL   T4, Free    Collection Time: 06/24/21  2:05 PM    Specimen: Blood    BLOOD  RELEASE TO SARAH   Result Value Ref Range    Free T4 1.11 0.93 - 1.70 ng/dL   TSH    Collection Time: 06/24/21  2:05 PM    Specimen: Blood    BLOOD  RELEASE TO SARAH   Result Value Ref Range    TSH 8.140 (H) 0.270 - 4.200 uIU/mL   Comprehensive Metabolic Panel    Collection Time: 06/24/21  2:05 PM    Specimen: Blood    BLOOD  RELEASE TO SARAH   Result Value Ref Range    Glucose 97 65 - 99 mg/dL    BUN 28 (H) 8 - 23 mg/dL    Creatinine 1.32 (H) 0.76 - 1.27 mg/dL    eGFR Non African Am 52 (L) >60 mL/min/1.73    eGFR African Am 63 >60 mL/min/1.73    BUN/Creatinine Ratio 21.2 7.0 - 25.0    Sodium 143 136 - 145 mmol/L    Potassium 4.4 3.5 - 5.2 mmol/L    Chloride 104 98 - 107 mmol/L    Total CO2 27.6 22.0 - 29.0 mmol/L    Calcium 9.7 8.6 - 10.5 mg/dL    Total Protein 6.8 6.0 - 8.5 g/dL    Albumin 4.50 3.50 - 5.20 g/dL    Globulin 2.3 gm/dL    A/G Ratio 2.0 g/dL    Total Bilirubin 0.6 0.0 - 1.2 mg/dL    Alkaline Phosphatase 45 39  - 117 U/L    AST (SGOT) 21 1 - 40 U/L    ALT (SGPT) 11 1 - 41 U/L   CBC & Differential    Collection Time: 06/24/21  2:05 PM    Specimen: Blood    BLOOD  RELEASE TO SARAH   Result Value Ref Range    WBC 9.20 3.40 - 10.80 10*3/mm3    RBC 4.92 4.14 - 5.80 10*6/mm3    Hemoglobin 15.2 13.0 - 17.7 g/dL    Hematocrit 46.6 37.5 - 51.0 %    MCV 94.7 79.0 - 97.0 fL    MCH 30.9 26.6 - 33.0 pg    MCHC 32.6 31.5 - 35.7 g/dL    RDW 14.1 12.3 - 15.4 %    Platelets 189 140 - 450 10*3/mm3    Neutrophil Rel % 70.5 42.7 - 76.0 %    Lymphocyte Rel % 21.2 19.6 - 45.3 %    Monocyte Rel % 6.1 5.0 - 12.0 %    Eosinophil Rel % 1.2 0.3 - 6.2 %    Basophil Rel % 0.5 0.0 - 1.5 %    Neutrophils Absolute 6.48 1.70 - 7.00 10*3/mm3    Lymphocytes Absolute 1.95 0.70 - 3.10 10*3/mm3    Monocytes Absolute 0.56 0.10 - 0.90 10*3/mm3    Eosinophils Absolute 0.11 0.00 - 0.40 10*3/mm3    Basophils Absolute 0.05 0.00 - 0.20 10*3/mm3    Immature Granulocyte Rel % 0.5 0.0 - 0.5 %    Immature Grans Absolute 0.05 0.00 - 0.05 10*3/mm3    nRBC 0.0 0.0 - 0.2 /100 WBC     Procedures    Medication Review: Medications reviewed and noted    Patient wellness counseling  Exercise: Encouraged walking exercise but no excessive outdoor walking  Diet: Healthy cardiac diet reduce carbs smaller portions weight loss  Smoking: Non-smoker  Alcohol: None alcohol  Screening: Lab pending    Assessment/Plan:    Problem List Items Addressed This Visit        Cardiac and Vasculature    Benign essential hypertension - Primary    Overview     History of essential hypertension currently on no therapy pressures been labile and currently stable at 120/82 left and right sitting         Current Assessment & Plan       Essential hypertension with current blood pressure 110/60 repeated 112/60 patient is on Lasix 40 and Zaroxolyn 2.5 and Entresto will continue Entresto and Lasix and discontinue Zaroxolyn as the patient is no longer edematous.         Relevant Medications    furosemide (LASIX)  40 MG tablet    Other Relevant Orders    CBC & Differential (Completed)    Comprehensive Metabolic Panel (Completed)    TSH (Completed)    T4, Free (Completed)    T3, Free (Completed)    Mixed hyperlipidemia    Overview     History of mixed hyperlipidemia not on therapy         Current Assessment & Plan       History of coronary disease atrial fibrillation and essential hypertension patient not on statin therapy suggest continued healthy cardiac diet with reduce carbs and smaller portions and weight loss         Paroxysmal atrial flutter (CMS/HCC)    Overview     History of atrial fibrillation currently in sinus rhythm with blood pressure 120/82 and heart rate of 84         Current Assessment & Plan     Paroxysmal atrial fibrillation with current regular atrial fibrillation with ventricular rate of 80 on aspirin 81 Entresto, Lasix 40 mg, and Zaroxolyn 2.52 days/week.  Suggest continue all therapy except discontinue Zaroxolyn as patient has no edema.         Relevant Medications    Entresto 24-26 MG tablet       Endocrine and Metabolic    Hypothyroidism (acquired)    Overview     History of hypothyroidism on Synthroid 88 MCG daily         Current Assessment & Plan     Hypothyroidism on levothyroxine 100 MCG daily continue therapy TSH is pending         Relevant Medications    levothyroxine (Synthroid) 100 MCG tablet    Other Relevant Orders    CBC & Differential (Completed)    Comprehensive Metabolic Panel (Completed)    TSH (Completed)    T4, Free (Completed)    T3, Free (Completed)           Patient Instructions   Lab pending  Discontinue Zaroxolyn his edema has resolved  Continue all other medications  Encourage walking exercise  Increase healthy cardiac diet and weight loss  Return visit in 5 months for annual wellness visit       Plan of care reviewed with patient at the conclusion of today's visit. Education was provided regarding diagnosis, management, and any prescribed or recommended OTC medications.Patient  verbalizes understanding of and agreement with management plan.         Saul Campbell MD      Note: Part of this note may be an electronic transcription/translation of spoken language to printed text using the Dragon Dictation system.      Answers for HPI/ROS submitted by the patient on 6/17/2021  What is the primary reason for your visit?: Other  Please describe your symptoms.: check up  Have you had these symptoms before?: Yes  How long have you been having these symptoms?: Greater than 2 weeks  Please list any medications you are currently taking for this condition.: metolazone 2.5 mg ,  2 times  weekly;    entresto   26 mg..,  1 twice daily;  furosemide,40 mg  1  daily

## 2021-06-25 LAB
ALBUMIN SERPL-MCNC: 4.5 G/DL (ref 3.5–5.2)
ALBUMIN/GLOB SERPL: 2 G/DL
ALP SERPL-CCNC: 45 U/L (ref 39–117)
ALT SERPL-CCNC: 11 U/L (ref 1–41)
AST SERPL-CCNC: 21 U/L (ref 1–40)
BASOPHILS # BLD AUTO: 0.05 10*3/MM3 (ref 0–0.2)
BASOPHILS NFR BLD AUTO: 0.5 % (ref 0–1.5)
BILIRUB SERPL-MCNC: 0.6 MG/DL (ref 0–1.2)
BUN SERPL-MCNC: 28 MG/DL (ref 8–23)
BUN/CREAT SERPL: 21.2 (ref 7–25)
CALCIUM SERPL-MCNC: 9.7 MG/DL (ref 8.6–10.5)
CHLORIDE SERPL-SCNC: 104 MMOL/L (ref 98–107)
CO2 SERPL-SCNC: 27.6 MMOL/L (ref 22–29)
CREAT SERPL-MCNC: 1.32 MG/DL (ref 0.76–1.27)
EOSINOPHIL # BLD AUTO: 0.11 10*3/MM3 (ref 0–0.4)
EOSINOPHIL NFR BLD AUTO: 1.2 % (ref 0.3–6.2)
ERYTHROCYTE [DISTWIDTH] IN BLOOD BY AUTOMATED COUNT: 14.1 % (ref 12.3–15.4)
GLOBULIN SER CALC-MCNC: 2.3 GM/DL
GLUCOSE SERPL-MCNC: 97 MG/DL (ref 65–99)
HCT VFR BLD AUTO: 46.6 % (ref 37.5–51)
HGB BLD-MCNC: 15.2 G/DL (ref 13–17.7)
IMM GRANULOCYTES # BLD AUTO: 0.05 10*3/MM3 (ref 0–0.05)
IMM GRANULOCYTES NFR BLD AUTO: 0.5 % (ref 0–0.5)
LYMPHOCYTES # BLD AUTO: 1.95 10*3/MM3 (ref 0.7–3.1)
LYMPHOCYTES NFR BLD AUTO: 21.2 % (ref 19.6–45.3)
MCH RBC QN AUTO: 30.9 PG (ref 26.6–33)
MCHC RBC AUTO-ENTMCNC: 32.6 G/DL (ref 31.5–35.7)
MCV RBC AUTO: 94.7 FL (ref 79–97)
MONOCYTES # BLD AUTO: 0.56 10*3/MM3 (ref 0.1–0.9)
MONOCYTES NFR BLD AUTO: 6.1 % (ref 5–12)
NEUTROPHILS # BLD AUTO: 6.48 10*3/MM3 (ref 1.7–7)
NEUTROPHILS NFR BLD AUTO: 70.5 % (ref 42.7–76)
NRBC BLD AUTO-RTO: 0 /100 WBC (ref 0–0.2)
PLATELET # BLD AUTO: 189 10*3/MM3 (ref 140–450)
POTASSIUM SERPL-SCNC: 4.4 MMOL/L (ref 3.5–5.2)
PROT SERPL-MCNC: 6.8 G/DL (ref 6–8.5)
RBC # BLD AUTO: 4.92 10*6/MM3 (ref 4.14–5.8)
SODIUM SERPL-SCNC: 143 MMOL/L (ref 136–145)
T3FREE SERPL-MCNC: 2.5 PG/ML (ref 2–4.4)
T4 FREE SERPL-MCNC: 1.11 NG/DL (ref 0.93–1.7)
TSH SERPL DL<=0.005 MIU/L-ACNC: 8.14 UIU/ML (ref 0.27–4.2)
WBC # BLD AUTO: 9.2 10*3/MM3 (ref 3.4–10.8)

## 2021-06-25 NOTE — ASSESSMENT & PLAN NOTE
Essential hypertension with current blood pressure 110/60 repeated 112/60 patient is on Lasix 40 and Zaroxolyn 2.5 and Entresto will continue Entresto and Lasix and discontinue Zaroxolyn as the patient is no longer edematous.

## 2021-06-25 NOTE — ASSESSMENT & PLAN NOTE
Paroxysmal atrial fibrillation with current regular atrial fibrillation with ventricular rate of 80 on aspirin 81 Entresto, Lasix 40 mg, and Zaroxolyn 2.52 days/week.  Suggest continue all therapy except discontinue Zaroxolyn as patient has no edema.

## 2021-06-25 NOTE — PATIENT INSTRUCTIONS
Lab pending  Discontinue Zaroxolyn his edema has resolved  Continue all other medications  Encourage walking exercise  Increase healthy cardiac diet and weight loss  Return visit in 5 months for annual wellness visit

## 2021-06-25 NOTE — ASSESSMENT & PLAN NOTE
History of coronary disease atrial fibrillation and essential hypertension patient not on statin therapy suggest continued healthy cardiac diet with reduce carbs and smaller portions and weight loss

## 2021-07-15 ENCOUNTER — OFFICE VISIT (OUTPATIENT)
Dept: INTERNAL MEDICINE | Facility: CLINIC | Age: 81
End: 2021-07-15

## 2021-07-15 VITALS
TEMPERATURE: 98.4 F | BODY MASS INDEX: 30.61 KG/M2 | DIASTOLIC BLOOD PRESSURE: 74 MMHG | HEART RATE: 64 BPM | HEIGHT: 72 IN | SYSTOLIC BLOOD PRESSURE: 116 MMHG | WEIGHT: 226 LBS

## 2021-07-15 DIAGNOSIS — M70.21 OLECRANON BURSITIS OF RIGHT ELBOW: Primary | ICD-10-CM

## 2021-07-15 PROCEDURE — 99213 OFFICE O/P EST LOW 20 MIN: CPT | Performed by: INTERNAL MEDICINE

## 2021-07-15 NOTE — PROGRESS NOTES
[facility]     Saul Sal  1940   [id number]      Patient Care Team:  Saul Campbell MD as PCP - General (Internal Medicine)  Tino Sampson MD as Consulting Physician (Cardiology)    Chief Complaint::   Chief Complaint   Patient presents with   • Joint Swelling     elbow - right arm            HPI  Patient is 81-year-old male with history of coronary disease essential hypertension mixed hyperlipidemia systolic congestive heart failure all in good control complaining of acute pain right elbow for approximately 1 week having applied ice initially and then heat and is taking Aleve 220 he denies injury or trauma does have some mild discomfort and no significant reduced range of motion.  The patient's right arm is now less swollen and nontender his right elbow olecranon bursa is warm with minor soreness.  Patient denies headache or dizzy cough or wheeze chest pain or pressure nausea vomiting.      Patient Active Problem List   Diagnosis   • CAD (coronary artery disease)   • Atrial flutter (CMS/HCC)   • Dyslipidemia   • ELIE (obstructive sleep apnea)   • Typical atrial flutter (CMS/HCC)   • Hypothyroidism (acquired)   • Benign essential hypertension   • Mixed hyperlipidemia   • Obesity (BMI 30-39.9)   • GERD (gastroesophageal reflux disease)   • Aortic ectasia (CMS/HCC)   • H/O asbestos exposure   • Herpes zoster without complication   • History of colon polyps   • Ischemic heart disease   • Left wrist injury   • Paroxysmal atrial flutter (CMS/HCC)   • Postherpetic neuralgia   • Primary osteoarthritis involving multiple joints   • Shortness of breath   • Acute on chronic systolic congestive heart failure (CMS/HCC)   • Bronchitis   • Trochanteric bursitis of right hip   • Olecranon bursitis of right elbow        Past Medical History:   Diagnosis Date   • Arthritis    • Atrial flutter (CMS/HCC)      LOW EF=28 %   • Benign essential hypertension    • CHF (congestive heart failure) (CMS/HCC)    •  Chronic diastolic (congestive) heart failure (CMS/HCC) 2019   • Coronary artery disease    • Disease of thyroid gland    • Diverticulosis    • GERD (gastroesophageal reflux disease)    • GI bleed    • Hyperlipidemia    • Hypertension    • Hypothyroidism (acquired)    • Mixed hyperlipidemia    • Obesity (BMI 30-39.9)    • Osteoarthritis    • Prostatic hypertrophy     Benign    • Shingles    • Skin cancer     squamous    • Wears eyeglasses     readers   • Wears hearing aid        Past Surgical History:   Procedure Laterality Date   • ANGIOPLASTY     • CARDIAC CATHETERIZATION     • CARDIAC ELECTROPHYSIOLOGY PROCEDURE N/A 2018    Procedure: Ablation atrial flutter;  Surgeon: Tino Sampson MD;  Location: [facility] INVASIVE LOCATION;  Service: Cardiovascular   • CARDIAC SURGERY      BYPASS X5   • COLONOSCOPY     • COLONOSCOPY     • CORONARY ART[facility] GRA[facility]  [facility]    • DENTAL PROCEDURE      dental surg and crowns   • EYE SURGERY Right     cataract   • SKIN BIOPSY     • VASECTOMY         Family History   Problem Relation Age of Onset   • Diabetes Mother    • Heart disease Mother    • Coronary artery disease Other    • Stroke Other    • Diabetes Other    • Rheumatic fever Other    • Other Other         Valvular CV Disease       Social History     Socioeconomic History   • Marital status:      Spouse name: Not on file   • Number of children: Not on file   • Years of education: Not on file   • Highest education level: Not on file   Tobacco Use   • Smoking status: Former Smoker     Packs/day: 1.00     Years: 8.00     Pack years: 8.00     Types: Cigarettes     Start date: 10/1/1956     Quit date: 1964     Years since quittin.6   • Smokeless tobacco: Never Used   Substance and Sexual Activity   • Alcohol use: No   • Drug use: No   • Sexual activity: Defer       Allergies   Allergen Reactions   • Eliquis [Apixaban] Other (See Comments)     Excessive bleeding  "  • Amlodipine Besylate Unknown (See Comments)     Unknown  Norvasc   • Atenolol Unknown (See Comments)     unknown   • Metoprolol Unknown (See Comments)     Unknown  Lopressor    • Milk-Related Compounds Other (See Comments)     Headache       Review of Systems     HEENT: Denies headache or dizzy  NECK: Denies pain stiffness or dysphagia  CHEST: Denies cough or wheeze  CARDIAC: Denies chest pain or pressure some fatigue with physical activity  ABD: Denies nausea vomiting  : Denies dysuria frequency  NEURO: Denies syncope falls or neuropathy  PSYCH: Denies anxiety  EXTREM: Complains of right elbow swelling and discomfort    Vital Signs  Vitals:    07/15/21 1556   BP: 116/74   BP Location: Left arm   Patient Position: Sitting   Cuff Size: Adult   Pulse: 64  Comment: irregular   Temp: 98.4 °F (36.9 °C)   Weight: 103 kg (226 lb)   Height: 182.9 cm (72\")   PainSc: 0-No pain     Body mass index is 30.65 kg/m².  Patient's Body mass index is 30.65 kg/m². indicating that he is obese (BMI >30). Obesity-related health conditions include the following: hypertension. Obesity is improving with treatment. BMI is is above average; BMI management plan is completed. We discussed low calorie, low carb based diet program, portion control and increasing exercise..     Advance Care Planning   ACP discussion was held with the patient during this visit. Patient has an advance directive in EMR which is still valid.        Current Outpatient Medications:   •  aspirin 81 MG tablet, Take 81 mg by mouth Daily., Disp: , Rfl:   •  COD LIVER OIL PO, Take 2 capsules in the morning and 1 capsule in the afternoon, Disp: , Rfl:   •  Entresto 24-26 MG tablet, TAKE 1 TABLET TWICE DAILY, Disp: 180 tablet, Rfl: 1  •  furosemide (LASIX) 40 MG tablet, TAKE 1 TABLET EVERY DAY, Disp: 90 tablet, Rfl: 3  •  levothyroxine (Synthroid) 100 MCG tablet, Take 1 tablet by mouth Daily., Disp: 90 tablet, Rfl: 3  •  NON FORMULARY, Cardio-Plus 2080 : Take 4 tablets in " the morning and 5 tablets in the evening, Disp: , Rfl:   •  NON FORMULARY, Cataplex B 1250mg: Take 3 tablets BID, Disp: , Rfl:   •  NON FORMULARY, Cataplex F 4200: Take 3 tablets BID, Disp: , Rfl:   •  TURMERIC PO, Take 1 tablet by mouth Daily. (Turmeric Forte), Disp: , Rfl:     Physical Exam     ACE III MINI         HEENT: No asymmetry  NECK: No mass or bruit  CHEST: Clear  CARDIAC: Irregular with stable blood pressure and heart rate  ABD: Normal  : Deferred  NEURO: Intact  PSYCH: Normal  EXTREM: Right elbow swollen olecranon bursa good range of motion except slight reduced extension small bursa erythema  Skin: Clear     Results Review:    Recent Results (from the past 672 hour(s))   T3, Free    Collection Time: 06/24/21  2:05 PM    Specimen: Blood    BLOOD  RELEASE TO SARAH   Result Value Ref Range    T3, Free 2.5 2.0 - 4.4 pg/mL   T4, Free    Collection Time: 06/24/21  2:05 PM    Specimen: Blood    BLOOD  RELEASE TO SARAH   Result Value Ref Range    Free T4 1.11 0.93 - 1.70 ng/dL   TSH    Collection Time: 06/24/21  2:05 PM    Specimen: Blood    BLOOD  RELEASE TO SARAH   Result Value Ref Range    TSH 8.140 (H) 0.270 - 4.200 uIU/mL   Comprehensive Metabolic Panel    Collection Time: 06/24/21  2:05 PM    Specimen: Blood    BLOOD  RELEASE TO SARAH   Result Value Ref Range    Glucose 97 65 - 99 mg/dL    BUN 28 (H) 8 - 23 mg/dL    Creatinine 1.32 (H) 0.76 - 1.27 mg/dL    eGFR Non African Am 52 (L) >60 mL/min/1.73    eGFR African Am 63 >60 mL/min/1.73    BUN/Creatinine Ratio 21.2 7.0 - 25.0    Sodium 143 136 - 145 mmol/L    Potassium 4.4 3.5 - 5.2 mmol/L    Chloride 104 98 - 107 mmol/L    Total CO2 27.6 22.0 - 29.0 mmol/L    Calcium 9.7 8.6 - 10.5 mg/dL    Total Protein 6.8 6.0 - 8.5 g/dL    Albumin 4.50 3.50 - 5.20 g/dL    Globulin 2.3 gm/dL    A/G Ratio 2.0 g/dL    Total Bilirubin 0.6 0.0 - 1.2 mg/dL    Alkaline Phosphatase 45 39 - 117 U/L    AST (SGOT) 21 1 - 40 U/L    ALT (SGPT) 11 1 - 41 U/L   CBC & Differential     Collection Time: 06/24/21  2:05 PM    Specimen: Blood    BLOOD  RELEASE TO SARAH   Result Value Ref Range    WBC 9.20 3.40 - 10.80 10*3/mm3    RBC 4.92 4.14 - 5.80 10*6/mm3    Hemoglobin 15.2 13.0 - 17.7 g/dL    Hematocrit 46.6 37.5 - 51.0 %    MCV 94.7 79.0 - 97.0 fL    MCH 30.9 26.6 - 33.0 pg    MCHC 32.6 31.5 - 35.7 g/dL    RDW 14.1 12.3 - 15.4 %    Platelets 189 140 - 450 10*3/mm3    Neutrophil Rel % 70.5 42.7 - 76.0 %    Lymphocyte Rel % 21.2 19.6 - 45.3 %    Monocyte Rel % 6.1 5.0 - 12.0 %    Eosinophil Rel % 1.2 0.3 - 6.2 %    Basophil Rel % 0.5 0.0 - 1.5 %    Neutrophils Absolute 6.48 1.70 - 7.00 10*3/mm3    Lymphocytes Absolute 1.95 0.70 - 3.10 10*3/mm3    Monocytes Absolute 0.56 0.10 - 0.90 10*3/mm3    Eosinophils Absolute 0.11 0.00 - 0.40 10*3/mm3    Basophils Absolute 0.05 0.00 - 0.20 10*3/mm3    Immature Granulocyte Rel % 0.5 0.0 - 0.5 %    Immature Grans Absolute 0.05 0.00 - 0.05 10*3/mm3    nRBC 0.0 0.0 - 0.2 /100 WBC     Procedures    Medication Review: Medications reviewed and noted    Patient wellness counseling  Exercise: Continue regular healthy cardiac activity but reduced range of motion of right elbow  Diet: Healthy cardiac diet  Smoking: Non-smoker  Alcohol: None alcohol  Screening: Elbow x-ray not obtained    Assessment/Plan:    Problem List Items Addressed This Visit        Musculoskeletal and Injuries    Olecranon bursitis of right elbow - Primary    Overview     Patient developed acute swelling and soreness of the right elbow approximately 1 week ago denies injury or trauma, the patient was advised to apply ice and then heat and take Aleve twice daily the general arm swelling has improved the patient does have olecranon bursa swelling he has good range of motion with no significant tenderness.         Current Assessment & Plan     1 week history of right elbow olecranon bursitis with swelling the forearm and hand wrist swelling have improved and resolved the patient still has olecranon  bursa that is warm and very mildly uncomfortable having treated this with heat and Aleve, will obtain uric acid-doubt this is gout, and will refer to orthopedic surgeon for evaluation         Relevant Orders    Ambulatory Referral to Orthopedic Surgery    Uric Acid           Patient Instructions   Obtain uric acid level, but do not believe this is gout  Continue heat and Aleve  Orthopedic referral for right elbow olecranon bursitis  Return visit November appointment  Has appointment for right elbow bursitis with Dr. Oskar Montiel July 22, 2021       Plan of care reviewed with patient at the conclusion of today's visit. Education was provided regarding diagnosis, management, and any prescribed or recommended OTC medications.Patient verbalizes understanding of and agreement with management plan.         Saul Campbell MD      Note: Part of this note may be an electronic transcription/translation of spoken language to printed text using the Dragon Dictation system.      Answers for HPI/ROS submitted by the patient on 7/14/2021  What is the primary reason for your visit?: Other  Please describe your symptoms.: right arm  swollen  Have you had these symptoms before?: No  How long have you been having these symptoms?: 1-2 weeks  Please list any medications you are currently taking for this condition.: tylenol  Please describe any probable cause for these symptoms. : do not know

## 2021-07-16 ENCOUNTER — TELEPHONE (OUTPATIENT)
Dept: INTERNAL MEDICINE | Facility: CLINIC | Age: 81
End: 2021-07-16

## 2021-07-16 NOTE — TELEPHONE ENCOUNTER
If he has no significant swelling, suggest he reduce his furosemide to 20 mg a day, or 1/2 pill a day over the weekend and touch base with us next week.

## 2021-07-16 NOTE — TELEPHONE ENCOUNTER
"Portal from wife   \"Alice:  It's Shelia Sal  (again)  I am sending you this message about Saul Sal [birth year]  He is feeling tired and lacking energy:  Took his blood pressure and it was 68/56 heart rate 116.   In a few minutes, take it again and it is 82/62  heart rate 61.   Anything we should do???     My cell [phone]  Thanks\"    "

## 2021-07-16 NOTE — ASSESSMENT & PLAN NOTE
1 week history of right elbow olecranon bursitis with swelling the forearm and hand wrist swelling have improved and resolved the patient still has olecranon bursa that is warm and very mildly uncomfortable having treated this with heat and Aleve, will obtain uric acid-doubt this is gout, and will refer to orthopedic surgeon for evaluation

## 2021-07-16 NOTE — PATIENT INSTRUCTIONS
Obtain uric acid level, but do not believe this is gout  Continue heat and Aleve  Orthopedic referral for right elbow olecranon bursitis  Return visit November appointment  Has appointment for right elbow bursitis with Dr. Oskar Montiel July 22, 2021

## 2021-07-19 LAB — URATE SERPL-MCNC: 10.9 MG/DL (ref 3.8–8.4)

## 2021-07-20 RX ORDER — PREDNISONE 20 MG/1
20 TABLET ORAL DAILY
Qty: 3 TABLET | Refills: 0 | Status: SHIPPED | OUTPATIENT
Start: 2021-07-20 | End: 2021-11-24

## 2021-07-20 RX ORDER — ALLOPURINOL 300 MG/1
300 TABLET ORAL DAILY
Qty: 30 TABLET | Refills: 5 | Status: SHIPPED | OUTPATIENT
Start: 2021-07-20 | End: 2021-11-24

## 2021-07-23 RX ORDER — SACUBITRIL AND VALSARTAN 24; 26 MG/1; MG/1
TABLET, FILM COATED ORAL
Qty: 180 TABLET | Refills: 1 | Status: SHIPPED | OUTPATIENT
Start: 2021-07-23 | End: 2021-08-02

## 2021-08-02 RX ORDER — SACUBITRIL AND VALSARTAN 24; 26 MG/1; MG/1
TABLET, FILM COATED ORAL
Qty: 180 TABLET | Refills: 0 | Status: SHIPPED | OUTPATIENT
Start: 2021-08-02 | End: 2022-01-11 | Stop reason: SDUPTHER

## 2021-09-15 RX ORDER — FUROSEMIDE 40 MG/1
40 TABLET ORAL DAILY
Qty: 90 TABLET | Refills: 3 | Status: SHIPPED | OUTPATIENT
Start: 2021-09-15 | End: 2022-01-12 | Stop reason: SDUPTHER

## 2021-09-15 NOTE — TELEPHONE ENCOUNTER
Faxed refill request.    Rx Refill Note  Requested Prescriptions     Pending Prescriptions Disp Refills   • furosemide (LASIX) 40 MG tablet 90 tablet 3     Sig: Take 1 tablet by mouth Daily.      Last office visit with prescribing clinician: 7/15/2021      Next office visit with prescribing clinician: 11/24/2021            Alice Webster RN  09/15/21, 15:34 EDT

## 2021-11-03 RX ORDER — LEVOTHYROXINE SODIUM 0.1 MG/1
TABLET ORAL
Qty: 90 TABLET | Refills: 3 | Status: SHIPPED | OUTPATIENT
Start: 2021-11-03 | End: 2021-12-07 | Stop reason: SDUPTHER

## 2021-11-21 PROBLEM — Z00.00 MEDICARE ANNUAL WELLNESS VISIT, SUBSEQUENT: Status: ACTIVE | Noted: 2021-11-21

## 2021-11-24 ENCOUNTER — LAB (OUTPATIENT)
Dept: LAB | Facility: HOSPITAL | Age: 81
End: 2021-11-24

## 2021-11-24 ENCOUNTER — OFFICE VISIT (OUTPATIENT)
Dept: INTERNAL MEDICINE | Facility: CLINIC | Age: 81
End: 2021-11-24

## 2021-11-24 VITALS
DIASTOLIC BLOOD PRESSURE: 82 MMHG | SYSTOLIC BLOOD PRESSURE: 126 MMHG | TEMPERATURE: 97.7 F | BODY MASS INDEX: 30.61 KG/M2 | HEIGHT: 72 IN | WEIGHT: 226 LBS

## 2021-11-24 DIAGNOSIS — I10 BENIGN ESSENTIAL HYPERTENSION: ICD-10-CM

## 2021-11-24 DIAGNOSIS — E78.2 MIXED HYPERLIPIDEMIA: ICD-10-CM

## 2021-11-24 DIAGNOSIS — I25.10 CORONARY ARTERY DISEASE INVOLVING NATIVE CORONARY ARTERY OF NATIVE HEART, UNSPECIFIED WHETHER ANGINA PRESENT: ICD-10-CM

## 2021-11-24 DIAGNOSIS — Z12.5 SPECIAL SCREENING FOR MALIGNANT NEOPLASM OF PROSTATE: ICD-10-CM

## 2021-11-24 DIAGNOSIS — I50.23 ACUTE ON CHRONIC SYSTOLIC CONGESTIVE HEART FAILURE (HCC): ICD-10-CM

## 2021-11-24 DIAGNOSIS — E03.9 HYPOTHYROIDISM (ACQUIRED): ICD-10-CM

## 2021-11-24 DIAGNOSIS — G47.33 OSA (OBSTRUCTIVE SLEEP APNEA): ICD-10-CM

## 2021-11-24 DIAGNOSIS — I48.92 PAROXYSMAL ATRIAL FLUTTER (HCC): ICD-10-CM

## 2021-11-24 DIAGNOSIS — Z00.00 MEDICARE ANNUAL WELLNESS VISIT, SUBSEQUENT: Primary | ICD-10-CM

## 2021-11-24 PROCEDURE — 1170F FXNL STATUS ASSESSED: CPT | Performed by: INTERNAL MEDICINE

## 2021-11-24 PROCEDURE — 1159F MED LIST DOCD IN RCRD: CPT | Performed by: INTERNAL MEDICINE

## 2021-11-24 PROCEDURE — 99214 OFFICE O/P EST MOD 30 MIN: CPT | Performed by: INTERNAL MEDICINE

## 2021-11-24 PROCEDURE — G0439 PPPS, SUBSEQ VISIT: HCPCS | Performed by: INTERNAL MEDICINE

## 2021-11-24 PROCEDURE — 96160 PT-FOCUSED HLTH RISK ASSMT: CPT | Performed by: INTERNAL MEDICINE

## 2021-11-24 PROCEDURE — 1126F AMNT PAIN NOTED NONE PRSNT: CPT | Performed by: INTERNAL MEDICINE

## 2021-11-24 NOTE — ASSESSMENT & PLAN NOTE
We will get a CBC, and CMP. We will also get a thyroid test a cholesterol panel, a BNP, a gout level as well as a prostate cancer blood test. He is to continue on his medications, no changes will be made at this time.

## 2021-11-24 NOTE — ASSESSMENT & PLAN NOTE
Patient has generally improved on the Entresto low-dose of 24/26 1/2 tablet twice daily, he reduced this dose on his own because of low range blood pressure and feeling of fatigue and he feels better but I have tablet twice daily continue current therapy

## 2021-11-24 NOTE — ASSESSMENT & PLAN NOTE
Currently in sinus rhythm on aspirin 81 Entresto 24/26 1/2 tablet twice daily and Lasix 40 mg daily continue therapy  EKG shows sinus rhythm with frequent ventricular premature complexes marked right axis and right bundle branch block similar to EKG of October 27, 2020

## 2021-11-24 NOTE — PROGRESS NOTES
QUICK REFERENCE INFORMATION:  The ABCs of the Annual Wellness Visit    Subsequent Medicare Wellness Visit    HEALTH RISK ASSESSMENT    1940    Recent Hospitalizations:  No hospitalization(s) within the last year..        Current Medical Providers:  Patient Care Team:  Saul Campbell MD as PCP - General (Internal Medicine)  Tino Sampson MD as Consulting Physician (Cardiology)        Smoking Status:  Social History     Tobacco Use   Smoking Status Former Smoker   • Packs/day: 1.00   • Years: 8.00   • Pack years: 8.00   • Types: Cigarettes   • Start date: 10/1/1956   • Quit date: 1964   • Years since quittin.0   Smokeless Tobacco Never Used       Alcohol Consumption:  Social History     Substance and Sexual Activity   Alcohol Use No       Depression Screen:   PHQ-2/PHQ-9 Depression Screening 2021   Little interest or pleasure in doing things 0   Feeling down, depressed, or hopeless 0   Total Score 0       Health Habits and Functional and Cognitive Screening:  Functional & Cognitive Status 2021   Do you have difficulty preparing food and eating? No   Do you have difficulty bathing yourself, getting dressed or grooming yourself? No   Do you have difficulty using the toilet? No   Do you have difficulty moving around from place to place? No   Do you have trouble with steps or getting out of a bed or a chair? Yes   Current Diet Unhealthy Diet   Dental Exam Not up to date   Eye Exam Up to date   Exercise (times per week) 4 times per week   Current Exercises Include Stationary Bicycling/Spin Class   Current Exercise Activities Include -   Do you need help using the phone?  No   Are you deaf or do you have serious difficulty hearing?  Yes   Do you need help with transportation? No   Do you need help shopping? No   Do you need help preparing meals?  No   Do you need help with housework?  No   Do you need help with laundry? No   Do you need help taking your medications? No   Do you need help  managing money? No   Do you ever drive or ride in a car without wearing a seat belt? No   Have you felt unusual stress, anger or loneliness in the last month? No   Who do you live with? Spouse   If you need help, do you have trouble finding someone available to you? No   Have you been bothered in the last four weeks by sexual problems? No   Do you have difficulty concentrating, remembering or making decisions? No       Fall Risk Screen:  PRATIBHA Fall Risk Assessment was completed, and patient is at LOW risk for falls.Assessment completed on:11/24/2021    ACE III MINI        Does the patient have evidence of cognitive impairment? No    Aspirin use counseling: Taking ASA appropriately as indicated    Recent Lab Results:  CMP:  Lab Results   Component Value Date    BUN 22 11/24/2021    CREATININE 1.08 11/24/2021    EGFRIFNONA 64 11/24/2021    EGFRIFAFRI 74 11/24/2021    BCR 20 11/24/2021     11/24/2021    K CANCELED 11/24/2021    CO2 22 11/24/2021    CALCIUM 9.3 11/24/2021    PROTENTOTREF 6.7 11/24/2021    ALBUMIN 4.4 11/24/2021    LABGLOBREF 2.3 11/24/2021    LABIL2 1.9 11/24/2021    BILITOT 0.6 11/24/2021    ALKPHOS 50 11/24/2021    AST 21 11/24/2021    ALT 11 11/24/2021     HbA1c:  No results found for: HGBA1C  Microalbumin:  Lab Results   Component Value Date    MICROALBUR 62.6 07/17/2019     Lipid Panel  Lab Results   Component Value Date    CHOL 206 (H) 01/16/2019    TRIG 120 11/24/2021    HDL 47 11/24/2021     (H) 11/24/2021    AST 21 11/24/2021    ALT 11 11/24/2021       Visual Acuity:  No exam data present    Age-appropriate Screening Schedule:  Refer to the list below for future screening recommendations based on patient's age, sex and/or medical conditions. Orders for these recommended tests are listed in the plan section. The patient has been provided with a written plan.    Health Maintenance   Topic Date Due   • ZOSTER VACCINE (1 of 2) Never done   • INFLUENZA VACCINE  11/24/2022 (Originally  8/1/2021)   • TDAP/TD VACCINES (3 - Td or Tdap) 08/16/2022   • LIPID PANEL  11/24/2022        Subjective   History of Present Illness  Patient is 81-year-old male presents for annual Medicare wellness visit and physical exam    Saul Sal is a 81 y.o. male who presents for a Subsequent Wellness Visit.    He relates in general he is feeling fine. He relates as of recent he noticed his blood pressure was low so he stopped taking his Entresto for a few days. He reports seeing numbers like 70 over 50 for his blood pressure. He admits he felt very weak and did not have any energy. He eventually felt good enough to continue the Entresto with half a pill twice a day for the last 2 months. He relates that he tries to do light exercises like riding his bike to increase his heart strength.     He admits that he is not taking allopurinol, he states he has not had any symptoms of gout in the last month and he has stopped eating red meat.     He mentions when he is walking he is having trouble with shortness of breath, he feels as if when he is walking around he has to stop to get catch his breath. He admits the he does not wear a sleep mask to bed however he wears nose strips that help with his snoring at night.     CHRONIC CONDITIONS    The following portions of the patient's history were reviewed and updated as appropriate: allergies, current medications, past family history, past medical history, past social history and past surgical history.    Outpatient Medications Prior to Visit   Medication Sig Dispense Refill   • aspirin 81 MG tablet Take 81 mg by mouth Daily.     • COD LIVER OIL PO Take 2 capsules in the morning and 1 capsule in the afternoon     • Entresto 24-26 MG tablet Take 1 tablet by mouth twice daily 180 tablet 0   • furosemide (LASIX) 40 MG tablet Take 1 tablet by mouth Daily. 90 tablet 3   • levothyroxine (SYNTHROID, LEVOTHROID) 100 MCG tablet TAKE 1 TABLET EVERY DAY 90 tablet 3   • NON FORMULARY  Cardio-Plus 2080 : Take 4 tablets in the morning and 5 tablets in the evening     • NON FORMULARY Cataplex B 1250mg: Take 3 tablets BID     • NON FORMULARY Cataplex F 4200: Take 3 tablets BID     • TURMERIC PO Take 1 tablet by mouth Daily. (Turmeric Forte)     • allopurinol (Zyloprim) 300 MG tablet Take 1 tablet by mouth Daily. 30 tablet 5   • predniSONE (DELTASONE) 20 MG tablet Take 1 tablet by mouth Daily. 3 tablet 0     No facility-administered medications prior to visit.       Patient Active Problem List   Diagnosis   • CAD (coronary artery disease)   • Atrial flutter (HCC)   • Dyslipidemia   • ELIE (obstructive sleep apnea)   • Typical atrial flutter (HCC)   • Hypothyroidism (acquired)   • Benign essential hypertension   • Mixed hyperlipidemia   • Obesity (BMI 30-39.9)   • GERD (gastroesophageal reflux disease)   • Aortic ectasia (HCC)   • H/O asbestos exposure   • Herpes zoster without complication   • History of colon polyps   • Ischemic heart disease   • Left wrist injury   • Paroxysmal atrial flutter (HCC)   • Postherpetic neuralgia   • Primary osteoarthritis involving multiple joints   • Shortness of breath   • Acute on chronic systolic congestive heart failure (HCC)   • Bronchitis   • Trochanteric bursitis of right hip   • Olecranon bursitis of right elbow   • Medicare annual wellness visit, subsequent       Advance Care Planning:  ACP discussion was held with the patient during this visit. Patient has an advance directive in EMR which is still valid.     Identification of Risk Factors:  Risk factors include: Cardiovascular risk.    Review of Systems     HEENT: Denies any eyesight changes, no headache or dizziness. Admits slight hearing changes.   NECK:  Denies any pain, stiffness, swelling or dysphagia   CHEST: Denies cough or wheeze or recent lung infections. Admits shortness of breath  CARDIAC: Denies chest pain, pressure or palpitations   ABD: Denies nausea, vomiting or abdominal pain   : Denies  dysuria  NEURO:  Denies syncope, concussion, neuropathy  PSYCH: Denies any stress   EXTREM: Pain on medial side of left ankle   SKIN: Denies any rash    Compared to one year ago, the patient feels his physical health is the same.  Compared to one year ago, the patient feels his mental health is the same.    Objective     Physical Exam     HEENT: Pupils equal reactive ENT clear, no facial asymmetry, pharynx is clear. Left ear has an ear tube  NECK:  No masses bruit or thyromegaly  CHEST: Clear without rales wheezes or rhonchi  CARDIAC: Heart is regular with occasional skip and a very soft systolic murmur.  ABD: Liver spleen normal, good bowel sounds, nontender  : Deferred  NEURO: Intact   PSYCH: Normal  EXTREM: No significant arthritic changes, left leg has minimal swelling.  There are palpable pulses and dorsalis pedis and posterior tibial bilaterally.   SKIN: Clear       ECG 12 Lead    Date/Time: 11/26/2021 4:02 PM  Performed by: Saul Campbell MD  Authorized by: Saul Campbell MD   Comparison: compared with previous ECG from 10/27/2020  Similar to previous ECG  Comparison to previous ECG: Current EKG shows sinus rhythm with frequent ventricular premature complexes marked right axis deviation and right bundle branch block similar to EKG of October 27, 2020  Rhythm: sinus rhythm  Ectopy: unifocal PVCs  Rate: normal  Conduction: right bundle branch block  QRS axis: right    Clinical impression: abnormal EKG  Comments: Current EKG shows sinus rhythm with ventricular premature complexes and right axis deviation and right bundle branch block similar to EKG of October 27, 2020         Patient Wellness Counseling:   Plan of care reviewed with patient at the conclusion of today's visit. Education was provided in regards to diagnosis, diet and exercise, prostate cancer screening discussed including benefit of early detection, potential need for follow-up, and harms associated with additional management. Patient agrees to  "screening.    Nutrition, physical activity, healthy weight,ways to reduce stress, adequate sleep, injury prevention, misuse of tobacco, alcohol and drugs, sexual behavior and STD's, dental health, mental health, and immunizations.    Plan of care reviewed with patient at the conclusion of today's visit. Education was provided regarding diagnosis, management and any prescribed or recommended OTC medications.  Patient verbalizes understanding of and agreement with management plan.        Vitals:    11/24/21 0939   BP: 126/82   BP Location: Left arm   Patient Position: Sitting   Cuff Size: Adult   Temp: 97.7 °F (36.5 °C)   Weight: 103 kg (226 lb)   Height: 182.9 cm (72\")   PainSc: 0-No pain       Patient's Body mass index is 30.65 kg/m². indicating that he is obese (BMI >30). Obesity-related health conditions include the following: hypertension, coronary heart disease and dyslipidemias. Obesity is unchanged. BMI is is above average; BMI management plan is completed. We discussed low calorie, low carb based diet program, portion control and increasing exercise..      Assessment/Plan   Problem List Items Addressed This Visit        Cardiac and Vasculature    CAD (coronary artery disease)    Overview     · CABG X5 2003         Current Assessment & Plan       Remote multivessel coronary bypass grafting x5 in 2003 currently stable without chest pain pressure or palpitations         Relevant Medications    Entresto 24-26 MG tablet    Other Relevant Orders    CBC & Differential (Completed)    BNP (Completed)    Comprehensive Metabolic Panel (Completed)    Lipid Panel (Completed)    Uric Acid (Completed)    TSH (Completed)    ECG 12 Lead    Benign essential hypertension    Overview     History of essential hypertension currently on no therapy pressures been labile and currently stable at 120/82 left and right sitting         Current Assessment & Plan     This is mildly low due to taking Entresto. He will continue on this " medication.          Relevant Medications    furosemide (LASIX) 40 MG tablet    Other Relevant Orders    CBC & Differential (Completed)    BNP (Completed)    Comprehensive Metabolic Panel (Completed)    Lipid Panel (Completed)    Uric Acid (Completed)    TSH (Completed)    ECG 12 Lead    Mixed hyperlipidemia    Overview     History of mixed hyperlipidemia not on therapy         Current Assessment & Plan     We will check lab work  today. He would be interested in taking cholesterol medication if the value is over 200. He is to continue on his medications, no changes will be made at this time.          Relevant Orders    CBC & Differential (Completed)    BNP (Completed)    Comprehensive Metabolic Panel (Completed)    Lipid Panel (Completed)    Uric Acid (Completed)    TSH (Completed)    Paroxysmal atrial flutter (HCC)    Overview     History of atrial fibrillation currently in sinus rhythm with blood pressure 120/82 and heart rate of 84         Current Assessment & Plan     Currently in sinus rhythm on aspirin 81 Entresto 24/26 1/2 tablet twice daily and Lasix 40 mg daily continue therapy  EKG shows sinus rhythm with frequent ventricular premature complexes marked right axis and right bundle branch block similar to EKG of October 27, 2020         Relevant Medications    Entresto 24-26 MG tablet    Other Relevant Orders    CBC & Differential (Completed)    BNP (Completed)    Comprehensive Metabolic Panel (Completed)    Lipid Panel (Completed)    Uric Acid (Completed)    TSH (Completed)    Acute on chronic systolic congestive heart failure (HCC)    Overview     Patient has chronic systolic congestive heart failure with reduced ejection fraction currently symptomatic with shortness of breath abdominal bloating and bilateral leg swelling  Echocardiogram of October 19, 2018 showed an EF of 30% echocardiogram of December 18, 2018 showed an EF of 21 to 25%  Lasix 40 mg is started this day.         Current Assessment & Plan        Patient has generally improved on the Entresto low-dose of 24/26 1/2 tablet twice daily, he reduced this dose on his own because of low range blood pressure and feeling of fatigue and he feels better but I have tablet twice daily continue current therapy         Relevant Medications    Entresto 24-26 MG tablet    Other Relevant Orders    BNP (Completed)       Endocrine and Metabolic    Hypothyroidism (acquired)    Overview     History of hypothyroidism on Synthroid 88 MCG daily         Current Assessment & Plan     His thyroid exam is normal, however we will get a TSH level.          Relevant Medications    levothyroxine (SYNTHROID, LEVOTHROID) 100 MCG tablet    Other Relevant Orders    CBC & Differential (Completed)    BNP (Completed)    Comprehensive Metabolic Panel (Completed)    Lipid Panel (Completed)    Uric Acid (Completed)    TSH (Completed)       Health Encounters    Medicare annual wellness visit, subsequent - Primary    Overview     81-year-old male presents for Medicare annual wellness visit and physical exam performed on 11/24/2021         Current Assessment & Plan     We will get a CBC, and CMP. We will also get a thyroid test a cholesterol panel, a BNP, a gout level as well as a prostate cancer blood test. He is to continue on his medications, no changes will be made at this time.          Relevant Orders    CBC & Differential (Completed)    BNP (Completed)    Comprehensive Metabolic Panel (Completed)    Lipid Panel (Completed)    Uric Acid (Completed)    TSH (Completed)       Sleep    ELIE (obstructive sleep apnea)    Overview     Patient has obstructive sleep apnea but is not on BiPAP or CPAP he does use no strips and feels that he breathes easier at night         Current Assessment & Plan     Using only nose strip dilation no CPAP or BiPAP         Relevant Orders    CBC & Differential (Completed)    BNP (Completed)    Comprehensive Metabolic Panel (Completed)    Lipid Panel (Completed)    Uric  Acid (Completed)    TSH (Completed)      Other Visit Diagnoses     Special screening for malignant neoplasm of prostate        Relevant Orders    BNP (Completed)    PSA Screen (Completed)        Patient Self-Management and Personalized Health Advice  The patient has been provided with information about: prevention of cardiac or vascular disease and preventive services including:   · Annual Wellness Visit (AWV).    Outpatient Encounter Medications as of 11/24/2021   Medication Sig Dispense Refill   • aspirin 81 MG tablet Take 81 mg by mouth Daily.     • COD LIVER OIL PO Take 2 capsules in the morning and 1 capsule in the afternoon     • Entresto 24-26 MG tablet Take 1 tablet by mouth twice daily 180 tablet 0   • furosemide (LASIX) 40 MG tablet Take 1 tablet by mouth Daily. 90 tablet 3   • levothyroxine (SYNTHROID, LEVOTHROID) 100 MCG tablet TAKE 1 TABLET EVERY DAY 90 tablet 3   • NON FORMULARY Cardio-Plus 2080 : Take 4 tablets in the morning and 5 tablets in the evening     • NON FORMULARY Cataplex B 1250mg: Take 3 tablets BID     • NON FORMULARY Cataplex F 4200: Take 3 tablets BID     • TURMERIC PO Take 1 tablet by mouth Daily. (Turmeric Forte)     • [DISCONTINUED] allopurinol (Zyloprim) 300 MG tablet Take 1 tablet by mouth Daily. 30 tablet 5   • [DISCONTINUED] predniSONE (DELTASONE) 20 MG tablet Take 1 tablet by mouth Daily. 3 tablet 0     No facility-administered encounter medications on file as of 11/24/2021.       Reviewed use of high risk medication in the elderly: yes  Reviewed for potential of harmful drug interactions in the elderly: yes    Follow Up:  Return in about 6 months (around 5/24/2022) for Recheck, labs today.     Patient Instructions   Fasting lab pending including BNP  EKG discussed with sinus rhythm and PVCs unchanged  Continue all current medications  Encouraged walking exercise and healthy cardiac diet and weight loss  Return visit in 6 months or as needed      An After Visit Summary and PPPS  with all of these plans were given to the patient.             Transcribed from ambient dictation for Saul Campbell MD by Aubrie Stone.  11/24/21   16:29 EST    Patient verbalized consent to the visit recording.  I have personally performed the services described in this document as transcribed by the above individual, and it is both accurate and complete.  Saul Campbell MD  11/27/2021  16:00 EST

## 2021-11-24 NOTE — ASSESSMENT & PLAN NOTE
Remote multivessel coronary bypass grafting x5 in 2003 currently stable without chest pain pressure or palpitations

## 2021-11-24 NOTE — PROGRESS NOTES
QUICK REFERENCE INFORMATION:  The ABCs of the Annual Wellness Visit    Subsequent Medicare Wellness Visit    HEALTH RISK ASSESSMENT    1940    Recent Hospitalizations:  No hospitalization(s) within the last year..        Current Medical Providers:  Patient Care Team:  Saul Campbell MD as PCP - General (Internal Medicine)  Tino Sampson MD as Consulting Physician (Cardiology)        Smoking Status:  Social History     Tobacco Use   Smoking Status Former Smoker   • Packs/day: 1.00   • Years: 8.00   • Pack years: 8.00   • Types: Cigarettes   • Start date: 10/1/1956   • Quit date: 1964   • Years since quittin.0   Smokeless Tobacco Never Used       Alcohol Consumption:  Social History     Substance and Sexual Activity   Alcohol Use No       Depression Screen:   PHQ-2/PHQ-9 Depression Screening 2021   Little interest or pleasure in doing things 0   Feeling down, depressed, or hopeless 0   Total Score 0       Health Habits and Functional and Cognitive Screening:  Functional & Cognitive Status 2021   Do you have difficulty preparing food and eating? No   Do you have difficulty bathing yourself, getting dressed or grooming yourself? No   Do you have difficulty using the toilet? No   Do you have difficulty moving around from place to place? No   Do you have trouble with steps or getting out of a bed or a chair? Yes   Current Diet Unhealthy Diet   Dental Exam Not up to date   Eye Exam Up to date   Exercise (times per week) 4 times per week   Current Exercises Include Stationary Bicycling/Spin Class   Current Exercise Activities Include -   Do you need help using the phone?  No   Are you deaf or do you have serious difficulty hearing?  Yes   Do you need help with transportation? No   Do you need help shopping? No   Do you need help preparing meals?  No   Do you need help with housework?  No   Do you need help with laundry? No   Do you need help taking your medications? No   Do you need help  managing money? No   Do you ever drive or ride in a car without wearing a seat belt? No   Have you felt unusual stress, anger or loneliness in the last month? No   Who do you live with? Spouse   If you need help, do you have trouble finding someone available to you? No   Have you been bothered in the last four weeks by sexual problems? No   Do you have difficulty concentrating, remembering or making decisions? No       Fall Risk Screen:  PRATIBHA Fall Risk Assessment was completed, and patient is at LOW risk for falls.Assessment completed on:11/24/2021    ACE III MINI        Does the patient have evidence of cognitive impairment? No    Aspirin use counseling: Taking ASA appropriately as indicated    Recent Lab Results:  CMP:  Lab Results   Component Value Date    BUN 28 (H) 06/24/2021    CREATININE 1.32 (H) 06/24/2021    EGFRIFNONA 52 (L) 06/24/2021    EGFRIFAFRI 63 06/24/2021    BCR 21.2 06/24/2021     06/24/2021    K 4.4 06/24/2021    CO2 27.6 06/24/2021    CALCIUM 9.7 06/24/2021    PROTENTOTREF 6.8 06/24/2021    ALBUMIN 4.50 06/24/2021    LABGLOBREF 2.3 06/24/2021    LABIL2 2.0 06/24/2021    BILITOT 0.6 06/24/2021    ALKPHOS 45 06/24/2021    AST 21 06/24/2021    ALT 11 06/24/2021     HbA1c:  No results found for: HGBA1C  Microalbumin:  Lab Results   Component Value Date    MICROALBUR 62.6 07/17/2019     Lipid Panel  Lab Results   Component Value Date    CHOL 206 (H) 01/16/2019    TRIG 139 10/20/2020    HDL 60 10/20/2020     (H) 10/20/2020    AST 21 06/24/2021    ALT 11 06/24/2021       Visual Acuity:  No exam data present    Age-appropriate Screening Schedule:  Refer to the list below for future screening recommendations based on patient's age, sex and/or medical conditions. Orders for these recommended tests are listed in the plan section. The patient has been provided with a written plan.    Health Maintenance   Topic Date Due   • LIPID PANEL  11/24/2021 (Originally 10/20/2021)   • ZOSTER VACCINE (1  of 2) 11/24/2021 (Originally 1/4/1989)   • INFLUENZA VACCINE  11/24/2022 (Originally 8/1/2021)   • TDAP/TD VACCINES (3 - Td or Tdap) 08/16/2022        Subjective   History of Present Illness    Saul Sal is a 81 y.o. male who presents for a Subsequent Wellness Visit.    CHRONIC CONDITIONS    The following portions of the patient's history were reviewed and updated as appropriate: allergies, current medications, past family history, past medical history, past social history and past surgical history.    Outpatient Medications Prior to Visit   Medication Sig Dispense Refill   • aspirin 81 MG tablet Take 81 mg by mouth Daily.     • COD LIVER OIL PO Take 2 capsules in the morning and 1 capsule in the afternoon     • Entresto 24-26 MG tablet Take 1 tablet by mouth twice daily 180 tablet 0   • furosemide (LASIX) 40 MG tablet Take 1 tablet by mouth Daily. 90 tablet 3   • levothyroxine (SYNTHROID, LEVOTHROID) 100 MCG tablet TAKE 1 TABLET EVERY DAY 90 tablet 3   • NON FORMULARY Cardio-Plus 2080 : Take 4 tablets in the morning and 5 tablets in the evening     • NON FORMULARY Cataplex B 1250mg: Take 3 tablets BID     • NON FORMULARY Cataplex F 4200: Take 3 tablets BID     • TURMERIC PO Take 1 tablet by mouth Daily. (Turmeric Forte)     • allopurinol (Zyloprim) 300 MG tablet Take 1 tablet by mouth Daily. 30 tablet 5   • predniSONE (DELTASONE) 20 MG tablet Take 1 tablet by mouth Daily. 3 tablet 0     No facility-administered medications prior to visit.       Patient Active Problem List   Diagnosis   • CAD (coronary artery disease)   • Atrial flutter (HCC)   • Dyslipidemia   • ELIE (obstructive sleep apnea)   • Typical atrial flutter (HCC)   • Hypothyroidism (acquired)   • Benign essential hypertension   • Mixed hyperlipidemia   • Obesity (BMI 30-39.9)   • GERD (gastroesophageal reflux disease)   • Aortic ectasia (HCC)   • H/O asbestos exposure   • Herpes zoster without complication   • History of colon polyps   • Ischemic  heart disease   • Left wrist injury   • Paroxysmal atrial flutter (HCC)   • Postherpetic neuralgia   • Primary osteoarthritis involving multiple joints   • Shortness of breath   • Acute on chronic systolic congestive heart failure (HCC)   • Bronchitis   • Trochanteric bursitis of right hip   • Olecranon bursitis of right elbow   • Medicare annual wellness visit, subsequent       Advance Care Planning:  ACP discussion was held with the patient during this visit. Patient has an advance directive in EMR which is still valid.     Identification of Risk Factors:  Risk factors include: Cardiovascular risk.    Review of Systems   Constitutional: Positive for fatigue. Negative for activity change, appetite change, chills, diaphoresis, fever and unexpected weight change.   HENT: Negative for hearing loss, sinus pressure, tinnitus and trouble swallowing.    Eyes: Negative for visual disturbance.   Respiratory: Negative for cough, choking, chest tightness, shortness of breath, wheezing and stridor.    Cardiovascular: Negative for chest pain, palpitations and leg swelling.   Gastrointestinal: Negative for abdominal pain, blood in stool, constipation, diarrhea, nausea, rectal pain and vomiting.   Endocrine: Negative for cold intolerance and polyuria.   Genitourinary: Negative for dysuria and frequency.   Musculoskeletal: Positive for back pain. Negative for gait problem and joint swelling.   Skin: Negative.    Allergic/Immunologic: Negative for environmental allergies, food allergies and immunocompromised state.   Neurological: Positive for weakness. Negative for dizziness, tremors, seizures, syncope and light-headedness.   Hematological: Negative for adenopathy.   Psychiatric/Behavioral: The patient is not nervous/anxious.        Compared to one year ago, the patient feels his physical health is the same.  Compared to one year ago, the patient feels his mental health is the same.    Objective     Physical Exam  Vitals and  nursing note reviewed.   Constitutional:       General: He is not in acute distress.     Appearance: Normal appearance. He is obese. He is not ill-appearing, toxic-appearing or diaphoretic.   HENT:      Head: Normocephalic and atraumatic.      Right Ear: Ear canal normal.      Left Ear: Ear canal normal.      Nose: Nose normal.      Mouth/Throat:      Mouth: Mucous membranes are moist.      Pharynx: Oropharynx is clear.   Eyes:      Extraocular Movements: Extraocular movements intact.      Conjunctiva/sclera: Conjunctivae normal.      Pupils: Pupils are equal, round, and reactive to light.   Neck:      Vascular: No carotid bruit.   Cardiovascular:      Rate and Rhythm: Normal rate and regular rhythm.      Pulses: Normal pulses.      Heart sounds: Normal heart sounds.   Pulmonary:      Effort: No respiratory distress.      Breath sounds: Normal breath sounds. No wheezing, rhonchi or rales.   Chest:      Chest wall: No tenderness.   Abdominal:      General: Bowel sounds are normal.      Palpations: Abdomen is soft. There is no mass.      Tenderness: There is no abdominal tenderness.   Musculoskeletal:         General: No swelling, tenderness, deformity or signs of injury.      Cervical back: Normal range of motion and neck supple. No rigidity or tenderness.      Right lower leg: No edema.      Left lower leg: No edema.   Lymphadenopathy:      Cervical: No cervical adenopathy.   Skin:     General: Skin is warm and dry.      Capillary Refill: Capillary refill takes less than 2 seconds.   Neurological:      General: No focal deficit present.      Mental Status: He is alert and oriented to person, place, and time. Mental status is at baseline.      Sensory: No sensory deficit.      Motor: No weakness.      Gait: Gait normal.   Psychiatric:         Mood and Affect: Mood normal.         Behavior: Behavior normal.         Thought Content: Thought content normal.         Judgment: Judgment normal.            ECG 12  "Lead    Date/Time: 11/27/2021 10:36 AM  Performed by: Saul Campbell MD  Authorized by: Saul Campbell MD   Comparison: compared with previous ECG from 10/27/2020  Similar to previous ECG  Comparison to previous ECG: Sinus rhythm with ventricular premature complex right axis deviation right bundle branch block similar to EKG of October 27, 2020  Rhythm: sinus rhythm  Ectopy: unifocal PVCs  Rate: normal  Conduction: right bundle branch block  QRS axis: right    Clinical impression: abnormal EKG  Comments: Abnormal EKG with sinus rhythm with ventricular premature complexes right axis deviation and right bundle branch block similar to EKG of October 27, 2020             Vitals:    11/24/21 0939   BP: 126/82   BP Location: Left arm   Patient Position: Sitting   Cuff Size: Adult   Temp: 97.7 °F (36.5 °C)   Weight: 103 kg (226 lb)   Height: 182.9 cm (72\")   PainSc: 0-No pain       Patient's Body mass index is 30.65 kg/m². indicating that he is obese (BMI >30). Obesity-related health conditions include the following: hypertension and coronary heart disease. Obesity is unchanged. BMI is is above average; BMI management plan is completed. We discussed low calorie, low carb based diet program, portion control and increasing exercise..      Assessment/Plan   Problem List Items Addressed This Visit        Cardiac and Vasculature    CAD (coronary artery disease)    Overview     · CABG X5 2003         Relevant Medications    Entresto 24-26 MG tablet    Other Relevant Orders    CBC & Differential    BNP    Comprehensive Metabolic Panel    Lipid Panel    Uric Acid    TSH    Benign essential hypertension    Overview     History of essential hypertension currently on no therapy pressures been labile and currently stable at 120/82 left and right sitting         Relevant Medications    furosemide (LASIX) 40 MG tablet    Other Relevant Orders    CBC & Differential    BNP    Comprehensive Metabolic Panel    Lipid Panel    Uric Acid    " TSH    Mixed hyperlipidemia    Overview     History of mixed hyperlipidemia not on therapy         Relevant Orders    CBC & Differential    BNP    Comprehensive Metabolic Panel    Lipid Panel    Uric Acid    TSH    Paroxysmal atrial flutter (HCC)    Overview     History of atrial fibrillation currently in sinus rhythm with blood pressure 120/82 and heart rate of 84         Relevant Medications    Entresto 24-26 MG tablet    Other Relevant Orders    CBC & Differential    BNP    Comprehensive Metabolic Panel    Lipid Panel    Uric Acid    TSH    Acute on chronic systolic congestive heart failure (HCC)    Overview     Patient has chronic systolic congestive heart failure with reduced ejection fraction currently symptomatic with shortness of breath abdominal bloating and bilateral leg swelling  Echocardiogram of October 19, 2018 showed an EF of 30% echocardiogram of December 18, 2018 showed an EF of 21 to 25%  Lasix 40 mg is started this day.         Relevant Medications    Entresto 24-26 MG tablet    Other Relevant Orders    BNP       Endocrine and Metabolic    Hypothyroidism (acquired)    Overview     History of hypothyroidism on Synthroid 88 MCG daily         Relevant Medications    levothyroxine (SYNTHROID, LEVOTHROID) 100 MCG tablet    Other Relevant Orders    CBC & Differential    BNP    Comprehensive Metabolic Panel    Lipid Panel    Uric Acid    TSH       Health Encounters    Medicare annual wellness visit, subsequent - Primary    Overview     81-year-old male presents for Medicare annual wellness visit and physical exam performed on 11/24/2021         Relevant Orders    CBC & Differential    BNP    Comprehensive Metabolic Panel    Lipid Panel    Uric Acid    TSH       Sleep    ELIE (obstructive sleep apnea)    Relevant Orders    CBC & Differential    BNP    Comprehensive Metabolic Panel    Lipid Panel    Uric Acid    TSH      Other Visit Diagnoses     Special screening for malignant neoplasm of prostate         Relevant Orders    BNP    PSA Screen        Patient Self-Management and Personalized Health Advice  The patient has been provided with information about: prevention of cardiac or vascular disease and preventive services including:   · Annual Wellness Visit (AWV).    Outpatient Encounter Medications as of 11/24/2021   Medication Sig Dispense Refill   • aspirin 81 MG tablet Take 81 mg by mouth Daily.     • COD LIVER OIL PO Take 2 capsules in the morning and 1 capsule in the afternoon     • Entresto 24-26 MG tablet Take 1 tablet by mouth twice daily 180 tablet 0   • furosemide (LASIX) 40 MG tablet Take 1 tablet by mouth Daily. 90 tablet 3   • levothyroxine (SYNTHROID, LEVOTHROID) 100 MCG tablet TAKE 1 TABLET EVERY DAY 90 tablet 3   • NON FORMULARY Cardio-Plus 2080 : Take 4 tablets in the morning and 5 tablets in the evening     • NON FORMULARY Cataplex B 1250mg: Take 3 tablets BID     • NON FORMULARY Cataplex F 4200: Take 3 tablets BID     • TURMERIC PO Take 1 tablet by mouth Daily. (Turmeric Forte)     • [DISCONTINUED] allopurinol (Zyloprim) 300 MG tablet Take 1 tablet by mouth Daily. 30 tablet 5   • [DISCONTINUED] predniSONE (DELTASONE) 20 MG tablet Take 1 tablet by mouth Daily. 3 tablet 0     No facility-administered encounter medications on file as of 11/24/2021.       Reviewed use of high risk medication in the elderly: yes  Reviewed for potential of harmful drug interactions in the elderly: yes    Follow Up:  Return in about 6 months (around 5/24/2022) for Recheck, labs today.     Patient Instructions   Fasting lab pending including BNP  EKG discussed with sinus rhythm and PVCs unchanged  Continue all current medications  Encouraged walking exercise and healthy cardiac diet and weight loss  Return visit in 6 months or as needed      An After Visit Summary and PPPS with all of these plans were given to the patient.

## 2021-11-24 NOTE — ASSESSMENT & PLAN NOTE
He is currently in sinus rhythm. Heart rate is normal, rhythm is abnormal which is confirmed with an EKG. This is doing well status post bypass grafting in 2003. I advised him to continue with aspirin.

## 2021-11-24 NOTE — ASSESSMENT & PLAN NOTE
We will check lab work  today. He would be interested in taking cholesterol medication if the value is over 200. He is to continue on his medications, no changes will be made at this time.

## 2021-11-25 LAB
ALBUMIN SERPL-MCNC: 4.4 G/DL (ref 3.6–4.6)
ALBUMIN/GLOB SERPL: 1.9 {RATIO} (ref 1.2–2.2)
ALP SERPL-CCNC: 50 IU/L (ref 44–121)
ALT SERPL-CCNC: 11 IU/L (ref 0–44)
AST SERPL-CCNC: 21 IU/L (ref 0–40)
BASOPHILS # BLD AUTO: 0.1 X10E3/UL (ref 0–0.2)
BASOPHILS NFR BLD AUTO: 1 %
BILIRUB SERPL-MCNC: 0.6 MG/DL (ref 0–1.2)
BNP SERPL-MCNC: 701.7 PG/ML (ref 0–100)
BUN SERPL-MCNC: 22 MG/DL (ref 8–27)
BUN/CREAT SERPL: 20 (ref 10–24)
CALCIUM SERPL-MCNC: 9.3 MG/DL (ref 8.6–10.2)
CHLORIDE SERPL-SCNC: 102 MMOL/L (ref 96–106)
CHOLEST SERPL-MCNC: 211 MG/DL (ref 100–199)
CO2 SERPL-SCNC: 22 MMOL/L (ref 20–29)
CREAT SERPL-MCNC: 1.08 MG/DL (ref 0.76–1.27)
EOSINOPHIL # BLD AUTO: 0.1 X10E3/UL (ref 0–0.4)
EOSINOPHIL NFR BLD AUTO: 2 %
ERYTHROCYTE [DISTWIDTH] IN BLOOD BY AUTOMATED COUNT: 15.2 % (ref 11.6–15.4)
GLOBULIN SER CALC-MCNC: 2.3 G/DL (ref 1.5–4.5)
GLUCOSE SERPL-MCNC: NORMAL MG/DL
HCT VFR BLD AUTO: 44.9 % (ref 37.5–51)
HDLC SERPL-MCNC: 47 MG/DL
HGB BLD-MCNC: 14.9 G/DL (ref 13–17.7)
IMM GRANULOCYTES # BLD AUTO: 0 X10E3/UL (ref 0–0.1)
IMM GRANULOCYTES NFR BLD AUTO: 0 %
LDLC SERPL CALC-MCNC: 142 MG/DL (ref 0–99)
LYMPHOCYTES # BLD AUTO: 2 X10E3/UL (ref 0.7–3.1)
LYMPHOCYTES NFR BLD AUTO: 30 %
MCH RBC QN AUTO: 29.4 PG (ref 26.6–33)
MCHC RBC AUTO-ENTMCNC: 33.2 G/DL (ref 31.5–35.7)
MCV RBC AUTO: 89 FL (ref 79–97)
MONOCYTES # BLD AUTO: 0.5 X10E3/UL (ref 0.1–0.9)
MONOCYTES NFR BLD AUTO: 7 %
NEUTROPHILS # BLD AUTO: 4 X10E3/UL (ref 1.4–7)
NEUTROPHILS NFR BLD AUTO: 60 %
PLATELET # BLD AUTO: 228 X10E3/UL (ref 150–450)
POTASSIUM SERPL-SCNC: NORMAL MMOL/L
PROT SERPL-MCNC: 6.7 G/DL (ref 6–8.5)
PSA SERPL-MCNC: 1.4 NG/ML (ref 0–4)
RBC # BLD AUTO: 5.07 X10E6/UL (ref 4.14–5.8)
SODIUM SERPL-SCNC: 141 MMOL/L (ref 134–144)
TRIGL SERPL-MCNC: 120 MG/DL (ref 0–149)
TSH SERPL DL<=0.005 MIU/L-ACNC: 10.5 UIU/ML (ref 0.45–4.5)
URATE SERPL-MCNC: 10.1 MG/DL (ref 3.8–8.4)
VLDLC SERPL CALC-MCNC: 22 MG/DL (ref 5–40)
WBC # BLD AUTO: 6.7 X10E3/UL (ref 3.4–10.8)

## 2021-11-27 PROCEDURE — 93000 ELECTROCARDIOGRAM COMPLETE: CPT | Performed by: INTERNAL MEDICINE

## 2021-11-29 ENCOUNTER — PATIENT MESSAGE (OUTPATIENT)
Dept: INTERNAL MEDICINE | Facility: CLINIC | Age: 81
End: 2021-11-29

## 2021-12-07 RX ORDER — ALLOPURINOL 300 MG/1
300 TABLET ORAL DAILY
Qty: 90 TABLET | Refills: 3 | Status: SHIPPED | OUTPATIENT
Start: 2021-12-07 | End: 2022-01-11

## 2021-12-07 RX ORDER — LEVOTHYROXINE SODIUM 0.1 MG/1
100 TABLET ORAL DAILY
Qty: 90 TABLET | Refills: 3 | Status: SHIPPED | OUTPATIENT
Start: 2021-12-07 | End: 2021-12-13 | Stop reason: SDUPTHER

## 2021-12-07 NOTE — TELEPHONE ENCOUNTER
From: Saul Sal  Sent: 12/7/2021 9:33 AM EST  To: [facility] [address]  Subject: lab results    Thank you Alice.. I probably need a new subscription for the levothyroxine and the allopurinol... May call them in @ [facility] in [place].. Thank you again and I hope you and your family have a Merry and wonderful ERIN Mccarthy

## 2021-12-13 RX ORDER — LEVOTHYROXINE SODIUM 0.12 MG/1
TABLET ORAL
Qty: 90 TABLET | Refills: 1 | Status: SHIPPED | OUTPATIENT
Start: 2021-12-13 | End: 2022-02-28 | Stop reason: SDUPTHER

## 2021-12-13 NOTE — TELEPHONE ENCOUNTER
See below.  With most recent lab results, we advised increase levothyroxine to 125 mcg.  Patient needs new RX.

## 2022-01-07 NOTE — PROGRESS NOTES
[facility] at [facility]  Cardiology Consultation Note     Saul Sal  1940  Requesting Provider: Boston Sifuentes MD  PCP: Saul Campbell MD    ID:  Saul Sal is a 82 y.o. male who resides in [place], [place]    REASON FOR CONSULTATION:    • HFrEF  • CAD         Dear Boston:    Thank you for referring Saul Sal to my office for reevaluation.  He is an 82-year-old gentleman with coronary artery disease, chronic systolic heart failure with EF approximately 20% as well as atrial fibrillation/flutter.  Last year, the patient was experiencing shortness of breath symptoms and Dr. Campbell appropriately diagnosed him with heart failure exacerbation and started him on furosemide.  He states that furosemide has greatly improved his symptoms and now is able to sleep without experiencing orthopnea or PND.  He still does get short of breath with moderate activity, however.     He denies exertional chest pain symptoms.  His last ischemic evaluation was in 2018 and at that time he had evidence of inferior and inferior lateral infarct with no ischemia.    He denies palpitation symptoms.  In 2018, he underwent flutter ablation with Dr. Sampson.  After the ablation, he wore a monitor which showed 63% burden of A. fib/flutter.  At that time, Dr. Sampson recommended amiodarone.  Once the patient read the side effect profile of amiodarone, he did not care to see Dr. Sampson any further.        Past Medical History, Past Surgical History, Family history, Social History, and Medications were all reviewed with the patient today and updated as necessary.       Current Outpatient Medications:   •  COD LIVER OIL PO, Take 2 capsules in the morning and 1 capsule in the afternoon, Disp: , Rfl:   •  furosemide (LASIX) 40 MG tablet, Take 1 tablet by mouth Daily., Disp: 90 tablet, Rfl: 3  •  levothyroxine (SYNTHROID, LEVOTHROID) 125 MCG tablet, One tablet daily, Disp: 90 tablet, Rfl: 1  •  NON FORMULARY,  Cardio-Plus 2080 : Take 4 tablets in the morning and 5 tablets in the evening, Disp: , Rfl:   •  NON FORMULARY, Cataplex B 1250mg: Take 3 tablets BID, Disp: , Rfl:   •  NON FORMULARY, Cataplex F 4200: Take 3 tablets BID, Disp: , Rfl:   •  sacubitril-valsartan (Entresto) 24-26 MG tablet, Take 1 tablet by mouth 2 (Two) Times a Day., Disp: 180 tablet, Rfl: 0  •  TURMERIC PO, Take 1 tablet by mouth Daily. (Turmeric Forte), Disp: , Rfl:   •  aspirin (aspirin) 81 MG EC tablet, Take 1 tablet by mouth Daily., Disp: 90 tablet, Rfl: 3  •  empagliflozin (Jardiance) 10 MG tablet tablet, Take 1 tablet by mouth Daily., Disp: 90 tablet, Rfl: 1    Allergies   Allergen Reactions   • Eliquis [Apixaban] Other (See Comments)     Excessive bleeding   • Amlodipine Besylate Unknown (See Comments)     Unknown  Norvasc   • Atenolol Unknown (See Comments)     unknown   • Metoprolol Unknown (See Comments)     Unknown  Lopressor    • Milk-Related Compounds Other (See Comments)     Headache         Past Medical History:   Diagnosis Date   • Arthritis    • Atrial flutter (HCC)      LOW EF=28 %   • Benign essential hypertension    • CHF (congestive heart failure) (HCC)    • Chronic diastolic (congestive) heart failure (HCC) 7/9/2019   • Coronary artery disease    • Disease of thyroid gland    • Diverticulosis    • GERD (gastroesophageal reflux disease)    • GI bleed    • Hyperlipidemia    • Hypertension    • Hypothyroidism (acquired)    • Mixed hyperlipidemia    • Obesity (BMI 30-39.9)    • Osteoarthritis    • Prostatic hypertrophy     Benign    • Shingles    • Skin cancer     squamous    • Wears eyeglasses     readers   • Wears hearing aid        Past Surgical History:   Procedure Laterality Date   • ANGIOPLASTY  1985   • CARDIAC CATHETERIZATION     • CARDIAC ELECTROPHYSIOLOGY PROCEDURE N/A 11/26/2018    Procedure: Ablation atrial flutter;  Surgeon: Tino Sampson MD;  Location: [facility] INVASIVE LOCATION;  Service: Cardiovascular   •  "CARDIAC SURGERY      BYPASS X5   • COLONOSCOPY  2009   • COLONOSCOPY  2005   • CORONARY ARTERY BYPASS GRAFT  2003    x5 [facility] 2003   • DENTAL PROCEDURE  2010    dental surg and crowns   • EYE SURGERY Right     cataract   • SKIN BIOPSY     • VASECTOMY         Family History   Problem Relation Age of Onset   • Diabetes Mother    • Heart disease Mother    • Coronary artery disease Other    • Stroke Other    • Diabetes Other    • Rheumatic fever Other    • Other Other         Valvular CV Disease       Social History     Tobacco Use   • Smoking status: Former Smoker     Packs/day: 1.00     Years: 8.00     Pack years: 8.00     Types: Cigarettes     Start date: 10/1/1956     Quit date: 1964     Years since quittin.1   • Smokeless tobacco: Never Used   Substance Use Topics   • Alcohol use: No       Review of Systems   Constitutional: Positive for malaise/fatigue.   Eyes: Negative for vision loss in left eye and vision loss in right eye.   Cardiovascular: Negative for chest pain, dyspnea on exertion, near-syncope, orthopnea, palpitations, paroxysmal nocturnal dyspnea and syncope.   Respiratory: Positive for shortness of breath.    Musculoskeletal: Positive for arthritis. Negative for myalgias.   Gastrointestinal: Positive for change in bowel habit.   Neurological: Positive for excessive daytime sleepiness. Negative for brief paralysis, focal weakness, numbness, paresthesias and weakness.   All other systems reviewed and are negative.              /80 (BP Location: Left arm, Patient Position: Sitting)   Pulse 60   Ht 185.4 cm (73\")   Wt 102 kg (225 lb)   SpO2 97%   BMI 29.69 kg/m²        Constitutional:       Appearance: Healthy appearance. Well-developed.   Eyes:      General: Lids are normal. No scleral icterus.     Conjunctiva/sclera: Conjunctivae normal.   HENT:      Head: Normocephalic and atraumatic.   Neck:      Thyroid: No thyromegaly.      Vascular: No carotid bruit or JVD.   Pulmonary:     "  Effort: Pulmonary effort is normal.      Breath sounds: Normal breath sounds. No wheezing. No rhonchi. No rales.   Cardiovascular:      Normal rate. Irregularly irregular rhythm.      Murmurs: There is no murmur.      S3 Gallop. No rub.   Pulses:     Intact distal pulses.   Abdominal:      General: There is no distension.      Palpations: Abdomen is soft. There is no abdominal mass.   Musculoskeletal:      Cervical back: Normal range of motion. Skin:     General: Skin is warm and dry.      Findings: No rash.   Neurological:      General: No focal deficit present.      Mental Status: Alert and oriented to person, place, and time.      Gait: Gait is intact.   Psychiatric:         Attention and Perception: Attention normal.         Mood and Affect: Mood normal.         Behavior: Behavior normal.             ECG 12 Lead    Date/Time: 1/11/2022 5:21 PM  Performed by: iLbrado Jones IV, MD  Authorized by: Librado Jones IV, MD   Rhythm: sinus rhythm  Ectopy: unifocal PVCs  BPM: 90              Lab Results   Component Value Date    CHLPL 211 (H) 11/24/2021    TRIG 120 11/24/2021    HDL 47 11/24/2021     (H) 11/24/2021     Lab Results   Component Value Date    GLUCOSE CANCELED 11/24/2021    BUN 22 11/24/2021    CREATININE 1.08 11/24/2021    EGFRIFNONA 64 11/24/2021    EGFRIFAFRI 74 11/24/2021    BCR 20 11/24/2021    K CANCELED 11/24/2021    CO2 22 11/24/2021    CALCIUM 9.3 11/24/2021    PROTENTOTREF 6.7 11/24/2021    ALBUMIN 4.4 11/24/2021    LABIL2 1.9 11/24/2021    AST 21 11/24/2021    ALT 11 11/24/2021     Lab Results   Component Value Date    WBC 6.7 11/24/2021    HGB 14.9 11/24/2021    HCT 44.9 11/24/2021    MCV 89 11/24/2021     11/24/2021     Lab Results   Component Value Date    TSH 10.500 (H) 11/24/2021                Problem List Items Addressed This Visit        Cardiology Problems    Coronary artery disease involving native coronary artery of native heart with angina pectoris  (HCC) - Primary (Chronic)    Overview     · CABG X5 2003  · Nuclear stress test (11/16/2018): medium-sized infarct located in the inferior wall and basal inferior lateral wall. LVEF 28%.          Relevant Medications    aspirin (aspirin) 81 MG EC tablet    sacubitril-valsartan (Entresto) 24-26 MG tablet    Other Relevant Orders    Comprehensive Metabolic Panel    CBC (No Diff)    Chronic systolic congestive heart failure (HCC) (Chronic)    Overview     · Echocardiogram (2/2019): LVEF 25%. Mild TR.  · Echocardiogram (12/1/2020): LVEF 20%.Moderate to severe MR. Moderate TR. Moderate pulmonary hypertension. Saline results negative.         Relevant Medications    sacubitril-valsartan (Entresto) 24-26 MG tablet    empagliflozin (Jardiance) 10 MG tablet tablet    Other Relevant Orders    TSH+Free T4    proBNP    Adult Transthoracic Echo Complete W/ Cont if Necessary Per Protocol    Hyperlipidemia LDL goal <70 (Chronic)    Overview     · Moderate intensity statin therapy indicated                        · Start Jardiance 10 mg daily  · Will review chart to see if patient can take beta-blocker and will start metoprolol succinate 25 mg daily if no meaningful allergy present  Return in about 4 weeks (around 2/8/2022) for Follow-up with [facility] only.          VENTURA Jones MD, FACC, [facility]  Interventional Cardiology  01/11/22  17:21 EST

## 2022-01-11 ENCOUNTER — OFFICE VISIT (OUTPATIENT)
Dept: CARDIOLOGY | Facility: CLINIC | Age: 82
End: 2022-01-11

## 2022-01-11 VITALS
BODY MASS INDEX: 29.82 KG/M2 | DIASTOLIC BLOOD PRESSURE: 80 MMHG | OXYGEN SATURATION: 97 % | SYSTOLIC BLOOD PRESSURE: 128 MMHG | HEART RATE: 90 BPM | WEIGHT: 225 LBS | HEIGHT: 73 IN

## 2022-01-11 DIAGNOSIS — E78.5 HYPERLIPIDEMIA LDL GOAL <70: ICD-10-CM

## 2022-01-11 DIAGNOSIS — I25.119 CORONARY ARTERY DISEASE INVOLVING NATIVE CORONARY ARTERY OF NATIVE HEART WITH ANGINA PECTORIS: Primary | Chronic | ICD-10-CM

## 2022-01-11 DIAGNOSIS — I50.22 CHRONIC SYSTOLIC CONGESTIVE HEART FAILURE: Chronic | ICD-10-CM

## 2022-01-11 PROBLEM — I48.92 PAROXYSMAL ATRIAL FLUTTER: Status: RESOLVED | Noted: 2019-07-09 | Resolved: 2022-01-11

## 2022-01-11 PROBLEM — I48.3 TYPICAL ATRIAL FLUTTER (HCC): Status: RESOLVED | Noted: 2018-11-20 | Resolved: 2022-01-11

## 2022-01-11 PROBLEM — I50.9 HEART FAILURE, UNSPECIFIED (HCC): Status: RESOLVED | Noted: 2019-07-09 | Resolved: 2022-01-11

## 2022-01-11 PROBLEM — I25.9 ISCHEMIC HEART DISEASE: Status: RESOLVED | Noted: 2019-07-09 | Resolved: 2022-01-11

## 2022-01-11 PROBLEM — I10 HYPERTENSION: Status: RESOLVED | Noted: 2018-10-19 | Resolved: 2022-01-11

## 2022-01-11 PROBLEM — R06.02 SHORTNESS OF BREATH: Status: RESOLVED | Noted: 2019-07-24 | Resolved: 2022-01-11

## 2022-01-11 PROBLEM — I50.23 ACUTE ON CHRONIC SYSTOLIC CONGESTIVE HEART FAILURE (HCC): Status: RESOLVED | Noted: 2019-10-01 | Resolved: 2022-01-11

## 2022-01-11 PROBLEM — E78.00 HYPERCHOLESTEROLEMIA: Status: RESOLVED | Noted: 2019-07-09 | Resolved: 2022-01-11

## 2022-01-11 PROBLEM — I77.819 AORTIC ECTASIA (HCC): Status: RESOLVED | Noted: 2019-07-09 | Resolved: 2022-01-11

## 2022-01-11 PROCEDURE — 93000 ELECTROCARDIOGRAM COMPLETE: CPT | Performed by: INTERNAL MEDICINE

## 2022-01-11 PROCEDURE — 99214 OFFICE O/P EST MOD 30 MIN: CPT | Performed by: INTERNAL MEDICINE

## 2022-01-11 RX ORDER — ASPIRIN 81 MG/1
81 TABLET ORAL DAILY
Qty: 90 TABLET | Refills: 3 | Status: SHIPPED | OUTPATIENT
Start: 2022-01-11 | End: 2022-01-12

## 2022-01-11 RX ORDER — SACUBITRIL AND VALSARTAN 24; 26 MG/1; MG/1
1 TABLET, FILM COATED ORAL 2 TIMES DAILY
Qty: 180 TABLET | Refills: 0 | Status: SHIPPED | OUTPATIENT
Start: 2022-01-11 | End: 2022-04-06 | Stop reason: SDUPTHER

## 2022-01-11 NOTE — ASSESSMENT & PLAN NOTE
· Stage C HFrEF with NYHA class II-III symptoms  · Presently on Entresto  · Historically intolerant to metoprolol (data deficit)  · Start Jardiance 10 mg daily

## 2022-01-12 RX ORDER — FUROSEMIDE 40 MG/1
40 TABLET ORAL DAILY
Qty: 90 TABLET | Refills: 3 | Status: SHIPPED | OUTPATIENT
Start: 2022-01-12 | End: 2022-09-27

## 2022-01-12 NOTE — ASSESSMENT & PLAN NOTE
· Stable CCS class II angina  · Cannot take aspirin due to GI bleeding  · Previously intolerant to metoprolol, although he would benefit from beta-blocker from CAD and HFrEF standpoint

## 2022-01-19 ENCOUNTER — LAB (OUTPATIENT)
Dept: LAB | Facility: HOSPITAL | Age: 82
End: 2022-01-19

## 2022-01-19 ENCOUNTER — HOSPITAL ENCOUNTER (OUTPATIENT)
Dept: CARDIOLOGY | Facility: HOSPITAL | Age: 82
Discharge: HOME | End: 2022-01-19

## 2022-01-19 VITALS — WEIGHT: 225 LBS | HEIGHT: 73 IN | BODY MASS INDEX: 29.82 KG/M2

## 2022-01-19 DIAGNOSIS — I50.22 CHRONIC SYSTOLIC CONGESTIVE HEART FAILURE: ICD-10-CM

## 2022-01-19 DIAGNOSIS — I25.119 CORONARY ARTERY DISEASE INVOLVING NATIVE CORONARY ARTERY OF NATIVE HEART WITH ANGINA PECTORIS: Chronic | ICD-10-CM

## 2022-01-19 DIAGNOSIS — I50.22 CHRONIC SYSTOLIC CONGESTIVE HEART FAILURE: Chronic | ICD-10-CM

## 2022-01-19 LAB
BH CV ECHO MEAS - AO ROOT AREA (BSA CORRECTED): 2.1
BH CV ECHO MEAS - AO ROOT AREA: 17.1 CM^2
BH CV ECHO MEAS - AO ROOT DIAM: 4.7 CM
BH CV ECHO MEAS - BSA(HAYCOCK): 2.3 M^2
BH CV ECHO MEAS - BSA: 2.3 M^2
BH CV ECHO MEAS - BZI_BMI: 29.8 KILOGRAMS/M^2
BH CV ECHO MEAS - BZI_METRIC_HEIGHT: 185 CM
BH CV ECHO MEAS - BZI_METRIC_WEIGHT: 102.1 KG
BH CV ECHO MEAS - EDV(CUBED): 342.3 ML
BH CV ECHO MEAS - EDV(MOD-SP2): 193 ML
BH CV ECHO MEAS - EDV(MOD-SP4): 371 ML
BH CV ECHO MEAS - EDV(TEICH): 255 ML
BH CV ECHO MEAS - EF(CUBED): 31.2 %
BH CV ECHO MEAS - EF(MOD-BP): 26.8 %
BH CV ECHO MEAS - EF(MOD-SP2): 17.1 %
BH CV ECHO MEAS - EF(MOD-SP4): 36.1 %
BH CV ECHO MEAS - EF(TEICH): 24.6 %
BH CV ECHO MEAS - ESV(CUBED): 235.6 ML
BH CV ECHO MEAS - ESV(MOD-SP2): 160 ML
BH CV ECHO MEAS - ESV(MOD-SP4): 237 ML
BH CV ECHO MEAS - ESV(TEICH): 192.3 ML
BH CV ECHO MEAS - FS: 11.7 %
BH CV ECHO MEAS - IVS/LVPW: 1
BH CV ECHO MEAS - IVSD: 1 CM
BH CV ECHO MEAS - LA DIMENSION: 4.2 CM
BH CV ECHO MEAS - LA/AO: 0.89
BH CV ECHO MEAS - LAD MAJOR: 7.1 CM
BH CV ECHO MEAS - LAT PEAK E' VEL: 7 CM/SEC
BH CV ECHO MEAS - LATERAL E/E' RATIO: 7.9
BH CV ECHO MEAS - LV DIASTOLIC VOL/BSA (35-75): 164.3 ML/M^2
BH CV ECHO MEAS - LV MASS(C)D: 333.5 GRAMS
BH CV ECHO MEAS - LV MASS(C)DI: 147.7 GRAMS/M^2
BH CV ECHO MEAS - LV MAX PG: 2.2 MMHG
BH CV ECHO MEAS - LV MEAN PG: 1.2 MMHG
BH CV ECHO MEAS - LV SYSTOLIC VOL/BSA (12-30): 104.9 ML/M^2
BH CV ECHO MEAS - LV V1 MAX: 75 CM/SEC
BH CV ECHO MEAS - LV V1 MEAN: 47.6 CM/SEC
BH CV ECHO MEAS - LV V1 VTI: 15.4 CM
BH CV ECHO MEAS - LVIDD: 7 CM
BH CV ECHO MEAS - LVIDS: 6.2 CM
BH CV ECHO MEAS - LVLD AP2: 10 CM
BH CV ECHO MEAS - LVLD AP4: 10.1 CM
BH CV ECHO MEAS - LVLS AP2: 9.1 CM
BH CV ECHO MEAS - LVLS AP4: 9.7 CM
BH CV ECHO MEAS - LVOT AREA (M): 3.1 CM^2
BH CV ECHO MEAS - LVOT AREA: 3.1 CM^2
BH CV ECHO MEAS - LVOT DIAM: 2 CM
BH CV ECHO MEAS - LVPWD: 1 CM
BH CV ECHO MEAS - MED PEAK E' VEL: 3.8 CM/SEC
BH CV ECHO MEAS - MEDIAL E/E' RATIO: 14.5
BH CV ECHO MEAS - MR MAX PG: 80 MMHG
BH CV ECHO MEAS - MR MAX VEL: 446.1 CM/SEC
BH CV ECHO MEAS - MR MEAN PG: 48 MMHG
BH CV ECHO MEAS - MR MEAN VEL: 325.4 CM/SEC
BH CV ECHO MEAS - MR VTI: 165 CM
BH CV ECHO MEAS - MV A MAX VEL: 60.8 CM/SEC
BH CV ECHO MEAS - MV DEC SLOPE: 216.5 CM/SEC^2
BH CV ECHO MEAS - MV DEC TIME: 0.21 SEC
BH CV ECHO MEAS - MV E MAX VEL: 55.3 CM/SEC
BH CV ECHO MEAS - MV E/A: 0.91
BH CV ECHO MEAS - MV MAX PG: 1.7 MMHG
BH CV ECHO MEAS - MV MEAN PG: 0.83 MMHG
BH CV ECHO MEAS - MV P1/2T MAX VEL: 67.6 CM/SEC
BH CV ECHO MEAS - MV P1/2T: 91.4 MSEC
BH CV ECHO MEAS - MV V2 MAX: 65.2 CM/SEC
BH CV ECHO MEAS - MV V2 MEAN: 43.6 CM/SEC
BH CV ECHO MEAS - MV V2 VTI: 30.8 CM
BH CV ECHO MEAS - MVA P1/2T LCG: 3.3 CM^2
BH CV ECHO MEAS - MVA(P1/2T): 2.4 CM^2
BH CV ECHO MEAS - MVA(VTI): 1.5 CM^2
BH CV ECHO MEAS - PI END-D VEL: 144.9 CM/SEC
BH CV ECHO MEAS - RAP SYSTOLE: 3 MMHG
BH CV ECHO MEAS - RVSP: 21 MMHG
BH CV ECHO MEAS - SI(CUBED): 47.2 ML/M^2
BH CV ECHO MEAS - SI(LVOT): 21 ML/M^2
BH CV ECHO MEAS - SI(MOD-SP2): 14.6 ML/M^2
BH CV ECHO MEAS - SI(MOD-SP4): 59.3 ML/M^2
BH CV ECHO MEAS - SI(TEICH): 27.8 ML/M^2
BH CV ECHO MEAS - SV(CUBED): 106.7 ML
BH CV ECHO MEAS - SV(LVOT): 47.4 ML
BH CV ECHO MEAS - SV(MOD-SP2): 33 ML
BH CV ECHO MEAS - SV(MOD-SP4): 134 ML
BH CV ECHO MEAS - SV(TEICH): 62.7 ML
BH CV ECHO MEAS - TAPSE (>1.6): 1.4 CM
BH CV ECHO MEAS - TR MAX PG: 18 MMHG
BH CV ECHO MEAS - TR MAX VEL: 213.1 CM/SEC
BH CV ECHO MEASUREMENTS AVERAGE E/E' RATIO: 10.24
BH CV VAS BP LEFT ARM: NORMAL MMHG
BH CV XLRA - RV BASE: 5.6 CM
BH CV XLRA - RV LENGTH: 7.2 CM
BH CV XLRA - RV MID: 3.9 CM
BH CV XLRA - TDI S': 8.6 CM/SEC
LV EF 2D ECHO EST: 20 %
MAXIMAL PREDICTED HEART RATE: 137 BPM
STRESS TARGET HR: 116 BPM

## 2022-01-19 PROCEDURE — 93306 TTE W/DOPPLER COMPLETE: CPT | Performed by: INTERNAL MEDICINE

## 2022-01-19 PROCEDURE — 93306 TTE W/DOPPLER COMPLETE: CPT

## 2022-01-19 PROCEDURE — 25010000002 SULFUR HEXAFLUORIDE MICROSPH 60.7-25 MG RECONSTITUTED SUSPENSION: Performed by: INTERNAL MEDICINE

## 2022-01-19 RX ADMIN — SULFUR HEXAFLUORIDE 3 ML: KIT at 16:03

## 2022-01-20 DIAGNOSIS — I50.22 CHRONIC SYSTOLIC CONGESTIVE HEART FAILURE: ICD-10-CM

## 2022-01-20 DIAGNOSIS — I25.119 CORONARY ARTERY DISEASE INVOLVING NATIVE CORONARY ARTERY OF NATIVE HEART WITH ANGINA PECTORIS: Primary | ICD-10-CM

## 2022-01-20 DIAGNOSIS — E78.5 HYPERLIPIDEMIA LDL GOAL <70: ICD-10-CM

## 2022-01-20 LAB
ERYTHROCYTE [DISTWIDTH] IN BLOOD BY AUTOMATED COUNT: 14.6 % (ref 12.3–15.4)
HCT VFR BLD AUTO: 46.6 % (ref 37.5–51)
HGB BLD-MCNC: 15.2 G/DL (ref 13–17.7)
MCH RBC QN AUTO: 29.5 PG (ref 26.6–33)
MCHC RBC AUTO-ENTMCNC: 32.6 G/DL (ref 31.5–35.7)
MCV RBC AUTO: 90.5 FL (ref 79–97)
PLATELET # BLD AUTO: 206 10*3/MM3 (ref 140–450)
RBC # BLD AUTO: 5.15 10*6/MM3 (ref 4.14–5.8)
T4 FREE SERPL-MCNC: 1.39 NG/DL (ref 0.93–1.7)
TSH SERPL DL<=0.005 MIU/L-ACNC: 6.2 UIU/ML (ref 0.27–4.2)
WBC # BLD AUTO: 6.51 10*3/MM3 (ref 3.4–10.8)

## 2022-02-22 ENCOUNTER — OFFICE VISIT (OUTPATIENT)
Dept: CARDIOLOGY | Facility: CLINIC | Age: 82
End: 2022-02-22

## 2022-02-22 VITALS
HEART RATE: 89 BPM | BODY MASS INDEX: 29 KG/M2 | SYSTOLIC BLOOD PRESSURE: 118 MMHG | OXYGEN SATURATION: 97 % | DIASTOLIC BLOOD PRESSURE: 72 MMHG | HEIGHT: 74 IN | WEIGHT: 226 LBS

## 2022-02-22 DIAGNOSIS — I25.119 CORONARY ARTERY DISEASE INVOLVING NATIVE CORONARY ARTERY OF NATIVE HEART WITH ANGINA PECTORIS: Primary | Chronic | ICD-10-CM

## 2022-02-22 DIAGNOSIS — I50.22 CHRONIC SYSTOLIC CONGESTIVE HEART FAILURE: Chronic | ICD-10-CM

## 2022-02-22 DIAGNOSIS — E78.5 HYPERLIPIDEMIA LDL GOAL <70: Chronic | ICD-10-CM

## 2022-02-22 DIAGNOSIS — I48.4 ATYPICAL ATRIAL FLUTTER: ICD-10-CM

## 2022-02-22 PROBLEM — I48.92 ATRIAL FLUTTER (HCC): Chronic | Status: ACTIVE | Noted: 2018-10-19

## 2022-02-22 PROCEDURE — 99214 OFFICE O/P EST MOD 30 MIN: CPT | Performed by: INTERNAL MEDICINE

## 2022-02-22 NOTE — ASSESSMENT & PLAN NOTE
· No symptoms  · Previous intolerance to NOAC due to bleeding  · Discussed previous EKG with GFT.  He concurs with previous recommendation from Dr. Sampson the patient take amiodarone  · Patient declines

## 2022-02-22 NOTE — PROGRESS NOTES
"[facility]      Identification: Saul Sal is a 82 y.o. male who resides in [place], [place]    Reason for visit:  · HFrEF  · Atrial flutter  · CAD      Subjective      Saul Sal presents to [facility] for followup.    Mr. Tan returns to the office.  He states that his breathing is improved with use of loop diuretic therapy.  At last visit I asked him to start empagliflozin.  He took this for several days but states that his blood pressure dropped to the 70s and he stopped.  He asks if he can resume physical activity.  He denies any chest pressure and states he feels good.  No syncope or near syncope.    I discussed the patient's case with Dr. Ryan the patient's last visit.  He reviewed EKG and recommended amiodarone therapy, which was previously recommended by Dr. Sampson.  Discussed this with the patient and the patient and he is not interested.            Review of Systems   All other systems reviewed and are negative.      Objective     /72 (BP Location: Left arm, Patient Position: Sitting)   Pulse 89   Ht 188 cm (74\")   Wt 103 kg (226 lb)   SpO2 97%   BMI 29.02 kg/m²       Constitutional:       Appearance: Healthy appearance. Well-developed.   Eyes:      General: Lids are normal. No scleral icterus.  HENT:      Head: Normocephalic and atraumatic.   Neck:      Thyroid: No thyroid mass.      Vascular: No carotid bruit or JVD. JVD normal.   Pulmonary:      Effort: Pulmonary effort is normal.      Breath sounds: Normal breath sounds.   Cardiovascular:      Normal rate. Frequent ectopic beats. Regular rhythm.      Murmurs: There is no murmur.      No gallop.   Musculoskeletal:      Extremities: No clubbing present.Skin:     General: Skin is warm and dry. There is no cyanosis.   Neurological:      General: No focal deficit present.      Mental Status: Alert.   Psychiatric:         Attention and Perception: Attention normal.         Behavior: Behavior normal. " Behavior is cooperative.         Result Review :    Lab Results   Component Value Date    GLUCOSE CANCELED 11/24/2021    BUN 22 11/24/2021    CREATININE 1.08 11/24/2021    EGFRIFNONA 64 11/24/2021    EGFRIFAFRI 74 11/24/2021    BCR 20 11/24/2021    K CANCELED 11/24/2021    CO2 22 11/24/2021    CALCIUM 9.3 11/24/2021    PROTENTOTREF 6.7 11/24/2021    ALBUMIN 4.4 11/24/2021    LABIL2 1.9 11/24/2021    AST 21 11/24/2021    ALT 11 11/24/2021     Lab Results   Component Value Date    WBC 6.51 01/19/2022    HGB 15.2 01/19/2022    HCT 46.6 01/19/2022    MCV 90.5 01/19/2022     01/19/2022     Lab Results   Component Value Date    CHLPL 211 (H) 11/24/2021    TRIG 120 11/24/2021    HDL 47 11/24/2021     (H) 11/24/2021             Assessment     Problem List Items Addressed This Visit        Cardiology Problems    Coronary artery disease involving native coronary artery of native heart with angina pectoris (HCC) - Primary (Chronic)    Overview     · CABGx5 2003  · Nuclear stress test (11/16/2018): medium-sized infarct located in the inferior wall and basal inferior lateral wall. LVEF 28%.   · Intolerant to beta-blocker, aspirin         Current Assessment & Plan     · No anginal symptoms         Chronic systolic congestive heart failure (HCC) (Chronic)    Overview     · Echocardiogram (2/2019): LVEF 25%. Mild TR.  · Echocardiogram (12/1/2020): LVEF 20%.Moderate to severe MR. Moderate TR. Moderate pulmonary hypertension. Saline results negative.  · Echocardiogram (1/19/2022): LVEF 20%. Moderate pulmonic valve regurgitation is present. Mild MR.         Current Assessment & Plan     · Stage C HFrEF with improved NYHA class II symptoms after initiating loop diuretic therapy  · Did not tolerate empagliflozin due to low blood pressure  · Continue Entresto  · Intolerant to beta-blocker         Atrial flutter (HCC) (Chronic)    Overview     · Typical flutter diagnosed 10/14/18  · Chadsvasc 5 (Age>75, HTN, CAD,  CHF)  · Apixaban stopped due to bleeding  · Echocardiogram (11/2018): EF 30%, mild AR, mild to moderate MR  · Nuclear stress (11/16/18):  medium-sized infarct in the inferior wall and basal inferior lateral wall with no significant ischemia.  · Radiofrequency ablation of isthmus dependent atrial flutter by Tino Sampson, 11/26/2018  · 12-day Holter monitor (12/2018): 63% A. fib/flutter/SVT burden         Current Assessment & Plan     · No symptoms  · Previous intolerance to NOAC due to bleeding  · Discussed previous EKG with GFT.  He concurs with previous recommendation from Dr. Sampson the patient take amiodarone  · Patient declines         Hyperlipidemia LDL goal <70 (Chronic)    Overview     · Moderate intensity statin therapy indicated               Plan   • Continue present medical therapy  • Discuss statin therapy at future visit  • Patient declines amiodarone and EP referral for now      Follow-up   Return in about 6 months (around 8/22/2022) for Follow-up with [facility] only.        Alexsander Jones MD, FACC, [facility]  2/22/2022

## 2022-02-22 NOTE — ASSESSMENT & PLAN NOTE
· Stage C HFrEF with improved NYHA class II symptoms after initiating loop diuretic therapy  · Did not tolerate empagliflozin due to low blood pressure  · Continue Entresto  · Intolerant to beta-blocker

## 2022-02-28 ENCOUNTER — LAB (OUTPATIENT)
Dept: LAB | Facility: HOSPITAL | Age: 82
End: 2022-02-28

## 2022-02-28 DIAGNOSIS — E78.5 HYPERLIPIDEMIA LDL GOAL <70: ICD-10-CM

## 2022-02-28 DIAGNOSIS — I50.22 CHRONIC SYSTOLIC CONGESTIVE HEART FAILURE: ICD-10-CM

## 2022-02-28 DIAGNOSIS — I25.119 CORONARY ARTERY DISEASE INVOLVING NATIVE CORONARY ARTERY OF NATIVE HEART WITH ANGINA PECTORIS: ICD-10-CM

## 2022-02-28 PROCEDURE — 80053 COMPREHEN METABOLIC PANEL: CPT

## 2022-02-28 PROCEDURE — 83880 ASSAY OF NATRIURETIC PEPTIDE: CPT

## 2022-02-28 RX ORDER — LEVOTHYROXINE SODIUM 0.12 MG/1
TABLET ORAL
Qty: 90 TABLET | Refills: 1 | Status: SHIPPED | OUTPATIENT
Start: 2022-02-28 | End: 2022-09-27

## 2022-02-28 NOTE — TELEPHONE ENCOUNTER
Caller: Shelia Sal    Relationship: Emergency Contact    Best call back number: [phone]     Requested Prescriptions:   Requested Prescriptions     Pending Prescriptions Disp Refills   • levothyroxine (SYNTHROID, LEVOTHROID) 125 MCG tablet 90 tablet 1     Sig: One tablet daily        Pharmacy where request should be sent: [facility] - [place] [id number] - [phone]  - [phone] FX     Additional details provided by patient: PATIENT HAS ABOUT A WEEK IN A HALF LEFT.  PATIENT WOULD LIKE THIS PRESCRIPTION SENT TO THE [facility].    Does the patient have less than a 3 day supply:  [] Yes  [x] No    Gloria Stephens Rep   02/28/22 08:31 EST

## 2022-03-01 LAB
ALBUMIN SERPL-MCNC: 4.4 G/DL (ref 3.5–5.2)
ALBUMIN/GLOB SERPL: 1.7 G/DL
ALP SERPL-CCNC: 44 U/L (ref 39–117)
ALT SERPL W P-5'-P-CCNC: 6 U/L (ref 1–41)
ANION GAP SERPL CALCULATED.3IONS-SCNC: 10.1 MMOL/L (ref 5–15)
AST SERPL-CCNC: 17 U/L (ref 1–40)
BILIRUB SERPL-MCNC: 0.4 MG/DL (ref 0–1.2)
BUN SERPL-MCNC: 26 MG/DL (ref 8–23)
BUN/CREAT SERPL: 21 (ref 7–25)
CALCIUM SPEC-SCNC: 9.5 MG/DL (ref 8.6–10.5)
CHLORIDE SERPL-SCNC: 101 MMOL/L (ref 98–107)
CO2 SERPL-SCNC: 27.9 MMOL/L (ref 22–29)
CREAT SERPL-MCNC: 1.24 MG/DL (ref 0.76–1.27)
EGFRCR SERPLBLD CKD-EPI 2021: 57.7 ML/MIN/1.73
GLOBULIN UR ELPH-MCNC: 2.6 GM/DL
GLUCOSE SERPL-MCNC: 91 MG/DL (ref 65–99)
NT-PROBNP SERPL-MCNC: 2087 PG/ML (ref 0–1800)
POTASSIUM SERPL-SCNC: 4.6 MMOL/L (ref 3.5–5.2)
PROT SERPL-MCNC: 7 G/DL (ref 6–8.5)
SODIUM SERPL-SCNC: 139 MMOL/L (ref 136–145)

## 2022-03-08 ENCOUNTER — TELEPHONE (OUTPATIENT)
Dept: INTERNAL MEDICINE | Facility: CLINIC | Age: 82
End: 2022-03-08

## 2022-03-08 RX ORDER — METOLAZONE 2.5 MG/1
TABLET ORAL
Qty: 20 TABLET | Refills: 2 | Status: SHIPPED | OUTPATIENT
Start: 2022-03-08 | End: 2022-11-14 | Stop reason: HOSPADM

## 2022-03-08 NOTE — TELEPHONE ENCOUNTER
See below.  I called patient and spoke with wife.  He's having SOB, intermittent, but sometimes even at rest.  C/o occas edema and also occas bloating.  He doesn't feel this way all the time.  Please advise.

## 2022-03-08 NOTE — TELEPHONE ENCOUNTER
WIFE HAS CALLED AND STATED PATIENT IS TAKING ONE TABLET OF furosemide (LASIX) 40 MG tablet IN THE MORNING. PATIENT IS STILL HAVING SHORTNESS OF BREATH AT TIMES AND SWOLLEN ANKLES AT TIMES.   PATIENT IN THE PAST WAS ALSO TAKING METOLAZONE 2.5 MG AS NEEDED.  PATIENT IS WANTING TO KNOW IF PERHAPS HE SHOULD START BACK TAKING THE METOLAZONE 2.5 MG TABLET AS NEEDED AND IF SO HE WILL NEED A NEW PRESCRIPTION CALLED INTO   [facility] IN [place].    WIFE IS REQUESTING A CALL BACK EITHER WAY TO BE ADVISED ON WHETHER OR NOT PRESCRIPTION WILL BE CALLED IN.    CALL BACK NUMBER [phone]

## 2022-03-08 NOTE — TELEPHONE ENCOUNTER
Noted shortness of breath and edema, as sent metolazone 2.5 mg to take 1 every Monday Wednesday and Friday and sent to patient's drugstore please inform

## 2022-04-06 DIAGNOSIS — I50.22 CHRONIC SYSTOLIC CONGESTIVE HEART FAILURE: Chronic | ICD-10-CM

## 2022-04-06 RX ORDER — SACUBITRIL AND VALSARTAN 24; 26 MG/1; MG/1
1 TABLET, FILM COATED ORAL 2 TIMES DAILY
Qty: 180 TABLET | Refills: 3 | Status: SHIPPED | OUTPATIENT
Start: 2022-04-06 | End: 2022-11-14 | Stop reason: HOSPADM

## 2022-04-06 NOTE — TELEPHONE ENCOUNTER
Rx Refill Note  Requested Prescriptions     Pending Prescriptions Disp Refills   • sacubitril-valsartan (Entresto) 24-26 MG tablet 180 tablet 3     Sig: Take 1 tablet by mouth 2 (Two) Times a Day.      Last office visit with prescribing clinician: 11/24/2021      Next office visit with prescribing clinician: 5/25/2022            Alice Webster RN  04/06/22, 15:16 EDT

## 2022-05-25 ENCOUNTER — LAB (OUTPATIENT)
Dept: LAB | Facility: HOSPITAL | Age: 82
End: 2022-05-25

## 2022-05-25 ENCOUNTER — OFFICE VISIT (OUTPATIENT)
Dept: INTERNAL MEDICINE | Facility: CLINIC | Age: 82
End: 2022-05-25

## 2022-05-25 VITALS
WEIGHT: 222.6 LBS | HEIGHT: 74 IN | HEART RATE: 72 BPM | DIASTOLIC BLOOD PRESSURE: 80 MMHG | SYSTOLIC BLOOD PRESSURE: 100 MMHG | BODY MASS INDEX: 28.57 KG/M2

## 2022-05-25 DIAGNOSIS — I50.22 CHRONIC SYSTOLIC CONGESTIVE HEART FAILURE: Chronic | ICD-10-CM

## 2022-05-25 DIAGNOSIS — M1A.09X0 CHRONIC GOUT OF MULTIPLE SITES, UNSPECIFIED CAUSE: ICD-10-CM

## 2022-05-25 DIAGNOSIS — I25.119 CORONARY ARTERY DISEASE INVOLVING NATIVE CORONARY ARTERY OF NATIVE HEART WITH ANGINA PECTORIS: Primary | Chronic | ICD-10-CM

## 2022-05-25 DIAGNOSIS — I25.119 CORONARY ARTERY DISEASE INVOLVING NATIVE CORONARY ARTERY OF NATIVE HEART WITH ANGINA PECTORIS: Chronic | ICD-10-CM

## 2022-05-25 DIAGNOSIS — E78.5 HYPERLIPIDEMIA LDL GOAL <70: Chronic | ICD-10-CM

## 2022-05-25 DIAGNOSIS — E03.9 HYPOTHYROIDISM (ACQUIRED): ICD-10-CM

## 2022-05-25 PROCEDURE — 99214 OFFICE O/P EST MOD 30 MIN: CPT | Performed by: INTERNAL MEDICINE

## 2022-05-25 NOTE — ASSESSMENT & PLAN NOTE
He will continue with Lasix 40 mg daily, Entresto 24-26 mg BID, as well as Metolazone 2.5 mg as needed.  In addition patient is on low-salt diet and his weight is down 4 pounds from February to 222 pounds

## 2022-05-25 NOTE — ASSESSMENT & PLAN NOTE
The patient's blood pressure today is 122/66. He will continue with his current medications.  Remote CABG times 5/2003  Current without chest pain pressure tightness or palpitations does complain of shortness of breath which he believes is improved with the Lasix of 40 mg daily Zaroxolyn 2.5 mg as needed Monday Wednesday Friday and Entresto 24/26 twice daily continue therapy BNP and CMP are pending

## 2022-05-25 NOTE — ASSESSMENT & PLAN NOTE
Mixed hyperlipidemia, not on statins as patient declines encouraged healthy cardiac diet reduce carbs more portions and low-cholesterol diet and weight loss

## 2022-05-25 NOTE — PROGRESS NOTES
"[facility]     Saul Sal  1940   [id number]      Patient Care Team:  Saul Campbell MD as PCP - General (Internal Medicine)  Isael Lake MD as Consulting Physician (Ophthalmology)  Librado Jones IV, MD as Cardiologist (Interventional Cardiology)    Chief Complaint::   Chief Complaint   Patient presents with   • Hyperlipidemia     Follow up   • chronic CHF     Follow up   • Abnormal Lab     [facility]  Was notified yesterday that thyroid and kidneys were \"off\"             HPI    Saul Sal is an 82-year-old male with coronary disease, remote coronary bypass grafting, chronic systolic heart failure on Entresto with hypothyroidism, overweight, and chronic edema. He presents for an office visit in follow-up of mixed hyperlipidemia and heart failure. The patient is not on statin therapy.    The [facility]. He notes abnormal values for his BUN, creatinine, and thyroid function. He notes that he is not currently taking any gout medications for his elevated uric acid levels. He is also not taking any medications for his cholesterol. He also reports that he has been taking his Metolazone intermittently and has been trying not to take it as much. He does, however, states that he has been successfully avoiding salt in his diet. He notes chronic leg swelling, weight loss, and improvement in his shortness of breath. The patient reports a shingles episode in 2018. He also reports a history of atrial fibrillation for which he underwent a cardiac ablation procedure in 2019. He notes an episode of shortness of breath a couple of weeks ago, which prompted him to get out of bed and sit on a chair. He states that he took his Metolazone at that time, which improved his shortness of breath. He states that he has been compliant with his other diuretic medication and has had no issues with oxygen via nasal cannula at night. He denies headache, dizziness, " vision changes, trouble swallowing, chest pain, palpitations, abdominal pain, nausea, and vomiting.      Patient Active Problem List   Diagnosis   • Coronary artery disease involving native coronary artery of native heart with angina pectoris (HCC)   • Atrial flutter (HCC)   • Hyperlipidemia LDL goal <70   • ELIE (obstructive sleep apnea)   • Hypothyroidism (acquired)   • Obesity (BMI 30-39.9)   • GERD (gastroesophageal reflux disease)   • H/O asbestos exposure   • Herpes zoster without complication   • History of colon polyps   • Left wrist injury   • Postherpetic neuralgia   • Primary osteoarthritis involving multiple joints   • Chronic systolic congestive heart failure (HCC)   • Bronchitis   • Trochanteric bursitis of right hip   • Olecranon bursitis of right elbow   • Medicare annual wellness visit, subsequent   • Chronic gout of multiple sites        Past Medical History:   Diagnosis Date   • Abnormal ECG    • Arrhythmia    • Arthritis    • Atrial fibrillation ([facility])    • Atrial flutter ([facility])     s/p typical flutter ablation with recurrent atypical flutter   • Benign essential hypertension    • BPH (benign prostatic hyperplasia)    • CHF (congestive heart failure) ([facility])    • Clotting disorder ([facility]) too many aspirin   • Congenital heart disease    • Coronary artery disease    • Diverticulosis    • GERD (gastroesophageal reflux disease)    • GI bleed    • HFrEF (heart failure with reduced ejection fraction) ([facility])    • Hyperlipidemia    • Hypertension    • Hypothyroidism (acquired)    • Obesity (BMI 30-39.9)    • ELIE (obstructive sleep apnea)    • Osteoarthritis    • Shingles    • Skin cancer     squamous    • Wears eyeglasses     readers   • Wears hearing aid        Past Surgical History:   Procedure Laterality Date   • ABLATION OF DYSRHYTHMIC FOCUS  12/2018   • ANGIOPLASTY  1985   • CARDIAC CATHETERIZATION     • CARDIAC ELECTROPHYSIOLOGY PROCEDURE N/A 11/26/2018    Procedure: Ablation atrial flutter;  Surgeon:  Tino Sampson MD;  Location: [facility] INVASIVE LOCATION;  Service: Cardiovascular   • CARDIAC SURGERY      BYPASS X5   • COLONOSCOPY  2009   • COLONOSCOPY     • CORONARY ANGIOPLASTY  1985   • [facility]ON[facility]    • DENTAL PROCEDURE      dental surg and crowns   • EYE SURGERY Right     cataract   • SKIN BIOPSY     • VASECTOMY         Family History   Problem Relation Age of Onset   • Diabetes Mother    • Heart disease Mother    • Coronary artery disease Other    • Stroke Other    • Diabetes Other    • Rheumatic fever Other    • Other Other         Valvular CV Disease       Social History     Socioeconomic History   • Marital status:    Tobacco Use   • Smoking status: Former Smoker     Packs/day: 1.00     Years: 8.00     Pack years: 8.00     Types: Cigarettes     Start date: 10/1/1956     Quit date: 1964     Years since quittin.5   • Smokeless tobacco: Never Used   Substance and Sexual Activity   • Alcohol use: No   • Drug use: Never   • Sexual activity: Not Currently       Allergies   Allergen Reactions   • Eliquis [Apixaban] Other (See Comments)     Excessive bleeding   • Aspirin GI Bleeding   • Empagliflozin Other (See Comments)     Low BP   • Amlodipine Besylate Unknown (See Comments)     Unknown     • Atenolol Unknown (See Comments)     unknown   • Metoprolol Unknown (See Comments)     Unknown   • Milk-Related Compounds Other (See Comments)     Headache       Review of Systems     HEENT: Denies any hearing changes, eyesight changes, no headache or dizziness  NECK: Denies any pain, stiffness, swelling or dysphagia  CHEST: Denies cough or wheeze or recent lung infections mild shortness of breath with physical activity  CARDIAC: Denies chest pain, pressure or palpitations  ABD: Denies nausea, vomiting or abdominal pain  : Denies dysuria  NEURO: Denies syncope, concussion, neuropathy  PSYCH: Denies any stress  EXTREM: Denies arthritic changes  "myalgia or arthralgia  SKIN: Denies any rash    Vital Signs  Vitals:    05/25/22 0839   BP: 100/80   BP Location: Right arm   Patient Position: Sitting   Cuff Size: Adult   Pulse: 72  Comment: irregular   Weight: 101 kg (222 lb 9.6 oz)   Height: 188 cm (74\")   PainSc: 0-No pain     Body mass index is 28.58 kg/m².  BMI is >= 25 and < 30. (Overweight) The following options were offered after discussion: exercise counseling/recommendations and nutrition counseling/recommendations encouraged healthy cardiac diet reduce carbs smaller portions and low-cholesterol and weight loss     Advance Care Planning   ACP discussion was held with the patient during this visit. Patient has an advance directive in EMR which is still valid.        Current Outpatient Medications:   •  COD LIVER OIL PO, Take 2 capsules in the morning and 1 capsule in the afternoon, Disp: , Rfl:   •  furosemide (LASIX) 40 MG tablet, Take 1 tablet by mouth Daily., Disp: 90 tablet, Rfl: 3  •  levothyroxine (SYNTHROID, LEVOTHROID) 125 MCG tablet, One tablet daily, Disp: 90 tablet, Rfl: 1  •  metOLazone (ZAROXOLYN) 2.5 MG tablet, Take 1 tablet every Monday Wednesday and Friday, Disp: 20 tablet, Rfl: 2  •  NON FORMULARY, Cardio-Plus 2080 : Take 4 tablets in the morning and 5 tablets in the evening, Disp: , Rfl:   •  NON FORMULARY, Cataplex B 1250mg: Take 3 tablets BID, Disp: , Rfl:   •  NON FORMULARY, Cataplex F 4200: Take 3 tablets BID, Disp: , Rfl:   •  sacubitril-valsartan (Entresto) 24-26 MG tablet, Take 1 tablet by mouth 2 (Two) Times a Day., Disp: 180 tablet, Rfl: 3  •  TURMERIC PO, Take 1 tablet by mouth Daily. (Turmeric Forte), Disp: , Rfl:     Physical Exam     ACE III MINI         EXAM  HEENT: Pupils equal reactive ENT clear, no facial asymmetry, pharynx is clear  NECK: No masses bruit or thyromegaly  CHEST: Clear without rales wheezes or rhonchi  CARDIAC: Regular rhythm without gallop rub or click, blood pressure heart rate stable  ABD: Liver spleen " normal, good bowel sounds, nontender  : Deferred  NEURO: Intact  PSYCH: Normal  EXTREM: No significant arthritic changes, 2+ edema of the left leg and 1+ of the right.  SKIN: Clear     Results Review:    No results found for this or any previous visit (from the past 672 hour(s)).  Procedures    Medication Review: Medications reviewed and noted    Patient wellness counseling  Exercise: Encouraged walking exercise but no heavy lifting or excessive yard work  Diet: Encouraged low-cholesterol smaller portions and low carbohydrate diet with no salt  Screening: BMP CBC CMP lipid TSH and uric acid pending  Social History     Socioeconomic History   • Marital status:    Tobacco Use   • Smoking status: Former Smoker     Packs/day: 1.00     Years: 8.00     Pack years: 8.00     Types: Cigarettes     Start date: 10/1/1956     Quit date: 1964     Years since quittin.5   • Smokeless tobacco: Never Used   Substance and Sexual Activity   • Alcohol use: No   • Drug use: Never   • Sexual activity: Not Currently        Assessment/Plan:    Problem List Items Addressed This Visit        Cardiac and Vasculature    Coronary artery disease involving native coronary artery of native heart with angina pectoris (HCC) - Primary (Chronic)    Overview     · CABGx5   · Nuclear stress test (2018): medium-sized infarct located in the inferior wall and basal inferior lateral wall. LVEF 28%.   · Intolerant to beta-blocker, aspirin           Current Assessment & Plan     The patient's blood pressure today is 122/66. He will continue with his current medications.  Remote CABG times 2003  Current without chest pain pressure tightness or palpitations does complain of shortness of breath which he believes is improved with the Lasix of 40 mg daily Zaroxolyn 2.5 mg as needed  and Entresto  twice daily continue therapy BNP and CMP are pending           Relevant Medications    sacubitril-valsartan  (Entresto) 24-26 MG tablet    Other Relevant Orders    CBC & Differential    Comprehensive Metabolic Panel    Lipid Panel    Uric Acid    TSH    BNP    Hyperlipidemia LDL goal <70 (Chronic)    Overview     · Moderate intensity statin therapy indicated           Current Assessment & Plan       Mixed hyperlipidemia, not on statins as patient declines encouraged healthy cardiac diet reduce carbs more portions and low-cholesterol diet and weight loss           Chronic systolic congestive heart failure (HCC) (Chronic)    Overview     · Echocardiogram (2/2019): LVEF 25%. Mild TR.  · Echocardiogram (12/1/2020): LVEF 20%.Moderate to severe MR. Moderate TR. Moderate pulmonary hypertension. Saline results negative.  · Echocardiogram (1/19/2022): LVEF 20%. Moderate pulmonic valve regurgitation is present. Mild MR.           Current Assessment & Plan     He will continue with Lasix 40 mg daily, Entresto 24-26 mg BID, as well as Metolazone 2.5 mg as needed.  In addition patient is on low-salt diet and his weight is down 4 pounds from February to 222 pounds           Relevant Medications    furosemide (LASIX) 40 MG tablet    sacubitril-valsartan (Entresto) 24-26 MG tablet    Other Relevant Orders    CBC & Differential    Comprehensive Metabolic Panel    Lipid Panel    Uric Acid    TSH    BNP       Endocrine and Metabolic    Hypothyroidism (acquired)    Overview     History of hypothyroidism on Synthroid 88 MCG daily           Current Assessment & Plan     He will continue with Levothyroxine 125 mcg daily.  TSH is pending           Relevant Medications    levothyroxine (SYNTHROID, LEVOTHROID) 125 MCG tablet    Other Relevant Orders    CBC & Differential    Comprehensive Metabolic Panel    Lipid Panel    Uric Acid    TSH    BNP       Musculoskeletal and Injuries    Chronic gout of multiple sites    Overview     History of gout with elevated uric acid the patient is on Lasix diuretic and metolazone as needed uric acid pending the  patient has no complaints currently of gouty arthritis and no tophi           Current Assessment & Plan     Uric acid pending           Relevant Orders    CBC & Differential    Comprehensive Metabolic Panel    Lipid Panel    Uric Acid    TSH    BNP           Patient Instructions   Fasting lab of BMP, CMP, CBC lipid TSH and uric acid pending  Continue all current medications with as needed metolazone and Lasix 40 mg daily  He declines statin therapy  Encourage plenty of water for hydration  Encouraged healthy cardiac diet with reduce salt  Careful cardiac exercise and not overdo  Return for annual wellness visit in 6 months or as needed  Handicap sticker is signed and given to patient       Plan of care reviewed with patient at the conclusion of today's visit. Education was provided regarding diagnosis, management, and any prescribed or recommended OTC medications.Patient verbalizes understanding of and agreement with management plan.         Saul Campbell MD      Note: Part of this note may be an electronic transcription/translation of spoken language to printed text using the Dragon Dictation system.    Transcribed from ambient dictation for Saul Campbell MD by MATA ELIZONDO.  05/25/22   10:48 EDT    Patient verbalized consent to the visit recording.  I have personally performed the services described in this document as transcribed by the above individual, and it is both accurate and complete.  Saul Campbell MD  5/25/2022  16:42 EDT          Answers for HPI/ROS submitted by the patient on 5/18/2022  What is the primary reason for your visit?: Shortness of Breath  Chronicity: chronic  Onset: more than 1 year ago  Frequency: daily  Progression since onset: gradually improving  claudication: No  coryza: No  headaches: No  hemoptysis: No  leg pain: No  orthopnea: Yes  PND: Yes  sputum production: No  syncope: No  Aggravating factors: lying flat

## 2022-05-27 LAB
ALBUMIN SERPL-MCNC: 4.3 G/DL (ref 3.6–4.6)
ALBUMIN/GLOB SERPL: 1.8 {RATIO} (ref 1.2–2.2)
ALP SERPL-CCNC: 47 IU/L (ref 44–121)
ALT SERPL-CCNC: 11 IU/L (ref 0–44)
AST SERPL-CCNC: 16 IU/L (ref 0–40)
BASOPHILS # BLD AUTO: 0 X10E3/UL (ref 0–0.2)
BASOPHILS NFR BLD AUTO: 1 %
BILIRUB SERPL-MCNC: 0.6 MG/DL (ref 0–1.2)
BNP SERPL-MCNC: NORMAL PG/ML
BUN SERPL-MCNC: 26 MG/DL (ref 8–27)
BUN/CREAT SERPL: 19 (ref 10–24)
CALCIUM SERPL-MCNC: 9.4 MG/DL (ref 8.6–10.2)
CHLORIDE SERPL-SCNC: 103 MMOL/L (ref 96–106)
CHOLEST SERPL-MCNC: 191 MG/DL (ref 100–199)
CO2 SERPL-SCNC: 22 MMOL/L (ref 20–29)
CREAT SERPL-MCNC: 1.34 MG/DL (ref 0.76–1.27)
EGFRCR SERPLBLD CKD-EPI 2021: 53 ML/MIN/1.73
EOSINOPHIL # BLD AUTO: 0.1 X10E3/UL (ref 0–0.4)
EOSINOPHIL NFR BLD AUTO: 2 %
ERYTHROCYTE [DISTWIDTH] IN BLOOD BY AUTOMATED COUNT: 15.2 % (ref 11.6–15.4)
GLOBULIN SER CALC-MCNC: 2.4 G/DL (ref 1.5–4.5)
GLUCOSE SERPL-MCNC: 106 MG/DL (ref 65–99)
HCT VFR BLD AUTO: 43.1 % (ref 37.5–51)
HDLC SERPL-MCNC: 40 MG/DL
HGB BLD-MCNC: 13.9 G/DL (ref 13–17.7)
IMM GRANULOCYTES # BLD AUTO: 0 X10E3/UL (ref 0–0.1)
IMM GRANULOCYTES NFR BLD AUTO: 0 %
LDLC SERPL CALC-MCNC: 128 MG/DL (ref 0–99)
LYMPHOCYTES # BLD AUTO: 1.8 X10E3/UL (ref 0.7–3.1)
LYMPHOCYTES NFR BLD AUTO: 30 %
MCH RBC QN AUTO: 29.1 PG (ref 26.6–33)
MCHC RBC AUTO-ENTMCNC: 32.3 G/DL (ref 31.5–35.7)
MCV RBC AUTO: 90 FL (ref 79–97)
MONOCYTES # BLD AUTO: 0.4 X10E3/UL (ref 0.1–0.9)
MONOCYTES NFR BLD AUTO: 6 %
NEUTROPHILS # BLD AUTO: 3.8 X10E3/UL (ref 1.4–7)
NEUTROPHILS NFR BLD AUTO: 61 %
PLATELET # BLD AUTO: 184 X10E3/UL (ref 150–450)
POTASSIUM SERPL-SCNC: 4.5 MMOL/L (ref 3.5–5.2)
PROT SERPL-MCNC: 6.7 G/DL (ref 6–8.5)
RBC # BLD AUTO: 4.78 X10E6/UL (ref 4.14–5.8)
SODIUM SERPL-SCNC: 141 MMOL/L (ref 134–144)
SPECIMEN STATUS: NORMAL
TRIGL SERPL-MCNC: 127 MG/DL (ref 0–149)
TSH SERPL DL<=0.005 MIU/L-ACNC: 6.23 UIU/ML (ref 0.45–4.5)
URATE SERPL-MCNC: 11.7 MG/DL (ref 3.8–8.4)
VLDLC SERPL CALC-MCNC: 23 MG/DL (ref 5–40)
WBC # BLD AUTO: 6.2 X10E3/UL (ref 3.4–10.8)

## 2022-06-01 ENCOUNTER — TELEPHONE (OUTPATIENT)
Dept: INTERNAL MEDICINE | Facility: CLINIC | Age: 82
End: 2022-06-01

## 2022-06-01 RX ORDER — ALLOPURINOL 300 MG/1
300 TABLET ORAL DAILY
Qty: 30 TABLET | Refills: 5 | Status: SHIPPED | OUTPATIENT
Start: 2022-06-01 | End: 2022-11-07

## 2022-06-01 NOTE — TELEPHONE ENCOUNTER
PT CALLED STATING HE WAS RETURNING A MISSED CALL TO [facility]ST[facility].  PT THINKS IT WAS ABOUT HIS LAB RESULTS AND HE SEEN THOSE ONLINE.  PLEASE CALL THE PT BACK ON HIS CELL PHONE.  [phone].

## 2022-06-12 ENCOUNTER — PATIENT MESSAGE (OUTPATIENT)
Dept: INTERNAL MEDICINE | Facility: CLINIC | Age: 82
End: 2022-06-12

## 2022-06-13 ENCOUNTER — OFFICE VISIT (OUTPATIENT)
Dept: INTERNAL MEDICINE | Facility: CLINIC | Age: 82
End: 2022-06-13

## 2022-06-13 VITALS
SYSTOLIC BLOOD PRESSURE: 120 MMHG | BODY MASS INDEX: 28.59 KG/M2 | HEIGHT: 74 IN | DIASTOLIC BLOOD PRESSURE: 82 MMHG | WEIGHT: 222.8 LBS | HEART RATE: 76 BPM | TEMPERATURE: 98.2 F

## 2022-06-13 DIAGNOSIS — J40 BRONCHITIS: Chronic | ICD-10-CM

## 2022-06-13 DIAGNOSIS — J98.8 RESPIRATORY INFECTION: Primary | ICD-10-CM

## 2022-06-13 DIAGNOSIS — T16.2XXA FOREIGN BODY OF LEFT EAR, INITIAL ENCOUNTER: ICD-10-CM

## 2022-06-13 PROCEDURE — 10120 INC&RMVL FB SUBQ TISS SMPL: CPT | Performed by: STUDENT IN AN ORGANIZED HEALTH CARE EDUCATION/TRAINING PROGRAM

## 2022-06-13 PROCEDURE — 99214 OFFICE O/P EST MOD 30 MIN: CPT | Performed by: STUDENT IN AN ORGANIZED HEALTH CARE EDUCATION/TRAINING PROGRAM

## 2022-06-13 RX ORDER — AZITHROMYCIN 250 MG/1
TABLET, FILM COATED ORAL
Qty: 6 TABLET | Refills: 0 | Status: SHIPPED | OUTPATIENT
Start: 2022-06-13 | End: 2022-07-07

## 2022-06-13 RX ORDER — ASPIRIN 81 MG/1
81 TABLET ORAL DAILY
COMMUNITY

## 2022-06-13 NOTE — PROGRESS NOTES
"Chief Complaint  Saul Sal is a 82 y.o. male presenting for URI (Nasal congestion, green mucous  X's 10 days   took covid test on 6/8/22 neg. ).     Patient has a past medical history of CAD, hyperlipidemia, CHF, GERD, hypothyroidism, colon polyps, herpes zoster, Osteoarthritis of Multiple Joints,, postherpetic neuralgia, bronchitis and ELIE.    History of Present Illness  Patient is here due to 10-day history of cough initially, few days later worsening nasal congestion, runny nose.  No sore throat.  No fever or chills.  He has continued to cough, coughing up green phlegm.  No shortness of breath, appetite is good.  He just is not getting better.    The following portions of the patient's history were reviewed and updated as appropriate: allergies, current medications, past family history, past medical history, past social history, past surgical history and problem list.    Objective  /82 (BP Location: Left arm, Patient Position: Sitting, Cuff Size: Adult)   Pulse 76   Temp 98.2 °F (36.8 °C) (Temporal)   Ht 188 cm (74.02\")   Wt 101 kg (222 lb 12.8 oz)   BMI 28.59 kg/m²     Physical Exam  Vitals reviewed.   Constitutional:       Appearance: Normal appearance.   HENT:      Head: Normocephalic and atraumatic.      Right Ear: Tympanic membrane, ear canal and external ear normal. There is no impacted cerumen.      Left Ear: There is no impacted cerumen.      Ears:      Comments: Left ear has a foreign body lodged midway into the ear canal.  This is eventually removed manually with cerumen hook, improves to be a silicone plug part of his hearing aid.  After removal there is some redness of the ear canal, also some scarring of the TM.  No sign of perforation.     Nose: Congestion present.   Eyes:      Extraocular Movements: Extraocular movements intact.      Conjunctiva/sclera: Conjunctivae normal.   Cardiovascular:      Rate and Rhythm: Normal rate and regular rhythm.      Heart sounds: Normal heart sounds. " No murmur heard.  Pulmonary:      Effort: Pulmonary effort is normal.      Breath sounds: Wheezing present.      Comments: Few occasional wheezes, no focal findings.  Good air entry bilaterally.  Abdominal:      General: There is no distension.      Palpations: Abdomen is soft. There is no mass.      Tenderness: There is no abdominal tenderness.   Musculoskeletal:         General: No swelling.      Cervical back: Neck supple.   Skin:     General: Skin is warm and dry.   Neurological:      Mental Status: He is alert and oriented to person, place, and time. Mental status is at baseline.   Psychiatric:         Behavior: Behavior normal.         Thought Content: Thought content normal.         Assessment/Plan   1. Respiratory infection  2. Bronchitis  No respiratory distress, but mild scattered wheezes.  Exacerbation of bronchitis, cannot rule out bacterial infection.  We will do a 5-day course of azithromycin.  I have recommended patient is back in touch if any worsening.  - azithromycin (Zithromax Z-Steve) 250 MG tablet; Take 2 tablets the first day, then 1 tablet daily for 4 days.  Dispense: 6 tablet; Refill: 0    3. Foreign body of left ear, initial encounter  Foreign body removed.  Some irritation of the ear canal noted.      Return if symptoms worsen or fail to improve, for Next scheduled follow up.    Future Appointments       Provider Department Center    11/30/2022 10:30 AM Saul Campbell MD [facility] INTERNAL MEDICINE [facility]    2/14/2023 10:45 AM Librado Jones IV, MD [facility] CARDIOLOGY [facility]            Blayne Sloan MD  Family Medicine  06/13/2022

## 2022-06-13 NOTE — TELEPHONE ENCOUNTER
From: Saul Sal  To: Saul Campbell MD  Sent: 6/12/2022 6:24 PM EDT  Subject: congestion    Please have Dr. Shannan Parkinson nurse to call me at her convenience. Thank you. Saul Sal [phone]

## 2022-06-13 NOTE — TELEPHONE ENCOUNTER
I called patient.  He's not feeling well with URI.  Negative home covid test.  He is seeing Dr. Sloan at 10:45 today.

## 2022-06-16 ENCOUNTER — OFFICE VISIT (OUTPATIENT)
Dept: INTERNAL MEDICINE | Facility: CLINIC | Age: 82
End: 2022-06-16

## 2022-06-16 ENCOUNTER — HOSPITAL ENCOUNTER (OUTPATIENT)
Dept: GENERAL RADIOLOGY | Facility: HOSPITAL | Age: 82
Discharge: HOME | End: 2022-06-16
Admitting: INTERNAL MEDICINE

## 2022-06-16 ENCOUNTER — TELEPHONE (OUTPATIENT)
Dept: INTERNAL MEDICINE | Facility: CLINIC | Age: 82
End: 2022-06-16

## 2022-06-16 VITALS
SYSTOLIC BLOOD PRESSURE: 98 MMHG | HEIGHT: 74 IN | HEART RATE: 110 BPM | OXYGEN SATURATION: 95 % | WEIGHT: 224 LBS | DIASTOLIC BLOOD PRESSURE: 80 MMHG | BODY MASS INDEX: 28.75 KG/M2

## 2022-06-16 DIAGNOSIS — R05.9 COUGH: ICD-10-CM

## 2022-06-16 DIAGNOSIS — I50.22 CHRONIC SYSTOLIC CONGESTIVE HEART FAILURE: Chronic | ICD-10-CM

## 2022-06-16 DIAGNOSIS — R05.9 COUGH: Primary | ICD-10-CM

## 2022-06-16 DIAGNOSIS — I50.21 ACUTE SYSTOLIC CHF (CONGESTIVE HEART FAILURE): ICD-10-CM

## 2022-06-16 PROCEDURE — 99214 OFFICE O/P EST MOD 30 MIN: CPT | Performed by: INTERNAL MEDICINE

## 2022-06-16 PROCEDURE — 71046 X-RAY EXAM CHEST 2 VIEWS: CPT

## 2022-06-16 RX ORDER — DEXTROMETHORPHAN POLISTIREX 30 MG/5ML
30 SUSPENSION ORAL EVERY 12 HOURS SCHEDULED
Qty: 90 ML | Refills: 1 | Status: SHIPPED | OUTPATIENT
Start: 2022-06-16 | End: 2022-11-07

## 2022-06-16 RX ORDER — LEVOFLOXACIN 500 MG/1
500 TABLET, FILM COATED ORAL DAILY
Qty: 7 TABLET | Refills: 0 | Status: SHIPPED | OUTPATIENT
Start: 2022-06-16 | End: 2022-07-07

## 2022-06-16 NOTE — ASSESSMENT & PLAN NOTE
Cough associated with enlarged heart and edema likely an exacerbation of his systolic heart failure but also complicated by bronchitis with productive sputum  Plan; continue Lasix 40 mg daily, take Zaroxolyn 2.5 mg daily for 3 days, add Delsym cough medicine twice daily, add Levaquin 500 mg daily for 7 days  He is to notify us of improvement

## 2022-06-16 NOTE — ASSESSMENT & PLAN NOTE
Patient has mild cough productive persistent for the past 2 weeks despite Z-Steve therapy given last Monday, he does have lower leg edema he just completed an x-ray this morning showing cardiomegaly slight increase in perihilar congestion with no confluent pneumonia or pleural fluid believe he has a component of heart failure with leg edema and bronchitis we will treat with metolazone 2.5 mg daily for 3 days in addition to his regular Lasix of 40 mg daily we will add Delsym cough medicine twice daily and add Levaquin 500 mg daily for 7 days

## 2022-06-16 NOTE — PATIENT INSTRUCTIONS
Chest x-ray revealed with cardiomegaly and slight increase in perihilar markings with mild failure with leg edema  Suggest Zaroxolyn 2.5 mg for 3 days beginning today  Suggest adding Levaquin 500 mg daily for 7 days  Suggest adding Delsym cough medicine teaspoon twice daily  To notify us if not improved  Return for regular appointment

## 2022-06-16 NOTE — TELEPHONE ENCOUNTER
Message noted patient who is Saul Sal needs a chest x-ray today and an office visit with me, I will order the chest x-ray  And inform Mrs. Tan that Saul needs a chest x-ray which I have ordered on him an office visit this afternoon and get the x-ray before I see him

## 2022-06-16 NOTE — TELEPHONE ENCOUNTER
This came in patient's spouse's MyChart:      Alice:  It's Shelia Sal for Saul Sal 1940.  Saul has been coughing to the point he can't lay down and for almost 2 weeks now.  He saw Dr Slaon Monday.  He gave him azithromycin 250mg.  He took 2 Monday, 1 Tuesday, and 1 Wednesday.  It is Thursday am and he has 1 for today and 1 for tomorrow.   He has not run a fever.  We tested at home for covid and it was negative.  His feet are swollen but he is having very little shortness of breath.  He doesn't seem any better.  He is still coughing constantly.  Can Dr. Hughes call in something for the cough at [facility]?  What to do???.  I am beside myself.   Please call me.  Shelia Sal [phone]

## 2022-06-16 NOTE — PROGRESS NOTES
[facility]     Saul Sal  1940   [id number]      Patient Care Team:  Saul Campbell MD as PCP - General (Internal Medicine)  Isael Lake MD as Consulting Physician (Ophthalmology)  Librado Jones IV, MD as Cardiologist (Interventional Cardiology)    Chief Complaint::   Chief Complaint   Patient presents with   • Cough     Deep - occas productive.  Denies SOB.  Cough worse at night.  Some LE edema            HPI  The patient is an 82-year-old male with office visit complaining of cough, deep, occasionally productive. He denies shortness of breath. Cough worse at night with some lower leg edema.    The patient reports that he has been coughing for approximately 2 weeks. The patient reports difficulty sleeping at night with the cough. He states that he has only been using cough drops to relieve his cough. The patient denies any fevers and confirms a negative at home COVID-19 test. The patient reports that he continues to take Lasix daily and metolazone once every 2 weeks. The patient reports being prescribed antibiotics recently and is feeling better. He states that he has been blowing his nose frequently and confirms that his mucus has been green in color.     The patient confirms that both legs have swelling. He states that the last time he took metolazone was on 06/10/2022.       Patient Active Problem List   Diagnosis   • Coronary artery disease involving native coronary artery of native heart with angina pectoris (HCC)   • Atrial flutter (HCC)   • Hyperlipidemia LDL goal <70   • ELIE (obstructive sleep apnea)   • Hypothyroidism (acquired)   • Obesity (BMI 30-39.9)   • GERD (gastroesophageal reflux disease)   • H/O asbestos exposure   • Herpes zoster without complication   • History of colon polyps   • Left wrist injury   • Postherpetic neuralgia   • Primary osteoarthritis involving multiple joints   • Chronic systolic congestive heart failure (HCC)   • Bronchitis   •  Trochanteric bursitis of right hip   • Olecranon bursitis of right elbow   • Medicare annual wellness visit, subsequent   • Chronic gout of multiple sites   • Cough        Past Medical History:   Diagnosis Date   • Abnormal ECG    • Arrhythmia    • Arthritis    • Atrial fibrillation (HCC)    • Atrial flutter ([facility])     s/p typical flutter ablation with recurrent atypical flutter   • Benign essential hypertension    • BPH (benign prostatic hyperplasia)    • CHF (congestive heart failure) ([facility])    • Clotting disorder ([facility]) too many aspirin   • Congenital heart disease    • Coronary artery disease    • Diverticulosis    • GERD (gastroesophageal reflux disease)    • GI bleed    • HFrEF (heart failure with reduced ejection fraction) ([facility])    • Hyperlipidemia    • Hypertension    • Hypothyroidism (acquired)    • Obesity (BMI 30-39.9)    • ELIE (obstructive sleep apnea)    • Osteoarthritis    • Shingles    • Skin cancer     squamous    • Wears eyeglasses     readers   • Wears hearing aid        Past Surgical History:   Procedure Laterality Date   • ABLATION OF DYSRHYTHMIC FOCUS  12/2018   • ANGIOPLASTY  1985   • CARDIAC CATHETERIZATION     • CARDIAC ELECTROPHYSIOLOGY PROCEDURE N/A 11/26/2018    Procedure: Ablation atrial flutter;  Surgeon: Tino Sampson MD;  Location: [facility] INVASIVE LOCATION;  Service: Cardiovascular   • CARDIAC SURGERY      BYPASS X5   • COLONOSCOPY  2009   • COLONOSCOPY  2005   • CORONARY ANGIOPLASTY  1985   • CORONARY ARTERY BYPASS GRAFT  2003    x5 [facility] 2003   • DENTAL PROCEDURE  2010    dental surg and crowns   • EYE SURGERY Right     cataract   • SKIN BIOPSY     • VASECTOMY         Family History   Problem Relation Age of Onset   • Diabetes Mother    • Heart disease Mother    • Coronary artery disease Other    • Stroke Other    • Diabetes Other    • Rheumatic fever Other    • Other Other         Valvular CV Disease       Social History     Socioeconomic History   • Marital status:  "   Tobacco Use   • Smoking status: Former Smoker     Packs/day: 1.00     Years: 8.00     Pack years: 8.00     Types: Cigarettes     Start date: 10/1/1956     Quit date: 1964     Years since quittin.6   • Smokeless tobacco: Never Used   Substance and Sexual Activity   • Alcohol use: No   • Drug use: Never   • Sexual activity: Not Currently       Allergies   Allergen Reactions   • Aspirin GI Bleeding   • Eliquis [Apixaban] Other (See Comments)     Excessive bleeding   • Amlodipine Besylate Unknown (See Comments)     Unknown     • Atenolol Unknown (See Comments)     unknown   • Empagliflozin Other (See Comments)     Low BP   • Metoprolol Unknown (See Comments)     Unknown   • Milk-Related Compounds Other (See Comments)     Headache       Review of Systems     HEENT: Denies any hearing changes, eyesight changes, no headache or dizziness  NECK: Denies any pain, stiffness, swelling or dysphagia  CHEST: Complains of cough for 2 weeks with recent treatment with Z-Steve minorly improved, infrequent wheeze some production greenish sputum with drainage and shortness of breath with walking and having to sleep with elevated head  CARDIAC: Denies chest pain, pressure or palpitations  ABD: Denies nausea, vomiting or abdominal pain  : Denies dysuria  NEURO: Denies syncope, concussion, neuropathy  PSYCH: Denies any stress  EXTREM: Denies arthritic changes myalgia or arthralgia complains of lower leg edema left and right  SKIN: Denies any rash      Vital Signs  Vitals:    22 1108   BP: 98/80   BP Location: Left arm   Patient Position: Sitting   Cuff Size: Adult   Pulse: 110   SpO2: 95%   Weight: 102 kg (224 lb)   Height: 188 cm (74\")   PainSc: 0-No pain     Body mass index is 28.76 kg/m².  BMI is >= 25 and <30. (Overweight) The following options were offered after discussion;: exercise counseling/recommendations and nutrition counseling/recommendations encouraged healthy cardiac diet reduce carbs no salt and " weight loss     Advance Care Planning   ACP discussion was held with the patient during this visit. Patient has an advance directive in EMR which is still valid.        Current Outpatient Medications:   •  allopurinol (Zyloprim) 300 MG tablet, Take 1 tablet by mouth Daily., Disp: 30 tablet, Rfl: 5  •  aspirin 81 MG EC tablet, Take 81 mg by mouth Daily., Disp: , Rfl:   •  azithromycin (Zithromax Z-Steve) 250 MG tablet, Take 2 tablets the first day, then 1 tablet daily for 4 days., Disp: 6 tablet, Rfl: 0  •  COD LIVER OIL PO, Take 2 capsules in the morning and 1 capsule in the afternoon, Disp: , Rfl:   •  furosemide (LASIX) 40 MG tablet, Take 1 tablet by mouth Daily., Disp: 90 tablet, Rfl: 3  •  levothyroxine (SYNTHROID, LEVOTHROID) 125 MCG tablet, One tablet daily, Disp: 90 tablet, Rfl: 1  •  metOLazone (ZAROXOLYN) 2.5 MG tablet, Take 1 tablet every Monday Wednesday and Friday, Disp: 20 tablet, Rfl: 2  •  Misc Natural Products (ADVANCED JOINT RELIEF PO), Take  by mouth Daily., Disp: , Rfl:   •  NON FORMULARY, Cardio-Plus 2080 : Take 4 tablets in the morning and 5 tablets in the evening, Disp: , Rfl:   •  NON FORMULARY, Cataplex B 1250mg: Take 3 tablets BID, Disp: , Rfl:   •  sacubitril-valsartan (Entresto) 24-26 MG tablet, Take 1 tablet by mouth 2 (Two) Times a Day., Disp: 180 tablet, Rfl: 3  •  TURMERIC PO, Take 1 tablet by mouth Daily. (Turmeric Forte), Disp: , Rfl:   •  dextromethorphan polistirex ER (Delsym) 30 MG/5ML Suspension Extended Release oral suspension, Take 5 mL by mouth Every 12 (Twelve) Hours., Disp: 90 mL, Rfl: 1  •  levoFLOXacin (Levaquin) 500 MG tablet, Take 1 tablet by mouth Daily., Disp: 7 tablet, Rfl: 0    Physical Exam     ACE III MINI         HEENT: Pupils equal reactive ENT clear, no facial asymmetry, pharynx is clear  NECK: No masses bruit or thyromegaly  CHEST: Rhonchi, wheezing and crackles noted on exam.   CARDIAC: Increased heart rate.   ABD: Liver spleen normal, good bowel sounds,  nontender  : Deferred  NEURO: Intact  PSYCH: Normal  EXTREM: Bilateral leg edema.   SKIN: Clear     Results Review:    Recent Results (from the past 672 hour(s))   BNP    Collection Time: 05/25/22  9:21 AM    Specimen: Blood    Blood  Release to alec   Result Value Ref Range    BNP CANCELED pg/mL   TSH    Collection Time: 05/25/22  9:21 AM    Specimen: Blood    Blood  Release to alec   Result Value Ref Range    TSH 6.230 (H) 0.450 - 4.500 uIU/mL   Uric Acid    Collection Time: 05/25/22  9:21 AM    Specimen: Blood    Blood  Release to alec   Result Value Ref Range    Uric Acid 11.7 (H) 3.8 - 8.4 mg/dL   Lipid Panel    Collection Time: 05/25/22  9:21 AM    Specimen: Blood    Blood  Release to alec   Result Value Ref Range    Total Cholesterol 191 100 - 199 mg/dL    Triglycerides 127 0 - 149 mg/dL    HDL Cholesterol 40 >39 mg/dL    VLDL Cholesterol Mateo 23 5 - 40 mg/dL    LDL Chol Calc (NIH) 128 (H) 0 - 99 mg/dL   Comprehensive Metabolic Panel    Collection Time: 05/25/22  9:21 AM    Specimen: Blood    Blood  Release to alec   Result Value Ref Range    Glucose 106 (H) 65 - 99 mg/dL    BUN 26 8 - 27 mg/dL    Creatinine 1.34 (H) 0.76 - 1.27 mg/dL    EGFR Result 53 (L) >59 mL/min/1.73    BUN/Creatinine Ratio 19 10 - 24    Sodium 141 134 - 144 mmol/L    Potassium 4.5 3.5 - 5.2 mmol/L    Chloride 103 96 - 106 mmol/L    Total CO2 22 20 - 29 mmol/L    Calcium 9.4 8.6 - 10.2 mg/dL    Total Protein 6.7 6.0 - 8.5 g/dL    Albumin 4.3 3.6 - 4.6 g/dL    Globulin 2.4 1.5 - 4.5 g/dL    A/G Ratio 1.8 1.2 - 2.2    Total Bilirubin 0.6 0.0 - 1.2 mg/dL    Alkaline Phosphatase 47 44 - 121 IU/L    AST (SGOT) 16 0 - 40 IU/L    ALT (SGPT) 11 0 - 44 IU/L   CBC & Differential    Collection Time: 05/25/22  9:21 AM    Specimen: Blood    Blood  Release to aelc   Result Value Ref Range    WBC 6.2 3.4 - 10.8 x10E3/uL    RBC 4.78 4.14 - 5.80 x10E6/uL    Hemoglobin 13.9 13.0 - 17.7 g/dL    Hematocrit 43.1 37.5 - 51.0 %    MCV 90 79 - 97 fL    MCH  29.1 26.6 - 33.0 pg    MCHC 32.3 31.5 - 35.7 g/dL    RDW 15.2 11.6 - 15.4 %    Platelets 184 150 - 450 x10E3/uL    Neutrophil Rel % 61 Not Estab. %    Lymphocyte Rel % 30 Not Estab. %    Monocyte Rel % 6 Not Estab. %    Eosinophil Rel % 2 Not Estab. %    Basophil Rel % 1 Not Estab. %    Neutrophils Absolute 3.8 1.4 - 7.0 x10E3/uL    Lymphocytes Absolute 1.8 0.7 - 3.1 x10E3/uL    Monocytes Absolute 0.4 0.1 - 0.9 x10E3/uL    Eosinophils Absolute 0.1 0.0 - 0.4 x10E3/uL    Basophils Absolute 0.0 0.0 - 0.2 x10E3/uL    Immature Granulocyte Rel % 0 Not Estab. %    Immature Grans Absolute 0.0 0.0 - 0.1 x10E3/uL   Specimen Status Report    Collection Time: 22  9:21 AM    Blood  Release to alec   Result Value Ref Range    Specimen Status CANCELED      Procedures chest x-ray done before office visit with cardiomegaly and increased hilar congestion with no pleural fluid and no infiltrate    Medication Review: Medications reviewed and noted    Patient wellness counseling  Exercise: Encouraged rest  Diet: Encouraged healthy cardiac diet reduce carbs smaller portions and no salt  Screening: Chest x-ray reviewed with patient  Social History     Socioeconomic History   • Marital status:    Tobacco Use   • Smoking status: Former Smoker     Packs/day: 1.00     Years: 8.00     Pack years: 8.00     Types: Cigarettes     Start date: 10/1/1956     Quit date: 1964     Years since quittin.6   • Smokeless tobacco: Never Used   Substance and Sexual Activity   • Alcohol use: No   • Drug use: Never   • Sexual activity: Not Currently        Assessment/Plan:    Problem List Items Addressed This Visit        Cardiac and Vasculature    Chronic systolic congestive heart failure (HCC) (Chronic)    Overview     · Echocardiogram (2019): LVEF 25%. Mild TR.  · Echocardiogram (2020): LVEF 20%.Moderate to severe MR. Moderate TR. Moderate pulmonary hypertension. Saline results negative.  · Echocardiogram (2022): LVEF 20%.  Moderate pulmonic valve regurgitation is present. Mild MR.           Current Assessment & Plan       Patient has mild cough productive persistent for the past 2 weeks despite Z-Steve therapy given last Monday, he does have lower leg edema he just completed an x-ray this morning showing cardiomegaly slight increase in perihilar congestion with no confluent pneumonia or pleural fluid believe he has a component of heart failure with leg edema and bronchitis we will treat with metolazone 2.5 mg daily for 3 days in addition to his regular Lasix of 40 mg daily we will add Delsym cough medicine twice daily and add Levaquin 500 mg daily for 7 days           Relevant Medications    furosemide (LASIX) 40 MG tablet    sacubitril-valsartan (Entresto) 24-26 MG tablet       Pulmonary and Pneumonias    Cough - Primary    Overview     Patient complains of cough for the past 2 weeks progressively worse was seen in the office this past Monday following Z-Steve of which he has 1 tablet left cough is slightly improved but continues to be a problem particularly at night when lying supine if he sits up he is less short of breath with less coughing the cough is productive of greenish sputum  He had a chest x-ray prior to the office visit today showing cardiomegaly and slight increase in perihilar markings with no fluid and no confluent pneumonia.  His lungs have distant breath sounds with scattered rhonchi and a few crackles and very minor wheeze he does have 2+ ankle edema he does have systolic heart failure and is on Lasix 40 mg daily he has taken 1 tablet of his metolazone 2.5 approximately 2 weeks ago and did have slight improvement with that.           Current Assessment & Plan     Cough associated with enlarged heart and edema likely an exacerbation of his systolic heart failure but also complicated by bronchitis with productive sputum  Plan; continue Lasix 40 mg daily, take Zaroxolyn 2.5 mg daily for 3 days, add Delsym cough medicine  twice daily, add Levaquin 500 mg daily for 7 days  He is to notify us of improvement                  Patient Instructions   Chest x-ray revealed with cardiomegaly and slight increase in perihilar markings with mild failure with leg edema  Suggest Zaroxolyn 2.5 mg for 3 days beginning today  Suggest adding Levaquin 500 mg daily for 7 days  Suggest adding Delsym cough medicine teaspoon twice daily  To notify us if not improved  Return for regular appointment       Plan of care reviewed with patient at the conclusion of today's visit. Education was provided regarding diagnosis, management, and any prescribed or recommended OTC medications.Patient verbalizes understanding of and agreement with management plan.         Saul Campbell MD      Note: Part of this note may be an electronic transcription/translation of spoken language to printed text using the Dragon Dictation system.    Transcribed from ambient dictation for Saul Campbell MD by Anisha Oreilly.  06/16/22   15:45 EDT    Patient verbalized consent to the visit recording.  I have personally performed the services described in this document as transcribed by the above individual, and it is both accurate and complete.  Saul Campbell MD  6/16/2022  17:46 EDT

## 2022-06-28 ENCOUNTER — TELEPHONE (OUTPATIENT)
Dept: INTERNAL MEDICINE | Facility: CLINIC | Age: 82
End: 2022-06-28

## 2022-06-28 NOTE — TELEPHONE ENCOUNTER
Called and talked to pt. Offered appt, but he said Dr. Campbell is familiar with this problem. He would like Dr. Campbell opinion re: referral or does he need to see him. Advised Dr. Campbell is out until next week - he is ok to wait

## 2022-06-28 NOTE — TELEPHONE ENCOUNTER
Caller: Saul Sal    Relationship: Self    Best call back number:     [phone]     What is the medical concern/diagnosis:     PATIENT STATED HIS LEFT ANKLE IS MORE SWOLLEN THAN USUAL OVER THE YEARS    What specialty or service is being requested:     PODIATRIST    What is the provider, practice or medical service name:     [facility] REC[facility]    What is the office location:     [facility] REC[facility]ENDED    Any additional details:     PLEASE CONTACT PATIENT WITH ANY INFORMATION REGARDING THIS REFERRAL REQUEST    PATIENT HAS BEEN HAVING DIFFICULTY WALKING     DR MAGANA

## 2022-07-03 NOTE — TELEPHONE ENCOUNTER
Message noted about leg swelling, and I reviewed the note of June 16 on Lasix 40 mg daily and gave 3 successive days of Zaroxolyn 2.5, as the patient short of breath with awakening at night with shortness of breath or is he just complaining of lower leg ankle edema

## 2022-07-05 NOTE — TELEPHONE ENCOUNTER
I spoke with patient.  He states his swelling his better.  He was having bad ankle pain but it's better.  Now he's having bilateral knee pain and having to walk with a cane.  I scheduled appt for him to see  on 7/7/22

## 2022-07-07 ENCOUNTER — LAB (OUTPATIENT)
Dept: LAB | Facility: HOSPITAL | Age: 82
End: 2022-07-07

## 2022-07-07 ENCOUNTER — OFFICE VISIT (OUTPATIENT)
Dept: INTERNAL MEDICINE | Facility: CLINIC | Age: 82
End: 2022-07-07

## 2022-07-07 ENCOUNTER — HOSPITAL ENCOUNTER (OUTPATIENT)
Dept: GENERAL RADIOLOGY | Facility: HOSPITAL | Age: 82
Discharge: HOME | End: 2022-07-07

## 2022-07-07 VITALS
WEIGHT: 215 LBS | BODY MASS INDEX: 27.59 KG/M2 | HEIGHT: 74 IN | SYSTOLIC BLOOD PRESSURE: 100 MMHG | HEART RATE: 92 BPM | DIASTOLIC BLOOD PRESSURE: 70 MMHG

## 2022-07-07 DIAGNOSIS — R60.0 LOCALIZED EDEMA: ICD-10-CM

## 2022-07-07 DIAGNOSIS — I50.22 CHRONIC SYSTOLIC CONGESTIVE HEART FAILURE: Chronic | ICD-10-CM

## 2022-07-07 DIAGNOSIS — R05.9 COUGH: ICD-10-CM

## 2022-07-07 DIAGNOSIS — R60.0 LOCALIZED EDEMA: Primary | ICD-10-CM

## 2022-07-07 DIAGNOSIS — M15.9 PRIMARY OSTEOARTHRITIS INVOLVING MULTIPLE JOINTS: ICD-10-CM

## 2022-07-07 PROCEDURE — 99214 OFFICE O/P EST MOD 30 MIN: CPT | Performed by: INTERNAL MEDICINE

## 2022-07-07 PROCEDURE — 73562 X-RAY EXAM OF KNEE 3: CPT

## 2022-07-07 RX ORDER — LANCETS
EACH MISCELLANEOUS
Qty: 100 EACH | Refills: 1 | Status: SHIPPED | OUTPATIENT
Start: 2022-07-07 | End: 2022-11-07

## 2022-07-07 RX ORDER — PREDNISONE 20 MG/1
20 TABLET ORAL DAILY
Qty: 10 TABLET | Refills: 1 | Status: SHIPPED | OUTPATIENT
Start: 2022-07-07 | End: 2022-07-18

## 2022-07-07 RX ORDER — BLOOD-GLUCOSE METER
1 EACH MISCELLANEOUS DAILY
Qty: 1 KIT | Refills: 1 | Status: SHIPPED | OUTPATIENT
Start: 2022-07-07 | End: 2022-10-27 | Stop reason: SDUPTHER

## 2022-07-07 NOTE — ASSESSMENT & PLAN NOTE
Currently controlled  Chronic systolic heart failure with aspirin 81 Lasix of 40 mg daily, Entresto 24/26 twice daily continue therapy

## 2022-07-07 NOTE — PROGRESS NOTES
"[facility]     Saul Sal  1940   [id number]      Patient Care Team:  Saul Campbell MD as PCP - General (Internal Medicine)  Isael Lake MD as Consulting Physician (Ophthalmology)  Librado Jones IV, MD as Cardiologist (Interventional Cardiology)    Chief Complaint::   Chief Complaint   Patient presents with   • Knee Pain     Bilateral, popliteal area primarily.  Also with lower extremity edema.  Aleve seems to help            HPI    The patient is an 82-year-old male with a history of coronary disease and chronic systolic heart failure, complaining of lower leg edema and bilateral popliteal area knee pain.    The patient reports he has been experiencing edema in the bilateral feet. He began taking metolazone which provided good relief, but then his bilateral feet would become edematous at night. He has been experiencing difficulty transitioning from a sitting to a standing position and ambulating since 07/01/2022. He is experiencing soreness and weakness in his bilateral knees  He has been taking 2 Aleve daily for approximately 3-4 days with good relief. He explains he was told to take Tylenol but it has not provided any relief. He denies any soreness in his bilateral knees. He has not taken metolazone in approximately 2 days.     He denies any fever or chills. He denies any wheezing or shortness of breath. He admits coughing. He admits difficulty sleeping. He is not using a CPAP.     He explains he has been experiencing some chigger bites. He states he has been working outside and he will pace himself when he does so. He denies working outside for approximately the past 2 weeks.     He has lost weight. He explains he is not eating much. He is taking Lasix.     [facility]\".     He is taking thyroid medication.       Patient Active Problem List   Diagnosis   • Coronary artery disease involving native coronary artery of native " heart with angina pectoris (HCC)   • Atrial flutter (HCC)   • Hyperlipidemia LDL goal <70   • ELIE (obstructive sleep apnea)   • Hypothyroidism (acquired)   • Obesity (BMI 30-39.9)   • GERD (gastroesophageal reflux disease)   • H/O asbestos exposure   • Herpes zoster without complication   • History of colon polyps   • Left wrist injury   • Postherpetic neuralgia   • Primary osteoarthritis involving multiple joints   • Chronic systolic congestive heart failure (HCC)   • Bronchitis   • Trochanteric bursitis of right hip   • Olecranon bursitis of right elbow   • Medicare annual wellness visit, subsequent   • Chronic gout of multiple sites   • Cough   • Localized edema        Past Medical History:   Diagnosis Date   • Abnormal ECG    • Arrhythmia    • Arthritis    • Atrial fibrillation (HCC)    • Atrial flutter (HCC)     s/p typical flutter ablation with recurrent atypical flutter   • Benign essential hypertension    • BPH (benign prostatic hyperplasia)    • CHF (congestive heart failure) (HCC)    • Clotting disorder (HCC) too many aspirin   • Congenital heart disease    • Coronary artery disease    • Diverticulosis    • GERD (gastroesophageal reflux disease)    • GI bleed    • HFrEF (heart failure with reduced ejection fraction) (formerly [facility])    • Hyperlipidemia    • Hypertension    • Hypothyroidism (acquired)    • Obesity (BMI 30-39.9)    • ELIE (obstructive sleep apnea)    • Osteoarthritis    • Shingles    • Skin cancer     squamous    • Wears eyeglasses     readers   • Wears hearing aid        Past Surgical History:   Procedure Laterality Date   • ABLATION OF DYSRHYTHMIC FOCUS  12/2018   • ANGIOPLASTY  1985   • CARDIAC CATHETERIZATION     • CARDIAC ELECTROPHYSIOLOGY PROCEDURE N/A 11/26/2018    Procedure: Ablation atrial flutter;  Surgeon: Tino Sampson MD;  Location: [facility] INVASIVE LOCATION;  Service: Cardiovascular   • CARDIAC SURGERY      BYPASS X5   • COLONOSCOPY  2009   • COLONOSCOPY  2005   • CORONARY  ANGIOPLASTY  1985   • CORONARY ARTERY BYPASS GRAFT  2003    x5 [facility] 2003   • DENTAL PROCEDURE      dental surg and crowns   • EYE SURGERY Right     cataract   • SKIN BIOPSY     • VASECTOMY         Family History   Problem Relation Age of Onset   • Diabetes Mother    • Heart disease Mother    • Coronary artery disease Other    • Stroke Other    • Diabetes Other    • Rheumatic fever Other    • Other Other         Valvular CV Disease       Social History     Socioeconomic History   • Marital status:    Tobacco Use   • Smoking status: Former Smoker     Packs/day: 1.00     Years: 8.00     Pack years: 8.00     Types: Cigarettes     Start date: 10/1/1956     Quit date: 1964     Years since quittin.6   • Smokeless tobacco: Never Used   Substance and Sexual Activity   • Alcohol use: No   • Drug use: Never   • Sexual activity: Not Currently       Allergies   Allergen Reactions   • Aspirin GI Bleeding   • Eliquis [Apixaban] Other (See Comments)     Excessive bleeding   • Amlodipine Besylate Unknown (See Comments)     Unknown     • Atenolol Unknown (See Comments)     unknown   • Empagliflozin Other (See Comments)     Low BP   • Metoprolol Unknown (See Comments)     Unknown   • Milk-Related Compounds Other (See Comments)     Headache       Review of Systems     HEENT: Denies any hearing changes, eyesight changes, no headache or dizziness  NECK: Denies any pain, stiffness, swelling, or dysphagia  CHEST: Denies wheeze or recent lung infections, admits cough  CARDIAC: Denies chest pain, pressure, or palpitations  ABD: Denies nausea, vomiting, or abdominal pain  : Denies dysuria  NEURO: Denies syncope, concussion, or neuropathy  PSYCH: Denies any stress  EXTREM: Denies arthritic changes, myalgia, or arthralgia complains of soreness and discomfort and the swelling and lack of strength  SKIN: Denies any rash, admits chigger bites    Vital Signs  Vitals:    22 0917   BP: 100/70   BP Location: Right  "arm   Patient Position: Sitting   Cuff Size: Adult   Pulse: 92  Comment: irregular   Weight: 97.5 kg (215 lb)   Height: 188 cm (74\")   PainSc:   7   PainLoc: Knee  Comment: pain with walking     Body mass index is 27.6 kg/m².  BMI is >= 25 and <30. (Overweight) The following options were offered after discussion;: exercise counseling/recommendations and nutrition counseling/recommendations     Advance Care Planning   ACP discussion was held with the patient during this visit. Patient has an advance directive in EMR which is still valid.        Current Outpatient Medications:   •  allopurinol (Zyloprim) 300 MG tablet, Take 1 tablet by mouth Daily., Disp: 30 tablet, Rfl: 5  •  aspirin 81 MG EC tablet, Take 81 mg by mouth Daily., Disp: , Rfl:   •  COD LIVER OIL PO, Take 2 capsules in the morning and 1 capsule in the afternoon, Disp: , Rfl:   •  furosemide (LASIX) 40 MG tablet, Take 1 tablet by mouth Daily., Disp: 90 tablet, Rfl: 3  •  levothyroxine (SYNTHROID, LEVOTHROID) 125 MCG tablet, One tablet daily, Disp: 90 tablet, Rfl: 1  •  metOLazone (ZAROXOLYN) 2.5 MG tablet, Take 1 tablet every Monday Wednesday and Friday, Disp: 20 tablet, Rfl: 2  •  Misc Natural Products (ADVANCED JOINT RELIEF PO), Take  by mouth Daily., Disp: , Rfl:   •  NON FORMULARY, Cardio-Plus 2080 : Take 4 tablets in the morning and 5 tablets in the evening, Disp: , Rfl:   •  NON FORMULARY, Cataplex B 1250mg: Take 3 tablets BID, Disp: , Rfl:   •  sacubitril-valsartan (Entresto) 24-26 MG tablet, Take 1 tablet by mouth 2 (Two) Times a Day., Disp: 180 tablet, Rfl: 3  •  TURMERIC PO, Take 1 tablet by mouth Daily. (Turmeric Forte), Disp: , Rfl:   •  Blood Glucose Monitoring Suppl (Accu-Chek Guide) w/Device kit, 1 Units Daily. For use with daily blood sugar monitoring, Disp: 1 kit, Rfl: 1  •  dextromethorphan polistirex ER (Delsym) 30 MG/5ML Suspension Extended Release oral suspension, Take 5 mL by mouth Every 12 (Twelve) Hours., Disp: 90 mL, Rfl: 1  •  " glucose blood test strip, Use as instructed for daily blood sugar testing., Disp: 100 each, Rfl: 1  •  Lancets (onetouch ultrasoft) lancets, Use as instructed for once daily blood sugar testing, Disp: 100 each, Rfl: 1  •  predniSONE (DELTASONE) 20 MG tablet, Take 1 tablet by mouth Daily., Disp: 10 tablet, Rfl: 1    Physical Exam     ACE III MINI         HEENT: Pupils equal reactive ENT clear, no facial asymmetry, pharynx is clear  NECK: No masses bruit or thyromegaly  CHEST: Clear without rales wheezes or rhonchi  CARDIAC: Regular rhythm without gallop rub or click, blood pressure heart rate stable  ABD: Liver spleen normal, good bowel sounds, nontender  : Deferred  NEURO: Intact  PSYCH: Normal  EXTREM: No significant arthritic changes, 1+ edema in the right lower ankle leg and 2+ edema on the left. Feet are warm. Mild swelling behind both knees. Palpable popliteal cyst behind both knees. Arthritic changes, left leg greater than right. He has some chigger bites on the left leg.   SKIN: Clear except chigger bites left leg    His blood pressure is 120/70 mm/Hg. On his right arm, his blood pressure is 118/70 mm/Hg.      Results Review:    No results found for this or any previous visit (from the past 672 hour(s)).  Procedures    Medication Review: Medications reviewed and noted    Patient wellness counseling  Exercise: Suggest rest  Diet: Healthy cardiac diet  Screening: Bilateral knee x-rays and lab work of CBC CMP PE sed rate and uric acid  Social History     Socioeconomic History   • Marital status:    Tobacco Use   • Smoking status: Former Smoker     Packs/day: 1.00     Years: 8.00     Pack years: 8.00     Types: Cigarettes     Start date: 10/1/1956     Quit date: 1964     Years since quittin.6   • Smokeless tobacco: Never Used   Substance and Sexual Activity   • Alcohol use: No   • Drug use: Never   • Sexual activity: Not Currently        Assessment/Plan:    Problem List Items Addressed This  Visit        Cardiac and Vasculature    Chronic systolic congestive heart failure (HCC) (Chronic)    Overview     · Echocardiogram (2/2019): LVEF 25%. Mild TR.  · Echocardiogram (12/1/2020): LVEF 20%.Moderate to severe MR. Moderate TR. Moderate pulmonary hypertension. Saline results negative.  · Echocardiogram (1/19/2022): LVEF 20%. Moderate pulmonic valve regurgitation is present. Mild MR.           Current Assessment & Plan     Currently controlled  Chronic systolic heart failure with aspirin 81 Lasix of 40 mg daily, Entresto 24/26 twice daily continue therapy           Relevant Medications    furosemide (LASIX) 40 MG tablet    sacubitril-valsartan (Entresto) 24-26 MG tablet    Other Relevant Orders    CBC & Differential    Comprehensive Metabolic Panel    Uric Acid    Sedimentation Rate    XR Knee 3 View Bilateral (Completed)       Musculoskeletal and Injuries    Primary osteoarthritis involving multiple joints    Overview     Bilateral knee pain           Current Assessment & Plan     Patient developed severe pain discomfort and soreness both knees exam reveals minor fluid both knees with good range of motion with bilateral lower leg edema I feel fullness in the popliteal spaces both left and right, will obtain x-rays of both knees as well as CBC CMP sed rate and uric acid    We will treat with prednisone 20 mg daily for 10 days and will have patient resume his metolazone 2.5 mg every Monday Wednesday and Friday please return visit in 1 week           Relevant Orders    CBC & Differential    Comprehensive Metabolic Panel    Uric Acid    Sedimentation Rate    XR Knee 3 View Bilateral (Completed)       Pulmonary and Pneumonias    Cough    Overview     Patient complains of cough for the past 2 weeks progressively worse was seen in the office this past Monday following Z-Steve of which he has 1 tablet left cough is slightly improved but continues to be a problem particularly at night when lying supine if he sits up he  is less short of breath with less coughing the cough is productive of greenish sputum  He had a chest x-ray prior to the office visit today showing cardiomegaly and slight increase in perihilar markings with no fluid and no confluent pneumonia.  His lungs have distant breath sounds with scattered rhonchi and a few crackles and very minor wheeze he does have 2+ ankle edema he does have systolic heart failure and is on Lasix 40 mg daily he has taken 1 tablet of his metolazone 2.5 approximately 2 weeks ago and did have slight improvement with that.           Current Assessment & Plan     Chest x-ray on June 16, 2022 showed cardiomegaly with no fluid and stable pulmonary vasculature suggest the cough is due to his chronic heart failure and chronic mild allergy suggest no change in therapy other than increasing the metolazone to every Monday Wednesday Friday along with the Lasix 40 mg daily           Relevant Orders    CBC & Differential    Comprehensive Metabolic Panel    Uric Acid    Sedimentation Rate    XR Knee 3 View Bilateral (Completed)       Symptoms and Signs    Localized edema - Primary    Overview     I will order x-rays of the patient's bilateral knees.  I will prescribe prednisone 20 mg once a day for 10 days.  I advised the patient to stop taking Aleve because it can aggravate his kidneys.  I will order lab work.  I advised the patient to take metolazone every Monday, Wednesday, and Friday.   He will follow up in 1 week.           Current Assessment & Plan     Continue Lasix at 40 mg daily and resume metolazone 2.5 mg Monday Wednesday Friday           Relevant Orders    CBC & Differential    Comprehensive Metabolic Panel    Uric Acid    Sedimentation Rate    XR Knee 3 View Bilateral (Completed)           Patient Instructions   Stop Aleve  Give prednisone 20 mg daily for 10 days  Take the metolazone 2.5 mg every Monday Wednesday Friday  Obtain CBC CMP uric acid and bilateral knee x-rays this day  Low-salt  diet  Rest  See back in 1 week       Plan of care reviewed with patient at the conclusion of today's visit. Education was provided regarding diagnosis, management, and any prescribed or recommended OTC medications.Patient verbalizes understanding of and agreement with management plan.         Saul Campbell MD      Note: Part of this note may be an electronic transcription/translation of spoken language to printed text using the Dragon Dictation system.    Transcribed from ambient dictation for Saul Campbell MD by CHICA BAIG.  07/07/22   11:31 EDT    Patient verbalized consent to the visit recording.  I have personally performed the services described in this document as transcribed by the above individual, and it is both accurate and complete.  Saul Campbell MD  7/7/2022  18:34 EDT

## 2022-07-07 NOTE — PATIENT INSTRUCTIONS
Stop Aleve  Give prednisone 20 mg daily for 10 days  Take the metolazone 2.5 mg every Monday Wednesday Friday  Obtain CBC CMP uric acid and bilateral knee x-rays this day  Low-salt diet  Rest  See back in 1 week

## 2022-07-07 NOTE — ASSESSMENT & PLAN NOTE
Chest x-ray on June 16, 2022 showed cardiomegaly with no fluid and stable pulmonary vasculature suggest the cough is due to his chronic heart failure and chronic mild allergy suggest no change in therapy other than increasing the metolazone to every Monday Wednesday Friday along with the Lasix 40 mg daily

## 2022-07-07 NOTE — ASSESSMENT & PLAN NOTE
Patient developed severe pain discomfort and soreness both knees exam reveals minor fluid both knees with good range of motion with bilateral lower leg edema I feel fullness in the popliteal spaces both left and right, will obtain x-rays of both knees as well as CBC CMP sed rate and uric acid    We will treat with prednisone 20 mg daily for 10 days and will have patient resume his metolazone 2.5 mg every Monday Wednesday and Friday please return visit in 1 week

## 2022-07-08 LAB
ALBUMIN SERPL-MCNC: 4 G/DL (ref 3.5–5.2)
ALBUMIN/GLOB SERPL: 1.4 G/DL
ALP SERPL-CCNC: 53 U/L (ref 39–117)
ALT SERPL-CCNC: 14 U/L (ref 1–41)
AST SERPL-CCNC: 21 U/L (ref 1–40)
BASOPHILS # BLD AUTO: 0.05 10*3/MM3 (ref 0–0.2)
BASOPHILS NFR BLD AUTO: 0.6 % (ref 0–1.5)
BILIRUB SERPL-MCNC: 0.6 MG/DL (ref 0–1.2)
BUN SERPL-MCNC: 26 MG/DL (ref 8–23)
BUN/CREAT SERPL: 20.5 (ref 7–25)
CALCIUM SERPL-MCNC: 8.8 MG/DL (ref 8.6–10.5)
CHLORIDE SERPL-SCNC: 99 MMOL/L (ref 98–107)
CO2 SERPL-SCNC: 26.7 MMOL/L (ref 22–29)
CREAT SERPL-MCNC: 1.27 MG/DL (ref 0.76–1.27)
EGFRCR SERPLBLD CKD-EPI 2021: 56.1 ML/MIN/1.73
EOSINOPHIL # BLD AUTO: 0.13 10*3/MM3 (ref 0–0.4)
EOSINOPHIL NFR BLD AUTO: 1.6 % (ref 0.3–6.2)
ERYTHROCYTE [DISTWIDTH] IN BLOOD BY AUTOMATED COUNT: 14.9 % (ref 12.3–15.4)
ERYTHROCYTE [SEDIMENTATION RATE] IN BLOOD BY WESTERGREN METHOD: 34 MM/HR (ref 0–20)
GLOBULIN SER CALC-MCNC: 2.8 GM/DL
GLUCOSE SERPL-MCNC: 102 MG/DL (ref 65–99)
HCT VFR BLD AUTO: 39.3 % (ref 37.5–51)
HGB BLD-MCNC: 13.2 G/DL (ref 13–17.7)
IMM GRANULOCYTES # BLD AUTO: 0.03 10*3/MM3 (ref 0–0.05)
IMM GRANULOCYTES NFR BLD AUTO: 0.4 % (ref 0–0.5)
LYMPHOCYTES # BLD AUTO: 1.34 10*3/MM3 (ref 0.7–3.1)
LYMPHOCYTES NFR BLD AUTO: 16.8 % (ref 19.6–45.3)
MCH RBC QN AUTO: 28.6 PG (ref 26.6–33)
MCHC RBC AUTO-ENTMCNC: 33.6 G/DL (ref 31.5–35.7)
MCV RBC AUTO: 85.2 FL (ref 79–97)
MONOCYTES # BLD AUTO: 0.54 10*3/MM3 (ref 0.1–0.9)
MONOCYTES NFR BLD AUTO: 6.8 % (ref 5–12)
NEUTROPHILS # BLD AUTO: 5.88 10*3/MM3 (ref 1.7–7)
NEUTROPHILS NFR BLD AUTO: 73.8 % (ref 42.7–76)
NRBC BLD AUTO-RTO: 0 /100 WBC (ref 0–0.2)
PLATELET # BLD AUTO: 196 10*3/MM3 (ref 140–450)
POTASSIUM SERPL-SCNC: 4.6 MMOL/L (ref 3.5–5.2)
PROT SERPL-MCNC: 6.8 G/DL (ref 6–8.5)
RBC # BLD AUTO: 4.61 10*6/MM3 (ref 4.14–5.8)
SODIUM SERPL-SCNC: 139 MMOL/L (ref 136–145)
URATE SERPL-MCNC: 8.4 MG/DL (ref 3.4–7)
WBC # BLD AUTO: 7.97 10*3/MM3 (ref 3.4–10.8)

## 2022-07-18 ENCOUNTER — OFFICE VISIT (OUTPATIENT)
Dept: INTERNAL MEDICINE | Facility: CLINIC | Age: 82
End: 2022-07-18

## 2022-07-18 VITALS
SYSTOLIC BLOOD PRESSURE: 122 MMHG | WEIGHT: 209 LBS | BODY MASS INDEX: 26.82 KG/M2 | HEIGHT: 74 IN | HEART RATE: 72 BPM | TEMPERATURE: 97.7 F | DIASTOLIC BLOOD PRESSURE: 68 MMHG

## 2022-07-18 DIAGNOSIS — I50.22 CHRONIC SYSTOLIC CONGESTIVE HEART FAILURE: Primary | Chronic | ICD-10-CM

## 2022-07-18 DIAGNOSIS — M15.9 PRIMARY OSTEOARTHRITIS INVOLVING MULTIPLE JOINTS: ICD-10-CM

## 2022-07-18 DIAGNOSIS — R60.0 LOCALIZED EDEMA: ICD-10-CM

## 2022-07-18 PROCEDURE — 99213 OFFICE O/P EST LOW 20 MIN: CPT | Performed by: INTERNAL MEDICINE

## 2022-07-18 NOTE — ASSESSMENT & PLAN NOTE
Symptoms improved on Entresto, Lasix 40 mg daily and metolazone 2.5 mg as needed the patient has no edema he has not short of breath he has good breath sounds without dyspnea or PND continue current therapy

## 2022-07-18 NOTE — ASSESSMENT & PLAN NOTE
Markedly improved edema on Lasix Entresto and as needed metolazone but only trace of edema left ankle continue therapy

## 2022-07-18 NOTE — PROGRESS NOTES
"[facility]     Saul Sal  1940   [id number]      Patient Care Team:  Saul Campbell MD as PCP - General (Internal Medicine)  Isael Lake MD as Consulting Physician (Ophthalmology)  Librado Jones IV, MD as Cardiologist (Interventional Cardiology)    Chief Complaint::   Chief Complaint   Patient presents with   • Edema   • Cough   • Congestive Heart Failure            HPI    The patient is an 82-year-old male who returns for office visit with regards to edema, cough, and congestive heart failure. He has been diuresed since 06/16/2022, 15 pounds, now down to 209 pounds.    The patient reports he is feeling well. He admits constant bilateral ankle edema. He explains he has not taken prednisone in approximately 2 days because he ran out. He explains his left knee is improving. He explains he recently weighed 203 pounds. He explains he weighed 205 pounds this morning, 07/18/2022. He explains his blood glucose reading was 102 mg/dL on 07/15/2022. He notes his blood glucose reading was 92 mg/dL this morning, 07/18/2022. He denies any difficulty breathing. He denies any pain in his bilateral knees. He is interested in a 2nd COVID-19 booster vaccine. He explains he has not experienced an episode of shortness of breath in \"over a month\". He denies any heart issues. He is currently taking Lasix and Entresto. He denies any issues with his medications.       Patient Active Problem List   Diagnosis   • Coronary artery disease involving native coronary artery of native heart with angina pectoris (HCC)   • Atrial flutter (HCC)   • Hyperlipidemia LDL goal <70   • ELIE (obstructive sleep apnea)   • Hypothyroidism (acquired)   • Obesity (BMI 30-39.9)   • GERD (gastroesophageal reflux disease)   • H/O asbestos exposure   • Herpes zoster without complication   • History of colon polyps   • Left wrist injury   • Postherpetic neuralgia   • Primary osteoarthritis involving multiple joints   • Chronic " systolic congestive heart failure (HCC)   • Bronchitis   • Trochanteric bursitis of right hip   • Olecranon bursitis of right elbow   • Medicare annual wellness visit, subsequent   • Chronic gout of multiple sites   • Cough   • Localized edema        Past Medical History:   Diagnosis Date   • Abnormal ECG    • Arrhythmia    • Arthritis    • Atrial fibrillation (HCC)    • Atrial flutter (HCC)     s/p typical flutter ablation with recurrent atypical flutter   • Benign essential hypertension    • BPH (benign prostatic hyperplasia)    • CHF (congestive heart failure) ([facility])    • Clotting disorder (HCC) too many aspirin   • Congenital heart disease    • Coronary artery disease    • Diverticulosis    • GERD (gastroesophageal reflux disease)    • GI bleed    • HFrEF (heart failure with reduced ejection fraction) ([facility])    • Hyperlipidemia    • Hypertension    • Hypothyroidism (acquired)    • Obesity (BMI 30-39.9)    • ELIE (obstructive sleep apnea)    • Osteoarthritis    • Shingles    • Skin cancer     squamous    • Wears eyeglasses     readers   • Wears hearing aid        Past Surgical History:   Procedure Laterality Date   • ABLATION OF DYSRHYTHMIC FOCUS  12/2018   • ANGIOPLASTY  1985   • CARDIAC CATHETERIZATION     • CARDIAC ELECTROPHYSIOLOGY PROCEDURE N/A 11/26/2018    Procedure: Ablation atrial flutter;  Surgeon: Tino Sampson MD;  Location: [facility] INVASIVE LOCATION;  Service: Cardiovascular   • CARDIAC SURGERY      BYPASS X5   • COLONOSCOPY  2009   • COLONOSCOPY  2005   • CORONARY ANGIOPLASTY  1985   • CORONARY ART[facility] GRAFT  2003    [facility]   • DENTAL PROCEDURE  2010    dental surg and crowns   • EYE SURGERY Right     cataract   • SKIN BIOPSY     • VASECTOMY         Family History   Problem Relation Age of Onset   • Diabetes Mother    • Heart disease Mother    • Coronary artery disease Other    • Stroke Other    • Diabetes Other    • Rheumatic fever Other    • Other Other         Valvular CV  "Disease       Social History     Socioeconomic History   • Marital status:    Tobacco Use   • Smoking status: Former Smoker     Packs/day: 1.00     Years: 8.00     Pack years: 8.00     Types: Cigarettes     Start date: 10/1/1956     Quit date: 1964     Years since quittin.6   • Smokeless tobacco: Never Used   Substance and Sexual Activity   • Alcohol use: No   • Drug use: Never   • Sexual activity: Not Currently       Allergies   Allergen Reactions   • Aspirin GI Bleeding   • Eliquis [Apixaban] Other (See Comments)     Excessive bleeding   • Amlodipine Besylate Unknown (See Comments)     Unknown     • Atenolol Unknown (See Comments)     unknown   • Empagliflozin Other (See Comments)     Low BP   • Metoprolol Unknown (See Comments)     Unknown   • Milk-Related Compounds Other (See Comments)     Headache       Review of Systems     HEENT: Denies any hearing changes, eyesight changes, no headache or dizziness  NECK: Denies any pain, stiffness, swelling, or dysphagia  CHEST: Denies cough, wheeze, or recent lung infections  CARDIAC: Denies chest pain, pressure, or palpitations  ABD: Denies nausea, vomiting, or abdominal pain  : Denies dysuria  NEURO: Denies syncope, concussion, or neuropathy  PSYCH: Denies any stress  EXTREM: Denies arthritic changes, myalgia, or arthralgia both knees are markedly improved with prednisone therapy for the degenerative arthritis disease  SKIN: Denies any rash    Vital Signs  Vitals:    22 1237   BP: 122/68   BP Location: Left arm   Patient Position: Sitting   Cuff Size: Adult   Pulse: 72   Temp: 97.7 °F (36.5 °C)   TempSrc: Temporal   Weight: 94.8 kg (209 lb)   Height: 188 cm (74.02\")   PainSc: 0-No pain     Body mass index is 26.82 kg/m².  BMI is >= 25 and <30. (Overweight) The following options were offered after discussion;: nutrition counseling/recommendations     Advance Care Planning   ACP discussion was held with the patient during this visit. Patient has " an advance directive in EMR which is still valid.        Current Outpatient Medications:   •  allopurinol (Zyloprim) 300 MG tablet, Take 1 tablet by mouth Daily., Disp: 30 tablet, Rfl: 5  •  aspirin 81 MG EC tablet, Take 81 mg by mouth Daily., Disp: , Rfl:   •  Blood Glucose Monitoring Suppl (Accu-Chek Guide) w/Device kit, 1 Units Daily. For use with daily blood sugar monitoring, Disp: 1 kit, Rfl: 1  •  COD LIVER OIL PO, Take 2 capsules in the morning and 1 capsule in the afternoon, Disp: , Rfl:   •  dextromethorphan polistirex ER (Delsym) 30 MG/5ML Suspension Extended Release oral suspension, Take 5 mL by mouth Every 12 (Twelve) Hours., Disp: 90 mL, Rfl: 1  •  furosemide (LASIX) 40 MG tablet, Take 1 tablet by mouth Daily., Disp: 90 tablet, Rfl: 3  •  glucose blood test strip, Use as instructed for daily blood sugar testing., Disp: 100 each, Rfl: 1  •  Lancets (onetouch ultrasoft) lancets, Use as instructed for once daily blood sugar testing, Disp: 100 each, Rfl: 1  •  levothyroxine (SYNTHROID, LEVOTHROID) 125 MCG tablet, One tablet daily, Disp: 90 tablet, Rfl: 1  •  metOLazone (ZAROXOLYN) 2.5 MG tablet, Take 1 tablet every Monday Wednesday and Friday, Disp: 20 tablet, Rfl: 2  •  Misc Natural Products (ADVANCED JOINT RELIEF PO), Take  by mouth Daily., Disp: , Rfl:   •  NON FORMULARY, Cardio-Plus 2080 : Take 4 tablets in the morning and 5 tablets in the evening, Disp: , Rfl:   •  NON FORMULARY, Cataplex B 1250mg: Take 3 tablets BID, Disp: , Rfl:   •  sacubitril-valsartan (Entresto) 24-26 MG tablet, Take 1 tablet by mouth 2 (Two) Times a Day., Disp: 180 tablet, Rfl: 3  •  TURMERIC PO, Take 1 tablet by mouth Daily. (Turmeric Forte), Disp: , Rfl:     Physical Exam     ACE III MINI         HEENT: Pupils equal, reactive, ENT clear, no facial asymmetry, pharynx is clear  NECK: No masses, bruit, or thyromegaly  CHEST: Clear without rales, wheezes, or rhonchi  CARDIAC: Regular rhythm without gallop, rub, or click, blood  pressure heart rate stable  ABD: Liver, spleen normal, good bowel sounds, nontender  : Deferred  NEURO: Intact  PSYCH: Normal  EXTREM: has significant arthritic changes of both knees and trace  edema left ankle  SKIN: Clear    The patient's blood pressure is 110/70 mm/Hg.      Results Review:    Recent Results (from the past 672 hour(s))   Sedimentation Rate    Collection Time: 07/07/22 10:33 AM    Specimen: Blood    Blood  Release to alec   Result Value Ref Range    Sed Rate 34 (H) 0 - 20 mm/hr   Uric Acid    Collection Time: 07/07/22 10:33 AM    Specimen: Blood    Blood  Release to alec   Result Value Ref Range    Uric Acid 8.4 (H) 3.4 - 7.0 mg/dL   Comprehensive Metabolic Panel    Collection Time: 07/07/22 10:33 AM    Specimen: Blood    Blood  Release to alec   Result Value Ref Range    Glucose 102 (H) 65 - 99 mg/dL    BUN 26 (H) 8 - 23 mg/dL    Creatinine 1.27 0.76 - 1.27 mg/dL    EGFR Result 56.1 (L) >60.0 mL/min/1.73    BUN/Creatinine Ratio 20.5 7.0 - 25.0    Sodium 139 136 - 145 mmol/L    Potassium 4.6 3.5 - 5.2 mmol/L    Chloride 99 98 - 107 mmol/L    Total CO2 26.7 22.0 - 29.0 mmol/L    Calcium 8.8 8.6 - 10.5 mg/dL    Total Protein 6.8 6.0 - 8.5 g/dL    Albumin 4.00 3.50 - 5.20 g/dL    Globulin 2.8 gm/dL    A/G Ratio 1.4 g/dL    Total Bilirubin 0.6 0.0 - 1.2 mg/dL    Alkaline Phosphatase 53 39 - 117 U/L    AST (SGOT) 21 1 - 40 U/L    ALT (SGPT) 14 1 - 41 U/L   CBC & Differential    Collection Time: 07/07/22 10:33 AM    Specimen: Blood    Blood  Release to alec   Result Value Ref Range    WBC 7.97 3.40 - 10.80 10*3/mm3    RBC 4.61 4.14 - 5.80 10*6/mm3    Hemoglobin 13.2 13.0 - 17.7 g/dL    Hematocrit 39.3 37.5 - 51.0 %    MCV 85.2 79.0 - 97.0 fL    MCH 28.6 26.6 - 33.0 pg    MCHC 33.6 31.5 - 35.7 g/dL    RDW 14.9 12.3 - 15.4 %    Platelets 196 140 - 450 10*3/mm3    Neutrophil Rel % 73.8 42.7 - 76.0 %    Lymphocyte Rel % 16.8 (L) 19.6 - 45.3 %    Monocyte Rel % 6.8 5.0 - 12.0 %    Eosinophil Rel % 1.6 0.3 -  6.2 %    Basophil Rel % 0.6 0.0 - 1.5 %    Neutrophils Absolute 5.88 1.70 - 7.00 10*3/mm3    Lymphocytes Absolute 1.34 0.70 - 3.10 10*3/mm3    Monocytes Absolute 0.54 0.10 - 0.90 10*3/mm3    Eosinophils Absolute 0.13 0.00 - 0.40 10*3/mm3    Basophils Absolute 0.05 0.00 - 0.20 10*3/mm3    Immature Granulocyte Rel % 0.4 0.0 - 0.5 %    Immature Grans Absolute 0.03 0.00 - 0.05 10*3/mm3    nRBC 0.0 0.0 - 0.2 /100 WBC     Procedures    Medication Review: Medications reviewed and noted    Patient wellness counseling  Exercise: Encouraged reduced exercise involving lifting and yard work  Diet: Healthy cardiac diet  Screening: Recent x-rays of knees discussed  Social History     Socioeconomic History   • Marital status:    Tobacco Use   • Smoking status: Former Smoker     Packs/day: 1.00     Years: 8.00     Pack years: 8.00     Types: Cigarettes     Start date: 10/1/1956     Quit date: 1964     Years since quittin.6   • Smokeless tobacco: Never Used   Substance and Sexual Activity   • Alcohol use: No   • Drug use: Never   • Sexual activity: Not Currently        Assessment/Plan:    Problem List Items Addressed This Visit        Cardiac and Vasculature    Chronic systolic congestive heart failure (HCC) - Primary (Chronic)    Overview     · Echocardiogram (2019): LVEF 25%. Mild TR.  · Echocardiogram (2020): LVEF 20%.Moderate to severe MR. Moderate TR. Moderate pulmonary hypertension. Saline results negative.  · Echocardiogram (2022): LVEF 20%. Moderate pulmonic valve regurgitation is present. Mild MR.           Current Assessment & Plan       Symptoms improved on Entresto, Lasix 40 mg daily and metolazone 2.5 mg as needed the patient has no edema he has not short of breath he has good breath sounds without dyspnea or PND continue current therapy           Relevant Medications    furosemide (LASIX) 40 MG tablet    sacubitril-valsartan (Entresto) 24-26 MG tablet       Musculoskeletal and Injuries     Primary osteoarthritis involving multiple joints    Overview     Bilateral knee pain           Current Assessment & Plan     Bilateral knee pain with history of arthritis with recent x-rays of both knees, treated with prednisone with marked improvement and now on turmeric and Tylenol as needed continue therapy encouraged to be careful about lifting garden work and yard work              Symptoms and Signs    Localized edema    Overview     I will order x-rays of the patient's bilateral knees.  I will prescribe prednisone 20 mg once a day for 10 days.  I advised the patient to stop taking Aleve because it can aggravate his kidneys.  I will order lab work.  I advised the patient to take metolazone every Monday, Wednesday, and Friday.   He will follow up in 1 week.           Current Assessment & Plan     Markedly improved edema on Lasix Entresto and as needed metolazone but only trace of edema left ankle continue therapy                  In regard to the COVID-19 booster vaccine, I advised the patient to wait until 09/2022 to receive the vaccine.     Patient Instructions   Patient is completed prednisone and knees are markedly improved  Continue all current medications  Careful exercise regards days  Encouraged healthy cardiac diet  Return visit in November 30, 2022       Plan of care reviewed with patient at the conclusion of today's visit. Education was provided regarding diagnosis, management, and any prescribed or recommended OTC medications.Patient verbalizes understanding of and agreement with management plan.         Saul Campbell MD      Note: Part of this note may be an electronic transcription/translation of spoken language to printed text using the Dragon Dictation system.    Transcribed from ambient dictation for Saul Campbell MD by CHICA BAIG.  07/18/22   14:50 EDT    Patient verbalized consent to the visit recording.  I have personally performed the services described in this document as transcribed  by the above individual, and it is both accurate and complete.  Saul Campbell MD  7/18/2022  18:48 EDT

## 2022-07-18 NOTE — PATIENT INSTRUCTIONS
Patient is completed prednisone and knees are markedly improved  Continue all current medications  Careful exercise regards days  Encouraged healthy cardiac diet  Return visit in November 30, 2022

## 2022-07-18 NOTE — ASSESSMENT & PLAN NOTE
Bilateral knee pain with history of arthritis with recent x-rays of both knees, treated with prednisone with marked improvement and now on turmeric and Tylenol as needed continue therapy encouraged to be careful about lifting garden work and yard work

## 2022-09-27 DIAGNOSIS — I50.22 CHRONIC SYSTOLIC CONGESTIVE HEART FAILURE: Chronic | ICD-10-CM

## 2022-09-27 RX ORDER — LEVOTHYROXINE SODIUM 0.12 MG/1
TABLET ORAL
Qty: 90 TABLET | Refills: 1 | Status: SHIPPED | OUTPATIENT
Start: 2022-09-27

## 2022-09-27 RX ORDER — FUROSEMIDE 40 MG/1
TABLET ORAL
Qty: 90 TABLET | Refills: 3 | Status: ON HOLD | OUTPATIENT
Start: 2022-09-27 | End: 2022-11-14 | Stop reason: SDUPTHER

## 2022-09-27 NOTE — TELEPHONE ENCOUNTER
Rx Refill Note  Requested Prescriptions     Pending Prescriptions Disp Refills   • levothyroxine (SYNTHROID, LEVOTHROID) 125 MCG tablet [Pharmacy Med Name: LEVOTHYROXINE SODIUM 125 MCG Tablet] 90 tablet 1     Sig: TAKE 1 TABLET EVERY DAY   • furosemide (LASIX) 40 MG tablet [Pharmacy Med Name: FUROSEMIDE 40 MG Tablet] 90 tablet 3     Sig: TAKE 1 TABLET EVERY DAY      Last office visit with prescribing clinician: 7/18/2022      Next office visit with prescribing clinician: 11/30/2022            Alice Webster RN  09/27/22, 11:17 EDT

## 2022-10-27 RX ORDER — BLOOD-GLUCOSE METER
1 EACH MISCELLANEOUS DAILY
Qty: 1 KIT | Refills: 1 | Status: SHIPPED | OUTPATIENT
Start: 2022-10-27 | End: 2022-11-07

## 2022-10-27 NOTE — TELEPHONE ENCOUNTER
Rx Refill Note  Requested Prescriptions     Pending Prescriptions Disp Refills   • Blood Glucose Monitoring Suppl (Accu-Chek Guide) w/Device kit 1 kit 1     Si Units Daily. For use with daily blood sugar monitoring      Last office visit with prescribing clinician: 2022      Next office visit with prescribing clinician: 2022            Alice Webster RN  10/27/22, 13:10 EDT

## 2022-11-07 ENCOUNTER — HOSPITAL ENCOUNTER (INPATIENT)
Facility: HOSPITAL | Age: 82
LOS: 7 days | Discharge: HOME HEALTH | End: 2022-11-14
Attending: EMERGENCY MEDICINE | Admitting: INTERNAL MEDICINE

## 2022-11-07 ENCOUNTER — TELEPHONE (OUTPATIENT)
Dept: INTERNAL MEDICINE | Facility: CLINIC | Age: 82
End: 2022-11-07

## 2022-11-07 DIAGNOSIS — I47.20 VENTRICULAR TACHYCARDIA: ICD-10-CM

## 2022-11-07 DIAGNOSIS — R55 SYNCOPE AND COLLAPSE: Primary | ICD-10-CM

## 2022-11-07 DIAGNOSIS — I50.22 CHRONIC SYSTOLIC CONGESTIVE HEART FAILURE: Chronic | ICD-10-CM

## 2022-11-07 DIAGNOSIS — E78.5 HYPERLIPIDEMIA LDL GOAL <70: Chronic | ICD-10-CM

## 2022-11-07 DIAGNOSIS — I47.29 NSVT (NONSUSTAINED VENTRICULAR TACHYCARDIA): ICD-10-CM

## 2022-11-07 DIAGNOSIS — I25.10 CORONARY ARTERY DISEASE INVOLVING NATIVE CORONARY ARTERY OF NATIVE HEART WITHOUT ANGINA PECTORIS: ICD-10-CM

## 2022-11-07 DIAGNOSIS — I42.0 DILATED CARDIOMYOPATHY: ICD-10-CM

## 2022-11-07 DIAGNOSIS — I95.2 HYPOTENSION DUE TO DRUGS: ICD-10-CM

## 2022-11-07 PROBLEM — S69.92XA LEFT WRIST INJURY: Status: RESOLVED | Noted: 2019-07-09 | Resolved: 2022-11-07

## 2022-11-07 PROBLEM — E03.9 HYPOTHYROID: Status: RESOLVED | Noted: 2018-10-19 | Resolved: 2022-11-07

## 2022-11-07 LAB
ALBUMIN SERPL-MCNC: 3.9 G/DL (ref 3.5–5.2)
ALBUMIN/GLOB SERPL: 1.3 G/DL
ALP SERPL-CCNC: 53 U/L (ref 39–117)
ALT SERPL W P-5'-P-CCNC: 12 U/L (ref 1–41)
ANION GAP SERPL CALCULATED.3IONS-SCNC: 13 MMOL/L (ref 5–15)
AST SERPL-CCNC: 22 U/L (ref 1–40)
BASOPHILS # BLD AUTO: 0.04 10*3/MM3 (ref 0–0.2)
BASOPHILS NFR BLD AUTO: 0.4 % (ref 0–1.5)
BILIRUB SERPL-MCNC: 0.4 MG/DL (ref 0–1.2)
BUN SERPL-MCNC: 27 MG/DL (ref 8–23)
BUN/CREAT SERPL: 24.5 (ref 7–25)
CALCIUM SPEC-SCNC: 9.2 MG/DL (ref 8.6–10.5)
CHLORIDE SERPL-SCNC: 104 MMOL/L (ref 98–107)
CHOLEST SERPL-MCNC: 169 MG/DL (ref 0–200)
CO2 SERPL-SCNC: 23 MMOL/L (ref 22–29)
CREAT SERPL-MCNC: 1.1 MG/DL (ref 0.76–1.27)
DEPRECATED RDW RBC AUTO: 56.7 FL (ref 37–54)
EGFRCR SERPLBLD CKD-EPI 2021: 66.6 ML/MIN/1.73
EOSINOPHIL # BLD AUTO: 0.06 10*3/MM3 (ref 0–0.4)
EOSINOPHIL NFR BLD AUTO: 0.6 % (ref 0.3–6.2)
ERYTHROCYTE [DISTWIDTH] IN BLOOD BY AUTOMATED COUNT: 16.4 % (ref 12.3–15.4)
GLOBULIN UR ELPH-MCNC: 3.1 GM/DL
GLUCOSE SERPL-MCNC: 130 MG/DL (ref 65–99)
HBA1C MFR BLD: 5.6 % (ref 4.8–5.6)
HCT VFR BLD AUTO: 43 % (ref 37.5–51)
HDLC SERPL-MCNC: 42 MG/DL (ref 40–60)
HGB BLD-MCNC: 13 G/DL (ref 13–17.7)
HOLD SPECIMEN: NORMAL
IMM GRANULOCYTES # BLD AUTO: 0.05 10*3/MM3 (ref 0–0.05)
IMM GRANULOCYTES NFR BLD AUTO: 0.5 % (ref 0–0.5)
LDLC SERPL CALC-MCNC: 111 MG/DL (ref 0–100)
LDLC/HDLC SERPL: 2.63 {RATIO}
LYMPHOCYTES # BLD AUTO: 1.03 10*3/MM3 (ref 0.7–3.1)
LYMPHOCYTES NFR BLD AUTO: 10.6 % (ref 19.6–45.3)
MAGNESIUM SERPL-MCNC: 2.2 MG/DL (ref 1.6–2.4)
MCH RBC QN AUTO: 28.4 PG (ref 26.6–33)
MCHC RBC AUTO-ENTMCNC: 30.2 G/DL (ref 31.5–35.7)
MCV RBC AUTO: 94.1 FL (ref 79–97)
MONOCYTES # BLD AUTO: 0.39 10*3/MM3 (ref 0.1–0.9)
MONOCYTES NFR BLD AUTO: 4 % (ref 5–12)
NEUTROPHILS NFR BLD AUTO: 8.16 10*3/MM3 (ref 1.7–7)
NEUTROPHILS NFR BLD AUTO: 83.9 % (ref 42.7–76)
NRBC BLD AUTO-RTO: 0 /100 WBC (ref 0–0.2)
NT-PROBNP SERPL-MCNC: 5129 PG/ML (ref 0–1800)
PLATELET # BLD AUTO: 223 10*3/MM3 (ref 140–450)
PMV BLD AUTO: 10 FL (ref 6–12)
POTASSIUM SERPL-SCNC: 4.3 MMOL/L (ref 3.5–5.2)
PROT SERPL-MCNC: 7 G/DL (ref 6–8.5)
RBC # BLD AUTO: 4.57 10*6/MM3 (ref 4.14–5.8)
SODIUM SERPL-SCNC: 140 MMOL/L (ref 136–145)
TRIGL SERPL-MCNC: 83 MG/DL (ref 0–150)
TROPONIN T SERPL-MCNC: 0.01 NG/ML (ref 0–0.03)
TROPONIN T SERPL-MCNC: 0.02 NG/ML (ref 0–0.03)
VLDLC SERPL-MCNC: 16 MG/DL (ref 5–40)
WBC NRBC COR # BLD: 9.73 10*3/MM3 (ref 3.4–10.8)
WHOLE BLOOD HOLD COAG: NORMAL
WHOLE BLOOD HOLD SPECIMEN: NORMAL

## 2022-11-07 PROCEDURE — 99285 EMERGENCY DEPT VISIT HI MDM: CPT

## 2022-11-07 PROCEDURE — 80061 LIPID PANEL: CPT | Performed by: NURSE PRACTITIONER

## 2022-11-07 PROCEDURE — 84484 ASSAY OF TROPONIN QUANT: CPT | Performed by: EMERGENCY MEDICINE

## 2022-11-07 PROCEDURE — 93005 ELECTROCARDIOGRAM TRACING: CPT | Performed by: EMERGENCY MEDICINE

## 2022-11-07 PROCEDURE — 84484 ASSAY OF TROPONIN QUANT: CPT | Performed by: NURSE PRACTITIONER

## 2022-11-07 PROCEDURE — G0378 HOSPITAL OBSERVATION PER HR: HCPCS

## 2022-11-07 PROCEDURE — 85025 COMPLETE CBC W/AUTO DIFF WBC: CPT

## 2022-11-07 PROCEDURE — 83735 ASSAY OF MAGNESIUM: CPT | Performed by: EMERGENCY MEDICINE

## 2022-11-07 PROCEDURE — 80053 COMPREHEN METABOLIC PANEL: CPT | Performed by: EMERGENCY MEDICINE

## 2022-11-07 PROCEDURE — 83036 HEMOGLOBIN GLYCOSYLATED A1C: CPT | Performed by: NURSE PRACTITIONER

## 2022-11-07 PROCEDURE — 93005 ELECTROCARDIOGRAM TRACING: CPT

## 2022-11-07 PROCEDURE — 99222 1ST HOSP IP/OBS MODERATE 55: CPT | Performed by: NURSE PRACTITIONER

## 2022-11-07 PROCEDURE — 36415 COLL VENOUS BLD VENIPUNCTURE: CPT

## 2022-11-07 PROCEDURE — 83880 ASSAY OF NATRIURETIC PEPTIDE: CPT | Performed by: EMERGENCY MEDICINE

## 2022-11-07 RX ORDER — EPLERENONE 25 MG/1
25 TABLET, FILM COATED ORAL
Status: DISCONTINUED | OUTPATIENT
Start: 2022-11-07 | End: 2022-11-11

## 2022-11-07 RX ORDER — SODIUM CHLORIDE 0.9 % (FLUSH) 0.9 %
10 SYRINGE (ML) INJECTION AS NEEDED
Status: DISCONTINUED | OUTPATIENT
Start: 2022-11-07 | End: 2022-11-14 | Stop reason: HOSPADM

## 2022-11-07 RX ORDER — ASPIRIN 81 MG/1
81 TABLET ORAL DAILY
Status: DISCONTINUED | OUTPATIENT
Start: 2022-11-08 | End: 2022-11-14 | Stop reason: HOSPADM

## 2022-11-07 RX ORDER — FUROSEMIDE 40 MG/1
40 TABLET ORAL DAILY
Status: DISCONTINUED | OUTPATIENT
Start: 2022-11-07 | End: 2022-11-11

## 2022-11-07 RX ADMIN — SACUBITRIL AND VALSARTAN 1 TABLET: 24; 26 TABLET, FILM COATED ORAL at 20:57

## 2022-11-07 RX ADMIN — FUROSEMIDE 40 MG: 40 TABLET ORAL at 19:26

## 2022-11-07 NOTE — TELEPHONE ENCOUNTER
"Patient calls in.  He was sitting on the commode this morning having a bowel movement and he passed out. He woke up when he hit the floor. 30 minutes after the event, his BP was 131/91, 137/103.  Discussed with DEISI.  We will see tomorrow but DEISI stated that if the event happens again, he needs to go to the ED.  When I called patient to tell him this, he stated \"it just happened again\".  I advised him to go to the ED.  He stated he would do this.  DEISI notified.   "

## 2022-11-08 ENCOUNTER — APPOINTMENT (OUTPATIENT)
Dept: CARDIOLOGY | Facility: HOSPITAL | Age: 82
End: 2022-11-08

## 2022-11-08 PROBLEM — I48.91 ATRIAL FIBRILLATION AND FLUTTER: Status: ACTIVE | Noted: 2018-10-19

## 2022-11-08 PROBLEM — I45.10 RBBB: Status: ACTIVE | Noted: 2022-11-08

## 2022-11-08 PROBLEM — G47.33 OSA (OBSTRUCTIVE SLEEP APNEA): Chronic | Status: ACTIVE | Noted: 2018-10-19

## 2022-11-08 PROBLEM — R55 SYNCOPE AND COLLAPSE: Status: ACTIVE | Noted: 2022-11-08

## 2022-11-08 PROBLEM — I47.20 VENTRICULAR TACHYCARDIA (HCC): Status: ACTIVE | Noted: 2022-11-07

## 2022-11-08 LAB
ANION GAP SERPL CALCULATED.3IONS-SCNC: 12 MMOL/L (ref 5–15)
BASOPHILS # BLD AUTO: 0.03 10*3/MM3 (ref 0–0.2)
BASOPHILS NFR BLD AUTO: 0.4 % (ref 0–1.5)
BUN SERPL-MCNC: 23 MG/DL (ref 8–23)
BUN/CREAT SERPL: 22.1 (ref 7–25)
CALCIUM SPEC-SCNC: 9.1 MG/DL (ref 8.6–10.5)
CHLORIDE SERPL-SCNC: 103 MMOL/L (ref 98–107)
CO2 SERPL-SCNC: 26 MMOL/L (ref 22–29)
CREAT SERPL-MCNC: 1.04 MG/DL (ref 0.76–1.27)
DEPRECATED RDW RBC AUTO: 55.3 FL (ref 37–54)
DEPRECATED RDW RBC AUTO: 55.8 FL (ref 37–54)
EGFRCR SERPLBLD CKD-EPI 2021: 71.2 ML/MIN/1.73
EOSINOPHIL # BLD AUTO: 0.17 10*3/MM3 (ref 0–0.4)
EOSINOPHIL NFR BLD AUTO: 2.2 % (ref 0.3–6.2)
ERYTHROCYTE [DISTWIDTH] IN BLOOD BY AUTOMATED COUNT: 16.3 % (ref 12.3–15.4)
ERYTHROCYTE [DISTWIDTH] IN BLOOD BY AUTOMATED COUNT: 16.4 % (ref 12.3–15.4)
GLUCOSE SERPL-MCNC: 93 MG/DL (ref 65–99)
HCT VFR BLD AUTO: 41.4 % (ref 37.5–51)
HCT VFR BLD AUTO: 43.1 % (ref 37.5–51)
HGB BLD-MCNC: 12.9 G/DL (ref 13–17.7)
HGB BLD-MCNC: 13.4 G/DL (ref 13–17.7)
IMM GRANULOCYTES # BLD AUTO: 0.03 10*3/MM3 (ref 0–0.05)
IMM GRANULOCYTES NFR BLD AUTO: 0.4 % (ref 0–0.5)
LYMPHOCYTES # BLD AUTO: 2.24 10*3/MM3 (ref 0.7–3.1)
LYMPHOCYTES NFR BLD AUTO: 28.4 % (ref 19.6–45.3)
MAGNESIUM SERPL-MCNC: 1.9 MG/DL (ref 1.6–2.4)
MAGNESIUM SERPL-MCNC: 2.1 MG/DL (ref 1.6–2.4)
MCH RBC QN AUTO: 28.7 PG (ref 26.6–33)
MCH RBC QN AUTO: 28.9 PG (ref 26.6–33)
MCHC RBC AUTO-ENTMCNC: 31.1 G/DL (ref 31.5–35.7)
MCHC RBC AUTO-ENTMCNC: 31.2 G/DL (ref 31.5–35.7)
MCV RBC AUTO: 92.2 FL (ref 79–97)
MCV RBC AUTO: 93.1 FL (ref 79–97)
MONOCYTES # BLD AUTO: 0.49 10*3/MM3 (ref 0.1–0.9)
MONOCYTES NFR BLD AUTO: 6.2 % (ref 5–12)
NEUTROPHILS NFR BLD AUTO: 4.92 10*3/MM3 (ref 1.7–7)
NEUTROPHILS NFR BLD AUTO: 62.4 % (ref 42.7–76)
NRBC BLD AUTO-RTO: 0 /100 WBC (ref 0–0.2)
PLATELET # BLD AUTO: 226 10*3/MM3 (ref 140–450)
PLATELET # BLD AUTO: 238 10*3/MM3 (ref 140–450)
PMV BLD AUTO: 10 FL (ref 6–12)
PMV BLD AUTO: 10.1 FL (ref 6–12)
POTASSIUM SERPL-SCNC: 3.9 MMOL/L (ref 3.5–5.2)
RBC # BLD AUTO: 4.49 10*6/MM3 (ref 4.14–5.8)
RBC # BLD AUTO: 4.63 10*6/MM3 (ref 4.14–5.8)
SODIUM SERPL-SCNC: 141 MMOL/L (ref 136–145)
T4 FREE SERPL-MCNC: 1.46 NG/DL (ref 0.93–1.7)
TROPONIN T SERPL-MCNC: 0.02 NG/ML (ref 0–0.03)
TSH SERPL DL<=0.05 MIU/L-ACNC: 1.34 UIU/ML (ref 0.27–4.2)
WBC NRBC COR # BLD: 7.37 10*3/MM3 (ref 3.4–10.8)
WBC NRBC COR # BLD: 7.88 10*3/MM3 (ref 3.4–10.8)

## 2022-11-08 PROCEDURE — 93005 ELECTROCARDIOGRAM TRACING: CPT | Performed by: INTERNAL MEDICINE

## 2022-11-08 PROCEDURE — 99223 1ST HOSP IP/OBS HIGH 75: CPT | Performed by: INTERNAL MEDICINE

## 2022-11-08 PROCEDURE — 84484 ASSAY OF TROPONIN QUANT: CPT | Performed by: NURSE PRACTITIONER

## 2022-11-08 PROCEDURE — 93306 TTE W/DOPPLER COMPLETE: CPT | Performed by: INTERNAL MEDICINE

## 2022-11-08 PROCEDURE — 25010000002 HEPARIN (PORCINE) PER 1000 UNITS: Performed by: INTERNAL MEDICINE

## 2022-11-08 PROCEDURE — 027034Z DILATION OF CORONARY ARTERY, ONE ARTERY WITH DRUG-ELUTING INTRALUMINAL DEVICE, PERCUTANEOUS APPROACH: ICD-10-PCS | Performed by: INTERNAL MEDICINE

## 2022-11-08 PROCEDURE — 93459 L HRT ART/GRFT ANGIO: CPT | Performed by: INTERNAL MEDICINE

## 2022-11-08 PROCEDURE — 83735 ASSAY OF MAGNESIUM: CPT | Performed by: INTERNAL MEDICINE

## 2022-11-08 PROCEDURE — 4A023N7 MEASUREMENT OF CARDIAC SAMPLING AND PRESSURE, LEFT HEART, PERCUTANEOUS APPROACH: ICD-10-PCS | Performed by: INTERNAL MEDICINE

## 2022-11-08 PROCEDURE — 85027 COMPLETE CBC AUTOMATED: CPT | Performed by: INTERNAL MEDICINE

## 2022-11-08 PROCEDURE — 80048 BASIC METABOLIC PNL TOTAL CA: CPT | Performed by: INTERNAL MEDICINE

## 2022-11-08 PROCEDURE — 99232 SBSQ HOSP IP/OBS MODERATE 35: CPT | Performed by: INTERNAL MEDICINE

## 2022-11-08 PROCEDURE — 25010000002 FENTANYL CITRATE (PF) 50 MCG/ML SOLUTION: Performed by: INTERNAL MEDICINE

## 2022-11-08 PROCEDURE — 84443 ASSAY THYROID STIM HORMONE: CPT | Performed by: NURSE PRACTITIONER

## 2022-11-08 PROCEDURE — C1874 STENT, COATED/COV W/DEL SYS: HCPCS | Performed by: INTERNAL MEDICINE

## 2022-11-08 PROCEDURE — 85025 COMPLETE CBC W/AUTO DIFF WBC: CPT | Performed by: INTERNAL MEDICINE

## 2022-11-08 PROCEDURE — C9604 PERC D-E COR REVASC T CABG S: HCPCS | Performed by: INTERNAL MEDICINE

## 2022-11-08 PROCEDURE — C1894 INTRO/SHEATH, NON-LASER: HCPCS | Performed by: INTERNAL MEDICINE

## 2022-11-08 PROCEDURE — 25010000002 SULFUR HEXAFLUORIDE MICROSPH 60.7-25 MG RECONSTITUTED SUSPENSION: Performed by: NURSE PRACTITIONER

## 2022-11-08 PROCEDURE — 84439 ASSAY OF FREE THYROXINE: CPT | Performed by: NURSE PRACTITIONER

## 2022-11-08 PROCEDURE — 4A0234Z MEASUREMENT OF CARDIAC ELECTRICAL ACTIVITY, PERCUTANEOUS APPROACH: ICD-10-PCS | Performed by: INTERNAL MEDICINE

## 2022-11-08 PROCEDURE — 25010000002 AMIODARONE IN DEXTROSE 5% 150-4.21 MG/100ML-% SOLUTION: Performed by: INTERNAL MEDICINE

## 2022-11-08 PROCEDURE — 93306 TTE W/DOPPLER COMPLETE: CPT

## 2022-11-08 PROCEDURE — 85347 COAGULATION TIME ACTIVATED: CPT

## 2022-11-08 PROCEDURE — 25010000002 AMIODARONE IN DEXTROSE 5% 360-4.14 MG/200ML-% SOLUTION: Performed by: INTERNAL MEDICINE

## 2022-11-08 PROCEDURE — C1769 GUIDE WIRE: HCPCS | Performed by: INTERNAL MEDICINE

## 2022-11-08 PROCEDURE — C1887 CATHETER, GUIDING: HCPCS | Performed by: INTERNAL MEDICINE

## 2022-11-08 PROCEDURE — 94799 UNLISTED PULMONARY SVC/PX: CPT

## 2022-11-08 PROCEDURE — 25010000002 MIDAZOLAM PER 1 MG: Performed by: INTERNAL MEDICINE

## 2022-11-08 PROCEDURE — B2111ZZ FLUOROSCOPY OF MULTIPLE CORONARY ARTERIES USING LOW OSMOLAR CONTRAST: ICD-10-PCS | Performed by: INTERNAL MEDICINE

## 2022-11-08 PROCEDURE — B2131ZZ FLUOROSCOPY OF MULTIPLE CORONARY ARTERY BYPASS GRAFTS USING LOW OSMOLAR CONTRAST: ICD-10-PCS | Performed by: INTERNAL MEDICINE

## 2022-11-08 PROCEDURE — 25010000002 AMIODARONE IN DEXTROSE 5% 360-4.14 MG/200ML-% SOLUTION: Performed by: HOSPITALIST

## 2022-11-08 PROCEDURE — 0 IOPAMIDOL PER 1 ML: Performed by: INTERNAL MEDICINE

## 2022-11-08 PROCEDURE — 92937 PRQ TRLUML REVSC CAB GRF 1: CPT | Performed by: INTERNAL MEDICINE

## 2022-11-08 DEVICE — XIENCE SKYPOINT™ EVEROLIMUS ELUTING CORONARY STENT SYSTEM 5.00 MM X 23 MM / RAPID-EXCHANGE
Type: IMPLANTABLE DEVICE | Site: CORONARY | Status: FUNCTIONAL
Brand: XIENCE SKYPOINT™

## 2022-11-08 RX ORDER — NICARDIPINE HCL-0.9% SOD CHLOR 1 MG/10 ML
SYRINGE (ML) INTRAVENOUS
Status: DISCONTINUED | OUTPATIENT
Start: 2022-11-08 | End: 2022-11-08 | Stop reason: HOSPADM

## 2022-11-08 RX ORDER — HEPARIN SODIUM 1000 [USP'U]/ML
INJECTION, SOLUTION INTRAVENOUS; SUBCUTANEOUS
Status: DISCONTINUED | OUTPATIENT
Start: 2022-11-08 | End: 2022-11-08 | Stop reason: HOSPADM

## 2022-11-08 RX ORDER — AMIODARONE HYDROCHLORIDE 200 MG/1
200 TABLET ORAL EVERY 12 HOURS
Status: DISCONTINUED | OUTPATIENT
Start: 2022-11-16 | End: 2022-11-08

## 2022-11-08 RX ORDER — AMIODARONE HYDROCHLORIDE 200 MG/1
200 TABLET ORAL ONCE
Status: DISCONTINUED | OUTPATIENT
Start: 2022-11-09 | End: 2022-11-08

## 2022-11-08 RX ORDER — ATORVASTATIN CALCIUM 40 MG/1
40 TABLET, FILM COATED ORAL NIGHTLY
Status: DISCONTINUED | OUTPATIENT
Start: 2022-11-08 | End: 2022-11-14 | Stop reason: HOSPADM

## 2022-11-08 RX ORDER — FENTANYL CITRATE 50 UG/ML
INJECTION, SOLUTION INTRAMUSCULAR; INTRAVENOUS
Status: DISCONTINUED | OUTPATIENT
Start: 2022-11-08 | End: 2022-11-08 | Stop reason: HOSPADM

## 2022-11-08 RX ORDER — CLOPIDOGREL BISULFATE 75 MG/1
75 TABLET ORAL DAILY
Status: DISCONTINUED | OUTPATIENT
Start: 2022-11-09 | End: 2022-11-14 | Stop reason: HOSPADM

## 2022-11-08 RX ORDER — AMIODARONE HYDROCHLORIDE 200 MG/1
200 TABLET ORAL DAILY
Status: DISCONTINUED | OUTPATIENT
Start: 2022-11-30 | End: 2022-11-08

## 2022-11-08 RX ORDER — HEPARIN SODIUM 5000 [USP'U]/ML
5000 INJECTION, SOLUTION INTRAVENOUS; SUBCUTANEOUS EVERY 8 HOURS SCHEDULED
Status: DISCONTINUED | OUTPATIENT
Start: 2022-11-08 | End: 2022-11-10

## 2022-11-08 RX ORDER — SODIUM CHLORIDE 9 MG/ML
1 INJECTION, SOLUTION INTRAVENOUS CONTINUOUS
Status: ACTIVE | OUTPATIENT
Start: 2022-11-08 | End: 2022-11-08

## 2022-11-08 RX ORDER — MIDAZOLAM HYDROCHLORIDE 1 MG/ML
INJECTION INTRAMUSCULAR; INTRAVENOUS
Status: DISCONTINUED | OUTPATIENT
Start: 2022-11-08 | End: 2022-11-08 | Stop reason: HOSPADM

## 2022-11-08 RX ORDER — AMIODARONE HYDROCHLORIDE 200 MG/1
200 TABLET ORAL EVERY 8 HOURS
Status: DISCONTINUED | OUTPATIENT
Start: 2022-11-09 | End: 2022-11-08

## 2022-11-08 RX ADMIN — AMIODARONE HYDROCHLORIDE 1 MG/MIN: 1.8 INJECTION, SOLUTION INTRAVENOUS at 13:49

## 2022-11-08 RX ADMIN — AMIODARONE HYDROCHLORIDE 150 MG: 1.5 INJECTION, SOLUTION INTRAVENOUS at 08:32

## 2022-11-08 RX ADMIN — ASPIRIN 81 MG: 81 TABLET, COATED ORAL at 09:53

## 2022-11-08 RX ADMIN — ATORVASTATIN CALCIUM 40 MG: 40 TABLET, FILM COATED ORAL at 20:22

## 2022-11-08 RX ADMIN — AMIODARONE HYDROCHLORIDE 0.5 MG/MIN: 1.8 INJECTION, SOLUTION INTRAVENOUS at 14:55

## 2022-11-08 RX ADMIN — HEPARIN SODIUM 5000 UNITS: 5000 INJECTION INTRAVENOUS; SUBCUTANEOUS at 14:56

## 2022-11-08 RX ADMIN — EPLERENONE 25 MG: 25 TABLET ORAL at 10:05

## 2022-11-08 RX ADMIN — AMIODARONE HYDROCHLORIDE 1 MG/MIN: 1.8 INJECTION, SOLUTION INTRAVENOUS at 21:37

## 2022-11-08 RX ADMIN — AMIODARONE HYDROCHLORIDE 1 MG/MIN: 1.8 INJECTION, SOLUTION INTRAVENOUS at 18:04

## 2022-11-08 RX ADMIN — FUROSEMIDE 40 MG: 40 TABLET ORAL at 09:53

## 2022-11-08 RX ADMIN — SACUBITRIL AND VALSARTAN 1 TABLET: 24; 26 TABLET, FILM COATED ORAL at 20:22

## 2022-11-08 RX ADMIN — SULFUR HEXAFLUORIDE 2 ML: KIT at 08:51

## 2022-11-08 RX ADMIN — HEPARIN SODIUM 5000 UNITS: 5000 INJECTION INTRAVENOUS; SUBCUTANEOUS at 22:55

## 2022-11-08 RX ADMIN — AMIODARONE HYDROCHLORIDE 1 MG/MIN: 1.8 INJECTION, SOLUTION INTRAVENOUS at 08:54

## 2022-11-08 RX ADMIN — SODIUM CHLORIDE 1 ML/KG/HR: 9 INJECTION, SOLUTION INTRAVENOUS at 20:22

## 2022-11-08 RX ADMIN — SACUBITRIL AND VALSARTAN 1 TABLET: 24; 26 TABLET, FILM COATED ORAL at 09:53

## 2022-11-09 LAB
ANION GAP SERPL CALCULATED.3IONS-SCNC: 12 MMOL/L (ref 5–15)
ASCENDING AORTA: 4.1 CM
BASOPHILS # BLD AUTO: 0.03 10*3/MM3 (ref 0–0.2)
BASOPHILS NFR BLD AUTO: 0.4 % (ref 0–1.5)
BH CV ECHO MEAS - AO MAX PG: 4.6 MMHG
BH CV ECHO MEAS - AO MEAN PG: 2.5 MMHG
BH CV ECHO MEAS - AO ROOT DIAM: 3.8 CM
BH CV ECHO MEAS - AO V2 MAX: 107.4 CM/SEC
BH CV ECHO MEAS - AO V2 VTI: 19.6 CM
BH CV ECHO MEAS - AVA(I,D): 1.91 CM2
BH CV ECHO MEAS - EDV(CUBED): 297.1 ML
BH CV ECHO MEAS - EDV(MOD-SP2): 274 ML
BH CV ECHO MEAS - EDV(MOD-SP4): 333 ML
BH CV ECHO MEAS - EF(MOD-BP): 7.1 %
BH CV ECHO MEAS - EF(MOD-SP2): -1.46 %
BH CV ECHO MEAS - EF(MOD-SP4): 15 %
BH CV ECHO MEAS - ESV(CUBED): 254.8 ML
BH CV ECHO MEAS - ESV(MOD-SP2): 278 ML
BH CV ECHO MEAS - ESV(MOD-SP4): 283 ML
BH CV ECHO MEAS - FS: 5 %
BH CV ECHO MEAS - IVS/LVPW: 1.06 CM
BH CV ECHO MEAS - IVSD: 1.15 CM
BH CV ECHO MEAS - LA DIMENSION: 5.2 CM
BH CV ECHO MEAS - LAT PEAK E' VEL: 11.7 CM/SEC
BH CV ECHO MEAS - LV MASS(C)D: 340.3 GRAMS
BH CV ECHO MEAS - LV MAX PG: 1.17 MMHG
BH CV ECHO MEAS - LV MEAN PG: 0.75 MMHG
BH CV ECHO MEAS - LV V1 MAX: 54.1 CM/SEC
BH CV ECHO MEAS - LV V1 VTI: 8.7 CM
BH CV ECHO MEAS - LVIDD: 6.7 CM
BH CV ECHO MEAS - LVIDS: 6.3 CM
BH CV ECHO MEAS - LVOT AREA: 4.3 CM2
BH CV ECHO MEAS - LVOT DIAM: 2.34 CM
BH CV ECHO MEAS - LVPWD: 1.08 CM
BH CV ECHO MEAS - MED PEAK E' VEL: 3.6 CM/SEC
BH CV ECHO MEAS - MV DEC SLOPE: 438.1 CM/SEC2
BH CV ECHO MEAS - MV DEC TIME: 0.08 MSEC
BH CV ECHO MEAS - MV E MAX VEL: 111.5 CM/SEC
BH CV ECHO MEAS - MV MAX PG: 5.4 MMHG
BH CV ECHO MEAS - MV MEAN PG: 1.96 MMHG
BH CV ECHO MEAS - MV P1/2T: 75.9 MSEC
BH CV ECHO MEAS - MV V2 VTI: 19 CM
BH CV ECHO MEAS - MVA(P1/2T): 2.9 CM2
BH CV ECHO MEAS - MVA(VTI): 1.97 CM2
BH CV ECHO MEAS - PA ACC TIME: 0.09 SEC
BH CV ECHO MEAS - PA PR(ACCEL): 39.4 MMHG
BH CV ECHO MEAS - PI END-D VEL: 158.8 CM/SEC
BH CV ECHO MEAS - RAP SYSTOLE: 3 MMHG
BH CV ECHO MEAS - RVSP: 31.7 MMHG
BH CV ECHO MEAS - SV(LVOT): 37.5 ML
BH CV ECHO MEAS - SV(MOD-SP2): -4 ML
BH CV ECHO MEAS - SV(MOD-SP4): 50 ML
BH CV ECHO MEAS - TAPSE (>1.6): 1.68 CM
BH CV ECHO MEAS - TR MAX PG: 28.7 MMHG
BH CV ECHO MEAS - TR MAX VEL: 266.9 CM/SEC
BH CV ECHO MEASUREMENTS AVERAGE E/E' RATIO: 14.58
BH CV XLRA - RV BASE: 5.2 CM
BH CV XLRA - RV LENGTH: 8.7 CM
BH CV XLRA - RV MID: 4.9 CM
BH CV XLRA - TDI S': 7.3 CM/SEC
BUN SERPL-MCNC: 19 MG/DL (ref 8–23)
BUN/CREAT SERPL: 20.2 (ref 7–25)
CALCIUM SPEC-SCNC: 8.1 MG/DL (ref 8.6–10.5)
CHLORIDE SERPL-SCNC: 106 MMOL/L (ref 98–107)
CO2 SERPL-SCNC: 21 MMOL/L (ref 22–29)
CREAT SERPL-MCNC: 0.94 MG/DL (ref 0.76–1.27)
DEPRECATED RDW RBC AUTO: 55.6 FL (ref 37–54)
EGFRCR SERPLBLD CKD-EPI 2021: 80.4 ML/MIN/1.73
EOSINOPHIL # BLD AUTO: 0.07 10*3/MM3 (ref 0–0.4)
EOSINOPHIL NFR BLD AUTO: 0.9 % (ref 0.3–6.2)
ERYTHROCYTE [DISTWIDTH] IN BLOOD BY AUTOMATED COUNT: 16.5 % (ref 12.3–15.4)
GLUCOSE SERPL-MCNC: 96 MG/DL (ref 65–99)
HCT VFR BLD AUTO: 37.9 % (ref 37.5–51)
HGB BLD-MCNC: 11.8 G/DL (ref 13–17.7)
IMM GRANULOCYTES # BLD AUTO: 0.02 10*3/MM3 (ref 0–0.05)
IMM GRANULOCYTES NFR BLD AUTO: 0.3 % (ref 0–0.5)
LYMPHOCYTES # BLD AUTO: 1.4 10*3/MM3 (ref 0.7–3.1)
LYMPHOCYTES NFR BLD AUTO: 18.8 % (ref 19.6–45.3)
MAGNESIUM SERPL-MCNC: 1.7 MG/DL (ref 1.6–2.4)
MAXIMAL PREDICTED HEART RATE: 137 BPM
MCH RBC QN AUTO: 28.7 PG (ref 26.6–33)
MCHC RBC AUTO-ENTMCNC: 31.1 G/DL (ref 31.5–35.7)
MCV RBC AUTO: 92.2 FL (ref 79–97)
MONOCYTES # BLD AUTO: 0.48 10*3/MM3 (ref 0.1–0.9)
MONOCYTES NFR BLD AUTO: 6.4 % (ref 5–12)
NEUTROPHILS NFR BLD AUTO: 5.46 10*3/MM3 (ref 1.7–7)
NEUTROPHILS NFR BLD AUTO: 73.2 % (ref 42.7–76)
NRBC BLD AUTO-RTO: 0 /100 WBC (ref 0–0.2)
PHOSPHATE SERPL-MCNC: 3 MG/DL (ref 2.5–4.5)
PLATELET # BLD AUTO: 199 10*3/MM3 (ref 140–450)
PMV BLD AUTO: 10.2 FL (ref 6–12)
POTASSIUM SERPL-SCNC: 3.5 MMOL/L (ref 3.5–5.2)
RBC # BLD AUTO: 4.11 10*6/MM3 (ref 4.14–5.8)
SODIUM SERPL-SCNC: 139 MMOL/L (ref 136–145)
STRESS TARGET HR: 116 BPM
WBC NRBC COR # BLD: 7.46 10*3/MM3 (ref 3.4–10.8)

## 2022-11-09 PROCEDURE — 93010 ELECTROCARDIOGRAM REPORT: CPT | Performed by: INTERNAL MEDICINE

## 2022-11-09 PROCEDURE — 84100 ASSAY OF PHOSPHORUS: CPT | Performed by: INTERNAL MEDICINE

## 2022-11-09 PROCEDURE — 25010000002 HEPARIN (PORCINE) PER 1000 UNITS: Performed by: INTERNAL MEDICINE

## 2022-11-09 PROCEDURE — 25010000002 CEFAZOLIN IN DEXTROSE 2-4 GM/100ML-% SOLUTION: Performed by: PHYSICIAN ASSISTANT

## 2022-11-09 PROCEDURE — 99232 SBSQ HOSP IP/OBS MODERATE 35: CPT | Performed by: INTERNAL MEDICINE

## 2022-11-09 PROCEDURE — 80048 BASIC METABOLIC PNL TOTAL CA: CPT | Performed by: INTERNAL MEDICINE

## 2022-11-09 PROCEDURE — 83735 ASSAY OF MAGNESIUM: CPT | Performed by: INTERNAL MEDICINE

## 2022-11-09 PROCEDURE — 85025 COMPLETE CBC W/AUTO DIFF WBC: CPT | Performed by: INTERNAL MEDICINE

## 2022-11-09 PROCEDURE — 99232 SBSQ HOSP IP/OBS MODERATE 35: CPT | Performed by: PHYSICIAN ASSISTANT

## 2022-11-09 PROCEDURE — 25010000002 AMIODARONE IN DEXTROSE 5% 360-4.14 MG/200ML-% SOLUTION: Performed by: HOSPITALIST

## 2022-11-09 RX ORDER — CEFAZOLIN SODIUM 2 G/100ML
2 INJECTION, SOLUTION INTRAVENOUS ONCE
Status: COMPLETED | OUTPATIENT
Start: 2022-11-09 | End: 2022-11-09

## 2022-11-09 RX ORDER — SODIUM CHLORIDE 0.9 % (FLUSH) 0.9 %
10 SYRINGE (ML) INJECTION AS NEEDED
Status: DISCONTINUED | OUTPATIENT
Start: 2022-11-09 | End: 2022-11-10 | Stop reason: HOSPADM

## 2022-11-09 RX ORDER — SODIUM CHLORIDE 0.9 % (FLUSH) 0.9 %
3 SYRINGE (ML) INJECTION EVERY 12 HOURS SCHEDULED
Status: DISCONTINUED | OUTPATIENT
Start: 2022-11-09 | End: 2022-11-10 | Stop reason: HOSPADM

## 2022-11-09 RX ADMIN — Medication 3 ML: at 20:39

## 2022-11-09 RX ADMIN — SACUBITRIL AND VALSARTAN 1 TABLET: 24; 26 TABLET, FILM COATED ORAL at 20:38

## 2022-11-09 RX ADMIN — Medication 3 ML: at 10:30

## 2022-11-09 RX ADMIN — CLOPIDOGREL BISULFATE 75 MG: 75 TABLET ORAL at 08:47

## 2022-11-09 RX ADMIN — AMIODARONE HYDROCHLORIDE 1 MG/MIN: 1.8 INJECTION, SOLUTION INTRAVENOUS at 20:38

## 2022-11-09 RX ADMIN — HEPARIN SODIUM 5000 UNITS: 5000 INJECTION INTRAVENOUS; SUBCUTANEOUS at 05:37

## 2022-11-09 RX ADMIN — ASPIRIN 81 MG: 81 TABLET, COATED ORAL at 08:47

## 2022-11-09 RX ADMIN — HEPARIN SODIUM 5000 UNITS: 5000 INJECTION INTRAVENOUS; SUBCUTANEOUS at 15:05

## 2022-11-09 RX ADMIN — CEFAZOLIN SODIUM 2 G: 2 INJECTION, SOLUTION INTRAVENOUS at 11:02

## 2022-11-09 RX ADMIN — ATORVASTATIN CALCIUM 40 MG: 40 TABLET, FILM COATED ORAL at 20:38

## 2022-11-09 RX ADMIN — HEPARIN SODIUM 5000 UNITS: 5000 INJECTION INTRAVENOUS; SUBCUTANEOUS at 20:38

## 2022-11-09 RX ADMIN — AMIODARONE HYDROCHLORIDE 1 MG/MIN: 1.8 INJECTION, SOLUTION INTRAVENOUS at 15:45

## 2022-11-09 RX ADMIN — AMIODARONE HYDROCHLORIDE 1 MG/MIN: 1.8 INJECTION, SOLUTION INTRAVENOUS at 09:48

## 2022-11-09 RX ADMIN — AMIODARONE HYDROCHLORIDE 1 MG/MIN: 1.8 INJECTION, SOLUTION INTRAVENOUS at 03:36

## 2022-11-10 LAB
ANION GAP SERPL CALCULATED.3IONS-SCNC: 13 MMOL/L (ref 5–15)
BASOPHILS # BLD AUTO: 0.03 10*3/MM3 (ref 0–0.2)
BASOPHILS NFR BLD AUTO: 0.3 % (ref 0–1.5)
BUN SERPL-MCNC: 26 MG/DL (ref 8–23)
BUN/CREAT SERPL: 25.5 (ref 7–25)
CALCIUM SPEC-SCNC: 8.6 MG/DL (ref 8.6–10.5)
CHLORIDE SERPL-SCNC: 104 MMOL/L (ref 98–107)
CO2 SERPL-SCNC: 23 MMOL/L (ref 22–29)
CREAT SERPL-MCNC: 1.02 MG/DL (ref 0.76–1.27)
DEPRECATED RDW RBC AUTO: 54.4 FL (ref 37–54)
EGFRCR SERPLBLD CKD-EPI 2021: 72.9 ML/MIN/1.73
EOSINOPHIL # BLD AUTO: 0.1 10*3/MM3 (ref 0–0.4)
EOSINOPHIL NFR BLD AUTO: 1.2 % (ref 0.3–6.2)
ERYTHROCYTE [DISTWIDTH] IN BLOOD BY AUTOMATED COUNT: 16.3 % (ref 12.3–15.4)
GLUCOSE SERPL-MCNC: 110 MG/DL (ref 65–99)
HCT VFR BLD AUTO: 38.2 % (ref 37.5–51)
HGB BLD-MCNC: 12.1 G/DL (ref 13–17.7)
IMM GRANULOCYTES # BLD AUTO: 0.03 10*3/MM3 (ref 0–0.05)
IMM GRANULOCYTES NFR BLD AUTO: 0.3 % (ref 0–0.5)
LYMPHOCYTES # BLD AUTO: 1.68 10*3/MM3 (ref 0.7–3.1)
LYMPHOCYTES NFR BLD AUTO: 19.4 % (ref 19.6–45.3)
MAGNESIUM SERPL-MCNC: 2 MG/DL (ref 1.6–2.4)
MCH RBC QN AUTO: 28.9 PG (ref 26.6–33)
MCHC RBC AUTO-ENTMCNC: 31.7 G/DL (ref 31.5–35.7)
MCV RBC AUTO: 91.2 FL (ref 79–97)
MONOCYTES # BLD AUTO: 0.56 10*3/MM3 (ref 0.1–0.9)
MONOCYTES NFR BLD AUTO: 6.5 % (ref 5–12)
NEUTROPHILS NFR BLD AUTO: 6.25 10*3/MM3 (ref 1.7–7)
NEUTROPHILS NFR BLD AUTO: 72.3 % (ref 42.7–76)
NRBC BLD AUTO-RTO: 0 /100 WBC (ref 0–0.2)
PHOSPHATE SERPL-MCNC: 3.1 MG/DL (ref 2.5–4.5)
PLATELET # BLD AUTO: 188 10*3/MM3 (ref 140–450)
PMV BLD AUTO: 10.3 FL (ref 6–12)
POTASSIUM SERPL-SCNC: 3.6 MMOL/L (ref 3.5–5.2)
POTASSIUM SERPL-SCNC: 4.7 MMOL/L (ref 3.5–5.2)
QT INTERVAL: 446 MS
QT INTERVAL: 450 MS
QT INTERVAL: 510 MS
QT INTERVAL: 552 MS
QTC INTERVAL: 550 MS
QTC INTERVAL: 554 MS
QTC INTERVAL: 558 MS
QTC INTERVAL: 599 MS
RBC # BLD AUTO: 4.19 10*6/MM3 (ref 4.14–5.8)
SODIUM SERPL-SCNC: 140 MMOL/L (ref 136–145)
WBC NRBC COR # BLD: 8.65 10*3/MM3 (ref 3.4–10.8)

## 2022-11-10 PROCEDURE — 33208 INSRT HEART PM ATRIAL & VENT: CPT | Performed by: INTERNAL MEDICINE

## 2022-11-10 PROCEDURE — C1769 GUIDE WIRE: HCPCS | Performed by: INTERNAL MEDICINE

## 2022-11-10 PROCEDURE — 93621 COMP EP EVL L PAC&REC C SINS: CPT | Performed by: INTERNAL MEDICINE

## 2022-11-10 PROCEDURE — 80048 BASIC METABOLIC PNL TOTAL CA: CPT | Performed by: INTERNAL MEDICINE

## 2022-11-10 PROCEDURE — 25010000002 HEPARIN (PORCINE) PER 1000 UNITS: Performed by: INTERNAL MEDICINE

## 2022-11-10 PROCEDURE — 93622 COMP EP EVAL L VENTR PAC&REC: CPT | Performed by: INTERNAL MEDICINE

## 2022-11-10 PROCEDURE — C1892 INTRO/SHEATH,FIXED,PEEL-AWAY: HCPCS | Performed by: INTERNAL MEDICINE

## 2022-11-10 PROCEDURE — 93620 COMP EP EVL R AT VEN PAC&REC: CPT | Performed by: INTERNAL MEDICINE

## 2022-11-10 PROCEDURE — 99024 POSTOP FOLLOW-UP VISIT: CPT | Performed by: INTERNAL MEDICINE

## 2022-11-10 PROCEDURE — 25010000002 MIDAZOLAM PER 1 MG: Performed by: INTERNAL MEDICINE

## 2022-11-10 PROCEDURE — 99152 MOD SED SAME PHYS/QHP 5/>YRS: CPT | Performed by: INTERNAL MEDICINE

## 2022-11-10 PROCEDURE — C1900 LEAD, CORONARY VENOUS: HCPCS | Performed by: INTERNAL MEDICINE

## 2022-11-10 PROCEDURE — 85025 COMPLETE CBC W/AUTO DIFF WBC: CPT | Performed by: INTERNAL MEDICINE

## 2022-11-10 PROCEDURE — 93005 ELECTROCARDIOGRAM TRACING: CPT | Performed by: INTERNAL MEDICINE

## 2022-11-10 PROCEDURE — C1898 LEAD, PMKR, OTHER THAN TRANS: HCPCS | Performed by: INTERNAL MEDICINE

## 2022-11-10 PROCEDURE — 33225 L VENTRIC PACING LEAD ADD-ON: CPT | Performed by: INTERNAL MEDICINE

## 2022-11-10 PROCEDURE — 99153 MOD SED SAME PHYS/QHP EA: CPT | Performed by: INTERNAL MEDICINE

## 2022-11-10 PROCEDURE — C1760 CLOSURE DEV, VASC: HCPCS | Performed by: INTERNAL MEDICINE

## 2022-11-10 PROCEDURE — 0JH606Z INSERTION OF PACEMAKER, DUAL CHAMBER INTO CHEST SUBCUTANEOUS TISSUE AND FASCIA, OPEN APPROACH: ICD-10-PCS | Performed by: INTERNAL MEDICINE

## 2022-11-10 PROCEDURE — C1894 INTRO/SHEATH, NON-LASER: HCPCS | Performed by: INTERNAL MEDICINE

## 2022-11-10 PROCEDURE — 84132 ASSAY OF SERUM POTASSIUM: CPT | Performed by: INTERNAL MEDICINE

## 2022-11-10 PROCEDURE — 25010000002 AMIODARONE IN DEXTROSE 5% 360-4.14 MG/200ML-% SOLUTION: Performed by: HOSPITALIST

## 2022-11-10 PROCEDURE — C1730 CATH, EP, 19 OR FEW ELECT: HCPCS | Performed by: INTERNAL MEDICINE

## 2022-11-10 PROCEDURE — C1893 INTRO/SHEATH, FIXED,NON-PEEL: HCPCS | Performed by: INTERNAL MEDICINE

## 2022-11-10 PROCEDURE — 83735 ASSAY OF MAGNESIUM: CPT | Performed by: INTERNAL MEDICINE

## 2022-11-10 PROCEDURE — 25010000002 ONDANSETRON PER 1 MG: Performed by: INTERNAL MEDICINE

## 2022-11-10 PROCEDURE — 84100 ASSAY OF PHOSPHORUS: CPT | Performed by: INTERNAL MEDICINE

## 2022-11-10 PROCEDURE — 0 IOPAMIDOL PER 1 ML: Performed by: INTERNAL MEDICINE

## 2022-11-10 PROCEDURE — 93623 PRGRMD STIMJ&PACG IV RX NFS: CPT | Performed by: INTERNAL MEDICINE

## 2022-11-10 PROCEDURE — 02H63JZ INSERTION OF PACEMAKER LEAD INTO RIGHT ATRIUM, PERCUTANEOUS APPROACH: ICD-10-PCS | Performed by: INTERNAL MEDICINE

## 2022-11-10 PROCEDURE — 99232 SBSQ HOSP IP/OBS MODERATE 35: CPT | Performed by: INTERNAL MEDICINE

## 2022-11-10 PROCEDURE — 02HL3JZ INSERTION OF PACEMAKER LEAD INTO LEFT VENTRICLE, PERCUTANEOUS APPROACH: ICD-10-PCS | Performed by: INTERNAL MEDICINE

## 2022-11-10 PROCEDURE — 02HK3JZ INSERTION OF PACEMAKER LEAD INTO RIGHT VENTRICLE, PERCUTANEOUS APPROACH: ICD-10-PCS | Performed by: INTERNAL MEDICINE

## 2022-11-10 PROCEDURE — 0 LIDOCAINE 1 % SOLUTION: Performed by: INTERNAL MEDICINE

## 2022-11-10 PROCEDURE — 99024 POSTOP FOLLOW-UP VISIT: CPT | Performed by: NURSE PRACTITIONER

## 2022-11-10 PROCEDURE — 25010000002 CEFAZOLIN IN DEXTROSE 2-4 GM/100ML-% SOLUTION: Performed by: INTERNAL MEDICINE

## 2022-11-10 PROCEDURE — 25010000002 AMIODARONE IN DEXTROSE 5% 360-4.14 MG/200ML-% SOLUTION: Performed by: INTERNAL MEDICINE

## 2022-11-10 PROCEDURE — 25010000002 FENTANYL CITRATE (PF) 50 MCG/ML SOLUTION: Performed by: INTERNAL MEDICINE

## 2022-11-10 PROCEDURE — C2621 PMKR, OTHER THAN SING/DUAL: HCPCS | Performed by: INTERNAL MEDICINE

## 2022-11-10 DEVICE — PACE/SENSE LEAD
Type: IMPLANTABLE DEVICE | Status: FUNCTIONAL
Brand: ACUITY™ X4 SPIRAL L

## 2022-11-10 DEVICE — CARDIAC RESYNCHRONIZATION THERAPY PACEMAKER
Type: IMPLANTABLE DEVICE | Status: FUNCTIONAL
Brand: VISIONIST™ X4 CRT-P

## 2022-11-10 DEVICE — HEMOST ABS SURGICEL PWDR 3GM: Type: IMPLANTABLE DEVICE | Status: FUNCTIONAL

## 2022-11-10 DEVICE — PACE/SENSE LEAD
Type: IMPLANTABLE DEVICE | Status: FUNCTIONAL
Brand: INGEVITY™+

## 2022-11-10 RX ORDER — CEFAZOLIN SODIUM 2 G/100ML
2 INJECTION, SOLUTION INTRAVENOUS ONCE
Status: COMPLETED | OUTPATIENT
Start: 2022-11-10 | End: 2022-11-10

## 2022-11-10 RX ORDER — MAGNESIUM SULFATE HEPTAHYDRATE 40 MG/ML
2 INJECTION, SOLUTION INTRAVENOUS AS NEEDED
Status: DISCONTINUED | OUTPATIENT
Start: 2022-11-10 | End: 2022-11-14 | Stop reason: HOSPADM

## 2022-11-10 RX ORDER — ACETAMINOPHEN 325 MG/1
650 TABLET ORAL EVERY 6 HOURS
Status: DISCONTINUED | OUTPATIENT
Start: 2022-11-10 | End: 2022-11-13

## 2022-11-10 RX ORDER — IBUPROFEN 600 MG/1
600 TABLET ORAL EVERY 6 HOURS SCHEDULED
Status: DISCONTINUED | OUTPATIENT
Start: 2022-11-11 | End: 2022-11-13

## 2022-11-10 RX ORDER — BUPIVACAINE HYDROCHLORIDE 5 MG/ML
INJECTION, SOLUTION PERINEURAL
Status: DISCONTINUED | OUTPATIENT
Start: 2022-11-10 | End: 2022-11-10 | Stop reason: HOSPADM

## 2022-11-10 RX ORDER — MAGNESIUM SULFATE HEPTAHYDRATE 40 MG/ML
4 INJECTION, SOLUTION INTRAVENOUS AS NEEDED
Status: DISCONTINUED | OUTPATIENT
Start: 2022-11-10 | End: 2022-11-14 | Stop reason: HOSPADM

## 2022-11-10 RX ORDER — SODIUM CHLORIDE 9 MG/ML
INJECTION, SOLUTION INTRAVENOUS
Status: COMPLETED | OUTPATIENT
Start: 2022-11-10 | End: 2022-11-10

## 2022-11-10 RX ORDER — LIDOCAINE HYDROCHLORIDE 10 MG/ML
INJECTION, SOLUTION INFILTRATION; PERINEURAL
Status: DISCONTINUED | OUTPATIENT
Start: 2022-11-10 | End: 2022-11-10 | Stop reason: HOSPADM

## 2022-11-10 RX ORDER — FENTANYL CITRATE 50 UG/ML
INJECTION, SOLUTION INTRAMUSCULAR; INTRAVENOUS
Status: DISCONTINUED | OUTPATIENT
Start: 2022-11-10 | End: 2022-11-10 | Stop reason: HOSPADM

## 2022-11-10 RX ORDER — ACETAMINOPHEN 325 MG/1
650 TABLET ORAL EVERY 6 HOURS PRN
Status: DISCONTINUED | OUTPATIENT
Start: 2022-11-10 | End: 2022-11-14 | Stop reason: HOSPADM

## 2022-11-10 RX ORDER — POTASSIUM CHLORIDE 7.45 MG/ML
10 INJECTION INTRAVENOUS
Status: DISCONTINUED | OUTPATIENT
Start: 2022-11-10 | End: 2022-11-14 | Stop reason: HOSPADM

## 2022-11-10 RX ORDER — POTASSIUM CHLORIDE 750 MG/1
40 CAPSULE, EXTENDED RELEASE ORAL AS NEEDED
Status: DISCONTINUED | OUTPATIENT
Start: 2022-11-10 | End: 2022-11-14 | Stop reason: HOSPADM

## 2022-11-10 RX ORDER — ONDANSETRON 2 MG/ML
INJECTION INTRAMUSCULAR; INTRAVENOUS
Status: DISCONTINUED | OUTPATIENT
Start: 2022-11-10 | End: 2022-11-10 | Stop reason: HOSPADM

## 2022-11-10 RX ORDER — AMIODARONE HYDROCHLORIDE 200 MG/1
400 TABLET ORAL 2 TIMES DAILY WITH MEALS
Status: DISCONTINUED | OUTPATIENT
Start: 2022-11-10 | End: 2022-11-14 | Stop reason: HOSPADM

## 2022-11-10 RX ORDER — MIDAZOLAM HYDROCHLORIDE 1 MG/ML
INJECTION INTRAMUSCULAR; INTRAVENOUS
Status: DISCONTINUED | OUTPATIENT
Start: 2022-11-10 | End: 2022-11-10 | Stop reason: HOSPADM

## 2022-11-10 RX ORDER — POTASSIUM CHLORIDE 1.5 G/1.77G
40 POWDER, FOR SOLUTION ORAL AS NEEDED
Status: DISCONTINUED | OUTPATIENT
Start: 2022-11-10 | End: 2022-11-14 | Stop reason: HOSPADM

## 2022-11-10 RX ADMIN — ATORVASTATIN CALCIUM 40 MG: 40 TABLET, FILM COATED ORAL at 20:48

## 2022-11-10 RX ADMIN — CLOPIDOGREL BISULFATE 75 MG: 75 TABLET ORAL at 08:27

## 2022-11-10 RX ADMIN — POTASSIUM CHLORIDE 40 MEQ: 750 CAPSULE, EXTENDED RELEASE ORAL at 15:06

## 2022-11-10 RX ADMIN — HEPARIN SODIUM 5000 UNITS: 5000 INJECTION INTRAVENOUS; SUBCUTANEOUS at 13:12

## 2022-11-10 RX ADMIN — AMIODARONE HYDROCHLORIDE 1 MG/MIN: 1.8 INJECTION, SOLUTION INTRAVENOUS at 13:12

## 2022-11-10 RX ADMIN — ACETAMINOPHEN 650 MG: 325 TABLET, FILM COATED ORAL at 20:48

## 2022-11-10 RX ADMIN — Medication 10 ML: at 20:49

## 2022-11-10 RX ADMIN — POTASSIUM CHLORIDE 40 MEQ: 750 CAPSULE, EXTENDED RELEASE ORAL at 09:22

## 2022-11-10 RX ADMIN — SACUBITRIL AND VALSARTAN 1 TABLET: 24; 26 TABLET, FILM COATED ORAL at 20:48

## 2022-11-10 RX ADMIN — ASPIRIN 81 MG: 81 TABLET, COATED ORAL at 08:27

## 2022-11-10 RX ADMIN — SODIUM CHLORIDE 500 ML: 9 INJECTION, SOLUTION INTRAVENOUS at 15:15

## 2022-11-10 RX ADMIN — HEPARIN SODIUM 5000 UNITS: 5000 INJECTION INTRAVENOUS; SUBCUTANEOUS at 05:49

## 2022-11-10 RX ADMIN — AMIODARONE HYDROCHLORIDE 1 MG/MIN: 1.8 INJECTION, SOLUTION INTRAVENOUS at 07:23

## 2022-11-10 RX ADMIN — AMIODARONE HYDROCHLORIDE 1 MG/MIN: 1.8 INJECTION, SOLUTION INTRAVENOUS at 01:52

## 2022-11-10 RX ADMIN — IBUPROFEN 600 MG: 600 TABLET ORAL at 23:46

## 2022-11-10 RX ADMIN — CEFAZOLIN SODIUM 2 G: 2 INJECTION, SOLUTION INTRAVENOUS at 18:13

## 2022-11-10 RX ADMIN — AMIODARONE HYDROCHLORIDE 400 MG: 200 TABLET ORAL at 20:48

## 2022-11-11 ENCOUNTER — APPOINTMENT (OUTPATIENT)
Dept: GENERAL RADIOLOGY | Facility: HOSPITAL | Age: 82
End: 2022-11-11

## 2022-11-11 PROBLEM — I95.2 HYPOTENSION DUE TO DRUGS: Status: ACTIVE | Noted: 2022-11-11

## 2022-11-11 PROBLEM — I47.29 NSVT (NONSUSTAINED VENTRICULAR TACHYCARDIA) (HCC): Status: ACTIVE | Noted: 2022-11-07

## 2022-11-11 PROBLEM — I45.89 HIS-PURKINJE DYSFUNCTION: Status: ACTIVE | Noted: 2022-11-11

## 2022-11-11 LAB
ACT BLD: 248 SECONDS (ref 82–152)
ANION GAP SERPL CALCULATED.3IONS-SCNC: 9 MMOL/L (ref 5–15)
BASOPHILS # BLD AUTO: 0.01 10*3/MM3 (ref 0–0.2)
BASOPHILS NFR BLD AUTO: 0.1 % (ref 0–1.5)
BUN SERPL-MCNC: 29 MG/DL (ref 8–23)
BUN/CREAT SERPL: 24.2 (ref 7–25)
CALCIUM SPEC-SCNC: 8.4 MG/DL (ref 8.6–10.5)
CHLORIDE SERPL-SCNC: 103 MMOL/L (ref 98–107)
CO2 SERPL-SCNC: 22 MMOL/L (ref 22–29)
CREAT SERPL-MCNC: 1.2 MG/DL (ref 0.76–1.27)
DEPRECATED RDW RBC AUTO: 57.1 FL (ref 37–54)
EGFRCR SERPLBLD CKD-EPI 2021: 60 ML/MIN/1.73
EOSINOPHIL # BLD AUTO: 0.01 10*3/MM3 (ref 0–0.4)
EOSINOPHIL NFR BLD AUTO: 0.1 % (ref 0.3–6.2)
ERYTHROCYTE [DISTWIDTH] IN BLOOD BY AUTOMATED COUNT: 16.7 % (ref 12.3–15.4)
GLUCOSE SERPL-MCNC: 110 MG/DL (ref 65–99)
HCT VFR BLD AUTO: 37.6 % (ref 37.5–51)
HGB BLD-MCNC: 11.6 G/DL (ref 13–17.7)
IMM GRANULOCYTES # BLD AUTO: 0.02 10*3/MM3 (ref 0–0.05)
IMM GRANULOCYTES NFR BLD AUTO: 0.3 % (ref 0–0.5)
LYMPHOCYTES # BLD AUTO: 0.97 10*3/MM3 (ref 0.7–3.1)
LYMPHOCYTES NFR BLD AUTO: 13.3 % (ref 19.6–45.3)
MAGNESIUM SERPL-MCNC: 2.1 MG/DL (ref 1.6–2.4)
MCH RBC QN AUTO: 28.9 PG (ref 26.6–33)
MCHC RBC AUTO-ENTMCNC: 30.9 G/DL (ref 31.5–35.7)
MCV RBC AUTO: 93.8 FL (ref 79–97)
MONOCYTES # BLD AUTO: 0.5 10*3/MM3 (ref 0.1–0.9)
MONOCYTES NFR BLD AUTO: 6.8 % (ref 5–12)
NEUTROPHILS NFR BLD AUTO: 5.8 10*3/MM3 (ref 1.7–7)
NEUTROPHILS NFR BLD AUTO: 79.4 % (ref 42.7–76)
NRBC BLD AUTO-RTO: 0 /100 WBC (ref 0–0.2)
PHOSPHATE SERPL-MCNC: 4 MG/DL (ref 2.5–4.5)
PLATELET # BLD AUTO: 174 10*3/MM3 (ref 140–450)
PMV BLD AUTO: 10.9 FL (ref 6–12)
POTASSIUM SERPL-SCNC: 4.9 MMOL/L (ref 3.5–5.2)
RBC # BLD AUTO: 4.01 10*6/MM3 (ref 4.14–5.8)
SODIUM SERPL-SCNC: 134 MMOL/L (ref 136–145)
WBC NRBC COR # BLD: 7.31 10*3/MM3 (ref 3.4–10.8)

## 2022-11-11 PROCEDURE — 97530 THERAPEUTIC ACTIVITIES: CPT

## 2022-11-11 PROCEDURE — 25010000002 PHENYLEPHRINE 10 MG/ML SOLUTION 5 ML VIAL: Performed by: INTERNAL MEDICINE

## 2022-11-11 PROCEDURE — 99024 POSTOP FOLLOW-UP VISIT: CPT | Performed by: PHYSICIAN ASSISTANT

## 2022-11-11 PROCEDURE — 85025 COMPLETE CBC W/AUTO DIFF WBC: CPT | Performed by: INTERNAL MEDICINE

## 2022-11-11 PROCEDURE — 80048 BASIC METABOLIC PNL TOTAL CA: CPT | Performed by: INTERNAL MEDICINE

## 2022-11-11 PROCEDURE — 93005 ELECTROCARDIOGRAM TRACING: CPT | Performed by: INTERNAL MEDICINE

## 2022-11-11 PROCEDURE — 83735 ASSAY OF MAGNESIUM: CPT | Performed by: INTERNAL MEDICINE

## 2022-11-11 PROCEDURE — 93010 ELECTROCARDIOGRAM REPORT: CPT | Performed by: INTERNAL MEDICINE

## 2022-11-11 PROCEDURE — 99232 SBSQ HOSP IP/OBS MODERATE 35: CPT | Performed by: INTERNAL MEDICINE

## 2022-11-11 PROCEDURE — 84100 ASSAY OF PHOSPHORUS: CPT | Performed by: INTERNAL MEDICINE

## 2022-11-11 PROCEDURE — 71046 X-RAY EXAM CHEST 2 VIEWS: CPT

## 2022-11-11 PROCEDURE — 97162 PT EVAL MOD COMPLEX 30 MIN: CPT

## 2022-11-11 RX ORDER — VALSARTAN 80 MG/1
40 TABLET ORAL
Status: DISCONTINUED | OUTPATIENT
Start: 2022-11-11 | End: 2022-11-12

## 2022-11-11 RX ORDER — AMIODARONE HYDROCHLORIDE 400 MG/1
400 TABLET ORAL DAILY
Qty: 30 TABLET | Refills: 5 | Status: SHIPPED | OUTPATIENT
Start: 2022-11-11 | End: 2022-11-14 | Stop reason: SDUPTHER

## 2022-11-11 RX ORDER — FUROSEMIDE 20 MG/1
20 TABLET ORAL DAILY
Status: DISCONTINUED | OUTPATIENT
Start: 2022-11-12 | End: 2022-11-12

## 2022-11-11 RX ORDER — EPLERENONE 25 MG/1
12.5 TABLET, FILM COATED ORAL
Status: DISCONTINUED | OUTPATIENT
Start: 2022-11-12 | End: 2022-11-11

## 2022-11-11 RX ORDER — FUROSEMIDE 20 MG/1
20 TABLET ORAL DAILY
Status: DISCONTINUED | OUTPATIENT
Start: 2022-11-12 | End: 2022-11-11

## 2022-11-11 RX ADMIN — ATORVASTATIN CALCIUM 40 MG: 40 TABLET, FILM COATED ORAL at 20:46

## 2022-11-11 RX ADMIN — ACETAMINOPHEN 650 MG: 325 TABLET, FILM COATED ORAL at 04:59

## 2022-11-11 RX ADMIN — ACETAMINOPHEN 650 MG: 325 TABLET, FILM COATED ORAL at 20:46

## 2022-11-11 RX ADMIN — ASPIRIN 81 MG: 81 TABLET, COATED ORAL at 08:07

## 2022-11-11 RX ADMIN — PHENYLEPHRINE HYDROCHLORIDE 0.5 MCG/KG/MIN: 10 INJECTION INTRAVENOUS at 10:07

## 2022-11-11 RX ADMIN — AMIODARONE HYDROCHLORIDE 400 MG: 200 TABLET ORAL at 17:55

## 2022-11-11 RX ADMIN — IBUPROFEN 600 MG: 600 TABLET ORAL at 12:24

## 2022-11-11 RX ADMIN — IBUPROFEN 600 MG: 600 TABLET ORAL at 17:55

## 2022-11-11 RX ADMIN — Medication 10 ML: at 08:07

## 2022-11-11 RX ADMIN — ACETAMINOPHEN 650 MG: 325 TABLET, FILM COATED ORAL at 10:07

## 2022-11-11 RX ADMIN — SODIUM CHLORIDE 500 ML: 9 INJECTION, SOLUTION INTRAVENOUS at 10:06

## 2022-11-11 RX ADMIN — IBUPROFEN 600 MG: 600 TABLET ORAL at 05:00

## 2022-11-11 RX ADMIN — AMIODARONE HYDROCHLORIDE 400 MG: 200 TABLET ORAL at 08:06

## 2022-11-11 RX ADMIN — CLOPIDOGREL BISULFATE 75 MG: 75 TABLET ORAL at 08:07

## 2022-11-11 RX ADMIN — ACETAMINOPHEN 650 MG: 325 TABLET, FILM COATED ORAL at 16:17

## 2022-11-11 RX ADMIN — EPLERENONE 25 MG: 25 TABLET ORAL at 08:07

## 2022-11-12 LAB
ANION GAP SERPL CALCULATED.3IONS-SCNC: 9 MMOL/L (ref 5–15)
BASOPHILS # BLD AUTO: 0.03 10*3/MM3 (ref 0–0.2)
BASOPHILS NFR BLD AUTO: 0.4 % (ref 0–1.5)
BUN SERPL-MCNC: 36 MG/DL (ref 8–23)
BUN/CREAT SERPL: 27.1 (ref 7–25)
CALCIUM SPEC-SCNC: 8.1 MG/DL (ref 8.6–10.5)
CHLORIDE SERPL-SCNC: 105 MMOL/L (ref 98–107)
CO2 SERPL-SCNC: 20 MMOL/L (ref 22–29)
CREAT SERPL-MCNC: 1.33 MG/DL (ref 0.76–1.27)
DEPRECATED RDW RBC AUTO: 55.4 FL (ref 37–54)
EGFRCR SERPLBLD CKD-EPI 2021: 53 ML/MIN/1.73
EOSINOPHIL # BLD AUTO: 0.32 10*3/MM3 (ref 0–0.4)
EOSINOPHIL NFR BLD AUTO: 3.8 % (ref 0.3–6.2)
ERYTHROCYTE [DISTWIDTH] IN BLOOD BY AUTOMATED COUNT: 16.3 % (ref 12.3–15.4)
GLUCOSE SERPL-MCNC: 93 MG/DL (ref 65–99)
HCT VFR BLD AUTO: 37.2 % (ref 37.5–51)
HGB BLD-MCNC: 11.5 G/DL (ref 13–17.7)
IMM GRANULOCYTES # BLD AUTO: 0.03 10*3/MM3 (ref 0–0.05)
IMM GRANULOCYTES NFR BLD AUTO: 0.4 % (ref 0–0.5)
LYMPHOCYTES # BLD AUTO: 1.66 10*3/MM3 (ref 0.7–3.1)
LYMPHOCYTES NFR BLD AUTO: 19.5 % (ref 19.6–45.3)
MAGNESIUM SERPL-MCNC: 2 MG/DL (ref 1.6–2.4)
MCH RBC QN AUTO: 28.5 PG (ref 26.6–33)
MCHC RBC AUTO-ENTMCNC: 30.9 G/DL (ref 31.5–35.7)
MCV RBC AUTO: 92.3 FL (ref 79–97)
MONOCYTES # BLD AUTO: 0.53 10*3/MM3 (ref 0.1–0.9)
MONOCYTES NFR BLD AUTO: 6.2 % (ref 5–12)
NEUTROPHILS NFR BLD AUTO: 5.93 10*3/MM3 (ref 1.7–7)
NEUTROPHILS NFR BLD AUTO: 69.7 % (ref 42.7–76)
NRBC BLD AUTO-RTO: 0 /100 WBC (ref 0–0.2)
PHOSPHATE SERPL-MCNC: 3.4 MG/DL (ref 2.5–4.5)
PLATELET # BLD AUTO: 174 10*3/MM3 (ref 140–450)
PMV BLD AUTO: 10.5 FL (ref 6–12)
POTASSIUM SERPL-SCNC: 4.3 MMOL/L (ref 3.5–5.2)
RBC # BLD AUTO: 4.03 10*6/MM3 (ref 4.14–5.8)
SODIUM SERPL-SCNC: 134 MMOL/L (ref 136–145)
WBC NRBC COR # BLD: 8.5 10*3/MM3 (ref 3.4–10.8)

## 2022-11-12 PROCEDURE — 25010000002 PHENYLEPHRINE 10 MG/ML SOLUTION 5 ML VIAL: Performed by: INTERNAL MEDICINE

## 2022-11-12 PROCEDURE — 80048 BASIC METABOLIC PNL TOTAL CA: CPT | Performed by: INTERNAL MEDICINE

## 2022-11-12 PROCEDURE — 85025 COMPLETE CBC W/AUTO DIFF WBC: CPT | Performed by: INTERNAL MEDICINE

## 2022-11-12 PROCEDURE — 99232 SBSQ HOSP IP/OBS MODERATE 35: CPT | Performed by: NURSE PRACTITIONER

## 2022-11-12 PROCEDURE — 83735 ASSAY OF MAGNESIUM: CPT | Performed by: INTERNAL MEDICINE

## 2022-11-12 PROCEDURE — 84100 ASSAY OF PHOSPHORUS: CPT | Performed by: INTERNAL MEDICINE

## 2022-11-12 PROCEDURE — 99232 SBSQ HOSP IP/OBS MODERATE 35: CPT | Performed by: INTERNAL MEDICINE

## 2022-11-12 RX ORDER — AMOXICILLIN 250 MG
2 CAPSULE ORAL 2 TIMES DAILY PRN
Status: DISCONTINUED | OUTPATIENT
Start: 2022-11-12 | End: 2022-11-14 | Stop reason: HOSPADM

## 2022-11-12 RX ORDER — POLYETHYLENE GLYCOL 3350 17 G/17G
17 POWDER, FOR SOLUTION ORAL DAILY
Status: DISCONTINUED | OUTPATIENT
Start: 2022-11-12 | End: 2022-11-14 | Stop reason: HOSPADM

## 2022-11-12 RX ADMIN — CLOPIDOGREL BISULFATE 75 MG: 75 TABLET ORAL at 08:15

## 2022-11-12 RX ADMIN — Medication 10 ML: at 08:16

## 2022-11-12 RX ADMIN — ASPIRIN 81 MG: 81 TABLET, COATED ORAL at 08:15

## 2022-11-12 RX ADMIN — IBUPROFEN 600 MG: 600 TABLET ORAL at 06:16

## 2022-11-12 RX ADMIN — AMIODARONE HYDROCHLORIDE 400 MG: 200 TABLET ORAL at 08:15

## 2022-11-12 RX ADMIN — ACETAMINOPHEN 650 MG: 325 TABLET, FILM COATED ORAL at 16:12

## 2022-11-12 RX ADMIN — ACETAMINOPHEN 650 MG: 325 TABLET, FILM COATED ORAL at 10:31

## 2022-11-12 RX ADMIN — AMIODARONE HYDROCHLORIDE 400 MG: 200 TABLET ORAL at 18:37

## 2022-11-12 RX ADMIN — PHENYLEPHRINE HYDROCHLORIDE 0.5 MCG/KG/MIN: 10 INJECTION INTRAVENOUS at 21:20

## 2022-11-12 RX ADMIN — IBUPROFEN 600 MG: 600 TABLET ORAL at 12:17

## 2022-11-12 RX ADMIN — POLYETHYLENE GLYCOL 3350 17 G: 17 POWDER, FOR SOLUTION ORAL at 12:17

## 2022-11-12 RX ADMIN — IBUPROFEN 600 MG: 600 TABLET ORAL at 18:37

## 2022-11-13 LAB
ANION GAP SERPL CALCULATED.3IONS-SCNC: 10 MMOL/L (ref 5–15)
BUN SERPL-MCNC: 33 MG/DL (ref 8–23)
BUN/CREAT SERPL: 23.9 (ref 7–25)
CALCIUM SPEC-SCNC: 8.6 MG/DL (ref 8.6–10.5)
CHLORIDE SERPL-SCNC: 108 MMOL/L (ref 98–107)
CO2 SERPL-SCNC: 20 MMOL/L (ref 22–29)
CREAT SERPL-MCNC: 1.38 MG/DL (ref 0.76–1.27)
EGFRCR SERPLBLD CKD-EPI 2021: 50.7 ML/MIN/1.73
GLUCOSE SERPL-MCNC: 92 MG/DL (ref 65–99)
MAGNESIUM SERPL-MCNC: 2.2 MG/DL (ref 1.6–2.4)
PHOSPHATE SERPL-MCNC: 3.2 MG/DL (ref 2.5–4.5)
POTASSIUM SERPL-SCNC: 4.1 MMOL/L (ref 3.5–5.2)
SODIUM SERPL-SCNC: 138 MMOL/L (ref 136–145)

## 2022-11-13 PROCEDURE — 99232 SBSQ HOSP IP/OBS MODERATE 35: CPT | Performed by: INTERNAL MEDICINE

## 2022-11-13 PROCEDURE — 99232 SBSQ HOSP IP/OBS MODERATE 35: CPT | Performed by: NURSE PRACTITIONER

## 2022-11-13 PROCEDURE — 84100 ASSAY OF PHOSPHORUS: CPT | Performed by: INTERNAL MEDICINE

## 2022-11-13 PROCEDURE — 83735 ASSAY OF MAGNESIUM: CPT | Performed by: INTERNAL MEDICINE

## 2022-11-13 PROCEDURE — 80048 BASIC METABOLIC PNL TOTAL CA: CPT | Performed by: INTERNAL MEDICINE

## 2022-11-13 RX ORDER — MIDODRINE HYDROCHLORIDE 10 MG/1
10 TABLET ORAL
Status: DISCONTINUED | OUTPATIENT
Start: 2022-11-13 | End: 2022-11-14 | Stop reason: HOSPADM

## 2022-11-13 RX ORDER — LEVOTHYROXINE SODIUM 0.12 MG/1
125 TABLET ORAL
Status: DISCONTINUED | OUTPATIENT
Start: 2022-11-13 | End: 2022-11-14 | Stop reason: HOSPADM

## 2022-11-13 RX ADMIN — ATORVASTATIN CALCIUM 40 MG: 40 TABLET, FILM COATED ORAL at 20:17

## 2022-11-13 RX ADMIN — MIDODRINE HYDROCHLORIDE 10 MG: 10 TABLET ORAL at 09:27

## 2022-11-13 RX ADMIN — CLOPIDOGREL BISULFATE 75 MG: 75 TABLET ORAL at 08:36

## 2022-11-13 RX ADMIN — LEVOTHYROXINE SODIUM 125 MCG: 125 TABLET ORAL at 09:28

## 2022-11-13 RX ADMIN — POLYETHYLENE GLYCOL 3350 17 G: 17 POWDER, FOR SOLUTION ORAL at 08:36

## 2022-11-13 RX ADMIN — AMIODARONE HYDROCHLORIDE 400 MG: 200 TABLET ORAL at 08:36

## 2022-11-13 RX ADMIN — AMIODARONE HYDROCHLORIDE 400 MG: 200 TABLET ORAL at 17:57

## 2022-11-13 RX ADMIN — ASPIRIN 81 MG: 81 TABLET, COATED ORAL at 08:36

## 2022-11-13 RX ADMIN — MIDODRINE HYDROCHLORIDE 10 MG: 10 TABLET ORAL at 17:57

## 2022-11-14 ENCOUNTER — READMISSION MANAGEMENT (OUTPATIENT)
Dept: CALL CENTER | Facility: HOSPITAL | Age: 82
End: 2022-11-14

## 2022-11-14 VITALS
HEIGHT: 72 IN | OXYGEN SATURATION: 92 % | WEIGHT: 218.03 LBS | HEART RATE: 73 BPM | RESPIRATION RATE: 18 BRPM | TEMPERATURE: 98.4 F | SYSTOLIC BLOOD PRESSURE: 105 MMHG | DIASTOLIC BLOOD PRESSURE: 74 MMHG | BODY MASS INDEX: 29.53 KG/M2

## 2022-11-14 PROBLEM — I95.2 HYPOTENSION DUE TO DRUGS: Status: RESOLVED | Noted: 2022-11-11 | Resolved: 2022-11-14

## 2022-11-14 LAB
ALBUMIN SERPL-MCNC: 3.6 G/DL (ref 3.5–5.2)
ALBUMIN/GLOB SERPL: 1.6 G/DL
ALP SERPL-CCNC: 50 U/L (ref 39–117)
ALT SERPL W P-5'-P-CCNC: 8 U/L (ref 1–41)
ANION GAP SERPL CALCULATED.3IONS-SCNC: 11 MMOL/L (ref 5–15)
AST SERPL-CCNC: 21 U/L (ref 1–40)
BASOPHILS # BLD AUTO: 0.03 10*3/MM3 (ref 0–0.2)
BASOPHILS NFR BLD AUTO: 0.5 % (ref 0–1.5)
BILIRUB SERPL-MCNC: 0.6 MG/DL (ref 0–1.2)
BUN SERPL-MCNC: 31 MG/DL (ref 8–23)
BUN/CREAT SERPL: 24.8 (ref 7–25)
CALCIUM SPEC-SCNC: 8.6 MG/DL (ref 8.6–10.5)
CHLORIDE SERPL-SCNC: 104 MMOL/L (ref 98–107)
CO2 SERPL-SCNC: 19 MMOL/L (ref 22–29)
CREAT SERPL-MCNC: 1.25 MG/DL (ref 0.76–1.27)
DEPRECATED RDW RBC AUTO: 55.1 FL (ref 37–54)
EGFRCR SERPLBLD CKD-EPI 2021: 57.1 ML/MIN/1.73
EOSINOPHIL # BLD AUTO: 0.2 10*3/MM3 (ref 0–0.4)
EOSINOPHIL NFR BLD AUTO: 3 % (ref 0.3–6.2)
ERYTHROCYTE [DISTWIDTH] IN BLOOD BY AUTOMATED COUNT: 16.2 % (ref 12.3–15.4)
GLOBULIN UR ELPH-MCNC: 2.3 GM/DL
GLUCOSE SERPL-MCNC: 126 MG/DL (ref 65–99)
HCT VFR BLD AUTO: 37.8 % (ref 37.5–51)
HGB BLD-MCNC: 11.7 G/DL (ref 13–17.7)
IMM GRANULOCYTES # BLD AUTO: 0.02 10*3/MM3 (ref 0–0.05)
IMM GRANULOCYTES NFR BLD AUTO: 0.3 % (ref 0–0.5)
LYMPHOCYTES # BLD AUTO: 1.56 10*3/MM3 (ref 0.7–3.1)
LYMPHOCYTES NFR BLD AUTO: 23.4 % (ref 19.6–45.3)
MAGNESIUM SERPL-MCNC: 1.9 MG/DL (ref 1.6–2.4)
MCH RBC QN AUTO: 28.5 PG (ref 26.6–33)
MCHC RBC AUTO-ENTMCNC: 31 G/DL (ref 31.5–35.7)
MCV RBC AUTO: 92 FL (ref 79–97)
MONOCYTES # BLD AUTO: 0.43 10*3/MM3 (ref 0.1–0.9)
MONOCYTES NFR BLD AUTO: 6.5 % (ref 5–12)
NEUTROPHILS NFR BLD AUTO: 4.42 10*3/MM3 (ref 1.7–7)
NEUTROPHILS NFR BLD AUTO: 66.3 % (ref 42.7–76)
NRBC BLD AUTO-RTO: 0 /100 WBC (ref 0–0.2)
PLATELET # BLD AUTO: 171 10*3/MM3 (ref 140–450)
PMV BLD AUTO: 10.5 FL (ref 6–12)
POTASSIUM SERPL-SCNC: 4.2 MMOL/L (ref 3.5–5.2)
PROT SERPL-MCNC: 5.9 G/DL (ref 6–8.5)
QT INTERVAL: 538 MS
QT INTERVAL: 574 MS
QTC INTERVAL: 568 MS
QTC INTERVAL: 568 MS
RBC # BLD AUTO: 4.11 10*6/MM3 (ref 4.14–5.8)
SODIUM SERPL-SCNC: 134 MMOL/L (ref 136–145)
WBC NRBC COR # BLD: 6.66 10*3/MM3 (ref 3.4–10.8)

## 2022-11-14 PROCEDURE — 99024 POSTOP FOLLOW-UP VISIT: CPT | Performed by: INTERNAL MEDICINE

## 2022-11-14 PROCEDURE — 25010000002 INFLUENZA VAC SPLIT QUAD 0.5 ML SUSPENSION PREFILLED SYRINGE: Performed by: INTERNAL MEDICINE

## 2022-11-14 PROCEDURE — 97110 THERAPEUTIC EXERCISES: CPT

## 2022-11-14 PROCEDURE — 85025 COMPLETE CBC W/AUTO DIFF WBC: CPT | Performed by: INTERNAL MEDICINE

## 2022-11-14 PROCEDURE — 97530 THERAPEUTIC ACTIVITIES: CPT

## 2022-11-14 PROCEDURE — 83735 ASSAY OF MAGNESIUM: CPT | Performed by: INTERNAL MEDICINE

## 2022-11-14 PROCEDURE — 90686 IIV4 VACC NO PRSV 0.5 ML IM: CPT | Performed by: INTERNAL MEDICINE

## 2022-11-14 PROCEDURE — 80053 COMPREHEN METABOLIC PANEL: CPT | Performed by: INTERNAL MEDICINE

## 2022-11-14 PROCEDURE — G0008 ADMIN INFLUENZA VIRUS VAC: HCPCS | Performed by: INTERNAL MEDICINE

## 2022-11-14 RX ORDER — AMIODARONE HYDROCHLORIDE 200 MG/1
400 TABLET ORAL DAILY
Qty: 180 TABLET | Refills: 1 | Status: SHIPPED | OUTPATIENT
Start: 2022-11-14 | End: 2022-12-05

## 2022-11-14 RX ORDER — ATORVASTATIN CALCIUM 20 MG/1
20 TABLET, FILM COATED ORAL NIGHTLY
Qty: 90 TABLET | Refills: 3 | Status: SHIPPED | OUTPATIENT
Start: 2022-11-14 | End: 2023-01-09 | Stop reason: HOSPADM

## 2022-11-14 RX ORDER — MIDODRINE HYDROCHLORIDE 5 MG/1
5 TABLET ORAL
Qty: 270 TABLET | Refills: 1 | Status: SHIPPED | OUTPATIENT
Start: 2022-11-14 | End: 2022-11-14 | Stop reason: SDUPTHER

## 2022-11-14 RX ORDER — CLOPIDOGREL BISULFATE 75 MG/1
75 TABLET ORAL DAILY
Qty: 90 TABLET | Refills: 1 | Status: SHIPPED | OUTPATIENT
Start: 2022-11-15 | End: 2023-01-09 | Stop reason: HOSPADM

## 2022-11-14 RX ORDER — FUROSEMIDE 40 MG/1
40 TABLET ORAL DAILY PRN
Qty: 90 TABLET | Refills: 3 | Status: SHIPPED | OUTPATIENT
Start: 2022-11-14 | End: 2022-12-01 | Stop reason: SDUPTHER

## 2022-11-14 RX ORDER — MIDODRINE HYDROCHLORIDE 5 MG/1
5 TABLET ORAL
Qty: 270 TABLET | Refills: 1 | Status: SHIPPED | OUTPATIENT
Start: 2022-11-14 | End: 2023-01-09 | Stop reason: HOSPADM

## 2022-11-14 RX ADMIN — CLOPIDOGREL BISULFATE 75 MG: 75 TABLET ORAL at 08:31

## 2022-11-14 RX ADMIN — MIDODRINE HYDROCHLORIDE 10 MG: 10 TABLET ORAL at 13:15

## 2022-11-14 RX ADMIN — AMIODARONE HYDROCHLORIDE 400 MG: 200 TABLET ORAL at 08:31

## 2022-11-14 RX ADMIN — ASPIRIN 81 MG: 81 TABLET, COATED ORAL at 08:31

## 2022-11-14 RX ADMIN — INFLUENZA VIRUS VACCINE 0.5 ML: 15; 15; 15; 15 SUSPENSION INTRAMUSCULAR at 13:14

## 2022-11-14 RX ADMIN — POLYETHYLENE GLYCOL 3350 17 G: 17 POWDER, FOR SOLUTION ORAL at 08:31

## 2022-11-14 RX ADMIN — LEVOTHYROXINE SODIUM 125 MCG: 125 TABLET ORAL at 05:13

## 2022-11-15 ENCOUNTER — DOCUMENTATION (OUTPATIENT)
Dept: CARDIAC REHAB | Facility: HOSPITAL | Age: 82
End: 2022-11-15

## 2022-11-15 ENCOUNTER — TRANSITIONAL CARE MANAGEMENT TELEPHONE ENCOUNTER (OUTPATIENT)
Dept: CALL CENTER | Facility: HOSPITAL | Age: 82
End: 2022-11-15

## 2022-11-15 DIAGNOSIS — Z95.5 STENTED CORONARY ARTERY: Primary | ICD-10-CM

## 2022-11-15 NOTE — OUTREACH NOTE
Prep Survey    Flowsheet Row Responses   [facility] facility patient discharged from? [place]   Is LACE score < 7 ? No   Emergency Room discharge w/ pulse ox? No   Eligibility [facility]   Date of Admission 11/07/22   Date of Discharge 11/14/22   Discharge Disposition Home-Health Care Svc   Discharge diagnosis syncope, nonsustained V-tach, Pacemaker placed, CAD, Chronic CHF   Does the patient have one of the following disease processes/diagnoses(primary or secondary)? Other   Does the patient have Home health ordered? Yes   What is the Home health agency?  [facility]   Is there a DME ordered? No   Prep survey completed? Yes          NICOLE HODGE - Registered Nurse

## 2022-11-15 NOTE — OUTREACH NOTE
"Call Center TCM Note    Flowsheet Row Responses   [facility] patient discharged from? [place]   Does the patient have one of the following disease processes/diagnoses(primary or secondary)? Other   TCM attempt successful? Yes   Call start time 1359   Call end time 1404   Discharge diagnosis syncope, nonsustained V-tach, Pacemaker placed, CAD, Chronic CHF   Meds reviewed with patient/caregiver? Yes   Is the patient having any side effects they believe may be caused by any medication additions or changes? No   Does the patient have all medications ordered at discharge? Yes   Is the patient taking all medications as directed (includes completed medication regime)? Yes   Medication comments Pt states he \"uses his common sense\" and \"holds his BP meds if he thinks he does not need it\" - encouraged pt to call PCP - pt declined and states he will talk to his doctor's at his scheduled apts    Comments Cards apt on 11/21/22 ,  MW visit on 11/30/22 (pt declined hosp dc fu apt)    Does the patient have an appointment with their PCP within 7 days of discharge? No   Nursing Interventions Patient declined scheduling/rescheduling appointment at this time, PCP office requested to make appointment - message sent, Routed TCM call to PCP office   What is the Home health agency?  [facility]   Home health comments Cardiac rehab visited pt today per pt (11/15/22)   Psychosocial issues? No   Did the patient receive a copy of their discharge instructions? Yes   Nursing interventions Reviewed instructions with patient   What is the patient's perception of their health status since discharge? Same   Is the patient/caregiver able to teach back signs and symptoms related to disease process for when to call PCP? Yes   Is the patient/caregiver able to teach back signs and symptoms related to disease process for when to call 911? Yes   Is the patient/caregiver able to teach back the hierarchy of who to call/visit for symptoms/problems? " PCP, Specialist, Home health nurse, Urgent Care, ED, 911 Yes   If the patient is a current smoker, are they able to teach back resources for cessation? Not a smoker   TCM call completed? Yes   Call end time [id number]          Kathi Shay RN    11/15/2022, 14:04 EST

## 2022-11-16 ENCOUNTER — TELEPHONE (OUTPATIENT)
Dept: INTERNAL MEDICINE | Facility: CLINIC | Age: 82
End: 2022-11-16

## 2022-11-17 ENCOUNTER — OFFICE VISIT (OUTPATIENT)
Dept: CARDIOLOGY | Facility: HOSPITAL | Age: 82
End: 2022-11-17

## 2022-11-17 ENCOUNTER — TELEPHONE (OUTPATIENT)
Dept: CARDIOLOGY | Facility: CLINIC | Age: 82
End: 2022-11-17

## 2022-11-17 VITALS
HEIGHT: 72 IN | OXYGEN SATURATION: 99 % | HEART RATE: 82 BPM | TEMPERATURE: 97.3 F | BODY MASS INDEX: 29.42 KG/M2 | RESPIRATION RATE: 20 BRPM | WEIGHT: 217.25 LBS | SYSTOLIC BLOOD PRESSURE: 129 MMHG | DIASTOLIC BLOOD PRESSURE: 79 MMHG

## 2022-11-17 DIAGNOSIS — I95.9 HYPOTENSION, UNSPECIFIED HYPOTENSION TYPE: ICD-10-CM

## 2022-11-17 DIAGNOSIS — I25.810 CORONARY ARTERY DISEASE INVOLVING CORONARY BYPASS GRAFT OF NATIVE HEART WITHOUT ANGINA PECTORIS: ICD-10-CM

## 2022-11-17 DIAGNOSIS — I50.23 ACUTE ON CHRONIC HFREF (HEART FAILURE WITH REDUCED EJECTION FRACTION): Primary | ICD-10-CM

## 2022-11-17 DIAGNOSIS — I47.29 NSVT (NONSUSTAINED VENTRICULAR TACHYCARDIA): ICD-10-CM

## 2022-11-17 PROCEDURE — 99214 OFFICE O/P EST MOD 30 MIN: CPT | Performed by: NURSE PRACTITIONER

## 2022-11-17 NOTE — TELEPHONE ENCOUNTER
Additionally, pt reports he has taken his home dose of furosemide 40 mg Tuesday, Wednesday & today.

## 2022-11-17 NOTE — TELEPHONE ENCOUNTER
Pt discharged from [facility] Monday, 11/14/2022, s/p BIV pacemaker implant & s/p PCI to SVG of RCA-see discharge summary.     Pt called to report low heart rates per his pulse oximeter. Additionally, pt reports that his weight is up 10 pounds since his hospital admission 11/7/2022. Per discharge summary diuretic & Entresto held secondary to hypotension. Pt is positive for abd edema, increased shortness of breath & PND.     Assisted pt w/ [employer] remote cardiac device transmission:  Normal device function, no arrhythmias, no alerts; AP 22%, BIV 95%, DDDR 60.  The pt's cardiac device does not provide [employer]/thoracic impedance information.     Arranged appointment for patient to be seen today in the [facility]ve Clinic,  [place], @ 3PM.   Pt & spouse notified.

## 2022-11-17 NOTE — PROGRESS NOTES
"[facility]  Heart and Valve Center    Chief Complaint  Edema and Establish Care    Subjective    History of Present Illness {CC  Problem List  Visit  Diagnosis   Encounters  Notes  Medications  Labs  Result Review Imaging  Media :23}     Saul Sal is a 82 y.o. male with HFrEF, CAD status post remote CABG, atrial fibrillation/atrial flutter, hypothyroidism, hyperlipidemia,ELIE, and ventricular tachycardia who presents today as a same-day visit for HFrEF.    Patient recently hospitalized 11/7 with NSVT.  He underwent left heart catheterization and had a PCI of the vein graft to the RCA.  He was started on amiodarone and underwent EP study with no inducible VT.  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      His diuretics and Entresto were held due to hypotension.  He was discharged on midodrine but he says he hasn't taken it because his SBP has been was 114-139. Since then he has had a 10 pound weight gain since the hospital stay. He started back on his lasix Tuesday and thinks he is down a pound. He notes orthopnea/PND and edema      Objective     Vital Signs:   Vitals:    11/17/22 1539 11/17/22 1541 11/17/22 1543   BP: 114/75 140/90 129/79   BP Location: Right arm Left arm Left arm   Patient Position: Sitting Standing Sitting   Cuff Size: Adult Adult Adult   Pulse: 85 91 82   Resp:   20   Temp:   97.3 °F (36.3 °C)   TempSrc:   Temporal   SpO2: 97% 95% 99%   Weight:   98.5 kg (217 lb 4 oz)   Height:   182.9 cm (72\")     Body mass index is 29.46 kg/m².  Physical Exam  Vitals reviewed.   Constitutional:       Appearance: Normal appearance.   HENT:      Head: Normocephalic.   Neck:      Vascular: JVD present. No carotid bruit.   Cardiovascular:      Rate and Rhythm: Normal rate and regular rhythm.      Pulses: Normal pulses.      Heart sounds: Normal heart sounds, S1 normal and S2 normal. No murmur heard.  Pulmonary:      Effort: " Pulmonary effort is normal. No respiratory distress.      Breath sounds: Rales present.   Chest:      Chest wall: No tenderness.      Comments: Left infraclavicular ICD dressing in place with no erythema or drainage. Mild swelling  Abdominal:      General: Abdomen is flat.      Palpations: Abdomen is soft.   Musculoskeletal:      Cervical back: Neck supple.      Right lower le+ Pitting Edema present.      Left lower le+ Pitting Edema present.   Skin:     General: Skin is warm and dry.   Neurological:      General: No focal deficit present.      Mental Status: He is alert and oriented to person, place, and time. Mental status is at baseline.   Psychiatric:         Mood and Affect: Mood normal.         Behavior: Behavior normal.         Thought Content: Thought content normal.              Result Review  Data Reviewed:{ Labs  Result Review  Imaging  Med Tab  Media :23}   Comprehensive Metabolic Panel (2022 08:58)  CBC & Differential (2022 08:58)  Magnesium (2022 08:58)  Basic Metabolic Panel (2022 04:12)  Magnesium (2022 04:12)  Phosphorus (2022 04:12)  ECG 12 Lead QT Measurement (2022 21:21)  Adult Transthoracic Echo Complete W/ Cont if Necessary Per Protocol (2022 09:10)    Reviewed cardiology and hospital notes           Assessment and Plan {CC Problem List  Visit Diagnosis  ROS  Review (Popup)  Health Maintenance  Quality  BestPractice  Medications  SmartSets  SnapShot Encounters  Media :23}   1. Acute on chronic HFrEF (heart failure with reduced ejection fraction) ([facility])  EF <20% NYHA III  IV diuresis today in office with Lasix 60mg. He was given 20meq of supplemental potassium PO. Vitals were monitored and stable. Please see IV diuresis record for those vitals. Patient voided 490 ml in the office prior to discharge from the office. Butterfly was d/c'd and area was free of erythema, ecchymosis, or drainage.  Patient will receive a follow up  call from the HF center in 24 hours to reassess signs and symptoms.    No BB, Entresto or spironolactone due to hypotension.  Continue Lasix 40 mg.  Depending on response to IV Lasix will consider increasing to 40 mg twice daily.  We will call him tomorrow  S/p resynchronization PPM  Consider low dose metoprolol succinate if BPs stable after diuresis      2. Coronary artery disease involving coronary bypass graft of native heart without angina pectoris  Status post PCI of the SVG to RPDA  Continue DAPT, statin    3. Hypotension, unspecified hypotension type  Was discharged on Midodrine, however, patient has not taken since systolic blood pressures have been stable at home.  Continue to monitor. May take PRN for SBP <100    4. NSVT (nonsustained ventricular tachycardia)  Discharged on amiodarone  S/p CRT PPM        Follow Up {Instructions Charge Capture  Follow-up Communications :23}   Return in about 4 days (around 11/21/2022) for HF, Office follow up.    Patient was given instructions and counseling regarding his condition or for health maintenance advice. Please see specific information pulled into the AVS if appropriate.  Advised to call the Heart and Valve Center with any questions, concerns, or worsening symptoms.

## 2022-11-17 NOTE — TELEPHONE ENCOUNTER
Patient called and states that last night he could not sleep, he woke up and checked his O2, it was 97%. He did not check any other vitals at this time. He states that this morning he had someone in the house and they told him that his HR was 13 on his O2 pleth. He also states that after that he has been checking it and at times his HR is in the 20's (he does have a pacemaker). He states he does have a home monitoring system. I advised him he should talk with the device clinic and they can assist him with a remote transfer.  I transferred him to the device clinic.

## 2022-11-18 ENCOUNTER — TELEPHONE (OUTPATIENT)
Dept: CARDIAC REHAB | Facility: HOSPITAL | Age: 82
End: 2022-11-18

## 2022-11-18 ENCOUNTER — TELEPHONE (OUTPATIENT)
Dept: CARDIOLOGY | Facility: HOSPITAL | Age: 82
End: 2022-11-18

## 2022-11-18 NOTE — TELEPHONE ENCOUNTER
Reassure him that his heart rate is not low as he has a pacemaker placed.  Sounds like he is going into heart failure and does need to see the heart failure clinic today and get IV diuretics.  If his blood pressure allows, we should stop the midodrine as well.    VENTURA Jones MD [facility]  Interventional and General Cardiology

## 2022-11-18 NOTE — TELEPHONE ENCOUNTER
Staff called patient in regards to Phase II Cardiac Rehab. Staff discussed benefits of exercise and program protocol with patients wife. Teach back verified. Patient is interested. Patients wife would like to know how much insurance would cover before scheduling orientation appointment.

## 2022-11-18 NOTE — TELEPHONE ENCOUNTER
Called patient and he reports that he is feeling better and was actually able to sleep last night. He is down about 4lbs. Reports his SBP was 117. He took 2 lasix this morning and says he believes he will feel better each day.  I advised him to resume his normal furosemide dose tomorrow if weight is back to baseline, if not may take an additional Lasix.  Keep follow-up for Monday.  He had no further questions or concerns

## 2022-11-21 ENCOUNTER — OFFICE VISIT (OUTPATIENT)
Dept: CARDIOLOGY | Facility: HOSPITAL | Age: 82
End: 2022-11-21

## 2022-11-21 ENCOUNTER — HOSPITAL ENCOUNTER (OUTPATIENT)
Dept: CARDIOLOGY | Facility: HOSPITAL | Age: 82
Discharge: HOME | End: 2022-11-21

## 2022-11-21 VITALS
DIASTOLIC BLOOD PRESSURE: 81 MMHG | HEART RATE: 88 BPM | HEIGHT: 72 IN | SYSTOLIC BLOOD PRESSURE: 115 MMHG | OXYGEN SATURATION: 98 % | TEMPERATURE: 96.2 F | RESPIRATION RATE: 18 BRPM | BODY MASS INDEX: 28.33 KG/M2 | WEIGHT: 209.2 LBS

## 2022-11-21 DIAGNOSIS — I50.23 ACUTE ON CHRONIC HFREF (HEART FAILURE WITH REDUCED EJECTION FRACTION): ICD-10-CM

## 2022-11-21 DIAGNOSIS — I50.23 ACUTE ON CHRONIC HFREF (HEART FAILURE WITH REDUCED EJECTION FRACTION): Primary | ICD-10-CM

## 2022-11-21 DIAGNOSIS — I25.810 CORONARY ARTERY DISEASE INVOLVING CORONARY BYPASS GRAFT OF NATIVE HEART WITHOUT ANGINA PECTORIS: ICD-10-CM

## 2022-11-21 DIAGNOSIS — I47.29 NSVT (NONSUSTAINED VENTRICULAR TACHYCARDIA): ICD-10-CM

## 2022-11-21 DIAGNOSIS — I48.4 ATYPICAL ATRIAL FLUTTER: ICD-10-CM

## 2022-11-21 LAB
QT INTERVAL: 482 MS
QTC INTERVAL: 542 MS

## 2022-11-21 PROCEDURE — 99024 POSTOP FOLLOW-UP VISIT: CPT | Performed by: NURSE PRACTITIONER

## 2022-11-21 PROCEDURE — 93005 ELECTROCARDIOGRAM TRACING: CPT | Performed by: NURSE PRACTITIONER

## 2022-11-21 PROCEDURE — 93010 ELECTROCARDIOGRAM REPORT: CPT | Performed by: INTERNAL MEDICINE

## 2022-11-21 RX ORDER — POTASSIUM CHLORIDE 750 MG/1
20 TABLET, FILM COATED, EXTENDED RELEASE ORAL DAILY
Qty: 60 TABLET | Refills: 0 | Status: SHIPPED | OUTPATIENT
Start: 2022-11-21 | End: 2023-01-09 | Stop reason: HOSPADM

## 2022-11-21 NOTE — PROGRESS NOTES
"Chief Complaint  Congestive Heart Failure and Follow-up    Subjective      History of Present Illness {CC  Problem List  Visit  Diagnosis   Encounters  Notes  Medications  Labs  Result Review Imaging  Media :23}     Saul Sal, 82 y.o. male with HFrEF, CAD status post remote CABG, atrial fibrillation/atrial flutter, hypothyroidism, hyperlipidemia,ELIE, and ventricular tachycardia presents to [facility] Heart and Valve clinic for Congestive Heart Failure and Follow-up.    Patient recently hospitalized 11/7 with NSVT.  He underwent left heart catheterization and had a PCI of the vein graft to the RCA.  He was started on amiodarone and underwent EP study with no inducible VT.  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  He was evaluated in heart and valve clinic approximately 4 days ago and diuresed with 60mg IV furosemide due to 10lb weight gain, dyspnea, orthopnea, LE edema.    Patient presents today with improvement in dyspnea, LE edema, orthopnea. Able to lie almost flat now. Weight is down 8lb since last clinic visit. Taking furosemide 40mg daily. Denies chest pain, palpitation/tachypalpitation, near syncope/syncope.      Objective     Vital Signs:   Vitals:    11/21/22 1205   BP: 115/81   BP Location: Left arm   Patient Position: Sitting   Cuff Size: Adult   Pulse: 88   Resp: 18   Temp: 96.2 °F (35.7 °C)   TempSrc: Temporal   SpO2: 98%   Weight: 94.9 kg (209 lb 3.2 oz)   Height: 182.9 cm (72\")     Body mass index is 28.37 kg/m².  Physical Exam  Vitals and nursing note reviewed.   Constitutional:       Appearance: Normal appearance.   HENT:      Head: Normocephalic.   Eyes:      Extraocular Movements: Extraocular movements intact.   Neck:      Vascular: No carotid bruit.   Cardiovascular:      Rate and Rhythm: Normal rate and regular rhythm.      Pulses: Normal pulses.      Heart sounds: Normal heart sounds, S1 normal and S2 normal. No " murmur heard.  Pulmonary:      Effort: Pulmonary effort is normal. No respiratory distress.      Breath sounds: Normal breath sounds.      Comments: Faint crackles bibasilar  Musculoskeletal:      Cervical back: Neck supple.      Right lower leg: Edema present.      Left lower leg: Edema present.      Comments: 1+ pitting LE edema   Skin:     General: Skin is warm and dry.      Findings: Bruising present.      Comments: Left upper chest device with dressing in place.   Neurological:      General: No focal deficit present.      Mental Status: He is alert.   Psychiatric:         Mood and Affect: Mood normal.         Behavior: Behavior normal.         Thought Content: Thought content normal.        Data Reviewed:{ Labs  Result Review  Imaging  Med Tab  Media :23}     CBC & Differential (11/14/2022 08:58)  Magnesium (11/14/2022 08:58)  Comprehensive Metabolic Panel (11/14/2022 08:58)  Adult Transthoracic Echo Complete W/ Cont if Necessary Per Protocol (11/08/2022 09:10)  Cardiac Catheterization/Vascular Study (11/08/2022 19:05)  Progress Notes by Sharri Westbrook APRN (11/17/2022 15:15)  EP/CRM Study (11/10/2022 19:48)      Assessment & Plan   Assessment and Plan {CC Problem List  Visit Diagnosis  ROS  Review (Popup)  Health Maintenance  Quality  BestPractice  Medications  SmartSets  SnapShot Encounters  Media :23}     1. Acute on chronic HFrEF (heart failure with reduced ejection fraction) (HCC)  - NYHA class: II. ACC/AHA stage: C  - Last LVEF: less than 20% on 11/9/2022 . S/p recent CRT placement  -Remains slightly hypervolemic on exam, reports improvement since previous evaluation  - Symptoms much improved since previous evaluation  -No beta-blocker, ACE/ARB/Arni due to recent hypotension  -Instructed to hold midodrine and evaluate blood pressure response  -Consider beta-blocker at follow-up pending home blood pressure recordings  -Continue furosemide 40 Mg daily  -Initiate potassium 20 M EQ  daily    2. Coronary artery disease involving coronary bypass graft of native heart without angina pectoris  -S/p PCI  -Denies anginal symptoms  -Continue DAPT, statin    3. NSVT (nonsustained ventricular tachycardia)  -Denies tachypalpitation symptoms  -S/p BiV - PPM  -QT/QTc stable on ECG today  -Continue amiodarone therapy as prescribed    4. Atypical atrial flutter (HCC)  -Denies tachypalpitation/palpitation symptoms  -[id number]: 5  -ASA only due to significant bleeding issues on anticoagulation  -Continue amiodarone      Follow Up {Instructions Charge Capture  Follow-up Communications :23}     Return in about 2 weeks (around 12/5/2022), or [place] follow-up for fluid check.      Patient was given instructions and counseling regarding his condition or for health maintenance advice. Please see specific information pulled into the AVS if appropriate.  Patient was instructed to call the Heart and Valve Center with any questions, concerns, or worsening symptoms.    Dictated Utilizing Dragon Dictation   Please note that portions of this note were completed with a voice recognition program.   Part of this note may be an electronic transcription/translation of spoken language to printed text using the Dragon Dictation System.

## 2022-11-23 ENCOUNTER — READMISSION MANAGEMENT (OUTPATIENT)
Dept: CALL CENTER | Facility: HOSPITAL | Age: 82
End: 2022-11-23

## 2022-11-23 LAB
QT INTERVAL: 552 MS
QTC INTERVAL: 574 MS

## 2022-11-23 NOTE — OUTREACH NOTE
Medical Week 2 Survey    Flowsheet Row Responses   [facility] patient discharged from? [place]   Does the patient have one of the following disease processes/diagnoses(primary or secondary)? Other   Week 2 attempt successful? Yes   Call start time 1818   Discharge diagnosis syncope, nonsustained V-tach, Pacemaker placed, CAD, Chronic CHF   Call end time 1825   Is the patient taking all medications as directed (includes completed medication regime)? No   What is preventing the patient from taking all medications as directed? Other   Medication comments Not taking midodrine.  Bp 124/73  Cardiologist is aware   Does the patient have a primary care provider?  Yes   Has the patient kept scheduled appointments due by today? Yes   Comments PCP appt on 11/30   What is the Home health agency?  [facility]   Has home health visited the patient within 72 hours of discharge? Yes   Psychosocial issues? No   What is the patient's perception of their health status since discharge? Improving   Is the patient/caregiver able to teach back signs and symptoms related to disease process for when to call PCP? Yes   Is the patient/caregiver able to teach back signs and symptoms related to disease process for when to call 911? Yes   Is the patient/caregiver able to teach back the hierarchy of who to call/visit for symptoms/problems? PCP, Specialist, Home health nurse, Urgent Care, ED, 911 Yes   If the patient is a current smoker, are they able to teach back resources for cessation? Not a smoker   Additional teach back comments States he feels he is doing very well.  Has no swelling and he weighs every morning.  States he is aware of taking his water pill to get the water off.     Week 2 Call Completed? Yes   Wrap up additional comments Denies questions or needs at this time.          VICTOR HUGO GALLEGOS - Licensed Nurse

## 2022-11-30 ENCOUNTER — OFFICE VISIT (OUTPATIENT)
Dept: INTERNAL MEDICINE | Facility: CLINIC | Age: 82
End: 2022-11-30

## 2022-11-30 ENCOUNTER — LAB (OUTPATIENT)
Dept: LAB | Facility: HOSPITAL | Age: 82
End: 2022-11-30

## 2022-11-30 VITALS
BODY MASS INDEX: 27.66 KG/M2 | WEIGHT: 204.2 LBS | HEART RATE: 100 BPM | HEIGHT: 72 IN | SYSTOLIC BLOOD PRESSURE: 112 MMHG | DIASTOLIC BLOOD PRESSURE: 76 MMHG

## 2022-11-30 DIAGNOSIS — I47.29 NSVT (NONSUSTAINED VENTRICULAR TACHYCARDIA): ICD-10-CM

## 2022-11-30 DIAGNOSIS — I50.22 CHRONIC SYSTOLIC CONGESTIVE HEART FAILURE: ICD-10-CM

## 2022-11-30 DIAGNOSIS — E78.5 HYPERLIPIDEMIA LDL GOAL <70: Chronic | ICD-10-CM

## 2022-11-30 DIAGNOSIS — I50.22 CHRONIC SYSTOLIC CONGESTIVE HEART FAILURE: Primary | ICD-10-CM

## 2022-11-30 DIAGNOSIS — R60.0 LOCALIZED EDEMA: ICD-10-CM

## 2022-11-30 PROBLEM — Z00.00 MEDICARE ANNUAL WELLNESS VISIT, SUBSEQUENT: Status: ACTIVE | Noted: 2022-11-30

## 2022-11-30 PROCEDURE — 99495 TRANSJ CARE MGMT MOD F2F 14D: CPT | Performed by: INTERNAL MEDICINE

## 2022-11-30 PROCEDURE — 1111F DSCHRG MED/CURRENT MED MERGE: CPT | Performed by: INTERNAL MEDICINE

## 2022-11-30 NOTE — ASSESSMENT & PLAN NOTE
Remote EP study and CRT pacemaker placed November 10, 2022 and now on amiodarone with no further arrhythmias continue therapy and follow-up with cardiology

## 2022-11-30 NOTE — ASSESSMENT & PLAN NOTE
Recent stenting of the vein graft to the RCA November 10, 2022 patient states he is not taking Plavix but is taking the aspirin 81 mg encouraged to take the Plavix 75 in addition as prescribed by cardiology

## 2022-11-30 NOTE — PROGRESS NOTES
Transitional Care Follow Up Visit  Subjective     Saul Sal is a 82 y.o. male who presents for a transitional care management visit.    The patient is an 82-year-old male who presents for a transitional care medicine follow-up and  Medicare annual wellness visit, having recently been hospitalized at [facility] 11/07/2022 through 11/14/2022 for sustained ventricular tachycardia and coronary artery disease with stenting of the vein graft of the RCA and PPM placed on 11/10/2022. He is complaining of mild insomnia, abdominal fullness after eating, and ankle edema with mixed hyperlipidemia and hypothyroidism.    The patient states that he is feeling well. He states that for the first 2 to 3 days after he got home, he used a walker; however, he uses a cane intermittently. He states that he still feels a little wobbly when he is out walking. He states that when he went to the hospital, he weighed 202 pounds and when he got out he weighed 214 pounds. He states that he was not given a water pill all the time. He states that he could not sleep. He states that he was not given a thyroid pill either. He states that when he got home, he could not sleep. He states that he doubled up. He states that he was given 2 prescriptions for the water pill. He states that he took 2 of them for a couple of days and got the water off of him. He states that he weighed 246 pounds. He states that he can sleep since he does not have the shortness of breath like he did because he got the water off. He states that the last 2 to 3 nights he has just slept 2 hours a night. He states that he is not sure what is the problem. He states that he will look at some food and he is hungry. He states that he will eat 3 to 4 bites of it. He states that he gets bloated. He states that he burps, even water.  He states, at times, he has to sleep in the recliner if he can not sleep in his bed. He states that he is taking amiodarone twice a day, baby  "aspirin, and cholesterol medicine. He states that he is not on Plavix. He states that a few years ago he was taking a full aspirin and he was in the hospital 2 times within 3 weeks. He states that he passed out in the ER because he was taking a full aspirin and it made him bleed. He states that the baby aspirin has done him good in the last 20 years. He states that he is taking the water pill everyday. He states that he is not on the medicine to raise his blood pressure. He states that 2 to 3 days before he left the hospital, they put some \"stuff\" in his veins to raise his blood pressure because it was low. He states that they did about 5 times; however, each time they took it out, his blood pressure would drop. He states that they gave him a prescription for it to take home with him. He states that they did not have it at the pharmacy. He states that he got the prescription filled at the hospital. He states that his wife has been taking his blood pressure 3 to 4 times a day. He states that right now it is 138/88 mmHg. He states that it has been up a good. He states that it has been as good as 107/63 mmHg. His blood pressure in the exam room today is 112/76 mmHg. He states that he has not taken one. He states that he is taking the potassium 2 tablets everyday. He denies any chest pain or pressure. He states that his feet are swollen. He states that he has been home for about 2 weeks. He states that he does not feel lightheaded when he stands up and moves around. He has a bath chair; however, he states he does not want to use it.  He states that he is on a low salt diet. He states that he takes Tylenol every 2 to 3 days.      Within 48 business hours after discharge our office contacted him via telephone to coordinate his care and needs.      I reviewed and discussed the details of that call along with the discharge summary, hospital problems, inpatient lab results, inpatient diagnostic studies, and consultation " "reports with Saul.     Current outpatient and discharge medications have been reconciled for the patient.  Reviewed by: Saul Campbell MD      Date of TCM Phone Call 11/14/2022   [facility]   Date of Admission 11/7/2022   Date of Discharge 11/14/2022   Discharge Disposition Home-Health Care Svc     Risk for Readmission (LACE) No data recorded    History of Present Illness   Course During Hospital Stay: Patient was hospitalized with episodic ventricular tachycardia and syncope was taken to the Cath Lab had stenting of the vein graft to the RCA and EP study and received CRT pacemaker on 11/10/2022 and discharged home on 14 November         Vitals:    11/30/22 1026 11/30/22 1056   BP: 130/88 112/76   BP Location: Left arm    Patient Position: Sitting    Cuff Size: Adult    Pulse: 100    Weight: 92.6 kg (204 lb 3.2 oz)    Height: 182.9 cm (72\")    PainSc: 0-No pain      Body mass index is 27.69 kg/m².  BMI is >= 25 and <30. (Overweight) The following options were offered after discussion;: nutrition counseling/recommendations     Advance Care Planning   ACP discussion was held with the patient during this visit. Patient has an advance directive in EMR which is still valid.      Review of Systems   Constitutional: Positive for fatigue.   HENT: Negative for hearing loss.    Eyes: Negative.    Respiratory: Positive for shortness of breath. Negative for cough and wheezing.         No recent lung infections.    Cardiovascular: Positive for leg swelling. Negative for chest pain and palpitations.        Denies chest pressure.    Gastrointestinal: Positive for abdominal distention. Negative for abdominal pain, nausea and vomiting.   Genitourinary: Negative for dysuria.   Musculoskeletal: Negative for arthralgias.        No arthritic changes or myalgia.    Skin: Negative for rash.   Neurological: Positive for syncope. Negative for dizziness and headaches.        Patient had syncope secondary to ventricular tachycardia " 2022 now controlled with amiodarone and CRT pacemaker placed November 10, 2022   Psychiatric/Behavioral: Negative.        Patient wellness counseling  Exercise: Walking exercise no lifting  Diet: Healthy cardiac diet with no salt  Screening: CBC and CMP pending  Social History     Socioeconomic History   • Marital status:    Tobacco Use   • Smoking status: Former     Packs/day: 1.00     Years: 8.00     Pack years: 8.00     Types: Cigarettes     Start date: 10/1/1956     Quit date: 1964     Years since quittin.0   • Smokeless tobacco: Never   Vaping Use   • Vaping Use: Never used   Substance and Sexual Activity   • Alcohol use: No   • Drug use: Never   • Sexual activity: Not Currently       Objective   Physical Exam  Constitutional:       Appearance: Normal appearance.   HENT:      Head: Normocephalic.      Mouth/Throat:      Pharynx: Oropharynx is clear.      Comments: Pharynx is normal.  Eyes:      Extraocular Movements: Extraocular movements intact.      Pupils: Pupils are equal, round, and reactive to light.   Neck:      Vascular: No carotid bruit.      Comments: No masses or thyromegaly.   Cardiovascular:      Rate and Rhythm: Normal rate and regular rhythm.      Pulses: Normal pulses.      Heart sounds: Murmur heard.     No friction rub. No gallop.   Pulmonary:      Effort: Pulmonary effort is normal.      Breath sounds: Normal breath sounds. No wheezing, rhonchi or rales.   Abdominal:      General: Bowel sounds are normal.      Tenderness: There is no abdominal tenderness.      Comments: Liver and spleen normal.    Musculoskeletal:      Right lower leg: Edema present.      Left lower leg: No edema.      Comments: No significant arthritic changes. 1+ fluid in the right ankle and knee moves well and the right hip moves well   Skin:     General: Skin is warm and dry.      Coloration: Skin is not pale.      Findings: No bruising.   Neurological:      General: No focal deficit present.       Mental Status: He is alert and oriented to person, place, and time.   Psychiatric:         Mood and Affect: Mood normal.         Behavior: Behavior normal.         Assessment & Plan   Problem List Items Addressed This Visit        Cardiac and Vasculature    Hyperlipidemia LDL goal <70 (Chronic)    Overview     · Moderate intensity statin therapy indicated         Current Assessment & Plan       Mixed hyperlipidemia coronary disease on atorvastatin 20 mg daily denies myalgia arthralgia continue therapy         Relevant Medications    atorvastatin (LIPITOR) 20 MG tablet    Chronic systolic congestive heart failure (EF 20%) - Primary (Chronic)    Overview     · Echocardiogram (2/2019): LVEF 25%. Mild TR.  · Echocardiogram (12/1/2020): LVEF 20%.Moderate to severe MR. Moderate TR. Moderate pulmonary hypertension. Saline results negative.  · Echocardiogram (1/19/2022): LVEF 20%. Moderate pulmonic valve regurgitation is present. Mild MR.  · Echocardiogram (11/8/2022): LVEF < 20%.  Moderate MR.    · EP study and CRT pacemaker ([facility]) by Dr. Mera for symptomatic VT, 11/10/2022         Current Assessment & Plan       Chronic systolic congestive heart failure with recent episode of ventricular tachycardia and syncope undergoing EP studies echocardiogram and cardiac cath during hospitalization November 7, 2022 through November 14, 2022 and currently on amiodarone 400 mg daily aspirin 81 mg daily atorvastatin 20 the patient has not taking the Plavix 75 on Lasix 40 mg daily he has not taking the midodrine.  He states his edema is improved he does have some fullness in his abdomen is very mildly short of breath with activity does not have PND and is slightly improved since his hospitalization and returning home November 14, 2022  He is encouraged to take the Plavix because of the stent, and he is encouraged to take the midodrine because of the low range blood pressure         Relevant Medications    clopidogrel (PLAVIX) 75 MG  tablet    furosemide (LASIX) 40 MG tablet    midodrine (PROAMATINE) 5 MG tablet    Other Relevant Orders    CBC & Differential    Comprehensive Metabolic Panel    NSVT (nonsustained ventricular tachycardia)    Overview     · [facility] ER presentation for syncope concerning for cardiac etiology, 11/7/2022  · Sustained VT with syncope on telemetry, 11/8/2022  · EP study and CRT pacemaker ([facility]) by Dr. Mera, 11/10/2022         Current Assessment & Plan     Remote EP study and CRT pacemaker placed November 10, 2022 and now on amiodarone with no further arrhythmias continue therapy and follow-up with cardiology         Relevant Medications    amiodarone (Pacerone) 200 MG tablet    clopidogrel (PLAVIX) 75 MG tablet    midodrine (PROAMATINE) 5 MG tablet       Symptoms and Signs    Localized edema    Overview     Patient's edema is improved and on Lasix 40 mg daily continue therapy         Current Assessment & Plan     Continue Lasix 40 mg daily, CBC and CMP are pending          Ventricular tachycardia and coronary artery disease   The patient will continue on his Plavix.   He will take continue midodrine for his blood pressure  He will continue Lasix 40 mg daily.  The patient will obtain lab work to make sure his chemistry looks okay and to check his blood count.   The patient will follow-up in 3 weeks.        Patient Instructions   CBC and CMP pending  Encouraged patient to take medications as prescribed including the Plavix and midodrine  Take the Lasix 40 mg daily  Low-salt diet  Limit activity with no outdoor work  Follow-up with cardiology December 5 appointment  Return visit in 3 weeks or as needed      Saul Campbell MD    Transcribed from ambient dictation for Saul Campbell MD by America Khan.  11/30/22   13:07 EST    Patient or patient representative verbalized consent to the visit recording.  I have personally performed the services described in this document as transcribed by the above individual, and it is  both accurate and complete.  Saul Campbell MD  11/30/2022  17:53 EST

## 2022-11-30 NOTE — ASSESSMENT & PLAN NOTE
Chronic systolic congestive heart failure with recent episode of ventricular tachycardia and syncope undergoing EP studies echocardiogram and cardiac cath during hospitalization November 7, 2022 through November 14, 2022 and currently on amiodarone 400 mg daily aspirin 81 mg daily atorvastatin 20 the patient has not taking the Plavix 75 on Lasix 40 mg daily he has not taking the midodrine.  He states his edema is improved he does have some fullness in his abdomen is very mildly short of breath with activity does not have PND and is slightly improved since his hospitalization and returning home November 14, 2022  He is encouraged to take the Plavix because of the stent, and he is encouraged to take the midodrine because of the low range blood pressure

## 2022-11-30 NOTE — ASSESSMENT & PLAN NOTE
Mixed hyperlipidemia coronary disease on atorvastatin 20 mg daily denies myalgia arthralgia continue therapy

## 2022-11-30 NOTE — PATIENT INSTRUCTIONS
CBC and CMP pending  Encouraged patient to take medications as prescribed including the Plavix and midodrine  Take the Lasix 40 mg daily  Low-salt diet  Limit activity with no outdoor work  Follow-up with cardiology December 5 appointment  Return visit in 3 weeks or as needed

## 2022-12-01 DIAGNOSIS — I50.22 CHRONIC SYSTOLIC CONGESTIVE HEART FAILURE: Chronic | ICD-10-CM

## 2022-12-01 LAB
ALBUMIN SERPL-MCNC: 4.4 G/DL (ref 3.5–5.2)
ALBUMIN/GLOB SERPL: 1.7 G/DL
ALP SERPL-CCNC: 60 U/L (ref 39–117)
ALT SERPL-CCNC: 20 U/L (ref 1–41)
AST SERPL-CCNC: 25 U/L (ref 1–40)
BASOPHILS # BLD AUTO: 0.05 10*3/MM3 (ref 0–0.2)
BASOPHILS NFR BLD AUTO: 0.6 % (ref 0–1.5)
BILIRUB SERPL-MCNC: 0.5 MG/DL (ref 0–1.2)
BUN SERPL-MCNC: 34 MG/DL (ref 8–23)
BUN/CREAT SERPL: 20.1 (ref 7–25)
CALCIUM SERPL-MCNC: 9.6 MG/DL (ref 8.6–10.5)
CHLORIDE SERPL-SCNC: 102 MMOL/L (ref 98–107)
CO2 SERPL-SCNC: 26.1 MMOL/L (ref 22–29)
CREAT SERPL-MCNC: 1.69 MG/DL (ref 0.76–1.27)
EGFRCR SERPLBLD CKD-EPI 2021: 39.8 ML/MIN/1.73
EOSINOPHIL # BLD AUTO: 0.1 10*3/MM3 (ref 0–0.4)
EOSINOPHIL NFR BLD AUTO: 1.2 % (ref 0.3–6.2)
ERYTHROCYTE [DISTWIDTH] IN BLOOD BY AUTOMATED COUNT: 15.3 % (ref 12.3–15.4)
GLOBULIN SER CALC-MCNC: 2.6 GM/DL
GLUCOSE SERPL-MCNC: 102 MG/DL (ref 65–99)
HCT VFR BLD AUTO: 39.8 % (ref 37.5–51)
HGB BLD-MCNC: 12.3 G/DL (ref 13–17.7)
IMM GRANULOCYTES # BLD AUTO: 0.04 10*3/MM3 (ref 0–0.05)
IMM GRANULOCYTES NFR BLD AUTO: 0.5 % (ref 0–0.5)
LYMPHOCYTES # BLD AUTO: 1.97 10*3/MM3 (ref 0.7–3.1)
LYMPHOCYTES NFR BLD AUTO: 23.5 % (ref 19.6–45.3)
MCH RBC QN AUTO: 28 PG (ref 26.6–33)
MCHC RBC AUTO-ENTMCNC: 30.9 G/DL (ref 31.5–35.7)
MCV RBC AUTO: 90.5 FL (ref 79–97)
MONOCYTES # BLD AUTO: 0.59 10*3/MM3 (ref 0.1–0.9)
MONOCYTES NFR BLD AUTO: 7 % (ref 5–12)
NEUTROPHILS # BLD AUTO: 5.64 10*3/MM3 (ref 1.7–7)
NEUTROPHILS NFR BLD AUTO: 67.2 % (ref 42.7–76)
NRBC BLD AUTO-RTO: 0 /100 WBC (ref 0–0.2)
PLATELET # BLD AUTO: 248 10*3/MM3 (ref 140–450)
POTASSIUM SERPL-SCNC: 4.7 MMOL/L (ref 3.5–5.2)
PROT SERPL-MCNC: 7 G/DL (ref 6–8.5)
RBC # BLD AUTO: 4.4 10*6/MM3 (ref 4.14–5.8)
SODIUM SERPL-SCNC: 142 MMOL/L (ref 136–145)
WBC # BLD AUTO: 8.39 10*3/MM3 (ref 3.4–10.8)

## 2022-12-01 RX ORDER — FUROSEMIDE 40 MG/1
TABLET ORAL
Qty: 90 TABLET | Refills: 3
Start: 2022-12-01 | End: 2022-12-28

## 2022-12-02 ENCOUNTER — TELEPHONE (OUTPATIENT)
Dept: CARDIOLOGY | Facility: CLINIC | Age: 82
End: 2022-12-02

## 2022-12-02 ENCOUNTER — TELEPHONE (OUTPATIENT)
Dept: CARDIOLOGY | Facility: HOSPITAL | Age: 82
End: 2022-12-02

## 2022-12-02 NOTE — TELEPHONE ENCOUNTER
Patient is currently taking Amiodarone 400 mg daily.  He states that since he has been taking this medication, he cannot eat. He states that the medication is making him extremely nauseous and he has lost weight. His last BP was 118/78 and his HR was normal. The patient has spoke with Sharri YOUSSEF as well. Sharri does not feel comfortable making any adjustments to the Amiodarone since you prescribed the medication. The patient would like to know if it can be changed to a different medication because he feels so bad?

## 2022-12-02 NOTE — PROGRESS NOTES
"[facility]  [facility]    Chief Complaint  Follow-up and Congestive Heart Failure    Subjective    History of Present Illness {CC  Problem List  Visit  Diagnosis   Encounters  Notes  Medications  Labs  Result Review Imaging  Media :23}     Saul Sal is a 82 y.o. male with HFrEF, CAD status post remote CABG, atrial fibrillation/atrial flutter, hypothyroidism, hyperlipidemia,ELIE, and ventricular tachycardia who presents today to follow up on HFrEF.    Patient recently hospitalized 11/7 with NSVT.  He underwent left heart catheterization and had a PCI of the vein graft to the RCA.  He was started on amiodarone and underwent EP study with no inducible VT.  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  His diuretics and Entresto were held due to hypotension.  He was discharged on midodrine, but didn't need it.    He was IV diuresed in our clinic 11/17 and lasix was resumed. He reports that he is feeling much better. Reports Dr. Campbell changed in to every other day due to worsening renal function. He reports that he is at his lowest weight. Wife reports that his SBP has been mostly around 105-117. He had one day with a SBP was 90 and had to take midodrine. He weighs himself daily. Trying to increase his exercise.  He initially had some nausea on amiodarone but recently changes to afternoon dosing with his largest meal and reports nausea is improved          Objective     Vital Signs:   Vitals:    12/05/22 1022   BP: 120/71   BP Location: Left arm   Patient Position: Sitting   Cuff Size: Adult   Pulse: 80   Resp: 19   Temp: 96.6 °F (35.9 °C)   TempSrc: Temporal   SpO2: 99%   Weight: 91.6 kg (202 lb)   Height: 182.9 cm (72\")     Body mass index is 27.4 kg/m².  Physical Exam  Vitals reviewed.   Constitutional:       Appearance: Normal appearance.   HENT:      Head: Normocephalic.   Neck:      Vascular: No carotid bruit. "   Cardiovascular:      Rate and Rhythm: Normal rate and regular rhythm.      Pulses: Normal pulses.      Heart sounds: Normal heart sounds, S1 normal and S2 normal. No murmur heard.  Pulmonary:      Effort: Pulmonary effort is normal. No respiratory distress.   Chest:      Chest wall: No tenderness.      Comments: Left infraclavicular ICD dressing in place with no erythema or drainage. Mild swelling  Abdominal:      General: Abdomen is flat.      Palpations: Abdomen is soft.   Musculoskeletal:      Cervical back: Neck supple.      Right lower le+ Pitting Edema present.      Left lower le+ Pitting Edema present.   Skin:     General: Skin is warm and dry.   Neurological:      General: No focal deficit present.      Mental Status: He is alert and oriented to person, place, and time. Mental status is at baseline.   Psychiatric:         Mood and Affect: Mood normal.         Behavior: Behavior normal.         Thought Content: Thought content normal.              Result Review  Data Reviewed:{ Labs  Result Review  Imaging  Med Tab  Media :23}   SCANNED EKG (2022)  Comprehensive Metabolic Panel (2022 11:22)  CBC & Differential (2022 11:22)  Magnesium (2022 08:58)  Adult Transthoracic Echo Complete W/ Cont if Necessary Per Protocol (2022 09:10)              Assessment and Plan {CC Problem List  Visit Diagnosis  ROS  Review (Popup)  Health Maintenance  Quality  BestPractice  Medications  SmartSets  SnapShot Encounters  Media :23}   1. Acute on chronic HFrEF (heart failure with reduced ejection fraction) ([facility])  EF <20% NYHA II  Symptoms improving.  Continue Lasix 40 mg every other day.  Advised him to take an additional Lasix if he gains 2 pounds in 24 hours, has increased shortness of breath or lower extremity edema  S/p resynchronization PPM  Currently not on guideline directed medical therapy due to symptomatic hypotension.  We discussed adding low-dose metoprolol  succinate, but it is listed as one of his allergies and since he still is requiring midodrine occasionally, will defer    2. Coronary artery disease involving coronary bypass graft of native heart without angina pectoris  Status post PCI of the SVG to RPDA 11/2022  Continue DAPT, statin    3. Hypotension, unspecified hypotension type  Was discharged on Midodrine, only using PRN    4. NSVT (nonsustained ventricular tachycardia)  Discharged on amiodarone  Nausea improving  S/p CRT PPM        Follow Up {Instructions Charge Capture  Follow-up Communications :23}   Return in about 4 weeks (around 1/2/2023) for Office follow up, HF.    Patient was given instructions and counseling regarding his condition or for health maintenance advice. Please see specific information pulled into the AVS if appropriate.  Advised to call the Heart and Valve Center with any questions, concerns, or worsening symptoms.

## 2022-12-02 NOTE — TELEPHONE ENCOUNTER
----- Message from Geraldine Menendez PharmD sent at 12/2/2022  8:35 AM EST -----  Regarding: N/V with amiodarone  Mr. Sal's wife called to ask if you would call him in something for N/V. He is taking amiodarone 400mg daily (takes two 200mg tablets once daily) and after this he will get very sick and is unable to do normal daily functions. I offered for him to take it at night, but she said he would be unable to lay or sleep when he feels this bad. Her callback number is [phone].

## 2022-12-02 NOTE — TELEPHONE ENCOUNTER
Called patient's wife and she reports that patient has been having nausea that occurs a few hours after he takes amiodarone. He denies abdominal pain or fluid retention.  Reports weight loss.  Denies constipation or diarrhea.  He reports that it is so severe that he can barely drink anything.  She reports that he only took 200 mg yesterday and still had some nausea.  Today he did not take any amiodarone and so far feels well. Will consult with Dr. Mera or Dr. Jones

## 2022-12-05 ENCOUNTER — OFFICE VISIT (OUTPATIENT)
Dept: CARDIOLOGY | Facility: HOSPITAL | Age: 82
End: 2022-12-05

## 2022-12-05 VITALS
SYSTOLIC BLOOD PRESSURE: 120 MMHG | BODY MASS INDEX: 27.36 KG/M2 | HEART RATE: 80 BPM | RESPIRATION RATE: 19 BRPM | DIASTOLIC BLOOD PRESSURE: 71 MMHG | HEIGHT: 72 IN | TEMPERATURE: 96.6 F | WEIGHT: 202 LBS | OXYGEN SATURATION: 99 %

## 2022-12-05 PROCEDURE — 99214 OFFICE O/P EST MOD 30 MIN: CPT | Performed by: NURSE PRACTITIONER

## 2022-12-19 ENCOUNTER — OUTSIDE FACILITY SERVICE (OUTPATIENT)
Dept: INTERNAL MEDICINE | Facility: CLINIC | Age: 82
End: 2022-12-19

## 2022-12-19 PROCEDURE — G0180 MD CERTIFICATION HHA PATIENT: HCPCS | Performed by: INTERNAL MEDICINE

## 2022-12-21 ENCOUNTER — OFFICE VISIT (OUTPATIENT)
Dept: INTERNAL MEDICINE | Facility: CLINIC | Age: 82
End: 2022-12-21

## 2022-12-21 ENCOUNTER — LAB (OUTPATIENT)
Dept: LAB | Facility: HOSPITAL | Age: 82
End: 2022-12-21

## 2022-12-21 VITALS
BODY MASS INDEX: 27.66 KG/M2 | DIASTOLIC BLOOD PRESSURE: 90 MMHG | WEIGHT: 204.2 LBS | OXYGEN SATURATION: 98 % | HEART RATE: 108 BPM | HEIGHT: 72 IN | SYSTOLIC BLOOD PRESSURE: 118 MMHG

## 2022-12-21 DIAGNOSIS — R60.0 LOCALIZED EDEMA: ICD-10-CM

## 2022-12-21 DIAGNOSIS — I50.22 CHRONIC SYSTOLIC CONGESTIVE HEART FAILURE: Primary | Chronic | ICD-10-CM

## 2022-12-21 DIAGNOSIS — N18.32 STAGE 3B CHRONIC KIDNEY DISEASE: ICD-10-CM

## 2022-12-21 DIAGNOSIS — E78.5 HYPERLIPIDEMIA LDL GOAL <70: Chronic | ICD-10-CM

## 2022-12-21 DIAGNOSIS — E03.9 HYPOTHYROIDISM (ACQUIRED): Chronic | ICD-10-CM

## 2022-12-21 DIAGNOSIS — R55 SYNCOPE AND COLLAPSE: ICD-10-CM

## 2022-12-21 DIAGNOSIS — I50.22 CHRONIC SYSTOLIC CONGESTIVE HEART FAILURE: Chronic | ICD-10-CM

## 2022-12-21 PROBLEM — N18.30 STAGE 3 CHRONIC KIDNEY DISEASE (HCC): Status: ACTIVE | Noted: 2022-12-21

## 2022-12-21 PROCEDURE — 99214 OFFICE O/P EST MOD 30 MIN: CPT | Performed by: INTERNAL MEDICINE

## 2022-12-21 NOTE — PATIENT INSTRUCTIONS
CBC and CMP pending  Add metoprolol 12.5 twice daily  Continue Lasix of 40 mg every other day pending lab  Encouraged limited walking no significant exercise and no lifting and no outside work  Continue all medications pending lab  Return visit in 2 weeks or as needed

## 2022-12-21 NOTE — ASSESSMENT & PLAN NOTE
Recent elevated creatinine of 1.69 and GFR 39 on November 30, 2022 the patient is now on Lasix 40 mg every other day we will recheck CBC and CMP this day continue current therapy

## 2022-12-21 NOTE — ASSESSMENT & PLAN NOTE
Coronary disease and mixed hyperlipidemia on atorvastatin 20 mg daily denies myalgia arthralgia continue therapy

## 2022-12-21 NOTE — PROGRESS NOTES
[facility]     Saul Sal  1940   [id number]      Patient Care Team:  Saul Campbell MD as PCP - General (Internal Medicine)  Isael Lake MD as Consulting Physician (Ophthalmology)  Librado Jones IV, MD as Cardiologist (Interventional Cardiology)    Chief Complaint::   Chief Complaint   Patient presents with   • Edema     Follow up   • Congestive Heart Failure     Follow up.  States he trying to exercise.  Wants to know what his limits are   • Shortness of Breath     With exertion.  Also at night.  Trouble sleeping because of SOB   • Bloated     Happening a couple of hours after eating.  Associated nausea   • Spots and/or Floaters     Right eye   • ear congestion     Right ear            HPI    The patient is an 82-year-old male who presents for an office visit for edema, congestive heart failure follow-up, shortness of breath, bloating after eating with floaters in the right eye, and right ear congestion.    The patient reports he has been experiencing bloating after eating. He states he has been drinking soda water and dani juice. He adds he has been experiencing shortness of breath with ambulating when he is bloated. He notes he does not feel it is not fluid retention due to him losing weight. He notes his leg are swollen minimally. He states he is still taking his fluid retention medication. He notes he has been trying to sleep in the bed. He notes he gets a dry cough and has to roll over to his left side. He admits to having his head raised with pillows minimally. He notes last night, he slept in his recliner due to feeling short of breath. He adds no exercise could be contributing to this. He reports he has been experiencing black floaters in his right eye. He adds he has been experiencing a lot of phlegm in his throat. He notes he has always had good bowel movements, at least twice a day. He denies having oxygen at home. He states he checks his oxygen 4 to 5 times a day  when he is sitting around. He reports when he was in the hospital, his oxygen was in the 90s. He adds he checks his blood pressure 3 to 4 times a day and it is around 110 to 118 systolic over 73 diastolic. He adds he is fasting.       Patient Active Problem List   Diagnosis   • Coronary artery disease involving native coronary artery of native heart without angina pectoris   • Atrial fib/flutter (not on AC 2* bleeding and patient preference)    • Hyperlipidemia LDL goal <70   • ELIE not on CPAP   • Hypothyroidism   • GERD (gastroesophageal reflux disease)   • H/O asbestos exposure   • Herpes zoster without complication   • History of colon polyps   • Postherpetic neuralgia   • Primary osteoarthritis involving multiple joints   • Chronic systolic congestive heart failure (EF 20%)   • Bronchitis   • Trochanteric bursitis of right hip   • Olecranon bursitis of right elbow   • Chronic gout of multiple sites   • Cough   • Localized edema   • NSVT (nonsustained ventricular tachycardia)   • Syncope and collapse   • RBBB   • 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   • Medicare annual wellness visit, subsequent   • Stage 3 chronic kidney disease (HCC)        Past Medical History:   Diagnosis Date   • Abnormal ECG    • Arrhythmia    • Arthritis    • Atrial fibrillation (HCC)    • Atrial flutter ([facility])     s/p typical flutter ablation with recurrent atypical flutter   • Benign essential hypertension    • BPH (benign prostatic hyperplasia)    • CHF (congestive heart failure) ([facility])    • Clotting disorder ([facility]) too many aspirin   • Congenital heart disease    • Coronary artery disease    • Diverticulosis    • GERD (gastroesophageal reflux disease)    • GI bleed    • HFrEF (heart failure with reduced ejection fraction) ([facility])    • Hyperlipidemia    • Hypertension    • Hypothyroidism (acquired)    • Obesity (BMI 30-39.9)    • ELIE (obstructive sleep apnea)    • Osteoarthritis    • Shingles    • Skin cancer     squamous    • Stage 3 chronic kidney  disease ([facility]) 2022   • Wears eyeglasses     readers   • Wears hearing aid        Past Surgical History:   Procedure Laterality Date   • ABLATION OF DYSRHYTHMIC FOCUS  2018   • ANGIOPLASTY  1985   • CARDIAC CATHETERIZATION     • CARDIAC CATHETERIZATION N/A 2022    Procedure: Left Heart Cath;  Surgeon: Marco Urena MD;  Location:  [facility];  Service: Cardiovascular;  Laterality: N/A;   • CARDIAC ELECTROPHYSIOLOGY PROCEDURE N/A 2018    Procedure: Ablation atrial flutter;  Surgeon: Tino Sampson MD;  Location:  [facility] EP INVASIVE [facility];  Service: Cardiovascular   • CARDIAC ELECTROPHYSIOLOGY PROCEDURE N/A 11/10/2022    Procedure: Pacemaker DC new;  Surgeon: Bruce Mera DO;  Location:  [facility] EP INV[facility];  Service: Cardiology;  Laterality: N/A;   • CARDIAC SURGERY      BYPASS X5   • COLONOSCOPY     • COLONOSCOPY     • CORONARY ANGIOPLASTY     • CORONARY ARTERY BYPASS GRAFT  2003    x5 sekela Located within [facility]    • DENTAL PROCEDURE      dental surg and crowns   • EYE SURGERY Right     cataract   • SKIN BIOPSY     • VASECTOMY         Family History   Problem Relation Age of Onset   • Diabetes Mother    • Heart disease Mother    • Coronary artery disease Other    • Stroke Other    • Diabetes Other    • Rheumatic fever Other    • Other Other         Valvular CV Disease       Social History     Socioeconomic History   • Marital status:    Tobacco Use   • Smoking status: Former     Packs/day: 1.00     Years: 8.00     Pack years: 8.00     Types: Cigarettes     Start date: 10/1/1956     Quit date: 1964     Years since quittin.1   • Smokeless tobacco: Never   Vaping Use   • Vaping Use: Never used   Substance and Sexual Activity   • Alcohol use: No   • Drug use: Never   • Sexual activity: Not Currently       Allergies   Allergen Reactions   • Aspirin GI Bleeding   • Eliquis [Apixaban] Other (See Comments)     Excessive bleeding   • Amlodipine  "Besylate Unknown (See Comments)     Unknown     • Atenolol Unknown (See Comments)     unknown   • Empagliflozin Other (See Comments)     Low BP   • Metoprolol Unknown (See Comments)     Unknown   • Milk-Related Compounds Other (See Comments)     Headache       Review of Systems   HENT: Positive for rhinorrhea. Negative for hearing loss.    Eyes: Negative.    Respiratory: Positive for shortness of breath. Negative for cough and wheezing.         Denies recent lung infection.    Cardiovascular: Positive for leg swelling. Negative for chest pain and palpitations.        Denies chest pressure.    Gastrointestinal: Positive for abdominal distention and indigestion. Negative for abdominal pain, nausea and vomiting.   Genitourinary: Negative for dysuria.   Musculoskeletal: Negative for arthralgias and myalgias.        Denies arthritic changes.    Neurological: Positive for weakness. Negative for dizziness, syncope and headache.        Denies concussion or neuropathy.    Psychiatric/Behavioral: Negative.             Vital Signs  Vitals:    12/21/22 1025   BP: 118/90   BP Location: Left arm   Patient Position: Sitting   Cuff Size: Adult   Pulse: 108   SpO2: 98%  Comment: room air; exerted   Weight: 92.6 kg (204 lb 3.2 oz)   Height: 182.9 cm (72\")   PainSc: 0-No pain     Body mass index is 27.69 kg/m².  BMI is >= 25 and <30. (Overweight) The following options were offered after discussion;: nutrition counseling/recommendations     Advance Care Planning   ACP discussion was held with the patient during this visit. Patient has an advance directive in EMR which is still valid.        Current Outpatient Medications:   •  aspirin 81 MG EC tablet, Take 81 mg by mouth Daily., Disp: , Rfl:   •  atorvastatin (LIPITOR) 20 MG tablet, Take 1 tablet by mouth Every Night., Disp: 90 tablet, Rfl: 3  •  clopidogrel (PLAVIX) 75 MG tablet, Take 1 tablet by mouth Daily., Disp: 90 tablet, Rfl: 1  •  Cod Liver Oil 1000 MG capsule, OTC Take 2 " capsules in the morning and 1 capsule in the afternoon, Disp: , Rfl:   •  furosemide (LASIX) 40 MG tablet, One every other day, Disp: 90 tablet, Rfl: 3  •  levothyroxine (SYNTHROID, LEVOTHROID) 125 MCG tablet, TAKE 1 TABLET EVERY DAY, Disp: 90 tablet, Rfl: 1  •  midodrine (PROAMATINE) 5 MG tablet, Take 1 tablet by mouth 3 (Three) Times a Day Before Meals. (Patient taking differently: Take 5 mg by mouth 3 (Three) Times a Day Before Meals. For SBP <100), Disp: 270 tablet, Rfl: 1  •  Misc Natural Products (Advanced Joint Relief) capsule, Take 1 capsule by mouth Daily. OTC, Disp: , Rfl:   •  NON FORMULARY, OTC- Cardio-Plus 2080 : Take 4 tablets in the morning and 5 tablets in the evening, Disp: , Rfl:   •  NON FORMULARY, OTC- Cataplex B 1250mg: Take 3 tablets BID, Disp: , Rfl:   •  potassium chloride 10 MEQ CR tablet, Take 2 tablets by mouth Daily., Disp: 60 tablet, Rfl: 0  •  Turmeric 450 MG capsule, Take 1 capsule by mouth Daily. OTC (Turmeric Forte), Disp: , Rfl:   •  metoprolol tartrate (LOPRESSOR) 25 MG tablet, Take 1/2 tab twice a day, Disp: 30 tablet, Rfl: 1    Physical Exam  HENT:      Nose: Rhinorrhea present.      Mouth/Throat:      Pharynx: Oropharynx is clear.   Eyes:      Extraocular Movements: Extraocular movements intact.      Pupils: Pupils are equal, round, and reactive to light.   Neck:      Vascular: No carotid bruit.      Comments: No masses or thyromegaly.   Cardiovascular:      Rate and Rhythm: Regular rhythm. Tachycardia present.      Heart sounds: Normal heart sounds.     No friction rub. No gallop.      Comments: No click. Blood pressure 118/70 mmHg. Pulse oximetry 98 percent in exam room today.  Pulmonary:      Effort: Pulmonary effort is normal.      Breath sounds: Normal breath sounds. No wheezing, rhonchi or rales.   Abdominal:      General: Bowel sounds are normal.      Tenderness: There is no abdominal tenderness.      Comments: Liver and spleen normal.    Musculoskeletal:      Right lower  leg: Edema present.      Left lower leg: Edema present.      Comments: No significant arthritic changes.    Skin:     General: Skin is warm and dry.   Neurological:      General: No focal deficit present.      Mental Status: He is oriented to person, place, and time.   Psychiatric:         Mood and Affect: Mood normal.         Behavior: Behavior normal.          ACE III MINI        Results Review:    Recent Results (from the past 672 hour(s))   Comprehensive Metabolic Panel    Collection Time: 11/30/22 11:22 AM    Specimen: Blood    Blood  Release to [facility]   Result Value Ref Range    Glucose 102 (H) 65 - 99 mg/dL    BUN 34 (H) 8 - 23 mg/dL    Creatinine 1.69 (H) 0.76 - 1.27 mg/dL    EGFR Result 39.8 (L) >60.0 mL/min/1.73    BUN/Creatinine Ratio 20.1 7.0 - 25.0    Sodium 142 136 - 145 mmol/L    Potassium 4.7 3.5 - 5.2 mmol/L    Chloride 102 98 - 107 mmol/L    Total CO2 26.1 22.0 - 29.0 mmol/L    Calcium 9.6 8.6 - 10.5 mg/dL    Total Protein 7.0 6.0 - 8.5 g/dL    Albumin 4.40 3.50 - 5.20 g/dL    Globulin 2.6 gm/dL    A/G Ratio 1.7 g/dL    Total Bilirubin 0.5 0.0 - 1.2 mg/dL    Alkaline Phosphatase 60 39 - 117 U/L    AST (SGOT) 25 1 - 40 U/L    ALT (SGPT) 20 1 - 41 U/L   CBC & Differential    Collection Time: 11/30/22 11:22 AM    Specimen: Blood    Blood  Release to [facility]   Result Value Ref Range    WBC 8.39 3.40 - 10.80 10*3/mm3    RBC 4.40 4.14 - 5.80 10*6/mm3    Hemoglobin 12.3 (L) 13.0 - 17.7 g/dL    Hematocrit 39.8 37.5 - 51.0 %    MCV 90.5 79.0 - 97.0 fL    MCH 28.0 26.6 - 33.0 pg    MCHC 30.9 (L) 31.5 - 35.7 g/dL    RDW 15.3 12.3 - 15.4 %    Platelets 248 140 - 450 10*3/mm3    Neutrophil Rel % 67.2 42.7 - 76.0 %    Lymphocyte Rel % 23.5 19.6 - 45.3 %    Monocyte Rel % 7.0 5.0 - 12.0 %    Eosinophil Rel % 1.2 0.3 - 6.2 %    Basophil Rel % 0.6 0.0 - 1.5 %    Neutrophils Absolute 5.64 1.70 - 7.00 10*3/mm3    Lymphocytes Absolute 1.97 0.70 - 3.10 10*3/mm3    Monocytes Absolute 0.59 0.10 - 0.90 10*3/mm3    Eosinophils  Absolute 0.10 0.00 - 0.40 10*3/mm3    Basophils Absolute 0.05 0.00 - 0.20 10*3/mm3    Immature Granulocyte Rel % 0.5 0.0 - 0.5 %    Immature Grans Absolute 0.04 0.00 - 0.05 10*3/mm3    nRBC 0.0 0.0 - 0.2 /100 WBC     Procedures CBC and CMP    Medication Review: Medications reviewed and noted    Patient wellness counseling  Exercise: Patient may ambulate in home but no outside ambulation lifting or carrying  Diet: Low-salt diet  Screening: CBC and CMP pending  Social History     Socioeconomic History   • Marital status:    Tobacco Use   • Smoking status: Former     Packs/day: 1.00     Years: 8.00     Pack years: 8.00     Types: Cigarettes     Start date: 10/1/1956     Quit date: 1964     Years since quittin.1   • Smokeless tobacco: Never   Vaping Use   • Vaping Use: Never used   Substance and Sexual Activity   • Alcohol use: No   • Drug use: Never   • Sexual activity: Not Currently        Assessment/Plan:    Problem List Items Addressed This Visit        Cardiac and Vasculature    Hyperlipidemia LDL goal <70 (Chronic)    Overview     · Moderate intensity statin therapy indicated         Current Assessment & Plan       Coronary disease and mixed hyperlipidemia on atorvastatin 20 mg daily denies myalgia arthralgia continue therapy         Relevant Medications    atorvastatin (LIPITOR) 20 MG tablet    Chronic systolic congestive heart failure (EF 20%) - Primary (Chronic)    Overview     · Echocardiogram (2019): LVEF 25%. Mild TR.  · Echocardiogram (2020): LVEF 20%.Moderate to severe MR. Moderate TR. Moderate pulmonary hypertension. Saline results negative.  · Echocardiogram (2022): LVEF 20%. Moderate pulmonic valve regurgitation is present. Mild MR.  · Echocardiogram (2022): LVEF < 20%.  Moderate MR.    · EP study and CRT pacemaker ([facility]) by Dr. Mera for symptomatic VT, 11/10/2022         Current Assessment & Plan       Chronic diastolic heart failure Lopressor for ventricular ejection  fraction less than 20% on echocardiogram of November 2022 with EP study and CRT pacemaker placed November 10, 2022 the patient is on diuretic 40 mg every other day and does have slight less edema but is complaining of fatigue and shortness of breath with physical exertion and bloating after eating he states his edema is slightly better his O2 saturation is 98% on room air at rest his weight is up 2 pounds he does have lower ankle fluid his heart rate is rather rapid at 108 irregular  Will obtain CBC and CMP we will begin the patient on metoprolol 12.5 twice daily  Return in 2 weeks or as needed         Relevant Medications    clopidogrel (PLAVIX) 75 MG tablet    midodrine (PROAMATINE) 5 MG tablet    furosemide (LASIX) 40 MG tablet    metoprolol tartrate (LOPRESSOR) 25 MG tablet    Other Relevant Orders    CBC & Differential    Comprehensive Metabolic Panel       Endocrine and Metabolic    Hypothyroidism (Chronic)    Overview     Hypothyroidism on levothyroxine 125 MCG daily         Current Assessment & Plan     Continue levothyroxine 125 MCG daily         Relevant Medications    levothyroxine (SYNTHROID, LEVOTHROID) 125 MCG tablet    metoprolol tartrate (LOPRESSOR) 25 MG tablet       Genitourinary and Reproductive     Stage 3 chronic kidney disease (HCC)    Overview     On November 30, 2022 creatinine was up to 1.69 and GFR of 39, the patient is on Lasix 40 mg every other day we will obtain CBC and CMP for recheck of creatinine and GFR         Current Assessment & Plan       Recent elevated creatinine of 1.69 and GFR 39 on November 30, 2022 the patient is now on Lasix 40 mg every other day we will recheck CBC and CMP this day continue current therapy         Relevant Medications    furosemide (LASIX) 40 MG tablet       Symptoms and Signs    Localized edema    Overview     Patient's edema is improved and on Lasix 40 mg daily continue therapy         Syncope and collapse        Patient Instructions   CBC and CMP  pending  Add metoprolol 12.5 twice daily  Continue Lasix of 40 mg every other day pending lab  Encouraged limited walking no significant exercise and no lifting and no outside work  Continue all medications pending lab  Return visit in 2 weeks or as needed       Plan of care reviewed with patient at the conclusion of today's visit. Education was provided regarding diagnosis, management, and any prescribed or recommended OTC medications.Patient verbalizes understanding of and agreement with management plan.         Saul Campbell MD      Note: Part of this note may be an electronic transcription/translation of spoken language to printed text using the Dragon Dictation system.    Transcribed from ambient dictation for Saul Campbell MD by America Khan.  12/21/22   13:08 EST    Patient or patient representative verbalized consent to the visit recording.  I have personally performed the services described in this document as transcribed by the above individual, and it is both accurate and complete.  Saul Campbell MD  12/21/2022  17:25 EST

## 2022-12-21 NOTE — ASSESSMENT & PLAN NOTE
Chronic diastolic heart failure Lopressor for ventricular ejection fraction less than 20% on echocardiogram of November 2022 with EP study and CRT pacemaker placed November 10, 2022 the patient is on diuretic 40 mg every other day and does have slight less edema but is complaining of fatigue and shortness of breath with physical exertion and bloating after eating he states his edema is slightly better his O2 saturation is 98% on room air at rest his weight is up 2 pounds he does have lower ankle fluid his heart rate is rather rapid at 108 irregular  Will obtain CBC and CMP we will begin the patient on metoprolol 12.5 twice daily  Return in 2 weeks or as needed

## 2022-12-22 ENCOUNTER — TELEPHONE (OUTPATIENT)
Dept: INTERNAL MEDICINE | Facility: CLINIC | Age: 82
End: 2022-12-22

## 2022-12-22 LAB
ALBUMIN SERPL-MCNC: 4.1 G/DL (ref 3.5–5.2)
ALBUMIN/GLOB SERPL: 1.5 G/DL
ALP SERPL-CCNC: 63 U/L (ref 39–117)
ALT SERPL-CCNC: 17 U/L (ref 1–41)
AST SERPL-CCNC: 23 U/L (ref 1–40)
BASOPHILS # BLD AUTO: 0.04 10*3/MM3 (ref 0–0.2)
BASOPHILS NFR BLD AUTO: 0.5 % (ref 0–1.5)
BILIRUB SERPL-MCNC: 0.7 MG/DL (ref 0–1.2)
BUN SERPL-MCNC: 36 MG/DL (ref 8–23)
BUN/CREAT SERPL: 22.1 (ref 7–25)
CALCIUM SERPL-MCNC: 9.5 MG/DL (ref 8.6–10.5)
CHLORIDE SERPL-SCNC: 102 MMOL/L (ref 98–107)
CO2 SERPL-SCNC: 28.3 MMOL/L (ref 22–29)
CREAT SERPL-MCNC: 1.63 MG/DL (ref 0.76–1.27)
EGFRCR SERPLBLD CKD-EPI 2021: 41.5 ML/MIN/1.73
EOSINOPHIL # BLD AUTO: 0.09 10*3/MM3 (ref 0–0.4)
EOSINOPHIL NFR BLD AUTO: 1.2 % (ref 0.3–6.2)
ERYTHROCYTE [DISTWIDTH] IN BLOOD BY AUTOMATED COUNT: 15.8 % (ref 12.3–15.4)
GLOBULIN SER CALC-MCNC: 2.7 GM/DL
GLUCOSE SERPL-MCNC: 102 MG/DL (ref 65–99)
HCT VFR BLD AUTO: 41.6 % (ref 37.5–51)
HGB BLD-MCNC: 13.4 G/DL (ref 13–17.7)
IMM GRANULOCYTES # BLD AUTO: 0.03 10*3/MM3 (ref 0–0.05)
IMM GRANULOCYTES NFR BLD AUTO: 0.4 % (ref 0–0.5)
LYMPHOCYTES # BLD AUTO: 1.8 10*3/MM3 (ref 0.7–3.1)
LYMPHOCYTES NFR BLD AUTO: 23.6 % (ref 19.6–45.3)
MCH RBC QN AUTO: 29.2 PG (ref 26.6–33)
MCHC RBC AUTO-ENTMCNC: 32.2 G/DL (ref 31.5–35.7)
MCV RBC AUTO: 90.6 FL (ref 79–97)
MONOCYTES # BLD AUTO: 0.55 10*3/MM3 (ref 0.1–0.9)
MONOCYTES NFR BLD AUTO: 7.2 % (ref 5–12)
NEUTROPHILS # BLD AUTO: 5.12 10*3/MM3 (ref 1.7–7)
NEUTROPHILS NFR BLD AUTO: 67.1 % (ref 42.7–76)
NRBC BLD AUTO-RTO: 0 /100 WBC (ref 0–0.2)
PLATELET # BLD AUTO: 192 10*3/MM3 (ref 140–450)
POTASSIUM SERPL-SCNC: 4.6 MMOL/L (ref 3.5–5.2)
PROT SERPL-MCNC: 6.8 G/DL (ref 6–8.5)
RBC # BLD AUTO: 4.59 10*6/MM3 (ref 4.14–5.8)
SODIUM SERPL-SCNC: 141 MMOL/L (ref 136–145)
WBC # BLD AUTO: 7.63 10*3/MM3 (ref 3.4–10.8)

## 2022-12-22 NOTE — TELEPHONE ENCOUNTER
"I spoke with Mr. Sal.  He doesn't feel any better than he did yesterday.  Still c/o SOB and bloating after eating.  He's only taken one metoprolol - he hesitates to take it because his BP is \"already low\" and he doesn't want it to go any lower (pharmacist told him it would lower his BP).  BP at home today 111/70, HR 71.  Evidently he was on midodrine while hospitalized to raise his BP.  Please advise.   "

## 2022-12-22 NOTE — TELEPHONE ENCOUNTER
MR MARCOS WOULD LIKE DR MAGANA' NURSE TO RETURN HIS CALL PLEASE, HE PHONED AT 1:55 PM.  THANK YOU.

## 2022-12-22 NOTE — TELEPHONE ENCOUNTER
Message noted about blood pressure 111/70 and heart rate of 71 bpm, and I would agree it is okay to discontinue the metoprolol, recent lab work shows a blood sugar of 102, abnormal kidney function which is very slightly improved and a normal CBC with no anemia and normal potassium.  I would continue current therapy which does include Lasix every other day with no other changes in medication and okay to discontinue the metoprolol because of the normalized heart rate of 71 please inform

## 2022-12-25 PROCEDURE — 93294 REM INTERROG EVL PM/LDLS PM: CPT | Performed by: INTERNAL MEDICINE

## 2022-12-25 PROCEDURE — 93296 REM INTERROG EVL PM/IDS: CPT | Performed by: INTERNAL MEDICINE

## 2022-12-28 ENCOUNTER — OFFICE VISIT (OUTPATIENT)
Dept: CARDIOLOGY | Facility: HOSPITAL | Age: 82
End: 2022-12-28

## 2022-12-28 ENCOUNTER — TELEPHONE (OUTPATIENT)
Dept: CARDIOLOGY | Facility: HOSPITAL | Age: 82
End: 2022-12-28

## 2022-12-28 ENCOUNTER — HOSPITAL ENCOUNTER (OUTPATIENT)
Dept: GENERAL RADIOLOGY | Facility: HOSPITAL | Age: 82
Discharge: HOME | End: 2022-12-28

## 2022-12-28 VITALS
HEIGHT: 72 IN | SYSTOLIC BLOOD PRESSURE: 122 MMHG | TEMPERATURE: 97.3 F | DIASTOLIC BLOOD PRESSURE: 83 MMHG | RESPIRATION RATE: 20 BRPM | BODY MASS INDEX: 27.55 KG/M2 | OXYGEN SATURATION: 98 % | WEIGHT: 203.4 LBS | HEART RATE: 94 BPM

## 2022-12-28 DIAGNOSIS — R06.02 SHORTNESS OF BREATH: Primary | ICD-10-CM

## 2022-12-28 DIAGNOSIS — I50.23 ACUTE ON CHRONIC HFREF (HEART FAILURE WITH REDUCED EJECTION FRACTION): ICD-10-CM

## 2022-12-28 DIAGNOSIS — R11.0 NAUSEA: ICD-10-CM

## 2022-12-28 DIAGNOSIS — R14.0 ABDOMINAL BLOATING: ICD-10-CM

## 2022-12-28 DIAGNOSIS — R06.02 SHORTNESS OF BREATH: ICD-10-CM

## 2022-12-28 DIAGNOSIS — I95.9 HYPOTENSION, UNSPECIFIED HYPOTENSION TYPE: ICD-10-CM

## 2022-12-28 DIAGNOSIS — I47.29 NSVT (NONSUSTAINED VENTRICULAR TACHYCARDIA): ICD-10-CM

## 2022-12-28 DIAGNOSIS — I25.810 CORONARY ARTERY DISEASE INVOLVING CORONARY BYPASS GRAFT OF NATIVE HEART WITHOUT ANGINA PECTORIS: ICD-10-CM

## 2022-12-28 DIAGNOSIS — I50.23 ACUTE ON CHRONIC HFREF (HEART FAILURE WITH REDUCED EJECTION FRACTION): Primary | ICD-10-CM

## 2022-12-28 DIAGNOSIS — N18.32 STAGE 3B CHRONIC KIDNEY DISEASE: ICD-10-CM

## 2022-12-28 PROCEDURE — 99214 OFFICE O/P EST MOD 30 MIN: CPT | Performed by: NURSE PRACTITIONER

## 2022-12-28 PROCEDURE — 71046 X-RAY EXAM CHEST 2 VIEWS: CPT

## 2022-12-28 RX ORDER — TORSEMIDE 20 MG/1
TABLET ORAL
Qty: 35 TABLET | Refills: 1 | Status: SHIPPED | OUTPATIENT
Start: 2022-12-28 | End: 2023-01-09 | Stop reason: HOSPADM

## 2022-12-28 NOTE — PROGRESS NOTES
[facility]  [facility]    Chief Complaint  No chief complaint on file.    Subjective    History of Present Illness {CC  Problem List  Visit  Diagnosis   Encounters  Notes  Medications  Labs  Result Review Imaging  Media :23}     Saul Sal is a 82 y.o. male with HFrEF, CAD status post remote CABG, atrial fibrillation/atrial flutter, hypothyroidism, hyperlipidemia,ELIE, and ventricular tachycardia who presents today to follow up on HFrEF.    Patient recently hospitalized 11/7 with NSVT.  He underwent left heart catheterization and had a PCI of the vein graft to the RCA.  He was started on amiodarone and underwent EP study with no inducible VT.  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  His diuretics and Entresto were held due to hypotension.  He was discharged on midodrine, but didn't need it.    Since hospital stay he has had worsening heart failure symptoms.  He was IV diuresed in our clinic 11/17 and his diuretics were resumed. He reports that his weight has been stable, actually much lower than his baseline. He notes some mild edema. He feels like he is getting weaker despite doing home exercises. Reports dyspnea just walking across the room. He notes ongoing nausea, poor appetite and bloating. Denies vomiting or change in bowel movements.  Denies abdominal pain.  He reports some intermittent orthopnea and dry cough. Overall feels worse since he had his pacemaker. He stopped the amiodarone a couple of weeks ago with no improvement in his nausea.  He does not feel symptoms are fluid related.  He does not notice any improvement in his symptoms when he takes his Lasix        Objective     Vital Signs:   Vitals:    12/28/22 0822   BP: 122/83   BP Location: Left arm   Patient Position: Sitting   Cuff Size: Adult   Pulse: 94   Resp: 20   Temp: 97.3 °F (36.3 °C)   TempSrc: Temporal   SpO2: 98%   Weight: 92.3 kg (203 lb  "6.4 oz)   Height: 182.9 cm (72\")     Body mass index is 27.59 kg/m².  Physical Exam  Vitals reviewed.   Constitutional:       Appearance: Normal appearance.   HENT:      Head: Normocephalic.   Neck:      Vascular: No carotid bruit.   Cardiovascular:      Rate and Rhythm: Normal rate and regular rhythm.      Pulses: Normal pulses.      Heart sounds: Normal heart sounds, S1 normal and S2 normal. No murmur heard.  Pulmonary:      Effort: Pulmonary effort is normal. No respiratory distress.      Breath sounds: Examination of the right-lower field reveals rales. Rales present.   Chest:      Chest wall: No tenderness.      Comments: Left infraclavicular ICD dressing in place with no erythema or drainage. Mild swelling  Abdominal:      General: Abdomen is flat.      Palpations: Abdomen is soft.   Musculoskeletal:      Cervical back: Neck supple.      Right lower le+ Pitting Edema present.      Left lower le+ Pitting Edema present.   Skin:     General: Skin is warm and dry.   Neurological:      General: No focal deficit present.      Mental Status: He is alert and oriented to person, place, and time. Mental status is at baseline.   Psychiatric:         Mood and Affect: Mood normal.         Behavior: Behavior normal.         Thought Content: Thought content normal.              Result Review  Data Reviewed:{ Labs  Result Review  Imaging  Med Tab  Media :23}   SCANNED EKG (2022)  Comprehensive Metabolic Panel (2022 11:22)  CBC & Differential (2022 11:22)  Magnesium (2022 08:58)  Adult Transthoracic Echo Complete W/ Cont if Necessary Per Protocol (2022 09:10)              Assessment and Plan {CC Problem List  Visit Diagnosis  ROS  Review (Popup)  Health Maintenance  Quality  BestPractice  Medications  SmartSets  SnapShot Encounters  Media :23}   1. Acute on chronic HFrEF (heart failure with reduced ejection fraction) ([facility])  EF <20% NYHA III  Advised to take extra 40mg of " lasix today    Check chest x-ray  Currently not on guideline directed medical therapy due to symptomatic hypotension.  We discussed adding low-dose metoprolol succinate, but it is listed as one of his allergies and his SBPs are still low 100s, will defer    2. Nausea  Unclear etiology  Check abdominal ultrasound  ? HF related. May consider changing lasix to bumex or torsemide  No improvement off amiodarone  Recommended pepcid BID    3. Abdominal bloating  -abdominal ultrasound    4. Coronary artery disease involving coronary bypass graft of native heart without angina pectoris  Status post PCI of the SVG to RPDA 11/2022  Continue DAPT, statin    5. Hypotension, unspecified hypotension type  Was discharged on Midodrine, only using PRN  Home systolic blood pressures are mostly 100 120    6. NSVT (nonsustained ventricular tachycardia)  Discharged on amiodarone which has since been discontinued due to nausea  S/p CRT PPM    7. Shortness of breath  Chest xray  Increase lasix today to 40mg BID    8. CKD IIIb  Recent labs stable.  Previous baseline around 1-1.3, recently closer to 1.6.  Lasix changed to every other day per PCP  May need to consider changing lasix to bumex or torsemide    Follow Up {Instructions Charge Capture  Follow-up Communications :23}   No follow-ups on file.    Patient was given instructions and counseling regarding his condition or for health maintenance advice. Please see specific information pulled into the AVS if appropriate.  Advised to call the [facility] with any questions, concerns, or worsening symptoms.

## 2022-12-28 NOTE — TELEPHONE ENCOUNTER
Called patient and his wife for chest x-ray results.  Chest x-ray showed a very small new right pleural effusion and mild increased pulmonary vascular congestion.  Will change Lasix to torsemide.  Advised to take torsemide 40 mg for 2 days and then decrease to 20 mg daily.  Repeat BMP and proBNP at upcoming appointment with his PCP on 1/4.  He verbalized understanding with no further questions or concerns

## 2023-01-03 ENCOUNTER — APPOINTMENT (OUTPATIENT)
Dept: CT IMAGING | Facility: HOSPITAL | Age: 83
End: 2023-01-03
Payer: MEDICARE

## 2023-01-03 ENCOUNTER — HOSPITAL ENCOUNTER (OUTPATIENT)
Facility: HOSPITAL | Age: 83
Setting detail: OBSERVATION
LOS: 2 days | Discharge: HOSPICE | End: 2023-01-09
Attending: EMERGENCY MEDICINE | Admitting: INTERNAL MEDICINE
Payer: MEDICARE

## 2023-01-03 ENCOUNTER — APPOINTMENT (OUTPATIENT)
Dept: GENERAL RADIOLOGY | Facility: HOSPITAL | Age: 83
End: 2023-01-03
Payer: MEDICARE

## 2023-01-03 ENCOUNTER — TELEPHONE (OUTPATIENT)
Dept: INTERNAL MEDICINE | Facility: CLINIC | Age: 83
End: 2023-01-03
Payer: MEDICARE

## 2023-01-03 DIAGNOSIS — R57.0 CARDIOGENIC SHOCK: ICD-10-CM

## 2023-01-03 DIAGNOSIS — I21.A1 TYPE 2 MYOCARDIAL INFARCTION DUE TO HEART FAILURE: ICD-10-CM

## 2023-01-03 DIAGNOSIS — N17.9 AKI (ACUTE KIDNEY INJURY): ICD-10-CM

## 2023-01-03 DIAGNOSIS — R11.0 NAUSEA IN ADULT: ICD-10-CM

## 2023-01-03 DIAGNOSIS — N18.30 STAGE 3 CHRONIC KIDNEY DISEASE, UNSPECIFIED WHETHER STAGE 3A OR 3B CKD: ICD-10-CM

## 2023-01-03 DIAGNOSIS — I25.10 CORONARY ARTERY DISEASE INVOLVING NATIVE CORONARY ARTERY OF NATIVE HEART WITHOUT ANGINA PECTORIS: ICD-10-CM

## 2023-01-03 DIAGNOSIS — I50.23 ACUTE ON CHRONIC SYSTOLIC CONGESTIVE HEART FAILURE: Primary | ICD-10-CM

## 2023-01-03 DIAGNOSIS — K21.00 GASTROESOPHAGEAL REFLUX DISEASE WITH ESOPHAGITIS WITHOUT HEMORRHAGE: ICD-10-CM

## 2023-01-03 DIAGNOSIS — I50.9 TYPE 2 MYOCARDIAL INFARCTION DUE TO HEART FAILURE: ICD-10-CM

## 2023-01-03 PROBLEM — R79.89 ELEVATED TROPONIN: Status: ACTIVE | Noted: 2023-01-03

## 2023-01-03 PROBLEM — R77.8 ELEVATED TROPONIN: Status: ACTIVE | Noted: 2023-01-03

## 2023-01-03 PROBLEM — R79.89 ELEVATED LFTS: Status: ACTIVE | Noted: 2023-01-03

## 2023-01-03 PROBLEM — R18.8 OTHER ASCITES: Status: ACTIVE | Noted: 2023-01-03

## 2023-01-03 LAB
ALBUMIN SERPL-MCNC: 4.5 G/DL (ref 3.5–5.2)
ALBUMIN/GLOB SERPL: 1.4 G/DL
ALP SERPL-CCNC: 133 U/L (ref 39–117)
ALT SERPL W P-5'-P-CCNC: 119 U/L (ref 1–41)
ANION GAP SERPL CALCULATED.3IONS-SCNC: 21 MMOL/L (ref 5–15)
AST SERPL-CCNC: 147 U/L (ref 1–40)
B PARAPERT DNA SPEC QL NAA+PROBE: NOT DETECTED
B PERT DNA SPEC QL NAA+PROBE: NOT DETECTED
BASOPHILS # BLD AUTO: 0.03 10*3/MM3 (ref 0–0.2)
BASOPHILS NFR BLD AUTO: 0.4 % (ref 0–1.5)
BILIRUB SERPL-MCNC: 1.6 MG/DL (ref 0–1.2)
BUN SERPL-MCNC: 53 MG/DL (ref 8–23)
BUN/CREAT SERPL: 22.7 (ref 7–25)
C PNEUM DNA NPH QL NAA+NON-PROBE: NOT DETECTED
CALCIUM SPEC-SCNC: 9.4 MG/DL (ref 8.6–10.5)
CHLORIDE SERPL-SCNC: 98 MMOL/L (ref 98–107)
CO2 SERPL-SCNC: 19 MMOL/L (ref 22–29)
CREAT SERPL-MCNC: 2.33 MG/DL (ref 0.76–1.27)
D-LACTATE SERPL-SCNC: 5.8 MMOL/L (ref 0.5–2)
DEPRECATED RDW RBC AUTO: 67 FL (ref 37–54)
EGFRCR SERPLBLD CKD-EPI 2021: 27.1 ML/MIN/1.73
EOSINOPHIL # BLD AUTO: 0.04 10*3/MM3 (ref 0–0.4)
EOSINOPHIL NFR BLD AUTO: 0.6 % (ref 0.3–6.2)
ERYTHROCYTE [DISTWIDTH] IN BLOOD BY AUTOMATED COUNT: 20.5 % (ref 12.3–15.4)
FLUAV SUBTYP SPEC NAA+PROBE: NOT DETECTED
FLUBV RNA ISLT QL NAA+PROBE: NOT DETECTED
GLOBULIN UR ELPH-MCNC: 3.3 GM/DL
GLUCOSE SERPL-MCNC: 129 MG/DL (ref 65–99)
HADV DNA SPEC NAA+PROBE: NOT DETECTED
HAV IGM SERPL QL IA: NORMAL
HBV CORE IGM SERPL QL IA: NORMAL
HBV SURFACE AG SERPL QL IA: NORMAL
HCOV 229E RNA SPEC QL NAA+PROBE: NOT DETECTED
HCOV HKU1 RNA SPEC QL NAA+PROBE: NOT DETECTED
HCOV NL63 RNA SPEC QL NAA+PROBE: NOT DETECTED
HCOV OC43 RNA SPEC QL NAA+PROBE: NOT DETECTED
HCT VFR BLD AUTO: 48.3 % (ref 37.5–51)
HCV AB SER DONR QL: NORMAL
HGB BLD-MCNC: 14.8 G/DL (ref 13–17.7)
HMPV RNA NPH QL NAA+NON-PROBE: NOT DETECTED
HOLD SPECIMEN: NORMAL
HOLD SPECIMEN: NORMAL
HPIV1 RNA ISLT QL NAA+PROBE: NOT DETECTED
HPIV2 RNA SPEC QL NAA+PROBE: NOT DETECTED
HPIV3 RNA NPH QL NAA+PROBE: NOT DETECTED
HPIV4 P GENE NPH QL NAA+PROBE: NOT DETECTED
IMM GRANULOCYTES # BLD AUTO: 0.03 10*3/MM3 (ref 0–0.05)
IMM GRANULOCYTES NFR BLD AUTO: 0.4 % (ref 0–0.5)
LIPASE SERPL-CCNC: 40 U/L (ref 13–60)
LYMPHOCYTES # BLD AUTO: 1.77 10*3/MM3 (ref 0.7–3.1)
LYMPHOCYTES NFR BLD AUTO: 25.4 % (ref 19.6–45.3)
M PNEUMO IGG SER IA-ACNC: NOT DETECTED
MCH RBC QN AUTO: 29.2 PG (ref 26.6–33)
MCHC RBC AUTO-ENTMCNC: 30.6 G/DL (ref 31.5–35.7)
MCV RBC AUTO: 95.3 FL (ref 79–97)
MONOCYTES # BLD AUTO: 0.46 10*3/MM3 (ref 0.1–0.9)
MONOCYTES NFR BLD AUTO: 6.6 % (ref 5–12)
NEUTROPHILS NFR BLD AUTO: 4.65 10*3/MM3 (ref 1.7–7)
NEUTROPHILS NFR BLD AUTO: 66.6 % (ref 42.7–76)
NRBC BLD AUTO-RTO: 0.7 /100 WBC (ref 0–0.2)
NT-PROBNP SERPL-MCNC: ABNORMAL PG/ML (ref 0–1800)
PLATELET # BLD AUTO: 144 10*3/MM3 (ref 140–450)
PMV BLD AUTO: 11.9 FL (ref 6–12)
POTASSIUM SERPL-SCNC: 5 MMOL/L (ref 3.5–5.2)
PROT SERPL-MCNC: 7.8 G/DL (ref 6–8.5)
RBC # BLD AUTO: 5.07 10*6/MM3 (ref 4.14–5.8)
RHINOVIRUS RNA SPEC NAA+PROBE: NOT DETECTED
RSV RNA NPH QL NAA+NON-PROBE: NOT DETECTED
SARS-COV-2 RNA NPH QL NAA+NON-PROBE: NOT DETECTED
SODIUM SERPL-SCNC: 138 MMOL/L (ref 136–145)
TROPONIN T SERPL-MCNC: 0.03 NG/ML (ref 0–0.03)
WBC NRBC COR # BLD: 6.98 10*3/MM3 (ref 3.4–10.8)
WHOLE BLOOD HOLD COAG: NORMAL
WHOLE BLOOD HOLD SPECIMEN: NORMAL

## 2023-01-03 PROCEDURE — 83690 ASSAY OF LIPASE: CPT | Performed by: EMERGENCY MEDICINE

## 2023-01-03 PROCEDURE — 80053 COMPREHEN METABOLIC PANEL: CPT | Performed by: EMERGENCY MEDICINE

## 2023-01-03 PROCEDURE — 85025 COMPLETE CBC W/AUTO DIFF WBC: CPT | Performed by: EMERGENCY MEDICINE

## 2023-01-03 PROCEDURE — 71045 X-RAY EXAM CHEST 1 VIEW: CPT

## 2023-01-03 PROCEDURE — 93005 ELECTROCARDIOGRAM TRACING: CPT

## 2023-01-03 PROCEDURE — 99284 EMERGENCY DEPT VISIT MOD MDM: CPT

## 2023-01-03 PROCEDURE — 74176 CT ABD & PELVIS W/O CONTRAST: CPT

## 2023-01-03 PROCEDURE — 93005 ELECTROCARDIOGRAM TRACING: CPT | Performed by: EMERGENCY MEDICINE

## 2023-01-03 PROCEDURE — 80074 ACUTE HEPATITIS PANEL: CPT | Performed by: EMERGENCY MEDICINE

## 2023-01-03 PROCEDURE — 83880 ASSAY OF NATRIURETIC PEPTIDE: CPT | Performed by: EMERGENCY MEDICINE

## 2023-01-03 PROCEDURE — 25010000002 FUROSEMIDE PER 20 MG: Performed by: EMERGENCY MEDICINE

## 2023-01-03 PROCEDURE — 84484 ASSAY OF TROPONIN QUANT: CPT | Performed by: EMERGENCY MEDICINE

## 2023-01-03 PROCEDURE — 93005 ELECTROCARDIOGRAM TRACING: CPT | Performed by: NURSE PRACTITIONER

## 2023-01-03 PROCEDURE — 96374 THER/PROPH/DIAG INJ IV PUSH: CPT

## 2023-01-03 PROCEDURE — 83605 ASSAY OF LACTIC ACID: CPT | Performed by: EMERGENCY MEDICINE

## 2023-01-03 PROCEDURE — 36415 COLL VENOUS BLD VENIPUNCTURE: CPT

## 2023-01-03 PROCEDURE — 0202U NFCT DS 22 TRGT SARS-COV-2: CPT | Performed by: EMERGENCY MEDICINE

## 2023-01-03 PROCEDURE — 71250 CT THORAX DX C-: CPT

## 2023-01-03 RX ORDER — FUROSEMIDE 10 MG/ML
80 INJECTION INTRAMUSCULAR; INTRAVENOUS ONCE
Status: COMPLETED | OUTPATIENT
Start: 2023-01-03 | End: 2023-01-03

## 2023-01-03 RX ORDER — SODIUM CHLORIDE 0.9 % (FLUSH) 0.9 %
10 SYRINGE (ML) INJECTION AS NEEDED
Status: DISCONTINUED | OUTPATIENT
Start: 2023-01-03 | End: 2023-01-09 | Stop reason: HOSPADM

## 2023-01-03 RX ADMIN — FUROSEMIDE 80 MG: 10 INJECTION, SOLUTION INTRAMUSCULAR; INTRAVENOUS at 23:56

## 2023-01-03 NOTE — LETTER
EMS Transport Request  For use at [facility],  [place], [place], and [place] only   Patient Name: Saul Sal : 1940   Weight:93 kg (205 lb) Pick-up Location: [facility] BLS/ALS: BLS/ALS: BLS   Insurance: [employer] MEDICARE REPLACEMENT Auth End Date: none     Pre-Cert #: D/C Summary complete: no   Destination: Home Will the patient be on the main level yes, Is there a ramp available no ramp. there is 1-2 steps, Can the patient stand and pivot yes, Address [address] [place] [address] [address] [place] and Name/contact number for who will be present Wife Shelia [phone]   Contact Precautions: None   Equipment (O2, Fluids, etc.): O2, settings 2 L/NC   Arrive By Date/Time:  late this afternoon or tomorrow morning, 1/10 Stretcher/WC: Stretcher   CM Requesting: Amy Barber RN Ext: [phone]   Notes/Medical Necessity: Pt's condition declining, increased weakness, periods of  agitation, unable to sit in w/c for extended period of time. Home for end of life care.     ______________________________________________________________________    *Only 2 patient bags OR 1 carry-on size bag are permitted.  Wheelchairs and walkers CANNOT transported with the patient. Acknowledge: Yes

## 2023-01-04 ENCOUNTER — APPOINTMENT (OUTPATIENT)
Dept: GENERAL RADIOLOGY | Facility: HOSPITAL | Age: 83
End: 2023-01-04
Payer: MEDICARE

## 2023-01-04 ENCOUNTER — APPOINTMENT (OUTPATIENT)
Dept: ULTRASOUND IMAGING | Facility: HOSPITAL | Age: 83
End: 2023-01-04
Payer: MEDICARE

## 2023-01-04 PROBLEM — R55 SYNCOPE AND COLLAPSE: Status: RESOLVED | Noted: 2022-11-08 | Resolved: 2023-01-04

## 2023-01-04 PROBLEM — K76.1 CHRONIC PASSIVE CONGESTION OF LIVER: Status: RESOLVED | Noted: 2023-01-03 | Resolved: 2023-01-04

## 2023-01-04 PROBLEM — I21.A1 TYPE 2 MYOCARDIAL INFARCTION DUE TO HEART FAILURE: Status: ACTIVE | Noted: 2023-01-03

## 2023-01-04 PROBLEM — K76.1: Status: ACTIVE | Noted: 2023-01-03

## 2023-01-04 PROBLEM — M35.00 SJOGREN'S SYNDROME (HCC): Status: RESOLVED | Noted: 2019-07-09 | Resolved: 2023-01-04

## 2023-01-04 PROBLEM — Z00.00 MEDICARE ANNUAL WELLNESS VISIT, SUBSEQUENT: Status: RESOLVED | Noted: 2022-11-30 | Resolved: 2023-01-04

## 2023-01-04 PROBLEM — R05.9 COUGH: Status: RESOLVED | Noted: 2022-06-16 | Resolved: 2023-01-04

## 2023-01-04 PROBLEM — K76.1 CHRONIC PASSIVE CONGESTION OF LIVER: Status: ACTIVE | Noted: 2023-01-03

## 2023-01-04 PROBLEM — R11.0 NAUSEA IN ADULT: Status: ACTIVE | Noted: 2023-01-04

## 2023-01-04 PROBLEM — I50.9 TYPE 2 MYOCARDIAL INFARCTION DUE TO HEART FAILURE (HCC): Status: ACTIVE | Noted: 2023-01-03

## 2023-01-04 PROBLEM — I50.23 ACUTE ON CHRONIC SYSTOLIC CONGESTIVE HEART FAILURE (HCC): Status: RESOLVED | Noted: 2023-01-03 | Resolved: 2023-01-04

## 2023-01-04 PROBLEM — J40 BRONCHITIS: Chronic | Status: RESOLVED | Noted: 2019-10-15 | Resolved: 2023-01-04

## 2023-01-04 PROBLEM — R57.0 CARDIOGENIC SHOCK (HCC): Status: ACTIVE | Noted: 2023-01-04

## 2023-01-04 PROBLEM — Z86.010 HISTORY OF COLON POLYPS: Status: RESOLVED | Noted: 2019-07-09 | Resolved: 2023-01-04

## 2023-01-04 PROBLEM — Z00.00 MEDICARE ANNUAL WELLNESS VISIT, SUBSEQUENT: Status: RESOLVED | Noted: 2021-11-21 | Resolved: 2023-01-04

## 2023-01-04 PROBLEM — M70.61 TROCHANTERIC BURSITIS OF RIGHT HIP: Status: RESOLVED | Noted: 2021-03-02 | Resolved: 2023-01-04

## 2023-01-04 PROBLEM — L97.519 ULCER OF RIGHT FOOT: Status: ACTIVE | Noted: 2023-01-04

## 2023-01-04 PROBLEM — I45.89 HIS-PURKINJE DYSFUNCTION: Status: RESOLVED | Noted: 2022-11-11 | Resolved: 2023-01-04

## 2023-01-04 PROBLEM — Z77.090 H/O ASBESTOS EXPOSURE: Status: RESOLVED | Noted: 2019-07-09 | Resolved: 2023-01-04

## 2023-01-04 PROBLEM — R60.0 LOCALIZED EDEMA: Status: RESOLVED | Noted: 2022-07-07 | Resolved: 2023-01-04

## 2023-01-04 LAB
ANION GAP SERPL CALCULATED.3IONS-SCNC: 15 MMOL/L (ref 5–15)
BACTERIA UR QL AUTO: ABNORMAL /HPF
BASOPHILS # BLD AUTO: 0.02 10*3/MM3 (ref 0–0.2)
BASOPHILS NFR BLD AUTO: 0.3 % (ref 0–1.5)
BILIRUB UR QL STRIP: NEGATIVE
BUN SERPL-MCNC: 57 MG/DL (ref 8–23)
BUN/CREAT SERPL: 25.2 (ref 7–25)
CALCIUM SPEC-SCNC: 8.8 MG/DL (ref 8.6–10.5)
CHLORIDE SERPL-SCNC: 101 MMOL/L (ref 98–107)
CHOLEST SERPL-MCNC: 130 MG/DL (ref 0–200)
CLARITY UR: ABNORMAL
CO2 SERPL-SCNC: 24 MMOL/L (ref 22–29)
COLOR UR: YELLOW
CREAT SERPL-MCNC: 2.26 MG/DL (ref 0.76–1.27)
CRP SERPL-MCNC: 3.02 MG/DL (ref 0–0.5)
D-LACTATE SERPL-SCNC: 2.1 MMOL/L (ref 0.5–2)
D-LACTATE SERPL-SCNC: 2.2 MMOL/L (ref 0.5–2)
D-LACTATE SERPL-SCNC: 2.4 MMOL/L (ref 0.5–2)
D-LACTATE SERPL-SCNC: 2.4 MMOL/L (ref 0.5–2)
DEPRECATED RDW RBC AUTO: 64.7 FL (ref 37–54)
EGFRCR SERPLBLD CKD-EPI 2021: 27.9 ML/MIN/1.73
EOSINOPHIL # BLD AUTO: 0.01 10*3/MM3 (ref 0–0.4)
EOSINOPHIL NFR BLD AUTO: 0.1 % (ref 0.3–6.2)
ERYTHROCYTE [DISTWIDTH] IN BLOOD BY AUTOMATED COUNT: 19.9 % (ref 12.3–15.4)
ERYTHROCYTE [SEDIMENTATION RATE] IN BLOOD: <1 MM/HR (ref 0–20)
GLUCOSE SERPL-MCNC: 97 MG/DL (ref 65–99)
GLUCOSE UR STRIP-MCNC: NEGATIVE MG/DL
HBA1C MFR BLD: 5.9 % (ref 4.8–5.6)
HCT VFR BLD AUTO: 41.9 % (ref 37.5–51)
HDLC SERPL-MCNC: 28 MG/DL (ref 40–60)
HGB BLD-MCNC: 13.2 G/DL (ref 13–17.7)
HGB UR QL STRIP.AUTO: NEGATIVE
HOLD SPECIMEN: NORMAL
HYALINE CASTS UR QL AUTO: ABNORMAL /LPF
IMM GRANULOCYTES # BLD AUTO: 0.06 10*3/MM3 (ref 0–0.05)
IMM GRANULOCYTES NFR BLD AUTO: 0.8 % (ref 0–0.5)
KETONES UR QL STRIP: NEGATIVE
LDLC SERPL CALC-MCNC: 90 MG/DL (ref 0–100)
LDLC/HDLC SERPL: 3.24 {RATIO}
LEUKOCYTE ESTERASE UR QL STRIP.AUTO: NEGATIVE
LYMPHOCYTES # BLD AUTO: 1.04 10*3/MM3 (ref 0.7–3.1)
LYMPHOCYTES NFR BLD AUTO: 14.7 % (ref 19.6–45.3)
MAGNESIUM SERPL-MCNC: 2.5 MG/DL (ref 1.6–2.4)
MCH RBC QN AUTO: 29.5 PG (ref 26.6–33)
MCHC RBC AUTO-ENTMCNC: 31.5 G/DL (ref 31.5–35.7)
MCV RBC AUTO: 93.5 FL (ref 79–97)
MONOCYTES # BLD AUTO: 0.61 10*3/MM3 (ref 0.1–0.9)
MONOCYTES NFR BLD AUTO: 8.6 % (ref 5–12)
NEUTROPHILS NFR BLD AUTO: 5.33 10*3/MM3 (ref 1.7–7)
NEUTROPHILS NFR BLD AUTO: 75.5 % (ref 42.7–76)
NITRITE UR QL STRIP: NEGATIVE
NRBC BLD AUTO-RTO: 0.3 /100 WBC (ref 0–0.2)
PH UR STRIP.AUTO: 5.5 [PH] (ref 5–8)
PHOSPHATE SERPL-MCNC: 4.6 MG/DL (ref 2.5–4.5)
PLATELET # BLD AUTO: 111 10*3/MM3 (ref 140–450)
PMV BLD AUTO: 12.6 FL (ref 6–12)
POTASSIUM SERPL-SCNC: 4.3 MMOL/L (ref 3.5–5.2)
PROT UR QL STRIP: ABNORMAL
QT INTERVAL: 374 MS
QT INTERVAL: 488 MS
QTC INTERVAL: 503 MS
QTC INTERVAL: 544 MS
RBC # BLD AUTO: 4.48 10*6/MM3 (ref 4.14–5.8)
RBC # UR STRIP: ABNORMAL /HPF
REF LAB TEST METHOD: ABNORMAL
SODIUM SERPL-SCNC: 140 MMOL/L (ref 136–145)
SP GR UR STRIP: 1.01 (ref 1–1.03)
SQUAMOUS #/AREA URNS HPF: ABNORMAL /HPF
TRIGL SERPL-MCNC: 56 MG/DL (ref 0–150)
TROPONIN T SERPL-MCNC: 0.02 NG/ML (ref 0–0.03)
TROPONIN T SERPL-MCNC: 0.02 NG/ML (ref 0–0.03)
TROPONIN T SERPL-MCNC: 0.04 NG/ML (ref 0–0.03)
UROBILINOGEN UR QL STRIP: ABNORMAL
VLDLC SERPL-MCNC: 12 MG/DL (ref 5–40)
WBC # UR STRIP: ABNORMAL /HPF
WBC NRBC COR # BLD: 7.07 10*3/MM3 (ref 3.4–10.8)

## 2023-01-04 PROCEDURE — 73630 X-RAY EXAM OF FOOT: CPT

## 2023-01-04 PROCEDURE — 99223 1ST HOSP IP/OBS HIGH 75: CPT | Performed by: FAMILY MEDICINE

## 2023-01-04 PROCEDURE — 83605 ASSAY OF LACTIC ACID: CPT | Performed by: EMERGENCY MEDICINE

## 2023-01-04 PROCEDURE — 80061 LIPID PANEL: CPT | Performed by: NURSE PRACTITIONER

## 2023-01-04 PROCEDURE — 81001 URINALYSIS AUTO W/SCOPE: CPT | Performed by: NURSE PRACTITIONER

## 2023-01-04 PROCEDURE — 83735 ASSAY OF MAGNESIUM: CPT | Performed by: NURSE PRACTITIONER

## 2023-01-04 PROCEDURE — 84484 ASSAY OF TROPONIN QUANT: CPT | Performed by: NURSE PRACTITIONER

## 2023-01-04 PROCEDURE — 99214 OFFICE O/P EST MOD 30 MIN: CPT | Performed by: INTERNAL MEDICINE

## 2023-01-04 PROCEDURE — 85025 COMPLETE CBC W/AUTO DIFF WBC: CPT | Performed by: NURSE PRACTITIONER

## 2023-01-04 PROCEDURE — 86140 C-REACTIVE PROTEIN: CPT | Performed by: FAMILY MEDICINE

## 2023-01-04 PROCEDURE — 87040 BLOOD CULTURE FOR BACTERIA: CPT | Performed by: FAMILY MEDICINE

## 2023-01-04 PROCEDURE — 93010 ELECTROCARDIOGRAM REPORT: CPT | Performed by: INTERNAL MEDICINE

## 2023-01-04 PROCEDURE — 80048 BASIC METABOLIC PNL TOTAL CA: CPT | Performed by: NURSE PRACTITIONER

## 2023-01-04 PROCEDURE — 85652 RBC SED RATE AUTOMATED: CPT | Performed by: FAMILY MEDICINE

## 2023-01-04 PROCEDURE — 76705 ECHO EXAM OF ABDOMEN: CPT

## 2023-01-04 PROCEDURE — 99203 OFFICE O/P NEW LOW 30 MIN: CPT | Performed by: PSYCHIATRY & NEUROLOGY

## 2023-01-04 PROCEDURE — 25010000002 PIPERACILLIN SOD-TAZOBACTAM PER 1 G: Performed by: FAMILY MEDICINE

## 2023-01-04 PROCEDURE — 84100 ASSAY OF PHOSPHORUS: CPT | Performed by: NURSE PRACTITIONER

## 2023-01-04 PROCEDURE — 83036 HEMOGLOBIN GLYCOSYLATED A1C: CPT | Performed by: NURSE PRACTITIONER

## 2023-01-04 PROCEDURE — 84484 ASSAY OF TROPONIN QUANT: CPT | Performed by: FAMILY MEDICINE

## 2023-01-04 RX ORDER — SODIUM CHLORIDE 9 MG/ML
40 INJECTION, SOLUTION INTRAVENOUS AS NEEDED
Status: DISCONTINUED | OUTPATIENT
Start: 2023-01-04 | End: 2023-01-07

## 2023-01-04 RX ORDER — LEVOTHYROXINE SODIUM 0.12 MG/1
125 TABLET ORAL
Status: DISCONTINUED | OUTPATIENT
Start: 2023-01-04 | End: 2023-01-09 | Stop reason: HOSPADM

## 2023-01-04 RX ORDER — ASPIRIN 81 MG/1
81 TABLET ORAL DAILY
Status: DISCONTINUED | OUTPATIENT
Start: 2023-01-04 | End: 2023-01-09 | Stop reason: HOSPADM

## 2023-01-04 RX ORDER — CLOPIDOGREL BISULFATE 75 MG/1
75 TABLET ORAL EVERY OTHER DAY
Status: DISCONTINUED | OUTPATIENT
Start: 2023-01-04 | End: 2023-01-04

## 2023-01-04 RX ORDER — POTASSIUM CHLORIDE 750 MG/1
20 CAPSULE, EXTENDED RELEASE ORAL DAILY
Status: DISCONTINUED | OUTPATIENT
Start: 2023-01-04 | End: 2023-01-09 | Stop reason: HOSPADM

## 2023-01-04 RX ORDER — SODIUM CHLORIDE 0.9 % (FLUSH) 0.9 %
10 SYRINGE (ML) INJECTION EVERY 12 HOURS SCHEDULED
Status: DISCONTINUED | OUTPATIENT
Start: 2023-01-04 | End: 2023-01-09 | Stop reason: HOSPADM

## 2023-01-04 RX ORDER — SODIUM CHLORIDE 0.9 % (FLUSH) 0.9 %
10 SYRINGE (ML) INJECTION AS NEEDED
Status: DISCONTINUED | OUTPATIENT
Start: 2023-01-04 | End: 2023-01-07

## 2023-01-04 RX ORDER — MIDODRINE HYDROCHLORIDE 5 MG/1
5 TABLET ORAL
Status: DISCONTINUED | OUTPATIENT
Start: 2023-01-04 | End: 2023-01-04

## 2023-01-04 RX ADMIN — TAZOBACTAM SODIUM AND PIPERACILLIN SODIUM 3.38 G: 375; 3 INJECTION, SOLUTION INTRAVENOUS at 02:17

## 2023-01-04 RX ADMIN — MIDODRINE HYDROCHLORIDE 5 MG: 5 TABLET ORAL at 09:52

## 2023-01-04 RX ADMIN — Medication 10 ML: at 08:09

## 2023-01-04 RX ADMIN — MIDODRINE HYDROCHLORIDE 5 MG: 5 TABLET ORAL at 16:34

## 2023-01-04 RX ADMIN — POTASSIUM CHLORIDE 20 MEQ: 750 CAPSULE, EXTENDED RELEASE ORAL at 09:52

## 2023-01-04 RX ADMIN — TAZOBACTAM SODIUM AND PIPERACILLIN SODIUM 3.38 G: 375; 3 INJECTION, SOLUTION INTRAVENOUS at 08:17

## 2023-01-04 RX ADMIN — CLOPIDOGREL BISULFATE 75 MG: 75 TABLET ORAL at 09:52

## 2023-01-04 RX ADMIN — ASPIRIN 81 MG: 81 TABLET, COATED ORAL at 09:52

## 2023-01-04 NOTE — PROGRESS NOTES
Pt. Referred for Phase II Cardiac Rehab. Staff discussed benefits of exercise, program protocol, and educational material provided. Teach back verified.  Patient refused cardiac rehab at this time. Staff discussed and gave home exercise benefits and guidelines. Teach back verified.

## 2023-01-04 NOTE — CONSULTS
Neurology    Referring provider:   No referring provider defined for this encounter.    Reason for Consultation: Restless leg    Chief complaint: Shortness of breath    History of present illness: 83-year-old man seen for Dr. Gould for evaluation of restless leg.    The patient has had this for years and finds that it bothers him mostly in evening.  If he gets up and walks before going to bed does not bother him and he has no interest in having it treated.    He is also suffering with shortness of breath and has had significant decline in his health following a bout of the shingles in the fall.    He is had a pacemaker put in by Dr. Mera and follows with Dr. Alexsander Jones.    He complains of nausea and vomiting treating.    He also has significant edema and takes Demadex for that.    Has had no blackouts or falls.    No history of stroke or residual pain from the shingles.        Review of Systems: Nausea and vomiting after eating.    Recent onset.    Significant edema.    All other systems reviewed and are negative.        Home meds:   Medications Prior to Admission   Medication Sig Dispense Refill Last Dose   • aspirin 81 MG EC tablet Take 81 mg by mouth Daily.      • atorvastatin (LIPITOR) 20 MG tablet Take 1 tablet by mouth Every Night. (Patient taking differently: Take 20 mg by mouth 3 (Three) Times a Week. Per pt, taking on MWF) 90 tablet 3    • clopidogrel (PLAVIX) 75 MG tablet Take 1 tablet by mouth Daily. (Patient not taking: Reported on 1/4/2023) 90 tablet 1 Not Taking   • Cod Liver Oil 1000 MG capsule OTC Take 2 capsules in the morning and 1 capsule in the afternoon      • levothyroxine (SYNTHROID, LEVOTHROID) 125 MCG tablet TAKE 1 TABLET EVERY DAY 90 tablet 1    • midodrine (PROAMATINE) 5 MG tablet Take 1 tablet by mouth 3 (Three) Times a Day Before Meals. (Patient taking differently: Take 5 mg by mouth 3 (Three) Times a Day Before Meals. For SBP <100) 270 tablet 1    • Misc Natural Products (Advanced  Joint Relief) capsule Take 1 capsule by mouth Daily. OTC      • NON FORMULARY OTC- Cardio-Plus 2080 : Take 4 tablets in the morning and 5 tablets in the evening      • NON FORMULARY OTC- Cataplex B 1250mg: Take 3 tablets BID      • potassium chloride 10 MEQ CR tablet Take 2 tablets by mouth Daily. 60 tablet 0    • torsemide (DEMADEX) 20 MG tablet Take 2 tablets for 2 days and then one tablet daily (Patient taking differently: Take 20 mg by mouth Daily. Take 2 tablets for 2 days and then one tablet daily) 35 tablet 1    • Turmeric 450 MG capsule Take 1 capsule by mouth Daily. OTC (Turmeric Forte)          History  Past Medical History:   Diagnosis Date   • Abnormal ECG    • Arrhythmia    • Arthritis    • Atrial fibrillation ([facility])    • Atrial flutter ([facility])     s/p typical flutter ablation with recurrent atypical flutter   • Benign essential hypertension    • BPH (benign prostatic hyperplasia)    • CHF (congestive heart failure) ([facility])    • Clotting disorder ([facility]) too many aspirin   • Congenital heart disease    • Coronary artery disease    • Diverticulosis    • GERD (gastroesophageal reflux disease)    • GI bleed    • HFrEF (heart failure with reduced ejection fraction) ([facility])    • Hyperlipidemia    • Hypertension    • Hypothyroidism (acquired)    • Obesity (BMI 30-39.9)    • ELIE (obstructive sleep apnea)    • Osteoarthritis    • Shingles    • Skin cancer     squamous    • Stage 3 chronic kidney disease ([facility]) 12/21/2022   • Wears eyeglasses     readers   • Wears hearing aid    ,   Past Surgical History:   Procedure Laterality Date   • ABLATION OF DYSRHYTHMIC FOCUS  12/2018   • ANGIOPLASTY  1985   • CARDIAC CATHETERIZATION     • CARDIAC CATHETERIZATION N/A 11/8/2022    Procedure: Left Heart Cath;  Surgeon: Marco Urena MD;  Location: [facility] CATH INVASIVE LOCATION;  Service: Cardiovascular;  Laterality: N/A;   • CARDIAC ELECTROPHYSIOLOGY PROCEDURE N/A 11/26/2018    Procedure: Ablation atrial flutter;  Surgeon:  "Tino Sampson MD;  Location:  [facility];  Service: Cardiovascular   • CARDIAC ELECTROPHYSIOLOGY PROCEDURE N/A 11/10/2022    Procedure: Pacemaker DC new;  Surgeon: Bruce Mera DO;  Location:  [facility];  Service: Cardiology;  Laterality: N/A;   • CARDIAC SURGERY      BYPASS X5   • COLONOSCOPY     • COLONOSCOPY     • CORONARY ANGIOPLASTY     • CORONARY ARTERY BYPASS GRAFT  2003    x5 [facility]    • DENTAL PROCEDURE      dental surg and crowns   • EYE SURGERY Right     cataract   • SKIN BIOPSY     • VASECTOMY     ,   Family History   Problem Relation Age of Onset   • Diabetes Mother    • Heart disease Mother    • Coronary artery disease Other    • Stroke Other    • Diabetes Other    • Rheumatic fever Other    • Other Other         Valvular CV Disease   ,   Social History     Tobacco Use   • Smoking status: Former     Packs/day: 1.00     Years: 8.00     Pack years: 8.00     Types: Cigarettes     Start date: 10/1/1956     Quit date: 1964     Years since quittin.1   • Smokeless tobacco: Never   Vaping Use   • Vaping Use: Never used   Substance Use Topics   • Alcohol use: No   • Drug use: Never    and Allergies:  Aspirin, Eliquis [apixaban], Amlodipine besylate, Atenolol, Empagliflozin, Metoprolol, and Milk-related compounds,    Vital Signs   Blood pressure 100/61, pulse 71, temperature 97.5 °F (36.4 °C), temperature source Oral, resp. rate 16, height 182.9 cm (72\"), weight 89.7 kg (197 lb 12.8 oz), SpO2 92 %.  Body mass index is 26.83 kg/m².    Physical Exam:   General: Pleasant white male in no distress              Head: No trauma              Neck:   No bruits              Resp: Normal breath              Cor: Paced              Extremities: 4+ edema              Skin: Warm and dry              Neuro: Mentally the patient is awake alert and oriented to person place and time.  He has normal memory attention and concentration.    Speech is " articulate with no word finding problems.    Coordination is normal on finger-nose testing bilaterally.    Cranial nerves show benign fundi equal pupils full eye movements.  He has no ptosis.    Facial movement sensation are normal.    Palate elevates normally tongue protrudes normally.    Reflexes are 2+ and equal bilaterally.    Motor testing shows normal power and tone in all muscle groups.    Sensory testing is normal.        Results Review: Lactate is 2.21.    Labs:  Lab Results (last 72 hours)     Procedure Component Value Units Date/Time    STAT Lactic Acid, Reflex [[id number]]  (Abnormal) Collected: 01/04/23 0849    Specimen: Blood Updated: 01/04/23 0935     Lactate 2.1 mmol/L      Comment: Falsely depressed results may occur on samples drawn from patients receiving N-Acetylcysteine (NAC) or Metamizole.       C-reactive Protein [[id number]]  (Abnormal) Collected: 01/04/23 0411    Specimen: Blood Updated: 01/04/23 0700     C-Reactive Protein 3.02 mg/dL     Sedimentation Rate [[id number]]  (Normal) Collected: 01/04/23 0411    Specimen: Blood Updated: 01/04/23 0548     Sed Rate <1 mm/hr     STAT Lactic Acid, Reflex [[id number]]  (Abnormal) Collected: 01/04/23 0411    Specimen: Blood Updated: 01/04/23 0546     Lactate 2.2 mmol/L      Comment: Falsely depressed results may occur on samples drawn from patients receiving N-Acetylcysteine (NAC) or Metamizole.       Troponin [[id number]]  (Normal) Collected: 01/04/23 0411    Specimen: Blood Updated: 01/04/23 0535     Troponin T 0.021 ng/mL     Narrative:      Troponin T Reference Range:  <= 0.03 ng/mL-   Negative for AMI  >0.03 ng/mL-     Abnormal for myocardial necrosis.  Clinicians would have to utilize clinical acumen, EKG, Troponin and serial changes to determine if it is an Acute Myocardial Infarction or myocardial injury due to an underlying chronic condition.       Results may be falsely decreased if patient taking Biotin.      Hemoglobin A1c [[id number]]   (Abnormal) Collected: 01/04/23 0411    Specimen: Blood Updated: 01/04/23 0528     Hemoglobin A1C 5.90 %     Narrative:      Hemoglobin A1C Ranges:    Increased Risk for Diabetes  5.7% to 6.4%  Diabetes                     >= 6.5%  Diabetic Goal                < 7.0%    Basic Metabolic Panel [[id number]]  (Abnormal) Collected: 01/04/23 0411    Specimen: Blood Updated: 01/04/23 0528     Glucose 97 mg/dL      BUN 57 mg/dL      Creatinine 2.26 mg/dL      Sodium 140 mmol/L      Potassium 4.3 mmol/L      Chloride 101 mmol/L      CO2 24.0 mmol/L      Calcium 8.8 mg/dL      BUN/Creatinine Ratio 25.2     Anion Gap 15.0 mmol/L      eGFR 27.9 mL/min/1.73      Comment: National Kidney Foundation and American Society of Nephrology (ASN) Task Force recommended calculation based on the Chronic Kidney Disease Epidemiology Collaboration (CKD-EPI) equation refit without adjustment for race.       Narrative:      GFR Normal >60  Chronic Kidney Disease <60  Kidney Failure <15    The GFR formula is only valid for adults with stable renal function between ages 18 and 70.    Lipid Panel [[id number]]  (Abnormal) Collected: 01/04/23 0411    Specimen: Blood Updated: 01/04/23 0528     Total Cholesterol 130 mg/dL      Triglycerides 56 mg/dL      HDL Cholesterol 28 mg/dL      LDL Cholesterol  90 mg/dL      VLDL Cholesterol 12 mg/dL      LDL/HDL Ratio 3.24    Narrative:      Cholesterol Reference Ranges  (U.S. Department of Health and Human Services ATP III Classifications)    Desirable          <200 mg/dL  Borderline High    200-239 mg/dL  High Risk          >240 mg/dL      Triglyceride Reference Ranges  (U.S. Department of Health and Human Services ATP III Classifications)    Normal           <150 mg/dL  Borderline High  150-199 mg/dL  High             200-499 mg/dL  Very High        >500 mg/dL    HDL Reference Ranges  (U.S. Department of Health and Human Services ATP III Classifications)    Low     <40 mg/dl (major risk factor for CHD)  High     >60 mg/dl ('negative' risk factor for CHD)        LDL Reference Ranges  (U.S. Department of Health and Human Services ATP III Classifications)    Optimal          <100 mg/dL  Near Optimal     100-129 mg/dL  Borderline High  130-159 mg/dL  High             160-189 mg/dL  Very High        >189 mg/dL    Phosphorus [[id number]]  (Abnormal) Collected: 01/04/23 0411    Specimen: Blood Updated: 01/04/23 0528     Phosphorus 4.6 mg/dL     Magnesium [[id number]]  (Abnormal) Collected: 01/04/23 0411    Specimen: Blood Updated: 01/04/23 0528     Magnesium 2.5 mg/dL     CBC Auto Differential [[id number]]  (Abnormal) Collected: 01/04/23 0411    Specimen: Blood Updated: 01/04/23 0514     WBC 7.07 10*3/mm3      RBC 4.48 10*6/mm3      Hemoglobin 13.2 g/dL      Hematocrit 41.9 %      MCV 93.5 fL      MCH 29.5 pg      MCHC 31.5 g/dL      RDW 19.9 %      RDW-SD 64.7 fl      MPV 12.6 fL      Platelets 111 10*3/mm3      Neutrophil % 75.5 %      Lymphocyte % 14.7 %      Monocyte % 8.6 %      Eosinophil % 0.1 %      Basophil % 0.3 %      Immature Grans % 0.8 %      Neutrophils, Absolute 5.33 10*3/mm3      Lymphocytes, Absolute 1.04 10*3/mm3      Monocytes, Absolute 0.61 10*3/mm3      Eosinophils, Absolute 0.01 10*3/mm3      Basophils, Absolute 0.02 10*3/mm3      Immature Grans, Absolute 0.06 10*3/mm3      nRBC 0.3 /100 WBC     Troponin [[id number]]  (Abnormal) Collected: 01/04/23 0205    Specimen: Blood Updated: 01/04/23 0249     Troponin T 0.035 ng/mL     Narrative:      Troponin T Reference Range:  <= 0.03 ng/mL-   Negative for AMI  >0.03 ng/mL-     Abnormal for myocardial necrosis.  Clinicians would have to utilize clinical acumen, EKG, Troponin and serial changes to determine if it is an Acute Myocardial Infarction or myocardial injury due to an underlying chronic condition.       Results may be falsely decreased if patient taking Biotin.      Blood Culture - Blood, Arm, Right [[id number]] Collected: 01/04/23 0156    Specimen: Blood from  Arm, Right Updated: 01/04/23 0226    Blood Culture - Blood, Arm, Right [[id number]] Collected: 01/04/23 0203    Specimen: Blood from Arm, Right Updated: 01/04/23 0226    Urinalysis With Culture If Indicated - Urine, Clean Catch [[id number]]  (Abnormal) Collected: 01/04/23 0105    Specimen: Urine, Clean Catch Updated: 01/04/23 0149     Color, UA Yellow     Appearance, UA Cloudy     pH, UA 5.5     Specific Gravity, UA 1.011     Glucose, UA Negative     Ketones, UA Negative     Bilirubin, UA Negative     Blood, UA Negative     Protein, UA 30 mg/dL (1+)     Leuk Esterase, UA Negative     Nitrite, UA Negative     Urobilinogen, UA 0.2 E.U./dL    Narrative:      In absence of clinical symptoms, the presence of pyuria, bacteria, and/or nitrites on the urinalysis result does not correlate with infection.    Urinalysis, Microscopic Only - Urine, Clean Catch [[id number]]  (Abnormal) Collected: 01/04/23 0105    Specimen: Urine, Clean Catch Updated: 01/04/23 0149     RBC, UA 3-6 /HPF      WBC, UA 3-5 /HPF      Comment: Urine culture not indicated.        Bacteria, UA None Seen /HPF      Squamous Epithelial Cells, UA 3-6 /HPF      Hyaline Casts, UA 7-12 /LPF      Methodology Manual Light Microscopy    Troponin [[id number]]  (Normal) Collected: 01/04/23 0023    Specimen: Blood Updated: 01/04/23 0118     Troponin T 0.023 ng/mL     Narrative:      Troponin T Reference Range:  <= 0.03 ng/mL-   Negative for AMI  >0.03 ng/mL-     Abnormal for myocardial necrosis.  Clinicians would have to utilize clinical acumen, EKG, Troponin and serial changes to determine if it is an Acute Myocardial Infarction or myocardial injury due to an underlying chronic condition.       Results may be falsely decreased if patient taking Biotin.      STAT Lactic Acid, Reflex [[id number]]  (Abnormal) Collected: 01/04/23 0023    Specimen: Blood Updated: 01/04/23 0116     Lactate 2.4 mmol/L      Comment: Falsely depressed results may occur on samples drawn from  patients receiving N-Acetylcysteine (NAC) or Metamizole.       [place] Draw [[id number]] Collected: 01/03/23 2045    Specimen: Blood Updated: 01/04/23 0100    Narrative:      The following orders were created for panel order [place] Draw.  Procedure                               Abnormality         Status                     ---------                               -----------         ------                     Green Top (Gel)[[id number]]                                  Final result               Lavender Top[[id number]]                                     Final result               Gold Top - SST[[id number]]                                   Final result               Gray Top[[id number]]                                         Final result               Light Blue Top[[id number]]                                   Final result                 Please view results for these tests on the individual orders.    Gray Top [[id number]] Collected: 01/03/23 2045    Specimen: Blood Updated: 01/04/23 0100     Extra Tube Hold for add-ons.     Comment: Auto resulted.       COVID PRE-OP / PRE-PROCEDURE SCREENING ORDER (NO ISOLATION) - Swab, Nasopharynx [[id number]]  (Normal) Collected: 01/03/23 2143    Specimen: Swab from Nasopharynx Updated: 01/03/23 2349    Narrative:      The following orders were created for panel order COVID PRE-OP / PRE-PROCEDURE SCREENING ORDER (NO ISOLATION) - Swab, Nasopharynx.  Procedure                               Abnormality         Status                     ---------                               -----------         ------                     Respiratory Panel PCR w/...[[id number]]  Normal              Final result                 Please view results for these tests on the individual orders.    Respiratory Panel PCR w/COVID-19(SARS-CoV-2) [facility] In-House, NP Swab in UTM/VTM, 3-4 HR TAT - Swab, Nasopharynx [[id number]]  (Normal) Collected: 01/03/23 2143    Specimen: Swab from Nasopharynx  Updated: 01/03/23 2349     ADENOVIRUS, PCR Not Detected     Coronavirus 229E Not Detected     Coronavirus HKU1 Not Detected     Coronavirus NL63 Not Detected     Coronavirus OC43 Not Detected     COVID19 Not Detected     Human Metapneumovirus Not Detected     Human Rhinovirus/Enterovirus Not Detected     Influenza A PCR Not Detected     Influenza B PCR Not Detected     Parainfluenza Virus 1 Not Detected     Parainfluenza Virus 2 Not Detected     Parainfluenza Virus 3 Not Detected     Parainfluenza Virus 4 Not Detected     RSV, PCR Not Detected     Bordetella pertussis pcr Not Detected     Bordetella parapertussis PCR Not Detected     Chlamydophila pneumoniae PCR Not Detected     Mycoplasma pneumo by PCR Not Detected    Narrative:      In the setting of a positive respiratory panel with a viral infection PLUS a negative procalcitonin without other underlying concern for bacterial infection, consider observing off antibiotics or discontinuation of antibiotics and continue supportive care. If the respiratory panel is positive for atypical bacterial infection (Bordetella pertussis, Chlamydophila pneumoniae, or Mycoplasma pneumoniae), consider antibiotic de-escalation to target atypical bacterial infection.    Hepatitis Panel, Acute [[id number]]  (Normal) Collected: 01/03/23 2045    Specimen: Blood Updated: 01/03/23 2304     Hepatitis B Surface Ag Non-Reactive     Hep A IgM Non-Reactive     Hep B C IgM Non-Reactive     Hepatitis C Ab Non-Reactive    Narrative:      Results may be falsely decreased if patient taking Biotin.     Lactic Acid, Plasma [[id number]]  (Abnormal) Collected: 01/03/23 2045    Specimen: Blood Updated: 01/03/23 2255     Lactate 5.8 mmol/L      Comment: Falsely depressed results may occur on samples drawn from patients receiving N-Acetylcysteine (NAC) or Metamizole.       Lipase [[id number]]  (Normal) Collected: 01/03/23 2045    Specimen: Blood Updated: 01/03/23 2203     Lipase 40 U/L     Green Top (Gel)  [[id number]] Collected: 01/03/23 2045    Specimen: Blood Updated: 01/03/23 2146     Extra Tube Hold for add-ons.     Comment: Auto resulted.       Lavender Top [[id number]] Collected: 01/03/23 2045    Specimen: Blood Updated: 01/03/23 2146     Extra Tube hold for add-on     Comment: Auto resulted       Light Blue Top [[id number]] Collected: 01/03/23 2045    Specimen: Blood Updated: 01/03/23 2146     Extra Tube Hold for add-ons.     Comment: Auto resulted       Gold Top - SST [[id number]] Collected: 01/03/23 2045    Specimen: Blood Updated: 01/03/23 2146     Extra Tube Hold for add-ons.     Comment: Auto resulted.       Comprehensive Metabolic Panel [[id number]]  (Abnormal) Collected: 01/03/23 2045    Specimen: Blood Updated: 01/03/23 2127     Glucose 129 mg/dL      BUN 53 mg/dL      Creatinine 2.33 mg/dL      Sodium 138 mmol/L      Potassium 5.0 mmol/L      Comment: Specimen hemolyzed.  Results may be affected.        Chloride 98 mmol/L      CO2 19.0 mmol/L      Calcium 9.4 mg/dL      Total Protein 7.8 g/dL      Albumin 4.5 g/dL      ALT (SGPT) 119 U/L      Comment: Specimen hemolyzed.  Results may be affected.        AST (SGOT) 147 U/L      Alkaline Phosphatase 133 U/L      Total Bilirubin 1.6 mg/dL      Globulin 3.3 gm/dL      Comment: Calculated Result        A/G Ratio 1.4 g/dL      BUN/Creatinine Ratio 22.7     Anion Gap 21.0 mmol/L      eGFR 27.1 mL/min/1.73      Comment: National Kidney Foundation and American Society of Nephrology (ASN) Task Force recommended calculation based on the Chronic Kidney Disease Epidemiology Collaboration (CKD-EPI) equation refit without adjustment for race.       Narrative:      GFR Normal >60  Chronic Kidney Disease <60  Kidney Failure <15    The GFR formula is only valid for adults with stable renal function between ages 18 and 70.    Troponin [[id number]]  (Abnormal) Collected: 01/03/23 2045    Specimen: Blood Updated: 01/03/23 2127     Troponin T 0.032 ng/mL     Narrative:       Troponin T Reference Range:  <= 0.03 ng/mL-   Negative for AMI  >0.03 ng/mL-     Abnormal for myocardial necrosis.  Clinicians would have to utilize clinical acumen, EKG, Troponin and serial changes to determine if it is an Acute Myocardial Infarction or myocardial injury due to an underlying chronic condition.       Results may be falsely decreased if patient taking Biotin.      BNP [[id number]]  (Abnormal) Collected: 01/03/23 2045    Specimen: Blood Updated: 01/03/23 2123     proBNP 31,376.0 pg/mL     Narrative:      Among patients with dyspnea, NT-proBNP is highly sensitive for the detection of acute congestive heart failure. In addition NT-proBNP of <300 pg/ml effectively rules out acute congestive heart failure with 99% negative predictive value.    Results may be falsely decreased if patient taking Biotin.      CBC & Differential [[id number]]  (Abnormal) Collected: 01/03/23 2045    Specimen: Blood Updated: 01/03/23 2113    Narrative:      The following orders were created for panel order CBC & Differential.  Procedure                               Abnormality         Status                     ---------                               -----------         ------                     CBC Auto Differential[[id number]]        Abnormal            Final result               Scan Slide[[id number]]                                                                    Please view results for these tests on the individual orders.    CBC Auto Differential [[id number]]  (Abnormal) Collected: 01/03/23 2045    Specimen: Blood Updated: 01/03/23 2113     WBC 6.98 10*3/mm3      RBC 5.07 10*6/mm3      Hemoglobin 14.8 g/dL      Hematocrit 48.3 %      MCV 95.3 fL      MCH 29.2 pg      MCHC 30.6 g/dL      RDW 20.5 %      RDW-SD 67.0 fl      MPV 11.9 fL      Platelets 144 10*3/mm3      Neutrophil % 66.6 %      Lymphocyte % 25.4 %      Monocyte % 6.6 %      Eosinophil % 0.6 %      Basophil % 0.4 %      Immature Grans % 0.4 %      Neutrophils,  Absolute 4.65 10*3/mm3      Lymphocytes, Absolute 1.77 10*3/mm3      Monocytes, Absolute 0.46 10*3/mm3      Eosinophils, Absolute 0.04 10*3/mm3      Basophils, Absolute 0.03 10*3/mm3      Immature Grans, Absolute 0.03 10*3/mm3      nRBC 0.7 /100 WBC           Rads:  Imaging Results (Last 72 Hours)     Procedure Component Value Units Date/Time    US Abdomen Limited [[id number]] Resulted: 01/04/23 0919     Updated: 01/04/23 0920    XR Foot 3+ View Right [[id number]] Collected: 01/04/23 0824     Updated: 01/04/23 0829    Narrative:      XR FOOT 3+ VW RIGHT    Date of Exam: 1/4/2023 6:33 AM EST    Indication: wound on left foot.    Comparison: None available.    Findings:  No acute fracture is identified. There is a marginal erosion along the first metatarsal head. Mild to moderate degenerative changes are present along the metatarsophalangeal and interphalangeal joints. There are tiny enthesophytes along the posterior and   inferior calcaneus. Mild degenerative changes are present along the dorsal midfoot. There is soft tissue swelling along the forefoot.      Impression:      Impression:  1.Degenerative changes as described above.  2.Marginal erosion along first metatarsal head. This could be degenerative such as in inflammatory arthritis, although if there is acute infection in this vicinity osteomyelitis cannot be excluded.  3.Nonspecific soft tissue swelling along forefoot.    Electronically Signed: Antonino Alexis    1/4/2023 8:27 AM EST    Workstation ID: [id number]    CT Abdomen Pelvis Without Contrast [[id number]] Collected: 01/03/23 2311     Updated: 01/03/23 2331    Narrative:      CT ABDOMEN PELVIS WO CONTRAST-, CT CHEST WO CONTRAST DIAGNOSTIC-     Date of Exam: 1/3/2023 10:35 PM     Indication: nausea/ abdominal pain; I50.23-Acute on chronic systolic  (congestive) heart failure.     Comparison: 10/14/2018     Technique: Contiguous axial CT images were obtained from the lung bases  to the to the pubic symphysis  without contrast.  Sagittal and coronal  reconstructions were performed.  Automated exposure control and  iterative reconstruction methods were used.             FINDINGS:  Chest:     Mediastinum: Heart size is enlarged. No suspicious pericardial fluid  collection noted.     Patient is status post median sternotomy and CABG. There is  calcification of the native coronary arteries.     Limited noncontrast imaging of the aorta and the origin of the great  vessels is grossly unremarkable in appearance.     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     Main pulmonary artery is normal in caliber     No suspicious hilar or mediastinal adenopathy is noted. There is  evidence of old granulomatous disease. The Limited imaging of the base  of the neck and the esophagus are grossly unremarkable     Lungs/pleura: There is a small right-sided pleural effusion.     Central airways are patent.     Partially calcified pleural plaques are again noted.     Partially calcified 4 mm nodule right middle lobe compatible  granulomatous disease. Additional evidence of old granulomatous disease  noted. Minimal groundglass and irregular opacities noted at the lung  bases. No suspicious areas of consolidation noted. No significant overt  pulmonary edema.     Soft Tissues/Bones: No destructive bone lesion.     Abdomen/Pelvis:  Organs: Stones and sludge are noted within the gallbladder. There is  mild stranding surrounding the gallbladder.     Limited noncontrast imaging of the liver, spleen, pancreas and right  adrenal gland are unremarkable in appearance. Indication of the left  stable compatible sequela prior. Low-attenuation lesion within the left  kidney compatible with cysts. Perinephric stranding noted bilaterally.      GI/Bowel: There is a small hiatal hernia. The stomach is incompletely  distended and otherwise grossly unremarkable on limited noncontrast  imaging.     Small bowel demonstrates no acute abnormality      There is diverticulosis more prominently within the sigmoid colon.  Stranding is noted within the mesentery and small amount of free fluid  noted within the pelvis.      Pelvis: Urinary bladder is mildly thick-walled this is accentuated by  incomplete distention.     Bilateral fat-containing inguinal hernias are noted. The prostate is  mildly enlarged.        Peritoneum/Retroperitoneum: Atherosclerotic changes are noted of the  aorta which is tortuous. No suspicious retroperitoneal adenopathy noted     Bones/Soft Tissues: Mild degree of subcutaneous edema is noted.  Multilevel degenerative changes are noted of the spine       Impression:      Chest:  1. Cardiomegaly without significant overt pulmonary edema  2. Small right-sided pleural effusion  3. Evidence of old granulomatous disease  4. Partially calcified pleural plaques similar to the prior study.     Abdomen and pelvis:     1. Small amount of ascites and fluid within the abdomen and pelvis  limited evaluation for acute inflammatory changes. Please correlate for  cardiac, liver or renal dysfunction  2. There is cholelithiasis and small amount of fluid surrounding the  gallbladder which is mentioned above is indeterminate. If there is  clinical concern for acute cholecystitis follow-up can always be  considered with a nuclear medicine hepatobiliary scan  3. Diverticulosis is noted. Evaluation for diverticulitis is limited by  ascites                 This report was finalized on 1/3/2023 11:28 PM by Wayne West.       CT Chest Without Contrast Diagnostic [[id number]] Collected: 01/03/23 2311     Updated: 01/03/23 2331    Narrative:      CT ABDOMEN PELVIS WO CONTRAST-, CT CHEST WO CONTRAST DIAGNOSTIC-     Date of Exam: 1/3/2023 10:35 PM     Indication: nausea/ abdominal pain; I50.23-Acute on chronic systolic  (congestive) heart failure.     Comparison: 10/14/2018     Technique: Contiguous axial CT images were obtained from the lung bases  to the to the  pubic symphysis without contrast.  Sagittal and coronal  reconstructions were performed.  Automated exposure control and  iterative reconstruction methods were used.             FINDINGS:  Chest:     Mediastinum: Heart size is enlarged. No suspicious pericardial fluid  collection noted.     Patient is status post median sternotomy and CABG. There is  calcification of the native coronary arteries.     Limited noncontrast imaging of the aorta and the origin of the great  vessels is grossly unremarkable in appearance.     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     Main pulmonary artery is normal in caliber     No suspicious hilar or mediastinal adenopathy is noted. There is  evidence of old granulomatous disease. The Limited imaging of the base  of the neck and the esophagus are grossly unremarkable     Lungs/pleura: There is a small right-sided pleural effusion.     Central airways are patent.     Partially calcified pleural plaques are again noted.     Partially calcified 4 mm nodule right middle lobe compatible  granulomatous disease. Additional evidence of old granulomatous disease  noted. Minimal groundglass and irregular opacities noted at the lung  bases. No suspicious areas of consolidation noted. No significant overt  pulmonary edema.     Soft Tissues/Bones: No destructive bone lesion.     Abdomen/Pelvis:  Organs: Stones and sludge are noted within the gallbladder. There is  mild stranding surrounding the gallbladder.     Limited noncontrast imaging of the liver, spleen, pancreas and right  adrenal gland are unremarkable in appearance. Indication of the left  stable compatible sequela prior. Low-attenuation lesion within the left  kidney compatible with cysts. Perinephric stranding noted bilaterally.      GI/Bowel: There is a small hiatal hernia. The stomach is incompletely  distended and otherwise grossly unremarkable on limited noncontrast  imaging.     Small bowel demonstrates no acute  abnormality     There is diverticulosis more prominently within the sigmoid colon.  Stranding is noted within the mesentery and small amount of free fluid  noted within the pelvis.      Pelvis: Urinary bladder is mildly thick-walled this is accentuated by  incomplete distention.     Bilateral fat-containing inguinal hernias are noted. The prostate is  mildly enlarged.        Peritoneum/Retroperitoneum: Atherosclerotic changes are noted of the  aorta which is tortuous. No suspicious retroperitoneal adenopathy noted     Bones/Soft Tissues: Mild degree of subcutaneous edema is noted.  Multilevel degenerative changes are noted of the spine       Impression:      Chest:  1. Cardiomegaly without significant overt pulmonary edema  2. Small right-sided pleural effusion  3. Evidence of old granulomatous disease  4. Partially calcified pleural plaques similar to the prior study.     Abdomen and pelvis:     1. Small amount of ascites and fluid within the abdomen and pelvis  limited evaluation for acute inflammatory changes. Please correlate for  cardiac, liver or renal dysfunction  2. There is cholelithiasis and small amount of fluid surrounding the  gallbladder which is mentioned above is indeterminate. If there is  clinical concern for acute cholecystitis follow-up can always be  considered with a nuclear medicine hepatobiliary scan  3. Diverticulosis is noted. Evaluation for diverticulitis is limited by  ascites                 This report was finalized on 1/3/2023 11:28 PM by Wayne West.       XR Chest 1 View [[id number]] Collected: 01/03/23 2050     Updated: 01/03/23 2055    Narrative:      DATE OF EXAM: 1/3/2023 8:20 PM     PROCEDURE: XR CHEST 1 VW-     INDICATIONS: SOA triage protocol     COMPARISON: 12/28/2022     TECHNIQUE: Single radiographic AP view of the chest was obtained.     FINDINGS:  Left subclavian triple lead pacer. Cardiomegaly. Prominence of the  central pulmonary vasculature. Lungs clear. Slight  blunting of both  costophrenic angles        Impression:      Cardiomegaly with pulmonary vascular congestion and trace pleural  effusions     This report was finalized on 1/3/2023 8:52 PM by Santy Garcia.               Assessment: Restless leg syndrome minimally symptomatic  No evidence of altered mentation.      Plan:    Patient does not desire treatment.    Comment:   See the patient on request    I discussed the patients findings and my recommendations with patient and family      Bradford Cooney MD  01/04/23  13:43 EST

## 2023-01-04 NOTE — H&P
[facility] Medicine Services  HISTORY AND PHYSICAL    Patient Name: Saul Sal  : 1940  MRN: [id number]  Primary Care Physician: Saul Campbell MD  Date of admission: 1/3/2023    Subjective   Subjective     Chief Complaint:  Shortness of breath, nausea    HPI:  Saul Sal is a 83 y.o. male with PMH significant for A. 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  He states that he has been having difficulty with shortness of breath since the placement of his PPM in November.  He notes that he was previously on Lasix which was discontinued and he is now on torsemide daily.  He continues to have lower extremity swelling as well as abdominal bloating with associated nausea and decreased appetite.  He denies chest pain, fever, cough.  He notes that he just overall feels poorly and weak.  He states that he takes midodrine as needed at home and has required 2 doses today secondary to hypotension.  Upon arrival to the ED, he was noted to have elevated troponin and proBNP.  He has STORM and elevated LFTs.  He also has lactic acidosis.  CT chest notes cardiomegaly without overt pulmonary edema and concern for small right-sided pleural effusion.  CT abdomen/pelvis notes small amount of ascites and fluid within the abdomen and pelvis as well as cholelithiasis with small amount of fluid surrounding the gallbladder.  He was given Lasix 80 mg while in the ED.  He will be admitted to hospital medicine for further evaluation.      Review of Systems   Constitutional: Positive for activity change, appetite change and fatigue. Negative for chills, diaphoresis, fever and unexpected weight change.   HENT: Positive for trouble swallowing. Negative for congestion, facial swelling and sinus pain.    Eyes: Negative.   Negative for visual disturbance.   Respiratory: Positive for shortness of breath. Negative for cough, chest tightness and wheezing.    Cardiovascular: Positive for leg swelling. Negative for chest pain and palpitations.   Gastrointestinal: Positive for abdominal distention and nausea. Negative for abdominal pain, constipation, diarrhea and vomiting.   Endocrine: Negative.  Negative for polyuria.   Genitourinary: Positive for frequency and urgency. Negative for decreased urine volume, difficulty urinating and dysuria.   Musculoskeletal: Positive for gait problem. Negative for arthralgias, back pain and myalgias.   Skin: Positive for wound. Negative for color change, pallor and rash.   Allergic/Immunologic: Negative.  Negative for immunocompromised state.   Neurological: Positive for weakness. Negative for dizziness, seizures, syncope, facial asymmetry, speech difficulty, light-headedness, numbness and headaches.   Hematological: Negative.  Does not bruise/bleed easily.   Psychiatric/Behavioral: Negative.  Negative for confusion. The patient is not nervous/anxious.         All other systems reviewed and are negative.     Personal History     Past Medical History:   Diagnosis Date   • Abnormal ECG    • Arrhythmia    • Arthritis    • Atrial fibrillation (HCC)    • Atrial flutter ([facility])     s/p typical flutter ablation with recurrent atypical flutter   • Benign essential hypertension    • BPH (benign prostatic hyperplasia)    • CHF (congestive heart failure) ([facility])    • Clotting disorder ([facility]) too many aspirin   • Congenital heart disease    • Coronary artery disease    • Diverticulosis    • GERD (gastroesophageal reflux disease)    • GI bleed    • HFrEF (heart failure with reduced ejection fraction) ([facility])    • Hyperlipidemia    • Hypertension    • Hypothyroidism (acquired)    • Obesity (BMI 30-39.9)    • ELIE (obstructive sleep apnea)    • Osteoarthritis    • Shingles    • Skin cancer     squamous    • Stage 3 chronic  kidney disease (HCC) 12/21/2022   • Wears eyeglasses     readers   • Wears hearing aid              Past Surgical History:   Procedure Laterality Date   • ABLATION OF DYSRHYTHMIC FOCUS  12/2018   • ANGIOPLASTY  1985   • CARDIAC CATHETERIZATION     • CARDIAC CATHETERIZATION N/A 11/8/2022    Procedure: Left Heart Cath;  Surgeon: Marco Urena MD;  Location:  [facility] CATH [facility]AS[facility];  Service: Cardiovascular;  Laterality: N/A;   • CARDIAC ELECTROPHYSIOLOGY PROCEDURE N/A 11/26/2018    Procedure: Ablation atrial flutter;  Surgeon: Tino Sampson MD;  Location:  [facility] EP INVASIVE [facility];  Service: Cardiovascular   • CARDIAC ELECTROPHYSIOLOGY PROCEDURE N/A 11/10/2022    Procedure: Pacemaker DC new;  Surgeon: Bruce Mera DO;  Location:  [facility] INVASIVE [facility];  Service: Cardiology;  Laterality: N/A;   • CARDIAC SURGERY      BYPASS X5   • COLONOSCOPY  2009   • COLONOSCOPY  2005   • CORONARY ANGIOPLASTY  1985   • CORONARY ARTERY BYPASS GRAFT  2003    x5 [facility] 2003   • DENTAL PROCEDURE  2010    dental surg and crowns   • EYE SURGERY Right     cataract   • SKIN BIOPSY     • VASECTOMY         Family History:  family history includes Coronary artery disease in an other family member; Diabetes in his mother and another family member; Heart disease in his mother; Other in an other family member; Rheumatic fever in an other family member; Stroke in an other family member. Otherwise pertinent FHx was reviewed and unremarkable.     Social History:  reports that he quit smoking about 58 years ago. His smoking use included cigarettes. He started smoking about 66 years ago. He has a 8.00 pack-year smoking history. He has never used smokeless tobacco. He reports that he does not drink alcohol and does not use drugs.  Social History     Social History Narrative    Caffeine: None    Patient lives at his home with   His wife       Medications:  Advanced Joint Relief, Cod Liver Oil, NON FORMULARY, Turmeric,  aspirin, atorvastatin, clopidogrel, levothyroxine, midodrine, potassium chloride, and torsemide    Allergies   Allergen Reactions   • Aspirin GI Bleeding   • Eliquis [Apixaban] Other (See Comments)     Excessive bleeding   • Amlodipine Besylate Unknown (See Comments)     Unknown     • Atenolol Unknown (See Comments)     unknown   • Empagliflozin Other (See Comments)     Low BP   • Metoprolol Unknown (See Comments)     Unknown   • Milk-Related Compounds Other (See Comments)     Headache       Objective   Objective     Vital Signs:   Temp:  [98.9 °F (37.2 °C)] 98.9 °F (37.2 °C)  Heart Rate:  [109] 109  Resp:  [18] 18  BP: (110)/(92) 110/92    Physical Exam   Constitutional: Awake, alert, no acute distress   Eyes: PERRLA, sclerae anicteric, no conjunctival injection  HENT: NCAT, mucous membranes moist  Neck: Supple, no thyromegaly, no lymphadenopathy, trachea midline  Respiratory: rales to auscultation bilaterally, nonlabored respirations   Cardiovascular: RRR, no murmurs, rubs, or gallops, palpable pedal pulses bilaterally  Gastrointestinal: Positive bowel sounds, soft, nontender, nondistended  Musculoskeletal: +3-4 bilateral ankle edema, no clubbing or cyanosis to extremities  Psychiatric: Appropriate affect, cooperative  Neurologic: Oriented x 3, strength symmetric in all extremities, Cranial Nerves grossly intact to confrontation, speech clear  Skin: No rashes, wound right 2nd toe with skin blistering and sloughing           Result Review:  I have personally reviewed the results from the time of this admission to 1/4/2023 00:58 EST and agree with these findings:  [x]  Laboratory list / accordion  []  Microbiology  [x]  Radiology  [x]  EKG/Telemetry   []  Cardiology/Vascular   []  Pathology  [x]  Old records  []  Other:  Most notable findings include:     LAB RESULTS:      Lab 01/03/23 2045   WBC 6.98   HEMOGLOBIN 14.8   HEMATOCRIT 48.3   PLATELETS 144   NEUTROS ABS 4.65   IMMATURE GRANS (ABS) 0.03   LYMPHS ABS  1.77   MONOS ABS 0.46   EOS ABS 0.04   MCV 95.3   LACTATE 5.8*         Lab 01/03/23 2045   SODIUM 138   POTASSIUM 5.0   CHLORIDE 98   CO2 19.0*   ANION GAP 21.0*   BUN 53*   CREATININE 2.33*   EGFR 27.1*   GLUCOSE 129*   CALCIUM 9.4         Lab 01/03/23 2045   TOTAL PROTEIN 7.8   ALBUMIN 4.5   GLOBULIN 3.3   ALT (SGPT) 119*   AST (SGOT) 147*   BILIRUBIN 1.6*   ALK PHOS 133*   LIPASE 40         Lab 01/03/23 2045   PROBNP 31,376.0*   TROPONIN T 0.032*                 Brief Urine Lab Results     None        Microbiology Results (last 10 days)     Procedure Component Value - Date/Time    COVID PRE-OP / PRE-PROCEDURE SCREENING ORDER (NO ISOLATION) - Swab, Nasopharynx [[id number]]  (Normal) Collected: 01/03/23 2143    Lab Status: Final result Specimen: Swab from Nasopharynx Updated: 01/03/23 2349    Narrative:      The following orders were created for panel order COVID PRE-OP / PRE-PROCEDURE SCREENING ORDER (NO ISOLATION) - Swab, Nasopharynx.  Procedure                               Abnormality         Status                     ---------                               -----------         ------                     Respiratory Panel PCR w/...[[id number]]  Normal              Final result                 Please view results for these tests on the individual orders.    Respiratory Panel PCR w/COVID-19(SARS-CoV-2) [facility] In-House, NP Swab in UTM/VTM, 3-4 HR TAT - Swab, Nasopharynx [[id number]]  (Normal) Collected: 01/03/23 2143    Lab Status: Final result Specimen: Swab from Nasopharynx Updated: 01/03/23 2349     ADENOVIRUS, PCR Not Detected     Coronavirus 229E Not Detected     Coronavirus HKU1 Not Detected     Coronavirus NL63 Not Detected     Coronavirus OC43 Not Detected     COVID19 Not Detected     Human Metapneumovirus Not Detected     Human Rhinovirus/Enterovirus Not Detected     Influenza A PCR Not Detected     Influenza B PCR Not Detected     Parainfluenza Virus 1 Not Detected     Parainfluenza  Virus 2 Not Detected     Parainfluenza Virus 3 Not Detected     Parainfluenza Virus 4 Not Detected     RSV, PCR Not Detected     Bordetella pertussis pcr Not Detected     Bordetella parapertussis PCR Not Detected     Chlamydophila pneumoniae PCR Not Detected     Mycoplasma pneumo by PCR Not Detected    Narrative:      In the setting of a positive respiratory panel with a viral infection PLUS a negative procalcitonin without other underlying concern for bacterial infection, consider observing off antibiotics or discontinuation of antibiotics and continue supportive care. If the respiratory panel is positive for atypical bacterial infection (Bordetella pertussis, Chlamydophila pneumoniae, or Mycoplasma pneumoniae), consider antibiotic de-escalation to target atypical bacterial infection.          CT Abdomen Pelvis Without Contrast    Result Date: 1/3/2023  CT ABDOMEN PELVIS WO CONTRAST-, CT CHEST WO CONTRAST DIAGNOSTIC-  Date of Exam: 1/3/2023 10:35 PM  Indication: nausea/ abdominal pain; I50.23-Acute on chronic systolic (congestive) heart failure.  Comparison: 10/14/2018  Technique: Contiguous axial CT images were obtained from the lung bases to the to the pubic symphysis without contrast.  Sagittal and coronal reconstructions were performed.  Automated exposure control and iterative reconstruction methods were used.     FINDINGS: Chest:  Mediastinum: Heart size is enlarged. No suspicious pericardial fluid collection noted.  Patient is status post median sternotomy and CABG. There is calcification of the native coronary arteries.  Limited noncontrast imaging of the aorta and the origin of the great vessels is grossly unremarkable in appearance.  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  Main pulmonary artery is normal in caliber  No suspicious hilar or mediastinal adenopathy is noted. There is evidence of old granulomatous disease. The Limited imaging of the base of the neck and the esophagus are  grossly unremarkable  Lungs/pleura: There is a small right-sided pleural effusion.  Central airways are patent.  Partially calcified pleural plaques are again noted.  Partially calcified 4 mm nodule right middle lobe compatible granulomatous disease. Additional evidence of old granulomatous disease noted. Minimal groundglass and irregular opacities noted at the lung bases. No suspicious areas of consolidation noted. No significant overt pulmonary edema.  Soft Tissues/Bones: No destructive bone lesion.  Abdomen/Pelvis: Organs: Stones and sludge are noted within the gallbladder. There is mild stranding surrounding the gallbladder.  Limited noncontrast imaging of the liver, spleen, pancreas and right adrenal gland are unremarkable in appearance. Indication of the left stable compatible sequela prior. Low-attenuation lesion within the left kidney compatible with cysts. Perinephric stranding noted bilaterally.  GI/Bowel: There is a small hiatal hernia. The stomach is incompletely distended and otherwise grossly unremarkable on limited noncontrast imaging.  Small bowel demonstrates no acute abnormality  There is diverticulosis more prominently within the sigmoid colon. Stranding is noted within the mesentery and small amount of free fluid noted within the pelvis.  Pelvis: Urinary bladder is mildly thick-walled this is accentuated by incomplete distention.  Bilateral fat-containing inguinal hernias are noted. The prostate is mildly enlarged.   Peritoneum/Retroperitoneum: Atherosclerotic changes are noted of the aorta which is tortuous. No suspicious retroperitoneal adenopathy noted  Bones/Soft Tissues: Mild degree of subcutaneous edema is noted. Multilevel degenerative changes are noted of the spine      Impression: Chest: 1. Cardiomegaly without significant overt pulmonary edema 2. Small right-sided pleural effusion 3. Evidence of old granulomatous disease 4. Partially calcified pleural plaques similar to the prior study.   Abdomen and pelvis:  1. Small amount of ascites and fluid within the abdomen and pelvis limited evaluation for acute inflammatory changes. Please correlate for cardiac, liver or renal dysfunction 2. There is cholelithiasis and small amount of fluid surrounding the gallbladder which is mentioned above is indeterminate. If there is clinical concern for acute cholecystitis follow-up can always be considered with a nuclear medicine hepatobiliary scan 3. Diverticulosis is noted. Evaluation for diverticulitis is limited by ascites      This report was finalized on 1/3/2023 11:28 PM by Wayne West.      CT Chest Without Contrast Diagnostic    Result Date: 1/3/2023  CT ABDOMEN PELVIS WO CONTRAST-, CT CHEST WO CONTRAST DIAGNOSTIC-  Date of Exam: 1/3/2023 10:35 PM  Indication: nausea/ abdominal pain; I50.23-Acute on chronic systolic (congestive) heart failure.  Comparison: 10/14/2018  Technique: Contiguous axial CT images were obtained from the lung bases to the to the pubic symphysis without contrast.  Sagittal and coronal reconstructions were performed.  Automated exposure control and iterative reconstruction methods were used.     FINDINGS: Chest:  Mediastinum: Heart size is enlarged. No suspicious pericardial fluid collection noted.  Patient is status post median sternotomy and CABG. There is calcification of the native coronary arteries.  Limited noncontrast imaging of the aorta and the origin of the great vessels is grossly unremarkable in appearance.  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  Main pulmonary artery is normal in caliber  No suspicious hilar or mediastinal adenopathy is noted. There is evidence of old granulomatous disease. The Limited imaging of the base of the neck and the esophagus are grossly unremarkable  Lungs/pleura: There is a small right-sided pleural effusion.  Central airways are patent.  Partially calcified pleural plaques are again noted.  Partially calcified 4 mm nodule  right middle lobe compatible granulomatous disease. Additional evidence of old granulomatous disease noted. Minimal groundglass and irregular opacities noted at the lung bases. No suspicious areas of consolidation noted. No significant overt pulmonary edema.  Soft Tissues/Bones: No destructive bone lesion.  Abdomen/Pelvis: Organs: Stones and sludge are noted within the gallbladder. There is mild stranding surrounding the gallbladder.  Limited noncontrast imaging of the liver, spleen, pancreas and right adrenal gland are unremarkable in appearance. Indication of the left stable compatible sequela prior. Low-attenuation lesion within the left kidney compatible with cysts. Perinephric stranding noted bilaterally.  GI/Bowel: There is a small hiatal hernia. The stomach is incompletely distended and otherwise grossly unremarkable on limited noncontrast imaging.  Small bowel demonstrates no acute abnormality  There is diverticulosis more prominently within the sigmoid colon. Stranding is noted within the mesentery and small amount of free fluid noted within the pelvis.  Pelvis: Urinary bladder is mildly thick-walled this is accentuated by incomplete distention.  Bilateral fat-containing inguinal hernias are noted. The prostate is mildly enlarged.   Peritoneum/Retroperitoneum: Atherosclerotic changes are noted of the aorta which is tortuous. No suspicious retroperitoneal adenopathy noted  Bones/Soft Tissues: Mild degree of subcutaneous edema is noted. Multilevel degenerative changes are noted of the spine      Impression: Chest: 1. Cardiomegaly without significant overt pulmonary edema 2. Small right-sided pleural effusion 3. Evidence of old granulomatous disease 4. Partially calcified pleural plaques similar to the prior study.  Abdomen and pelvis:  1. Small amount of ascites and fluid within the abdomen and pelvis limited evaluation for acute inflammatory changes. Please correlate for cardiac, liver or renal dysfunction  2. There is cholelithiasis and small amount of fluid surrounding the gallbladder which is mentioned above is indeterminate. If there is clinical concern for acute cholecystitis follow-up can always be considered with a nuclear medicine hepatobiliary scan 3. Diverticulosis is noted. Evaluation for diverticulitis is limited by ascites      This report was finalized on 1/3/2023 11:28 PM by Wayne West.      XR Chest 1 View    Result Date: 1/3/2023  DATE OF EXAM: 1/3/2023 8:20 PM  PROCEDURE: XR CHEST 1 VW-  INDICATIONS: SOA triage protocol  COMPARISON: 12/28/2022  TECHNIQUE: Single radiographic AP view of the chest was obtained.  FINDINGS: Left subclavian triple lead pacer. Cardiomegaly. Prominence of the central pulmonary vasculature. Lungs clear. Slight blunting of both costophrenic angles      Impression: Cardiomegaly with pulmonary vascular congestion and trace pleural effusions  This report was finalized on 1/3/2023 8:52 PM by Santy Garcia.        Results for orders placed during the hospital encounter of 11/07/22    Adult Transthoracic Echo Complete W/ Cont if Necessary Per Protocol    Interpretation Summary  •  Left ventricular systolic function is severely decreased. Left ventricular ejection fraction appears to be less than 20%.  •  The left ventricular cavity is moderately dilated.  •  Left ventricular diastolic dysfunction is noted.  •  Left atrial volume is moderately increased.  •  Moderate mitral valve regurgitation is present.  •  The right ventricular cavity is moderately dilated.  •  Estimated right ventricular systolic pressure from tricuspid regurgitation is normal (<35 mmHg).  •  There is a large left pleural effusion.      Assessment & Plan   Assessment & Plan       Acute on chronic systolic congestive heart failure (HCC)    Coronary artery disease involving native coronary artery of native heart without angina pectoris    Atrial fib/flutter (not on AC 2* bleeding and patient preference)      Hyperlipidemia LDL goal <70    ELIE not on CPAP    Hypothyroidism    GERD (gastroesophageal reflux disease)    Stage 3 chronic kidney disease (HCC)    STORM (acute kidney injury) (HCC)    Elevated troponin    Elevated LFTs    Other ascites    83 y.o. male with PMH significant for A. 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    Acute on Chronic systolic heart failure   Ascites   -No longer on Entresto secondary to hypotension   -Currently on Torsemide 20mg daily  -Follows outpt with heart valve clinic and has had several medication adjustments/ extra diuresing over the past 2 months   -daily weight   -cardiology consult in am   -Lasix 80 mg given in ED, hold further diuresis pending cardiology eval in am   -CBC, BMP in am       STORM on CKD  -UA pending   -monitor closely given need for continued diuresis   -BMP in am     Elevated LFTs  -likely related to above  -CT abdomen/pelvis notes cholelithiasis without noted obstruction   -ultrasound liver in am   -acute hep panel negative     Elevated troponin   -likely NSTEMI type 2 secondary to renal function and acute CHF   -denies chest pain   -trend troponin  -trend EKG   -Cardiology consult in am     Nausea/cholelithaisis   -likely related to ascites/volume overload   -CT abd/pelvis did note cholelithiasis with gallbladder stranding   -ultrasound gallbladder in am, Consult Gen Surgery based on results.   -not a good surgical candidate   -consider hidascan     Sepsis  -started on Zosyn   -avoiding aggressive fluid boluses due to CHF exac.   -check UA, further eval foot and gall bladder as sources of sepsis.     Right 2nd Toe wound   -wound consult in am     Hypothyroid   -continue levothyroxine    Atrial  Fib/Flutter  -rate controlled   -unable to tolerate BB   -no anticoagulation secondary to GI bleeding     ELIE  -CPAP/BiPAP intolerant     CAD  Dyslipidemia   Hypotension   -continue daily ASA   -continue daily midodrine, uses PRN at home   -hold statin secondary to elevated LFTs  -continue plavix       DVT prophylaxis:  No mechanical secondary to lower extremity edema/heartfailure, no pharmacologics secondary to history of GI bleed with anticoagulation    CODE STATUS:    Code Status (Patient has no pulse and is not breathing): CPR (Attempt to Resuscitate)  Medical Interventions (Patient has pulse or is breathing): Full Support      Expected Discharge   3-4 days     This note has been completed as part of a split-shared workflow.     Signature: Electronically signed by DOMONIQUE Anthony, 01/04/23, 12:59 AM EST.          Attending   Admission Attestation       I have performed an independent face-to-face diagnostic evaluation including performing an independent physical examination as documented here.  The documented plan of care above was reviewed and developed with the advanced practice clinician (APC).      Brief Summary Statement:   Saul Sal is a 83 y.o. 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 Pt reports worsening SOA since PPM placement in November. He reprots he was previously on Lasix, however this was changed to torsemide. C/o increased Lower ext edema and abdominal distension since medication change.  He was noted on arrival to have acute on chronic CHF exac, ascites, and elevated trop. Also noted on CT to have cholelithiasis and noted to have elevated LFTs. Right upper quadrant ultrasound ordered. Pt was made NPO, however was drinking liquid until approx 5 am.   Lactic at admission was 5.8. no obvious source of  sepsis. Possibly secondary to wound on left foot, vs GB. Started on Zosyn. Will image left foot. Xray ordered. Will also check sed rate and CRP. Consider discussion regarding prognosis and consider palative consult due to multiple comorbities. Based on RUQ ultrasound findings, he may not be a candidate for any surgical intervention.     Remainder of detailed HPI is as noted by APC and has been reviewed and/or edited by me for completeness.    Attending Physical Exam:  Temp:  [97.8 °F (36.6 °C)-98.9 °F (37.2 °C)] 97.8 °F (36.6 °C)  Heart Rate:  [] 78  Resp:  [14-18] 14  BP: (102-113)/(76-92) 102/83    Constitutional: Awake, alert  Eyes: PERRLA, sclerae anicteric, no conjunctival injection  HENT: NCAT, mucous membranes moist  Neck: Supple, no thyromegaly, no lymphadenopathy, trachea midline  Respiratory: Clear to auscultation bilaterally, nonlabored respirations   Cardiovascular: irregular, Murmur noted , rubs, or gallops, decreased pulses.   Gastrointestinal: Positive bowel sounds, soft, distended with mild RUQ tenderness. Musculoskeletal: ++ edema, no clubbing or cyanosis to extremities  Psychiatric: Appropriate affect, cooperative  Neurologic: Oriented x 3, strength symmetric in all extremities, Cranial Nerves grossly intact to confrontation, speech clear  Skin: No rashes      Brief Assessment/Plan :  See detailed assessment and plan developed with APC which I have reviewed and/or edited for completeness.      June Olson,   01/04/23

## 2023-01-04 NOTE — PROGRESS NOTES
[facility] Medicine Services  ADMISSION FOLLOW-UP NOTE          Patient admitted after midnight, H&P by my partner performed earlier on today's date reviewed.  Interim findings, labs, and charting also reviewed.        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]  Yes   • Acute on chronic systolic congestive heart failure (HCC) [I50.23]  Yes   • Atrial fib/flutter (not on AC 2* bleeding and patient preference)  [I48.91, I48.92]  Yes   • Coronary artery disease involving native coronary artery of native heart without angina pectoris [I25.10]  Yes      Resolved Hospital Problems    Diagnosis Date Resolved POA   • Acute on chronic systolic congestive heart failure (HCC) [I50.23] 01/04/2023 Yes   • 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 He has been taking Torsemide daily as instructed. Workup in the ER consistent with decompensated CHF. Received 80 mg IV Lasix and admitted for further management.    ADDITIONAL PLAN:  --Cardiology consulted. Most recent Echo 11/8/22 shows EF <20%.  --He is on maximally tolerated GDMT and with CRT pacing. Meds limited due to hypotension, on midodrine as needed.   --Cardiology feels Palliative and Hospice consultations are indicated, they have been consulted.   --I do not think patient has sepsis. UA negative. Gallbladder ultrasound  pending for further evaluation of cholelithiasis. Lactic acidosis improved, can likely be attributed to severe heart failure. Will stop Zosyn today.    Neurology consult was placed in error on this patient. Discussed with Dr. Cooney.    Expected Discharge   Expected Discharge Date and Time     Expected Discharge Date Expected Discharge Time    Jan 7, 2023            Daisha Gould MD  01/04/23

## 2023-01-04 NOTE — CASE MANAGEMENT/SOCIAL WORK
Discharge Planning Assessment  [facility]     Patient Name: Saul Sal  MRN: [id number]  Today's Date: 1/4/2023    Admit Date: 1/3/2023    Plan: Home   Discharge Needs Assessment     Row Name 01/04/23 1159       Living Environment    People in Home spouse    Name(s) of People in Home Shelia ( spouse) [phone]    Current Living Arrangements home    Primary Care Provided by self    Provides Primary Care For no one, unable/limited ability to care for self    Family Caregiver if Needed spouse    Able to Return to Prior Arrangements yes       Resource/Environmental Concerns    Resource/Environmental Concerns none       Transition Planning    Patient/Family Anticipates Transition to home with family    Patient/Family Anticipated Services at Transition none    Transportation Anticipated family or friend will provide       Discharge Needs Assessment    Readmission Within the Last 30 Days no previous admission in last 30 days    Equipment Currently Used at Home walker, rolling;cane, straight    Concerns to be Addressed basic needs;discharge planning    Current Discharge Risk chronically ill               Discharge Plan     Row Name 01/04/23 1200       Plan    Plan Home    Plan Comments chart reviewed. I met with Mr Sal and wife at bedside to initiate d/c planning. He and wife live in a one level home in [facility]. He has a PCP and insurance coverage for RX meds. He was recently here in 11/22 and was d/cd home with [facility]. He is no longer current with them . He has access to a rolling walker and cane, he uses prn. He states he is independnet with his own ADLs, administers his on meds,etc. His plan is to return home, he is not interested in inpt rehab. Case mgt will follow progress and assist with any d/c needs    Final Discharge Disposition Code 01 - home or self-care              Continued Care and Services - Admitted Since 1/3/2023    Coordination has not been started for this encounter.      Selected Continued Care - Prior Encounters Includes continued care and service providers with selected services from prior encounters from 10/5/2022 to 1/4/2023    Discharged on 11/14/2022 Admission date: 11/7/2022 - Discharge disposition: Home-Health Care Svc    Home Medical Care     Service Provider Selected Services Address Phone Fax Patient Preferred    [facility] [address], [address] [address] [phone] -- --                    Expected Discharge Date and Time     Expected Discharge Date Expected Discharge Time    Jan 8, 2023          Demographic Summary     Row Name 01/04/23 1157       General Information    Admission Type inpatient    Arrived From home    Referral Source admission list    Reason for Consult discharge planning    Preferred Language English    General Information Comments PCP- Saul Campbell       Contact Information    Permission Granted to Share Info With ;family/designee               Functional Status     Row Name 01/04/23 1158       Functional Status    Usual Activity Tolerance moderate       Functional Status, IADL    Medications independent    Meal Preparation assistive person    Housekeeping assistive person    Laundry assistive person    Shopping independent       Mental Status    General Appearance WDL WDL       Mental Status Summary    Recent Changes in Mental Status/Cognitive Functioning no changes       Employment/    Employment Status retired    Employment/ Comments insurance- [employer] Medicare replacement               Psychosocial    No documentation.                Abuse/Neglect    No documentation.                Legal    No documentation.                Substance Abuse    No documentation.                Patient Forms    No documentation.                   Sonja C Kellerman, RN

## 2023-01-04 NOTE — PROGRESS NOTES
Confirmed medications with pharmacy and with patient/spouse at bedside.    - Patient states that he does not take clopidogrel 2/2 hx bleeding in the past. States he only takes ASA 81.     - Patient noted that he only takes atorvastatin three times weekly on MWF.    - Pt takes midodrine TID PRN for SBP <100     - Recent fill history indicated amiodarone and metoprolol, but patient states both have been discontinued in the last few weeks.     All meds on home list updated as appropriate. Kept clopidogrel on list but noted that pt is not taking as prescribed.     Sherin Portillo, PharmD  01/04/23  13:03 EST

## 2023-01-04 NOTE — ED PROVIDER NOTES
EMERGENCY DEPARTMENT ENCOUNTER    Pt Name: Saul Sal  MRN: [id number]  Pt :   1940  Room Number:  S378/1  Date of encounter:  1/3/2023  PCP: Saul Campbell MD  ED Provider: Bradford Valdez MD        HPI:  Progressive shortness of breath over 3 months with worsening this week causing him difficulty in sleeping, and functioning at his home.  He states symptoms have been present since November but have been worse this week.  He describes trouble eating as well, over the past month, with some abdominal pain after this or nausea and has been intolerant of adequate nutrition he states and does relate some element of weight loss due to this.  PAST MEDICAL HISTORY  Past Medical History:   Diagnosis Date   • Arthritis    • Atrial fibrillation (HCC)    • Atrial flutter (HCC)     s/p typical flutter ablation with recurrent atypical flutter   • BPH (benign prostatic hyperplasia)    • Coronary artery disease    • Diverticulosis    • GERD (gastroesophageal reflux disease)    • GI bleed    • HFrEF (heart failure with reduced ejection fraction) ([facility])    • Hyperlipidemia    • Hypothyroidism (acquired)    • Obesity (BMI 30-39.9)    • ELIE (obstructive sleep apnea)    • Osteoarthritis    • Shingles    • Skin cancer     squamous    • Stage 3 chronic kidney disease (HCC) 2022   • Wears eyeglasses     readers   • Wears hearing aid          PAST SURGICAL HISTORY  Past Surgical History:   Procedure Laterality Date   • ABLATION OF DYSRHYTHMIC FOCUS  2018   • ANGIOPLASTY     • CARDIAC CATHETERIZATION     • CARDIAC CATHETERIZATION N/A 2022    Procedure: Left Heart Cath;  Surgeon: Marco Urena MD;  Location:  [facility] CATH [facility]VASIVE [facility];  Service: Cardiovascular;  Laterality: N/A;   • CARDIAC ELECTROPHYSIOLOGY PROCEDURE N/A 2018    Procedure: Ablation atrial flutter;  Surgeon: Tino Sampson MD;  Location:  [facility] EP INVASIVE LOCATION;  Service: Cardiovascular   • CARDIAC ELECTROPHYSIOLOGY  PROCEDURE N/A 11/10/2022    Procedure: Pacemaker DC new;  Surgeon: Bruce Mera DO;  Location: [facility] INVASIVE LOCATION;  Service: Cardiology;  Laterality: N/A;   • CARDIAC SURGERY      BYPASS X5   • COLONOSCOPY     • COLONOSCOPY     • CORONARY ANGIOPLASTY     • CORONARY ARTERY BYPASS GRAFT  2003    x5 [facility]    • DENTAL PROCEDURE      dental surg and crowns   • EYE SURGERY Right     cataract   • SKIN BIOPSY     • VASECTOMY           FAMILY HISTORY  Family History   Problem Relation Age of Onset   • Diabetes Mother    • Heart disease Mother    • Coronary artery disease Other    • Stroke Other    • Diabetes Other    • Rheumatic fever Other    • Other Other         Valvular CV Disease         SOCIAL HISTORY  Social History     Socioeconomic History   • Marital status:    Tobacco Use   • Smoking status: Former     Packs/day: 1.00     Years: 8.00     Pack years: 8.00     Types: Cigarettes     Start date: 10/1/1956     Quit date: 1964     Years since quittin.1   • Smokeless tobacco: Never   Vaping Use   • Vaping Use: Never used   Substance and Sexual Activity   • Alcohol use: No   • Drug use: Never   • Sexual activity: Not Currently         ALLERGIES  Aspirin, Eliquis [apixaban], Amlodipine besylate, Atenolol, Empagliflozin, Metoprolol, and Milk-related compounds        REVIEW OF SYSTEMS  Review of Systems     No acute chest pain, he has had abdominal pain over weeks, with eating but not much in the emergency room.  See HPI.  All systems reviewed and negative except for those discussed in HPI.       PHYSICAL EXAM    I have reviewed the triage vital signs and nursing notes.    ED Triage Vitals   Temp Heart Rate Resp BP SpO2   -- 23 --    109 18 110/92       Temp src Heart Rate Source Patient Position BP Location FiO2 (%)   -- 23 --    Monitor Sitting Right arm        Physical Exam  GENERAL:    Appears alert and conversant.  HENT: Nares patent.  EYES: No scleral icterus.  CV: Regular rhythm, regular rate.  RESPIRATORY: Normal effort.  No audible wheezes, rales or rhonchi.  ABDOMEN: Soft, nontender bowel sounds are present, no peritonitis.  MUSCULOSKELETAL: No deformities.   NEURO: Alert, moves all extremities, follows commands.  SKIN: Warm, dry, no rash visualized.        LAB RESULTS  Recent Results (from the past 24 hour(s))   ECG 12 Lead ED Triage Standing Order; SOA    Collection Time: 01/03/23  8:15 PM   Result Value Ref Range    QT Interval 374 ms    QTC Interval 503 ms   Comprehensive Metabolic Panel    Collection Time: 01/03/23  8:45 PM    Specimen: Blood   Result Value Ref Range    Glucose 129 (H) 65 - 99 mg/dL    BUN 53 (H) 8 - 23 mg/dL    Creatinine 2.33 (H) 0.76 - 1.27 mg/dL    Sodium 138 136 - 145 mmol/L    Potassium 5.0 3.5 - 5.2 mmol/L    Chloride 98 98 - 107 mmol/L    CO2 19.0 (L) 22.0 - 29.0 mmol/L    Calcium 9.4 8.6 - 10.5 mg/dL    Total Protein 7.8 6.0 - 8.5 g/dL    Albumin 4.5 3.5 - 5.2 g/dL    ALT (SGPT) 119 (H) 1 - 41 U/L    AST (SGOT) 147 (H) 1 - 40 U/L    Alkaline Phosphatase 133 (H) 39 - 117 U/L    Total Bilirubin 1.6 (H) 0.0 - 1.2 mg/dL    Globulin 3.3 gm/dL    A/G Ratio 1.4 g/dL    BUN/Creatinine Ratio 22.7 7.0 - 25.0    Anion Gap 21.0 (H) 5.0 - 15.0 mmol/L    eGFR 27.1 (L) >60.0 mL/min/1.73   BNP    Collection Time: 01/03/23  8:45 PM    Specimen: Blood   Result Value Ref Range    proBNP 31,376.0 (H) 0.0 - 1,800.0 pg/mL   Troponin    Collection Time: 01/03/23  8:45 PM    Specimen: Blood   Result Value Ref Range    Troponin T 0.032 (C) 0.000 - 0.030 ng/mL   Green Top (Gel)    Collection Time: 01/03/23  8:45 PM   Result Value Ref Range    Extra Tube Hold for add-ons.    Lavender Top    Collection Time: 01/03/23  8:45 PM   Result Value Ref Range    Extra Tube hold for add-on    Gold Top - SST    Collection Time: 01/03/23  8:45 PM   Result Value Ref Range    Extra Tube Hold for  add-ons.    Gray Top    Collection Time: 01/03/23  8:45 PM   Result Value Ref Range    Extra Tube Hold for add-ons.    Light Blue Top    Collection Time: 01/03/23  8:45 PM   Result Value Ref Range    Extra Tube Hold for add-ons.    CBC Auto Differential    Collection Time: 01/03/23  8:45 PM    Specimen: Blood   Result Value Ref Range    WBC 6.98 3.40 - 10.80 10*3/mm3    RBC 5.07 4.14 - 5.80 10*6/mm3    Hemoglobin 14.8 13.0 - 17.7 g/dL    Hematocrit 48.3 37.5 - 51.0 %    MCV 95.3 79.0 - 97.0 fL    MCH 29.2 26.6 - 33.0 pg    MCHC 30.6 (L) 31.5 - 35.7 g/dL    RDW 20.5 (H) 12.3 - 15.4 %    RDW-SD 67.0 (H) 37.0 - 54.0 fl    MPV 11.9 6.0 - 12.0 fL    Platelets 144 140 - 450 10*3/mm3    Neutrophil % 66.6 42.7 - 76.0 %    Lymphocyte % 25.4 19.6 - 45.3 %    Monocyte % 6.6 5.0 - 12.0 %    Eosinophil % 0.6 0.3 - 6.2 %    Basophil % 0.4 0.0 - 1.5 %    Immature Grans % 0.4 0.0 - 0.5 %    Neutrophils, Absolute 4.65 1.70 - 7.00 10*3/mm3    Lymphocytes, Absolute 1.77 0.70 - 3.10 10*3/mm3    Monocytes, Absolute 0.46 0.10 - 0.90 10*3/mm3    Eosinophils, Absolute 0.04 0.00 - 0.40 10*3/mm3    Basophils, Absolute 0.03 0.00 - 0.20 10*3/mm3    Immature Grans, Absolute 0.03 0.00 - 0.05 10*3/mm3    nRBC 0.7 (H) 0.0 - 0.2 /100 WBC   Lipase    Collection Time: 01/03/23  8:45 PM    Specimen: Blood   Result Value Ref Range    Lipase 40 13 - 60 U/L   Hepatitis Panel, Acute    Collection Time: 01/03/23  8:45 PM    Specimen: Blood   Result Value Ref Range    Hepatitis B Surface Ag Non-Reactive Non-Reactive    Hep A IgM Non-Reactive Non-Reactive    Hep B C IgM Non-Reactive Non-Reactive    Hepatitis C Ab Non-Reactive Non-Reactive   Lactic Acid, Plasma    Collection Time: 01/03/23  8:45 PM    Specimen: Blood   Result Value Ref Range    Lactate 5.8 (C) 0.5 - 2.0 mmol/L   Respiratory Panel PCR w/COVID-19(SARS-CoV-2) [facility] In-House, NP Swab in [facility], 3-4 HR TAT - Swab, Nasopharynx    Collection Time: 01/03/23  9:43 PM     Specimen: Nasopharynx; Swab   Result Value Ref Range    ADENOVIRUS, PCR Not Detected Not Detected    Coronavirus 229E Not Detected Not Detected    Coronavirus HKU1 Not Detected Not Detected    Coronavirus NL63 Not Detected Not Detected    Coronavirus OC43 Not Detected Not Detected    COVID19 Not Detected Not Detected - Ref. Range    Human Metapneumovirus Not Detected Not Detected    Human Rhinovirus/Enterovirus Not Detected Not Detected    Influenza A PCR Not Detected Not Detected    Influenza B PCR Not Detected Not Detected    Parainfluenza Virus 1 Not Detected Not Detected    Parainfluenza Virus 2 Not Detected Not Detected    Parainfluenza Virus 3 Not Detected Not Detected    Parainfluenza Virus 4 Not Detected Not Detected    RSV, PCR Not Detected Not Detected    Bordetella pertussis pcr Not Detected Not Detected    Bordetella parapertussis PCR Not Detected Not Detected    Chlamydophila pneumoniae PCR Not Detected Not Detected    Mycoplasma pneumo by PCR Not Detected Not Detected   ECG 12 Lead Dyspnea    Collection Time: 01/04/23 12:18 AM   Result Value Ref Range    QT Interval 488 ms    QTC Interval 544 ms   STAT Lactic Acid, Reflex    Collection Time: 01/04/23 12:23 AM    Specimen: Blood   Result Value Ref Range    Lactate 2.4 (C) 0.5 - 2.0 mmol/L   Troponin    Collection Time: 01/04/23 12:23 AM    Specimen: Blood   Result Value Ref Range    Troponin T 0.023 0.000 - 0.030 ng/mL   Urinalysis With Culture If Indicated - Urine, Clean Catch    Collection Time: 01/04/23  1:05 AM    Specimen: Urine, Clean Catch   Result Value Ref Range    Color, UA Yellow Yellow, Straw    Appearance, UA Cloudy (A) Clear    pH, UA 5.5 5.0 - 8.0    Specific Gravity, UA 1.011 1.001 - 1.030    Glucose, UA Negative Negative    Ketones, UA Negative Negative    Bilirubin, UA Negative Negative    Blood, UA Negative Negative    Protein, UA 30 mg/dL (1+) (A) Negative    Leuk Esterase, UA Negative Negative    Nitrite, UA Negative Negative     Urobilinogen, UA 0.2 E.U./dL 0.2 - 1.0 E.U./dL   Urinalysis, Microscopic Only - Urine, Clean Catch    Collection Time: 01/04/23  1:05 AM    Specimen: Urine, Clean Catch   Result Value Ref Range    RBC, UA 3-6 (A) None Seen, 0-2 /HPF    WBC, UA 3-5 (A) None Seen, 0-2 /HPF    Bacteria, UA None Seen None Seen, Trace /HPF    Squamous Epithelial Cells, UA 3-6 (A) None Seen, 0-2 /HPF    Hyaline Casts, UA 7-12 0 - 6 /LPF    Methodology Manual Light Microscopy    Troponin    Collection Time: 01/04/23  2:05 AM    Specimen: Blood   Result Value Ref Range    Troponin T 0.035 (C) 0.000 - 0.030 ng/mL   STAT Lactic Acid, Reflex    Collection Time: 01/04/23  4:11 AM    Specimen: Blood   Result Value Ref Range    Lactate 2.2 (C) 0.5 - 2.0 mmol/L   Basic Metabolic Panel    Collection Time: 01/04/23  4:11 AM    Specimen: Blood   Result Value Ref Range    Glucose 97 65 - 99 mg/dL    BUN 57 (H) 8 - 23 mg/dL    Creatinine 2.26 (H) 0.76 - 1.27 mg/dL    Sodium 140 136 - 145 mmol/L    Potassium 4.3 3.5 - 5.2 mmol/L    Chloride 101 98 - 107 mmol/L    CO2 24.0 22.0 - 29.0 mmol/L    Calcium 8.8 8.6 - 10.5 mg/dL    BUN/Creatinine Ratio 25.2 (H) 7.0 - 25.0    Anion Gap 15.0 5.0 - 15.0 mmol/L    eGFR 27.9 (L) >60.0 mL/min/1.73   Lipid Panel    Collection Time: 01/04/23  4:11 AM    Specimen: Blood   Result Value Ref Range    Total Cholesterol 130 0 - 200 mg/dL    Triglycerides 56 0 - 150 mg/dL    HDL Cholesterol 28 (L) 40 - 60 mg/dL    LDL Cholesterol  90 0 - 100 mg/dL    VLDL Cholesterol 12 5 - 40 mg/dL    LDL/HDL Ratio 3.24    Hemoglobin A1c    Collection Time: 01/04/23  4:11 AM    Specimen: Blood   Result Value Ref Range    Hemoglobin A1C 5.90 (H) 4.80 - 5.60 %   Magnesium    Collection Time: 01/04/23  4:11 AM    Specimen: Blood   Result Value Ref Range    Magnesium 2.5 (H) 1.6 - 2.4 mg/dL   Phosphorus    Collection Time: 01/04/23  4:11 AM    Specimen: Blood   Result Value Ref Range    Phosphorus 4.6 (H) 2.5 - 4.5 mg/dL   CBC Auto Differential     Collection Time: 01/04/23  4:11 AM    Specimen: Blood   Result Value Ref Range    WBC 7.07 3.40 - 10.80 10*3/mm3    RBC 4.48 4.14 - 5.80 10*6/mm3    Hemoglobin 13.2 13.0 - 17.7 g/dL    Hematocrit 41.9 37.5 - 51.0 %    MCV 93.5 79.0 - 97.0 fL    MCH 29.5 26.6 - 33.0 pg    MCHC 31.5 31.5 - 35.7 g/dL    RDW 19.9 (H) 12.3 - 15.4 %    RDW-SD 64.7 (H) 37.0 - 54.0 fl    MPV 12.6 (H) 6.0 - 12.0 fL    Platelets 111 (L) 140 - 450 10*3/mm3    Neutrophil % 75.5 42.7 - 76.0 %    Lymphocyte % 14.7 (L) 19.6 - 45.3 %    Monocyte % 8.6 5.0 - 12.0 %    Eosinophil % 0.1 (L) 0.3 - 6.2 %    Basophil % 0.3 0.0 - 1.5 %    Immature Grans % 0.8 (H) 0.0 - 0.5 %    Neutrophils, Absolute 5.33 1.70 - 7.00 10*3/mm3    Lymphocytes, Absolute 1.04 0.70 - 3.10 10*3/mm3    Monocytes, Absolute 0.61 0.10 - 0.90 10*3/mm3    Eosinophils, Absolute 0.01 0.00 - 0.40 10*3/mm3    Basophils, Absolute 0.02 0.00 - 0.20 10*3/mm3    Immature Grans, Absolute 0.06 (H) 0.00 - 0.05 10*3/mm3    nRBC 0.3 (H) 0.0 - 0.2 /100 WBC   Troponin    Collection Time: 01/04/23  4:11 AM    Specimen: Blood   Result Value Ref Range    Troponin T 0.021 0.000 - 0.030 ng/mL   Sedimentation Rate    Collection Time: 01/04/23  4:11 AM    Specimen: Blood   Result Value Ref Range    Sed Rate <1 0 - 20 mm/hr   C-reactive Protein    Collection Time: 01/04/23  4:11 AM    Specimen: Blood   Result Value Ref Range    C-Reactive Protein 3.02 (H) 0.00 - 0.50 mg/dL   STAT Lactic Acid, Reflex    Collection Time: 01/04/23  8:49 AM    Specimen: Blood   Result Value Ref Range    Lactate 2.1 (C) 0.5 - 2.0 mmol/L   STAT Lactic Acid, Reflex    Collection Time: 01/04/23  1:48 PM    Specimen: Blood   Result Value Ref Range    Lactate 2.4 (C) 0.5 - 2.0 mmol/L         RADIOLOGY    PROCEDURES    Critical Care  Performed by: Bradford Valdez MD  Authorized by: Daisha Gould MD     Critical care provider statement:     Critical care time (minutes):  30    Critical care was necessary to treat or prevent  imminent or life-threatening deterioration of the following conditions:  Circulatory failure, cardiac failure, shock, sepsis and respiratory failure        ECG 12 Lead Dyspnea   Final Result   Test Reason : Dyspnea   Blood Pressure :   */*   mmHG   Vent. Rate :  75 BPM     Atrial Rate :  75 BPM      P-R Int : 118 ms          QRS Dur : 170 ms       QT Int : 488 ms       P-R-T Axes :  78 -45 -58 degrees      QTc Int : 544 ms      Atrial-sensed ventricular-paced rhythm   Abnormal ECG   When compared with ECG of 03-JAN-2023 20:15, (Unconfirmed)   Vent. rate has decreased BY  34 BPM   Confirmed by MD Urena Robert ([id number]) on 1/4/2023 10:35:42 AM      Referred By: ED MD           Confirmed By: Marco Urena MD      ECG 12 Lead ED Triage Standing Order; SOA   Final Result   Test Reason : ED Triage Standing Order~   Blood Pressure :   */*   mmHG   Vent. Rate : 109 BPM     Atrial Rate : 109 BPM      P-R Int : 192 ms          QRS Dur : 174 ms       QT Int : 374 ms       P-R-T Axes :   * 137 -46 degrees      QTc Int : 503 ms      Atrial-sensed ventricular-paced rhythm   Biventricular pacemaker detected   Abnormal ECG   When compared with ECG of 21-NOV-2022 12:54,   Vent. rate has increased BY  33 BPM   Confirmed by FADY KENNEDY ([id number]) on 1/4/2023 3:01:17 AM      Referred By: EDMD           Confirmed By: FADY KENNEDY          MEDICATIONS GIVEN IN ER    Medications   sodium chloride 0.9 % flush 10 mL (has no administration in time range)   aspirin EC tablet 81 mg (81 mg Oral Given 1/4/23 0952)   levothyroxine (SYNTHROID, LEVOTHROID) tablet 125 mcg (125 mcg Oral Not Given 1/4/23 3928)   potassium chloride (MICRO-K) CR capsule 20 mEq (20 mEq Oral Given 1/4/23 0952)   sodium chloride 0.9 % flush 10 mL (10 mL Intravenous Given 1/4/23 3181)   sodium chloride 0.9 % flush 10 mL (has no administration in time range)   sodium chloride 0.9 % infusion 40 mL (has no administration in time range)   Pharmacy Consult - MTM ( Does not  apply Canceled Entry 1/4/23 4109)   furosemide (LASIX) injection 80 mg (80 mg Intravenous Given 1/3/23 7535)   piperacillin-tazobactam (ZOSYN) 3.375 g in iso-osmotic dextrose 50 ml (premix) (3.375 g Intravenous New Bag 1/4/23 0217)         PROGRESS, DATA ANALYSIS, CONSULTS, AND MEDICAL DECISION MAKING  83-year-old male with a prior history of congestive heart failure, and heart disease, presenting to the emergency room with progressive shortness of breath orthopnea, as well as nutrition difficulties and trouble eating due to nausea, or pain.  Symptoms have been progressive.    Prior history of significant heart disease, with pacemaker placement concern over acute worsening of chronic cardiac conditions.  [place] diagnosis of acute coronary syndrome, acute CHF, heart and vascular disease, arterial insufficiency, cholecystitis.    Troponin measured and slightly elevated 0.035 interpreted as similar to prior levels, with renal insufficiency, proBNP however is 30,000, and elevated, renal function is slightly worse with creatinine of 2.3 slightly elevated from prior levels.  CBC indicates white blood cell count at 6, lactic acid is elevated at 2.2.         Prior history of CHF, cardiac disease, congestive heart failure, with worsening general function, nutrition, difficulty breathing, and abdominal pain.  Considered acute cholecystitis, hepatitis, however findings indicate likely CHF as etiology to issues, IV   Lasix 80 mg was given in the emergency room for this diagnosis.   Later  I did order a CT scan, to investigate for acute abdominal events, this shows some slight fluid around the gallbladder, and had ordered antibiotic coverage in the emergency room until a follow-up ultrasound could be obtained to evaluate for acute cholecystitis versus cholelithiasis with fluid due to CHF.      AS OF 19:41 EST VITALS:    BP - 104/76  HR - 70  TEMP - 98.1 °F (36.7 °C) (Oral)  O2 SATS - 95%                  DIAGNOSIS  Final  diagnoses:   Acute on chronic systolic congestive heart failure (HCC)   Coronary artery disease involving native coronary artery of native heart without angina pectoris   Gastroesophageal reflux disease with esophagitis without hemorrhage   STORM (acute kidney injury) (HCC)   Type 2 myocardial infarction due to heart failure (HCC)   Stage 3 chronic kidney disease, unspecified whether stage 3a or 3b CKD (HCC)   Nausea in adult         DISPOSITION  admit           Bradfrod Valdez MD  01/04/23 1941

## 2023-01-04 NOTE — NURSING NOTE
"WOC consult for right second toe.     Patient states he is not diabetic, has never been told he has poor blood flow to his feet, he has excellent pulses. He states his feet were swollen, causing his shoes to rub the toe \"raw\". He noticed skin peking off and pulled it off. This was a few days ago.    Today, toe has 2.5x1.5 and a dried scab. Good feeling in toe, no numbness or tingling. No purulence, erythema or swelling. Painted with betadine and left bottle of betadine for patient to take home to continue to cleanse wound. Instructions given to patient and wife about monitoring for further infection/worsening of wound. Advised to wear well fitting shoes, keep feet clean and dry and monitor feet for sores frequently.     Nursing to continue to paint wound daily with betadine.     Consult WOC for worsening of wound, we will sign off.    "

## 2023-01-04 NOTE — PLAN OF CARE
Goal Outcome Evaluation:                 Problem: Fall Injury Risk  Goal: Absence of Fall and Fall-Related Injury  Intervention: Identify and Manage Contributors  Recent Flowsheet Documentation  Taken 1/4/2023 0251 by Kameron Nobles RN  Medication Review/Management: medications reviewed  Intervention: Promote Injury-Free Environment  Recent Flowsheet Documentation  Taken 1/4/2023 0251 by Kameron Nobles, RN  Safety Promotion/Fall Prevention:   activity supervised   safety round/check completed   toileting scheduled   Admitted tonight with CHF and SOA, VSS, voids well, rested throughout the night, will continue to monitor for changes.

## 2023-01-04 NOTE — CONSULTS
Inpatient Cardiology Consult  Consult performed by: Librado Jones IV, MD  Consult ordered by: Tamanna Green APRN  Reason for consult: Acute on chronic systolic heart failure and hypotension            Cardiology Consult         Reason for consultation:  · CHF exacerbation    Requesting provider: Daisha Gould MD  Consulting provider: Alexsander Jones MD    IDENTIFICATION: Patient is an 83 year-old male resident of [place], [place]      Active Hospital Problems    Diagnosis  POA   • **Cardiogenic shock (HCC) [R57.0]  Yes     Priority: High   • STORM (acute kidney injury) (HCC) [N17.9]  Yes     Priority: Medium   • Acute on chronic systolic congestive heart failure (HCC) [I50.23]  Yes     Priority: Medium     · Echocardiogram (2/2019): LVEF 25%. Mild TR.  · Echocardiogram (12/1/2020): LVEF 20%.Moderate to severe MR. Moderate TR. Moderate pulmonary hypertension. Saline results negative.  · Echocardiogram (1/19/2022): LVEF 20%. Moderate pulmonic valve regurgitation is present. Mild MR.  · Echocardiogram (11/8/2022): LVEF < 20%.  Moderate MR.    · EP study and CRT pacemaker ([facility] [place]) by Dr. Mera for symptomatic VT, 11/10/2022  · [facility] admission for recurrent acute systolic heart failure/cardiogenic shock, 1/3/2023     • Coronary artery disease involving native coronary artery of native heart without angina pectoris [I25.10]  Yes     Priority: Medium     · CABGx5 2003  · Nuclear stress test (11/16/2018): medium-sized infarct located in the inferior wall and basal inferior lateral wall. LVEF 28%.   · Intolerant to beta-blocker, aspirin  · Cardiac cath for VT and syncope (11/8/2022): PCI to SVG of RPDA.  Widely patent LIMA sequential LAD and diagonal and patent SVG to OM1 and OM 2     • Nausea in adult [R11.0]  Yes   • Ulcer of right foot (HCC) [L97.519]  Yes   • Type 2 myocardial infarction due to heart failure (HCC) [I50.9, I21.A1]  Yes   • 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]  Yes   • Atrial fib/flutter  (not on AC 2* bleeding and patient preference)  [I48.91, I48.92]  Yes     · Typical flutter diagnosed 10/14/18  · Chadsvasc 5 (Age>75, HTN, CAD, CHF)  · Apixaban stopped due to bleeding  · Radiofrequency ablation of isthmus dependent atrial flutter by Tino Sampson, 11/26/2018  · 12-day Holter monitor (12/2018): 63% A. fib/flutter/SVT burden  · Echocardiogram, 11/8/2022: Left atrium 5.2 cm                Patient is an 83-year-old gentleman well-known to me.  The patient has history of coronary artery disease, heart failure with reduced ejection fraction (last EF <20%).  Most recently, he was admitted in November with syncope with suspected cardiac etiology.  He underwent CRT pacemaker and was started on amiodarone for ventricular tachycardia.  Cardiac catheterization at that time showed acceptable coronary and graft anatomy.    Since November, he has continued to decline.  He stopped taking amiodarone after several weeks as he was developing nausea.  Uunfortunately, since stopping the amiodarone he continues to have nausea and anorexia.  He states that the food he eats tastes good but after several bites he gets nauseous and vomits.  He has been experiencing worsening shortness of breath as well as abdominal bloating.  His blood pressure has been low and he has had to stop taking Entresto and has actually started midodrine to keep his blood pressure elevated.    He presented to the emergency room where he was found to have mild troponin elevation, elevated lactate, elevated LFTs, and acute kidney injury on chronic kidney disease.  He was admitted to the hospitalist service.        Allergies   Allergen Reactions   • Aspirin GI Bleeding   • Eliquis [Apixaban] Other (See Comments)     Excessive bleeding   • Amlodipine Besylate Unknown (See Comments)     Unknown     • Atenolol Unknown (See Comments)     Unknown       • Empagliflozin Other (See Comments)     Low BP   • Metoprolol Unknown (See Comments)     Unknown   •  Milk-Related Compounds Other (See Comments)     Headache       Prior to Admission medications    Medication Sig Start Date End Date Taking? Authorizing Provider   aspirin 81 MG EC tablet Take 81 mg by mouth Daily.    Vineet Zamora MD   atorvastatin (LIPITOR) 20 MG tablet Take 1 tablet by mouth Every Night.  Patient taking differently: Take 20 mg by mouth Every Other Day. 11/14/22   Librado Jones IV, MD   clopidogrel (PLAVIX) 75 MG tablet Take 1 tablet by mouth Daily.  Patient taking differently: Take 75 mg by mouth Every Other Day. 11/15/22   Librado Jones IV, MD   Cod Liver Oil 1000 MG capsule OTC Take 2 capsules in the morning and 1 capsule in the afternoon    Vineet Zamora MD   levothyroxine (SYNTHROID, LEVOTHROID) 125 MCG tablet TAKE 1 TABLET EVERY DAY 9/27/22   Saul Campbell MD   midodrine (PROAMATINE) 5 MG tablet Take 1 tablet by mouth 3 (Three) Times a Day Before Meals.  Patient taking differently: Take 5 mg by mouth 3 (Three) Times a Day Before Meals. For SBP <100 11/14/22   Librado Jones IV, MD   Misc Natural Products (Advanced Joint Relief) capsule Take 1 capsule by mouth Daily. OTC    Vineet Zamora MD   NON FORMULARY OTC- Cardio-Plus 2080 : Take 4 tablets in the morning and 5 tablets in the evening    Vineet Zamora MD   NON FORMULARY OTC- Cataplex B 1250mg: Take 3 tablets BID    Vineet Zamora MD   potassium chloride 10 MEQ CR tablet Take 2 tablets by mouth Daily. 11/21/22   Davis Mendoza APRN   torsemide (DEMADEX) 20 MG tablet Take 2 tablets for 2 days and then one tablet daily 12/28/22   Sharri Westbrook APRN   Turmeric 450 MG capsule Take 1 capsule by mouth Daily. OTC (Turmeric Forte)    Vineet Zamora MD       Past Medical History:   Diagnosis Date   • Arthritis    • Atrial fibrillation (HCC)    • Atrial flutter (HCC)     s/p typical flutter ablation with recurrent atypical flutter   • BPH (benign prostatic  hyperplasia)    • Coronary artery disease    • Diverticulosis    • GERD (gastroesophageal reflux disease)    • GI bleed    • HFrEF (heart failure with reduced ejection fraction) ([facility])    • Hyperlipidemia    • Hypothyroidism (acquired)    • Obesity (BMI 30-39.9)    • ELIE (obstructive sleep apnea)    • Osteoarthritis    • Shingles    • Skin cancer     squamous    • Stage 3 chronic kidney disease ([facility]) 12/21/2022   • Wears eyeglasses     readers   • Wears hearing aid        Past Surgical History:   Procedure Laterality Date   • ABLATION OF DYSRHYTHMIC FOCUS  12/2018   • ANGIOPLASTY  1985   • CARDIAC CATHETERIZATION     • CARDIAC CATHETERIZATION N/A 11/8/2022    Procedure: Left Heart Cath;  Surgeon: Marco Urena MD;  Location:  [facility];  Service: Cardiovascular;  Laterality: N/A;   • CARDIAC ELECTROPHYSIOLOGY PROCEDURE N/A 11/26/2018    Procedure: Ablation atrial flutter;  Surgeon: Tino Sampson MD;  Location:  [facility] EP INVASIVE [facility];  Service: Cardiovascular   • CARDIAC ELECTROPHYSIOLOGY PROCEDURE N/A 11/10/2022    Procedure: Pacemaker DC new;  Surgeon: Bruce Mera DO;  Location:  [facility] EP INVASIVE [facility];  Service: Cardiology;  Laterality: N/A;   • CARDIAC SURGERY      BYPASS X5   • COLONOSCOPY  2009   • COLONOSCOPY  2005   • CORONARY ANGIOPLASTY  1985   • [facility]  [facility]   • DENTAL PROCEDURE  2010    dental surg and crowns   • EYE SURGERY Right     cataract   • SKIN BIOPSY     • VASECTOMY         Family History   Problem Relation Age of Onset   • Diabetes Mother    • Heart disease Mother    • Coronary artery disease Other    • Stroke Other    • Diabetes Other    • Rheumatic fever Other    • Other Other         Valvular CV Disease       Social History     Tobacco Use   Smoking Status Former   • Packs/day: 1.00   • Years: 8.00   • Pack years: 8.00   • Types: Cigarettes   • Start date: 10/1/1956   • Quit date: 11/20/1964   • Years since  quittin.1   Smokeless Tobacco Never       Social History     Substance and Sexual Activity   Alcohol Use No         Review of Systems:   Review of Systems   Constitutional: Positive for malaise/fatigue.   Cardiovascular: Positive for dyspnea on exertion and orthopnea. Negative for chest pain.   Respiratory: Positive for shortness of breath.    Gastrointestinal: Positive for bloating.            Vital Sign Min/Max for last 24 hours  Temp  Min: 97.5 °F (36.4 °C)  Max: 98.9 °F (37.2 °C)   BP  Min: 100/61  Max: 113/80   Pulse  Min: 66  Max: 109   Resp  Min: 14  Max: 18   SpO2  Min: 92 %  Max: 100 %   No data recorded      Intake/Output Summary (Last 24 hours) at 2023 1715  Last data filed at 2023 1100  Gross per 24 hour   Intake 360 ml   Output 1200 ml   Net -840 ml           Tele: V paced    Vitals and nursing note reviewed.   Constitutional:       Appearance: Normal appearance. Not in distress.   Eyes:      General: Gaze aligned appropriately.      Conjunctiva/sclera: Conjunctivae normal.   HENT:      Head: Normocephalic and atraumatic.   Neck:      Vascular: JVD normal.   Pulmonary:      Effort: Pulmonary effort is normal.      Breath sounds: Wheezing present.   Chest:      Chest wall: Not tender to palpatation.   Cardiovascular:      PMI at left midclavicular line. Normal rate. Regular rhythm. Paced rhythm      Murmurs: There is a grade 2/6 systolic murmur.   Pulses:     Carotid: 2+ bilaterally.     Radial: 2+ bilaterally.     Femoral: 2+ bilaterally.     Dorsalis pedis: 2+ bilaterally.     Posterior tibial: 2+ bilaterally.  Edema:     Peripheral edema present.     Thigh: bilateral 1+ edema of the thigh.     Pretibial: bilateral 3+ edema of the pretibial area.     Ankle: bilateral 3+ edema of the ankle.     Feet: bilateral 3+ edema of the feet.  Abdominal:      General: There is no distension.      Palpations: Abdomen is soft.      Tenderness: There is no abdominal tenderness.   Musculoskeletal:       Cervical back: Normal range of motion.      Comments: Normal ROM in all 4 extremeities Skin:     General: Skin is warm and dry.      Comments: Healing wound on second toe   Neurological:      Mental Status: Alert and oriented to person, place and time.   Psychiatric:         Mood and Affect: Mood and affect normal.              DATA REVIEW:    EKG (1/4/2023):  Atrial sensed, v paced     ECHO (11/7/2022): EF < 20%, LV and RV cavity moderately dilated, moderate MR, large left pleural effusion      Results from last 7 days   Lab Units 01/04/23 0411 01/03/23 2045   SODIUM mmol/L 140 138   POTASSIUM mmol/L 4.3 5.0   CHLORIDE mmol/L 101 98   BUN mg/dL 57* 53*   CREATININE mg/dL 2.26* 2.33*   MAGNESIUM mg/dL 2.5*  --      Results from last 7 days   Lab Units 01/04/23  0411 01/04/23  0205 01/04/23  0023   TROPONIN T ng/mL 0.021 0.035* 0.023     Results from last 7 days   Lab Units 01/04/23 0411 01/03/23 2045   WBC 10*3/mm3 7.07 6.98   HEMOGLOBIN g/dL 13.2 14.8   HEMATOCRIT % 41.9 48.3   PLATELETS 10*3/mm3 111* 144     Lab Results   Component Value Date    HGBA1C 5.90 (H) 01/04/2023     Lab Results   Component Value Date    CHOL 130 01/04/2023    CHLPL 191 05/25/2022    TRIG 56 01/04/2023    HDL 28 (L) 01/04/2023    LDL 90 01/04/2023                    Lab Results   Component Value Date    TROPONINT 0.021 01/04/2023    TROPONINT 0.035 (C) 01/04/2023    TROPONINT 0.023 01/04/2023              Cardiogenic shock  · Patient is essentially ambulatory cardiogenic shock that has not improved with maximally tolerated GDMT or CRT pacing  · With age, comorbidities, and multiorgan system failure, patient deserves palliative consultation and end-of-life plan    Stage D HFrEF with acute exacerbation  · EF <20 by echo 11/7/2022  · Multiple medication adjustment by heart and valve   · Entresto discontinued due to hypotension  · Ascites and pulmonary vascular congestion    Type II myocardial infarction due to heart failure  · Mild  "troponin elevation in the setting of end-stage heart failure and renal failure  · Recent cardiac catheterization showed acceptable coronary and graft anatomy    Coronary artery disease  · C 11/2022 with PCI to SVG, adequately revascularized  · Continue DAPT    Valvular heart disease  · Moderate MR on Echo 11/7/2022    NSVT  · S/p EP study and CRT PPM implantation, 11/10/2022    Atrial fibrillation  · Not on anticoagulated due to GI bleed    Hypotension  · On Midodrine PRN    Elevated LFTs/Cholelithiasis/Nausea  · Elevated LFTs likely related to passive liver congestion due to CHF  · Gallbladder US pending  · Per primary team      I had a long and thoughtful conversation with the patient today regarding his poor prognosis given the entirety of his clinical picture.  He understands his clinical situation is not good and \"does not want to be a burden\" on his wife.           · Consult palliative care for hospice            Electronically signed by Librado Jones IV, MD, 01/04/23, 12:56 PM EST.        "

## 2023-01-04 NOTE — CONSULTS
Saul Campbell MD  Consulting physician: Robert    Chief Complaint   Patient presents with   • Shortness of Breath       Reason for consult: [facility]    HPI: Patient 83-year-old male brought to hospital due to increasing dyspnea as well as episodes of nausea and vomiting.  Patient states that he had a pacemaker placed in November initially was starting to get stronger but after a few weeks started to decline becoming progressively weaker.  States that he is now getting the point where eating is difficult as he has no appetite and when he does try to eat he does have some nausea and vomiting that goes along with attempting to eat.    Pain assesment: Denies    Dyspnea: Positive    N/V: Positive    PPS: 40      Past Medical History:   Diagnosis Date   • Abnormal ECG    • Arrhythmia    • Arthritis    • Atrial fibrillation ([facility])    • Atrial flutter ([facility])     s/p typical flutter ablation with recurrent atypical flutter   • Benign essential hypertension    • BPH (benign prostatic hyperplasia)    • CHF (congestive heart failure) ([facility])    • Clotting disorder ([facility]) too many aspirin   • Congenital heart disease    • Coronary artery disease    • Diverticulosis    • GERD (gastroesophageal reflux disease)    • GI bleed    • HFrEF (heart failure with reduced ejection fraction) ([facility])    • Hyperlipidemia    • Hypertension    • Hypothyroidism (acquired)    • Obesity (BMI 30-39.9)    • ELIE (obstructive sleep apnea)    • Osteoarthritis    • Shingles    • Skin cancer     squamous    • Stage 3 chronic kidney disease ([facility]) 12/21/2022   • Wears eyeglasses     readers   • Wears hearing aid      Past Surgical History:   Procedure Laterality Date   • ABLATION OF DYSRHYTHMIC FOCUS  12/2018   • ANGIOPLASTY  1985   • CARDIAC CATHETERIZATION     • CARDIAC CATHETERIZATION N/A 11/8/2022    Procedure: Left Heart Cath;  Surgeon: Marco Urena MD;  Location: [facility] CATH INVASIVE LOCATION;  Service: Cardiovascular;  Laterality: N/A;   • CARDIAC  ELECTROPHYSIOLOGY PROCEDURE N/A 11/26/2018    Procedure: Ablation atrial flutter;  Surgeon: Tino Sampson MD;  Location:  [facility] INVASIVE [facility];  Service: Cardiovascular   • CARDIAC ELECTROPHYSIOLOGY PROCEDURE N/A 11/10/2022    Procedure: Pacemaker DC new;  Surgeon: Bruce Mera DO;  Location:  [facility] INVASIVE LOCATION;  Service: Cardiology;  Laterality: N/A;   • CARDIAC SURGERY      BYPASS X5   • COLONOSCOPY  2009   • COLONOSCOPY  2005   • CORONARY ANGIOPLASTY  1985   • CORONARY ARTERY BYPASS GRAFT  2003    x5 [facility] 2003   • DENTAL PROCEDURE  2010    dental surg and crowns   • EYE SURGERY Right     cataract   • SKIN BIOPSY     • VASECTOMY       Current Code Status     Date Active Code Status Order ID Comments User Context       1/4/2023 0058 CPR (Attempt to Resuscitate) [id number]  Tamanna Green APRN ED      Question Answer    Code Status (Patient has no pulse and is not breathing) CPR (Attempt to Resuscitate)    Medical Interventions (Patient has pulse or is breathing) Full Support              Current Facility-Administered Medications   Medication Dose Route Frequency Provider Last Rate Last Admin   • aspirin EC tablet 81 mg  81 mg Oral Daily Tamanna Green APRN   81 mg at 01/04/23 0952   • clopidogrel (PLAVIX) tablet 75 mg  75 mg Oral Every Other Day Tamanna Green APRN   75 mg at 01/04/23 0952   • levothyroxine (SYNTHROID, LEVOTHROID) tablet 125 mcg  125 mcg Oral Q AM Tamanna Green APRN       • midodrine (PROAMATINE) tablet 5 mg  5 mg Oral TID AC Tamanna Green APRN   5 mg at 01/04/23 0952   • Pharmacy Consult - MTM   Does not apply BID Sherin Portillo RPH       • piperacillin-tazobactam (ZOSYN) 3.375 g in iso-osmotic dextrose 50 ml (premix)  3.375 g Intravenous Q8H June Olson DO   3.375 g at 01/04/23 0817   • potassium chloride (MICRO-K) CR capsule 20 mEq  20 mEq Oral Daily Tamanna Green APRN   20 mEq at 01/04/23 0952   • sodium chloride 0.9 % flush 10 mL  10 mL  "Intravenous PRN Bradford Valdez MD       • sodium chloride 0.9 % flush 10 mL  10 mL Intravenous Q12H Tamanna Green APRN   10 mL at 23 0809   • sodium chloride 0.9 % flush 10 mL  10 mL Intravenous PRN Tamanna Green APRN       • sodium chloride 0.9 % infusion 40 mL  40 mL Intravenous PRN Tamanna Green APRN            •  sodium chloride  •  sodium chloride  •  sodium chloride  Allergies   Allergen Reactions   • Aspirin GI Bleeding   • Eliquis [Apixaban] Other (See Comments)     Excessive bleeding   • Amlodipine Besylate Unknown (See Comments)     Unknown     • Atenolol Unknown (See Comments)     unknown   • Empagliflozin Other (See Comments)     Low BP   • Metoprolol Unknown (See Comments)     Unknown   • Milk-Related Compounds Other (See Comments)     Headache     Family History   Problem Relation Age of Onset   • Diabetes Mother    • Heart disease Mother    • Coronary artery disease Other    • Stroke Other    • Diabetes Other    • Rheumatic fever Other    • Other Other         Valvular CV Disease     Social History     Socioeconomic History   • Marital status:    Tobacco Use   • Smoking status: Former     Packs/day: 1.00     Years: 8.00     Pack years: 8.00     Types: Cigarettes     Start date: 10/1/1956     Quit date: 1964     Years since quittin.1   • Smokeless tobacco: Never   Vaping Use   • Vaping Use: Never used   Substance and Sexual Activity   • Alcohol use: No   • Drug use: Never   • Sexual activity: Not Currently     Review of Systems -all others reviewed and found negative except as mentioned in the HPI      /61 (BP Location: Right arm, Patient Position: Lying)   Pulse 71   Temp 97.5 °F (36.4 °C) (Oral)   Resp 16   Ht 182.9 cm (72\")   Wt 89.7 kg (197 lb 12.8 oz)   SpO2 92%   BMI 26.83 kg/m²     Intake/Output Summary (Last 24 hours) at 2023 1452  Last data filed at 2023 1100  Gross per 24 hour   Intake 360 ml   Output 1200 ml   Net -840 ml "     Physical Exam:      General Appearance:    Alert, cooperative, in no acute distress   Head:    Normocephalic, without obvious abnormality, atraumatic   Eyes:            Lids and lashes normal, conjunctivae and sclerae normal, no   icterus, no pallor, corneas clear, PERRLA   Ears:    Ears appear intact with no abnormalities noted   Throat:   No oral lesions, no thrush, oral mucosa moist   Neck:   No adenopathy, supple, trachea midline, no thyromegaly, no   carotid bruit, no JVD   Back:     No kyphosis present, no scoliosis present, no skin lesions,      erythema or scars, no tenderness to percussion or                   palpation,   range of motion normal   Lungs:     Clear to auscultation,respirations regular, even and                  unlabored    Heart:    Regular rhythm and normal rate, normal S1 and S2, no            murmur, no gallop, no rub, no click   Chest Wall:    No abnormalities observed   Abdomen:     Normal bowel sounds, no masses, no organomegaly, soft        non-tender, non-distended, no guarding, no rebound                tenderness   Rectal:     Deferred   Extremities:   Moves all extremities well, no edema, no cyanosis, no             redness   Pulses:   Pulses palpable and equal bilaterally   Skin:   No bleeding, bruising or rash   Lymph nodes:   No palpable adenopathy   Neurologic:   Cranial nerves 2 - 12 grossly intact, sensation intact, DTR       present and equal bilaterally       Results from last 7 days   Lab Units 01/04/23  0411   WBC 10*3/mm3 7.07   HEMOGLOBIN g/dL 13.2   HEMATOCRIT % 41.9   PLATELETS 10*3/mm3 111*     Results from last 7 days   Lab Units 01/04/23  0411 01/03/23  2045   SODIUM mmol/L 140 138   POTASSIUM mmol/L 4.3 5.0   CHLORIDE mmol/L 101 98   CO2 mmol/L 24.0 19.0*   BUN mg/dL 57* 53*   CREATININE mg/dL 2.26* 2.33*   CALCIUM mg/dL 8.8 9.4   BILIRUBIN mg/dL  --  1.6*   ALK PHOS U/L  --  133*   ALT (SGPT) U/L  --  119*   AST (SGOT) U/L  --  147*   GLUCOSE mg/dL 97 129*      Results from last 7 days   Lab Units 01/04/23  0411   SODIUM mmol/L 140   POTASSIUM mmol/L 4.3   CHLORIDE mmol/L 101   CO2 mmol/L 24.0   BUN mg/dL 57*   CREATININE mg/dL 2.26*   GLUCOSE mg/dL 97   CALCIUM mg/dL 8.8     Imaging Results (Last 72 Hours)     Procedure Component Value Units Date/Time    XR Foot 3+ View Right [[id number]] Collected: 01/04/23 0824     Updated: 01/04/23 1450    Narrative:      XR FOOT 3+ VW RIGHT    Date of Exam: 1/4/2023 6:33 AM EST    Indication: wound on left foot.    Comparison: None available.    Findings:  No acute fracture is identified. There is a marginal erosion along the first metatarsal head. Mild to moderate degenerative changes are present along the metatarsophalangeal and interphalangeal joints. There are tiny enthesophytes along the posterior and   inferior calcaneus. Mild degenerative changes are present along the dorsal midfoot. There is soft tissue swelling along the forefoot.      Impression:      Impression:  1.Degenerative changes as described above.  2.Marginal erosion along first metatarsal head. This could be degenerative such as in inflammatory arthritis, although if there is acute infection in this vicinity osteomyelitis cannot be excluded.  3.Nonspecific soft tissue swelling along forefoot.    Electronically Signed: Antonino Alexis    1/4/2023 8:27 AM EST    Workstation ID: [id number]    US Abdomen Limited [[id number]] Resulted: 01/04/23 0919     Updated: 01/04/23 0920    CT Abdomen Pelvis Without Contrast [[id number]] Collected: 01/03/23 2311     Updated: 01/03/23 2331    Narrative:      CT ABDOMEN PELVIS WO CONTRAST-, CT CHEST WO CONTRAST DIAGNOSTIC-     Date of Exam: 1/3/2023 10:35 PM     Indication: nausea/ abdominal pain; I50.23-Acute on chronic systolic  (congestive) heart failure.     Comparison: 10/14/2018     Technique: Contiguous axial CT images were obtained from the lung bases  to the to the pubic symphysis without contrast.  Sagittal and  coronal  reconstructions were performed.  Automated exposure control and  iterative reconstruction methods were used.             FINDINGS:  Chest:     Mediastinum: Heart size is enlarged. No suspicious pericardial fluid  collection noted.     Patient is status post median sternotomy and CABG. There is  calcification of the native coronary arteries.     Limited noncontrast imaging of the aorta and the origin of the great  vessels is grossly unremarkable in appearance.     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     Main pulmonary artery is normal in caliber     No suspicious hilar or mediastinal adenopathy is noted. There is  evidence of old granulomatous disease. The Limited imaging of the base  of the neck and the esophagus are grossly unremarkable     Lungs/pleura: There is a small right-sided pleural effusion.     Central airways are patent.     Partially calcified pleural plaques are again noted.     Partially calcified 4 mm nodule right middle lobe compatible  granulomatous disease. Additional evidence of old granulomatous disease  noted. Minimal groundglass and irregular opacities noted at the lung  bases. No suspicious areas of consolidation noted. No significant overt  pulmonary edema.     Soft Tissues/Bones: No destructive bone lesion.     Abdomen/Pelvis:  Organs: Stones and sludge are noted within the gallbladder. There is  mild stranding surrounding the gallbladder.     Limited noncontrast imaging of the liver, spleen, pancreas and right  adrenal gland are unremarkable in appearance. Indication of the left  stable compatible sequela prior. Low-attenuation lesion within the left  kidney compatible with cysts. Perinephric stranding noted bilaterally.      GI/Bowel: There is a small hiatal hernia. The stomach is incompletely  distended and otherwise grossly unremarkable on limited noncontrast  imaging.     Small bowel demonstrates no acute abnormality     There is diverticulosis more  prominently within the sigmoid colon.  Stranding is noted within the mesentery and small amount of free fluid  noted within the pelvis.      Pelvis: Urinary bladder is mildly thick-walled this is accentuated by  incomplete distention.     Bilateral fat-containing inguinal hernias are noted. The prostate is  mildly enlarged.        Peritoneum/Retroperitoneum: Atherosclerotic changes are noted of the  aorta which is tortuous. No suspicious retroperitoneal adenopathy noted     Bones/Soft Tissues: Mild degree of subcutaneous edema is noted.  Multilevel degenerative changes are noted of the spine       Impression:      Chest:  1. Cardiomegaly without significant overt pulmonary edema  2. Small right-sided pleural effusion  3. Evidence of old granulomatous disease  4. Partially calcified pleural plaques similar to the prior study.     Abdomen and pelvis:     1. Small amount of ascites and fluid within the abdomen and pelvis  limited evaluation for acute inflammatory changes. Please correlate for  cardiac, liver or renal dysfunction  2. There is cholelithiasis and small amount of fluid surrounding the  gallbladder which is mentioned above is indeterminate. If there is  clinical concern for acute cholecystitis follow-up can always be  considered with a nuclear medicine hepatobiliary scan  3. Diverticulosis is noted. Evaluation for diverticulitis is limited by  ascites                 This report was finalized on 1/3/2023 11:28 PM by Wayne West.       CT Chest Without Contrast Diagnostic [[id number]] Collected: 01/03/23 2311     Updated: 01/03/23 2331    Narrative:      CT ABDOMEN PELVIS WO CONTRAST-, CT CHEST WO CONTRAST DIAGNOSTIC-     Date of Exam: 1/3/2023 10:35 PM     Indication: nausea/ abdominal pain; I50.23-Acute on chronic systolic  (congestive) heart failure.     Comparison: 10/14/2018     Technique: Contiguous axial CT images were obtained from the lung bases  to the to the pubic symphysis without contrast.   Sagittal and coronal  reconstructions were performed.  Automated exposure control and  iterative reconstruction methods were used.             FINDINGS:  Chest:     Mediastinum: Heart size is enlarged. No suspicious pericardial fluid  collection noted.     Patient is status post median sternotomy and CABG. There is  calcification of the native coronary arteries.     Limited noncontrast imaging of the aorta and the origin of the great  vessels is grossly unremarkable in appearance.     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     Main pulmonary artery is normal in caliber     No suspicious hilar or mediastinal adenopathy is noted. There is  evidence of old granulomatous disease. The Limited imaging of the base  of the neck and the esophagus are grossly unremarkable     Lungs/pleura: There is a small right-sided pleural effusion.     Central airways are patent.     Partially calcified pleural plaques are again noted.     Partially calcified 4 mm nodule right middle lobe compatible  granulomatous disease. Additional evidence of old granulomatous disease  noted. Minimal groundglass and irregular opacities noted at the lung  bases. No suspicious areas of consolidation noted. No significant overt  pulmonary edema.     Soft Tissues/Bones: No destructive bone lesion.     Abdomen/Pelvis:  Organs: Stones and sludge are noted within the gallbladder. There is  mild stranding surrounding the gallbladder.     Limited noncontrast imaging of the liver, spleen, pancreas and right  adrenal gland are unremarkable in appearance. Indication of the left  stable compatible sequela prior. Low-attenuation lesion within the left  kidney compatible with cysts. Perinephric stranding noted bilaterally.      GI/Bowel: There is a small hiatal hernia. The stomach is incompletely  distended and otherwise grossly unremarkable on limited noncontrast  imaging.     Small bowel demonstrates no acute abnormality     There is diverticulosis  more prominently within the sigmoid colon.  Stranding is noted within the mesentery and small amount of free fluid  noted within the pelvis.      Pelvis: Urinary bladder is mildly thick-walled this is accentuated by  incomplete distention.     Bilateral fat-containing inguinal hernias are noted. The prostate is  mildly enlarged.        Peritoneum/Retroperitoneum: Atherosclerotic changes are noted of the  aorta which is tortuous. No suspicious retroperitoneal adenopathy noted     Bones/Soft Tissues: Mild degree of subcutaneous edema is noted.  Multilevel degenerative changes are noted of the spine       Impression:      Chest:  1. Cardiomegaly without significant overt pulmonary edema  2. Small right-sided pleural effusion  3. Evidence of old granulomatous disease  4. Partially calcified pleural plaques similar to the prior study.     Abdomen and pelvis:     1. Small amount of ascites and fluid within the abdomen and pelvis  limited evaluation for acute inflammatory changes. Please correlate for  cardiac, liver or renal dysfunction  2. There is cholelithiasis and small amount of fluid surrounding the  gallbladder which is mentioned above is indeterminate. If there is  clinical concern for acute cholecystitis follow-up can always be  considered with a nuclear medicine hepatobiliary scan  3. Diverticulosis is noted. Evaluation for diverticulitis is limited by  ascites                 This report was finalized on 1/3/2023 11:28 PM by Wayne West.       XR Chest 1 View [[id number]] Collected: 01/03/23 2050     Updated: 01/03/23 2055    Narrative:      DATE OF EXAM: 1/3/2023 8:20 PM     PROCEDURE: XR CHEST 1 VW-     INDICATIONS: SOA triage protocol     COMPARISON: 12/28/2022     TECHNIQUE: Single radiographic AP view of the chest was obtained.     FINDINGS:  Left subclavian triple lead pacer. Cardiomegaly. Prominence of the  central pulmonary vasculature. Lungs clear. Slight blunting of both  costophrenic angles         Impression:      Cardiomegaly with pulmonary vascular congestion and trace pleural  effusions     This report was finalized on 1/3/2023 8:52 PM by Santy Garcia.           Impression: CHF  Dyspnea  Nausea/vomiting  Debility  Restless leg syndrome  Goals of care  Plan: Did try to talk to the patient about overall goals of care, however states that he does not want know what his options are.  Patient was just admitted to the hospital today and is currently being worked up for his problems so we will plan on touching base with the hospitalist before attempting to have any other conversation with the patient or the patient's wife.  Patient will remain a full code for the time being.    Time: More than 50% of time spent in counseling and coordination of care:  Total face-to-face/floor time 40 min.  Time spent in counseling 25 min. Counseling included the following topics: GOC/Sx mgmnt    Sabino Garber DO  01/04/23  14:52 EST

## 2023-01-05 PROBLEM — Z95.0 CARDIAC RESYNCHRONIZATION THERAPY PACEMAKER (CRT-P) IN PLACE: Status: ACTIVE | Noted: 2023-01-05

## 2023-01-05 PROBLEM — I24.8 DEMAND ISCHEMIA: Status: ACTIVE | Noted: 2019-07-09

## 2023-01-05 PROBLEM — I24.89 DEMAND ISCHEMIA: Status: ACTIVE | Noted: 2019-07-09

## 2023-01-05 LAB
ANION GAP SERPL CALCULATED.3IONS-SCNC: 16 MMOL/L (ref 5–15)
BUN SERPL-MCNC: 53 MG/DL (ref 8–23)
BUN/CREAT SERPL: 27.5 (ref 7–25)
CALCIUM SPEC-SCNC: 8.4 MG/DL (ref 8.6–10.5)
CHLORIDE SERPL-SCNC: 103 MMOL/L (ref 98–107)
CO2 SERPL-SCNC: 21 MMOL/L (ref 22–29)
CREAT SERPL-MCNC: 1.93 MG/DL (ref 0.76–1.27)
EGFRCR SERPLBLD CKD-EPI 2021: 33.7 ML/MIN/1.73
GLUCOSE SERPL-MCNC: 81 MG/DL (ref 65–99)
POTASSIUM SERPL-SCNC: 4 MMOL/L (ref 3.5–5.2)
SODIUM SERPL-SCNC: 140 MMOL/L (ref 136–145)

## 2023-01-05 PROCEDURE — G0378 HOSPITAL OBSERVATION PER HR: HCPCS

## 2023-01-05 PROCEDURE — 97161 PT EVAL LOW COMPLEX 20 MIN: CPT

## 2023-01-05 PROCEDURE — 80048 BASIC METABOLIC PNL TOTAL CA: CPT | Performed by: HOSPITALIST

## 2023-01-05 PROCEDURE — 25010000002 PROCHLORPERAZINE 10 MG/2ML SOLUTION: Performed by: FAMILY MEDICINE

## 2023-01-05 PROCEDURE — 97110 THERAPEUTIC EXERCISES: CPT

## 2023-01-05 PROCEDURE — 96375 TX/PRO/DX INJ NEW DRUG ADDON: CPT

## 2023-01-05 PROCEDURE — 99214 OFFICE O/P EST MOD 30 MIN: CPT | Performed by: NURSE PRACTITIONER

## 2023-01-05 PROCEDURE — 99232 SBSQ HOSP IP/OBS MODERATE 35: CPT | Performed by: FAMILY MEDICINE

## 2023-01-05 RX ORDER — PROCHLORPERAZINE EDISYLATE 5 MG/ML
5 INJECTION INTRAMUSCULAR; INTRAVENOUS EVERY 8 HOURS
Status: DISCONTINUED | OUTPATIENT
Start: 2023-01-05 | End: 2023-01-09 | Stop reason: HOSPADM

## 2023-01-05 RX ORDER — MORPHINE SULFATE 20 MG/ML
5 SOLUTION ORAL
Status: DISCONTINUED | OUTPATIENT
Start: 2023-01-05 | End: 2023-01-06

## 2023-01-05 RX ORDER — CLOPIDOGREL BISULFATE 75 MG/1
75 TABLET ORAL DAILY
Status: DISCONTINUED | OUTPATIENT
Start: 2023-01-05 | End: 2023-01-09 | Stop reason: HOSPADM

## 2023-01-05 RX ADMIN — POTASSIUM CHLORIDE 20 MEQ: 750 CAPSULE, EXTENDED RELEASE ORAL at 09:38

## 2023-01-05 RX ADMIN — PROCHLORPERAZINE EDISYLATE 5 MG: 5 INJECTION INTRAMUSCULAR; INTRAVENOUS at 16:01

## 2023-01-05 RX ADMIN — ASPIRIN 81 MG: 81 TABLET, COATED ORAL at 09:38

## 2023-01-05 RX ADMIN — CLOPIDOGREL BISULFATE 75 MG: 75 TABLET ORAL at 12:01

## 2023-01-05 RX ADMIN — LEVOTHYROXINE SODIUM 125 MCG: 125 TABLET ORAL at 05:30

## 2023-01-05 RX ADMIN — MORPHINE SULFATE 5 MG: 20 SOLUTION ORAL at 20:50

## 2023-01-05 RX ADMIN — MORPHINE SULFATE 5 MG: 20 SOLUTION ORAL at 16:17

## 2023-01-05 NOTE — PLAN OF CARE
Goal Outcome Evaluation:                 Problem: Fall Injury Risk  Goal: Absence of Fall and Fall-Related Injury  Intervention: Identify and Manage Contributors  Recent Flowsheet Documentation  Taken 1/5/2023 0200 by Kameron Nobles RN  Medication Review/Management: medications reviewed  Taken 1/5/2023 0000 by Kameron Nobles RN  Medication Review/Management: medications reviewed  Taken 1/4/2023 2200 by Kameron Nobles RN  Medication Review/Management: medications reviewed  Taken 1/4/2023 2000 by Kameron Nobles RN  Medication Review/Management: medications reviewed  Intervention: Promote Injury-Free Environment  Recent Flowsheet Documentation  Taken 1/5/2023 0200 by Kameron Nobles RN  Safety Promotion/Fall Prevention:   activity supervised   safety round/check completed   toileting scheduled  Taken 1/5/2023 0000 by Kameron Nobles RN  Safety Promotion/Fall Prevention:   activity supervised   safety round/check completed   toileting scheduled  Taken 1/4/2023 2200 by Kameron Nobles RN  Safety Promotion/Fall Prevention:   activity supervised   safety round/check completed   toileting scheduled  Taken 1/4/2023 2000 by Kameron Nobles RN  Safety Promotion/Fall Prevention:   activity supervised   safety round/check completed   toileting scheduled     VSs, voids well, rested throughout the night, pain managed with PRN medications, will continue to monitor for changes.

## 2023-01-05 NOTE — PROGRESS NOTES
Cardiology Progress Note      Reason for visit:    · Acute CHF exacerbation    IDENTIFICATION: 83-year-old gentleman who resides in [place], [place]    Active Hospital Problems    Diagnosis  POA   • **Cardiogenic shock (HCC) [R57.0]  Yes     Priority: High   • STORM (acute kidney injury) (HCC) [N17.9]  Yes     Priority: High   • Type 2 myocardial infarction due to heart failure (HCC) [I50.9, I21.A1]  Yes     Priority: High   • Stage 3 chronic kidney disease (HCC) [N18.30]  Yes     Priority: High   • Acute on chronic systolic congestive heart failure (HCC) [I50.23]  Yes     Priority: High     · Echocardiogram (2/2019): LVEF 25%. Mild TR.  · Echocardiogram (12/1/2020): LVEF 20%.Moderate to severe MR. Moderate TR. Moderate pulmonary hypertension. Saline results negative.  · Echocardiogram (1/19/2022): LVEF 20%. Moderate pulmonic valve regurgitation is present. Mild MR.  · Echocardiogram (11/8/2022): LVEF < 20%.  Moderate MR.    · EP study and CRT pacemaker (BSC) by Dr. Mera for symptomatic VT, 11/10/2022  · [facility] admission for recurrent acute systolic heart failure/cardiogenic shock, 1/3/2023     • Atrial fib/flutter (not on AC 2* bleeding and patient preference)  [I48.91, I48.92]  Yes     Priority: High     · Typical flutter diagnosed 10/14/18  · Chadsvasc 5 (Age>75, HTN, CAD, CHF)  · Apixaban stopped due to bleeding  · Radiofrequency ablation of isthmus dependent atrial flutter by Tino Sampson, 11/26/2018  · 12-day Holter monitor (12/2018): 63% A. fib/flutter/SVT burden  · Echocardiogram, 11/8/2022: Left atrium 5.2 cm     • Coronary artery disease involving native coronary artery of native heart without angina pectoris [I25.10]  Yes     Priority: High     · CABGx5 2003  · Nuclear stress test (11/16/2018): medium-sized infarct located in the inferior wall and basal inferior lateral wall. LVEF 28%.   · Intolerant to beta-blocker, aspirin  · Cardiac cath for VT and syncope (11/8/2022): PCI to SVG of RPDA.   Widely patent LIMA sequential LAD and diagonal and patent SVG to OM1 and OM 2     • 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 [Z95.0]  Yes     Priority: Medium     · CRT pacemaker placed by Dr. Mera 11/2022     • Ulcer of right foot (HCC) [L97.519]  Yes     Priority: Medium   • Hyperlipidemia LDL goal <70 [E78.5]  Yes     Priority: Medium     · Moderate intensity statin therapy indicated     • Nausea in adult [R11.0]  Yes     Priority: Low   • 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]  Yes            Palliative care was consulted yesterday but according to their notes the patient was not receptive to discussing his goals of care at present time.  His wife is currently at bedside.  He is sitting up in the chair on room air with O2 saturations 95%.  He denies any chest pain and thinks his breathing is a little better than when he was admitted.  His main complaints is of extreme weakness, low blood pressures and nausea with abdominal bloating and inability to eat.           Vital Sign Min/Max for last 24 hours  Temp  Min: 97.3 °F (36.3 °C)  Max: 98.1 °F (36.7 °C)   BP  Min: 94/60  Max: 109/76   Pulse  Min: 60  Max: 71   Resp  Min: 16  Max: 17   SpO2  Min: 80 %  Max: 96 %   No data recorded      Intake/Output Summary (Last 24 hours) at 1/5/2023 0845  Last data filed at 1/5/2023 0712  Gross per 24 hour   Intake 1080 ml   Output 800 ml   Net 280 ml           Physical Exam  Constitutional:       General: He is awake.      Appearance: He is ill-appearing.   Cardiovascular:      Rate and Rhythm: Normal rate.      Pulses: Normal pulses.           Carotid pulses are 2+ on the right side and 2+ on the left side.       Radial pulses are 2+ on the right side and 2+ on the left side.        Femoral pulses are 2+ on the right side and 2+ on the left side.       Popliteal pulses are 2+ on the right side and 2+ on the left side.        Dorsalis pedis pulses are 2+ on the right side and 2+ on the left side.         Posterior tibial pulses are 2+ on the right side and 2+ on the left side.      Heart sounds: Murmur heard.    Systolic murmur is present with a grade of 2/6.     Comments: Paced  Abdominal:      General: There is distension.   Musculoskeletal:      Right lower le+ Pitting Edema present.      Left lower le+ Pitting Edema present.   Skin:     General: Skin is warm and dry.   Neurological:      Mental Status: He is alert and oriented to person, place, and time.   Psychiatric:         Behavior: Behavior is cooperative.         Tele: Ventricular paced    Results Review (reviewed the patient's recent labs in the electronic medical record):     EKG (2023): Ventricular paced    CT of the chest without contrast (1/3/2023): Cardiomegaly without significant overt pulmonary edema.  Small right-sided pleural effusion.  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  Partially calcified pleural plaques similar to prior study.  Small amount of ascites and fluid within the abdomen and pelvis.  Diverticulosis noted    ECHO (2022): LVEF less than 20%.  Moderate MR.  Large left pleural effusion    Results from last 7 days   Lab Units 23  0520 23  04123   SODIUM mmol/L 140 140 138   POTASSIUM mmol/L 4.0 4.3 5.0   CHLORIDE mmol/L 103 101 98   BUN mg/dL 53* 57* 53*   CREATININE mg/dL 1.93* 2.26* 2.33*   MAGNESIUM mg/dL  --  2.5*  --      Results from last 7 days   Lab Units 23  0411 23  0205 23  0023   TROPONIN T ng/mL 0.021 0.035* 0.023     Results from last 7 days   Lab Units 23  0411 23  204   WBC 10*3/mm3 7.07 6.98   HEMOGLOBIN g/dL 13.2 14.8   HEMATOCRIT % 41.9 48.3   PLATELETS 10*3/mm3 111* 144       Lab Results   Component Value Date    HGBA1C 5.90 (H) 2023       Lab Results   Component Value Date    CHOL 130 2023    CHLPL 191 2022    TRIG 56 2023    HDL 28 (L) 2023    LDL 90 2023                   Cardiogenic shock  • Patient is essentially  ambulatory cardiogenic shock that has not improved with maximally tolerated GDMT or CRT pacing  • With age, comorbidities, and multiorgan system failure, patient deserves palliative consultation and end-of-life plan  • Palliative care following     Stage D HFrEF with acute exacerbation  • EF <20 by echo 11/7/2022  • Multiple medication adjustments by heart and valve clinic  • Entresto discontinued due to hypotension  • Ascites and pulmonary vascular congestion  • proBNP 31,376  • Unable to tolerate diuretics due to hypotension     Type II myocardial infarction due to heart failure/demand ischemia  • Mild troponin elevation in the setting of end-stage heart failure and renal failure  • Recent cardiac catheterization showed acceptable coronary and graft anatomy     Coronary artery disease  · [facility] 11/2022 with PCI to SVG of RPDA, adequately revascularized with patent LIMA and diagonal and patent SVG to OM1 and OM2  · Aspirin 81 mg day  · On Plavix 75 mg daily at home but currently on hold     Valvular heart disease  · Moderate MR on Echo 11/7/2022     NSVT/CRT pacemaker  · S/p EP study and CRT PPM implantation, 11/10/2022  · No inducible VT on EP study 11/2022  · Currently no arrhythmias this admission     Atrial fibrillation/flutter  · Not on anticoagulated due to GI bleed  · Status post ablation of atrial flutter 2018     Hypotension  · Midodrine discontinued 1/4/2023     Elevated LFTs/Cholelithiasis/Nausea  · Elevated LFTs likely related to passive liver congestion due to CHF.   and   · Management Per primary team       Hyperlipidemia  · Total cholesterol 130, triglycerides 56, HDL 28, LDL 90  · Statin on hold due to elevated LFTs    Stage III chronic kidney disease  · Creatinine improved from 2.26-1.93         · Restart Plavix 75 mg daily and continue aspirin 75 mg daily due to recent PCI 11/2022  · Recommend palliative and hospice care.  Discussed goals of care with patient and his wife.  They are  currently unsure of their wishes  · Guideline directed medical therapy for heart failure limited by hypotension and multisystem organ involvement    Electronically signed by DOMONIQUE Nash, 01/05/23, 8:26 AM EST.

## 2023-01-05 NOTE — PLAN OF CARE
Goal Outcome Evaluation:  Plan of Care Reviewed With: patient, spouse        Progress: no change  Outcome Evaluation: PT eval completed. Patient presents w/ generalized weakness, mild balance deficits, mildlly unsteady gait, and decreased functional endurance. He completed transfers w/ CGA and ambulated 250ft w/ RW and CGA. Patient would benefit from acute PT to address impairments and improve pt's safety and independence w/ functional mobility. Recommend home w/ assist and HHPT when medically appropriate.

## 2023-01-05 NOTE — PROGRESS NOTES
[facility] Medicine Services  PROGRESS NOTE    Patient Name: Saul Sal  : 1940  MRN: [id number]    Date of Admission: 1/3/2023  Primary Care Physician: Saul Campbell MD    Subjective   Subjective     CC:  F/u with cardiogenic shock    HPI:  Pt's only complaint is that the bed makes his bed hurt. Wife and daughter are bedside.   Pt states that he has been declining for the past 5 years.   He does complain of fullness and nausea     ROS:  Gen- No fevers, chills  CV- No chest pain, palpitations  Resp- No cough, dyspnea  GI- + N/ no V/D, abd pain        Objective   Objective     Vital Signs:   Temp:  [97.3 °F (36.3 °C)-98.1 °F (36.7 °C)] 97.4 °F (36.3 °C)  Heart Rate:  [60-77] 77  Resp:  [16-17] 17  BP: ()/(60-92) 108/83     Physical Exam:  Constitutional: No acute distress, awake, alert  HENT: NCAT, mucous membranes moist  Respiratory:  respiratory effort normal   Cardiovascular: RRR, no murmurs, rubs, or gallops  Gastrointestinal: Positive bowel sounds, soft, nontender, nondistended  Musculoskeletal: No bilateral ankle edema  Psychiatric: Appropriate affect, cooperative  Neurologic: Oriented x 3,  speech clear  Skin: No rashes      Results Reviewed:  LAB RESULTS:      Lab 23  1348 23  0849 23  0411 23  0023 23  2045   WBC  --   --  7.07  --  6.98   HEMOGLOBIN  --   --  13.2  --  14.8   HEMATOCRIT  --   --  41.9  --  48.3   PLATELETS  --   --  111*  --  144   NEUTROS ABS  --   --  5.33  --  4.65   IMMATURE GRANS (ABS)  --   --  0.06*  --  0.03   LYMPHS ABS  --   --  1.04  --  1.77   MONOS ABS  --   --  0.61  --  0.46   EOS ABS  --   --  0.01  --  0.04   MCV  --   --  93.5  --  95.3   SED RATE  --   --  <1  --   --    CRP  --   --  3.02*  --   --    LACTATE 2.4* 2.1* 2.2* 2.4* 5.8*         Lab 23  0520 23  0411 23   SODIUM 140 140 138   POTASSIUM 4.0 4.3 5.0   CHLORIDE 103 101 98   CO2 21.0* 24.0 19.0*   ANION GAP 16.0*  15.0 21.0*   BUN 53* 57* 53*   CREATININE 1.93* 2.26* 2.33*   EGFR 33.7* 27.9* 27.1*   GLUCOSE 81 97 129*   CALCIUM 8.4* 8.8 9.4   MAGNESIUM  --  2.5*  --    PHOSPHORUS  --  4.6*  --    HEMOGLOBIN A1C  --  5.90*  --          Lab 01/03/23 2045   TOTAL PROTEIN 7.8   ALBUMIN 4.5   GLOBULIN 3.3   ALT (SGPT) 119*   AST (SGOT) 147*   BILIRUBIN 1.6*   ALK PHOS 133*   LIPASE 40         Lab 01/04/23  0411 01/04/23  0205 01/04/23  0023 01/03/23 2045   PROBNP  --   --   --  31,376.0*   TROPONIN T 0.021 0.035* 0.023 0.032*         Lab 01/04/23  0411   CHOLESTEROL 130   LDL CHOL 90   HDL CHOL 28*   TRIGLYCERIDES 56             Brief Urine Lab Results  (Last result in the past 365 days)      Color   Clarity   Blood   Leuk Est   Nitrite   Protein   CREAT   Urine HCG        01/04/23 0105 Yellow   Cloudy   Negative   Negative   Negative   30 mg/dL (1+)                 Microbiology Results Abnormal     Procedure Component Value - Date/Time    Blood Culture - Blood, Arm, Right [[id number]]  (Normal) Collected: 01/04/23 0156    Lab Status: Preliminary result Specimen: Blood from Arm, Right Updated: 01/05/23 0230     Blood Culture No growth at 24 hours    Blood Culture - Blood, Arm, Right [[id number]]  (Normal) Collected: 01/04/23 0203    Lab Status: Preliminary result Specimen: Blood from Arm, Right Updated: 01/05/23 0230     Blood Culture No growth at 24 hours    COVID PRE-OP / PRE-PROCEDURE SCREENING ORDER (NO ISOLATION) - Swab, Nasopharynx [[id number]]  (Normal) Collected: 01/03/23 2143    Lab Status: Final result Specimen: Swab from Nasopharynx Updated: 01/03/23 1729    Narrative:      The following orders were created for panel order COVID PRE-OP / PRE-PROCEDURE SCREENING ORDER (NO ISOLATION) - Swab, Nasopharynx.  Procedure                               Abnormality         Status                     ---------                               -----------         ------                     Respiratory Panel PCR w/...[[id number]]  Normal               Final result                 Please view results for these tests on the individual orders.    Respiratory Panel PCR w/COVID-19(SARS-CoV-2) REID/YO/BALBINA/PAD/COR/MAD/DORI In-House, NP Swab in UTM/VTM, 3-4 HR TAT - Swab, Nasopharynx [[id number]]  (Normal) Collected: 01/03/23 2142    Lab Status: Final result Specimen: Swab from Nasopharynx Updated: 01/03/23 2008     ADENOVIRUS, PCR Not Detected     Coronavirus 229E Not Detected     Coronavirus HKU1 Not Detected     Coronavirus NL63 Not Detected     Coronavirus OC43 Not Detected     COVID19 Not Detected     Human Metapneumovirus Not Detected     Human Rhinovirus/Enterovirus Not Detected     Influenza A PCR Not Detected     Influenza B PCR Not Detected     Parainfluenza Virus 1 Not Detected     Parainfluenza Virus 2 Not Detected     Parainfluenza Virus 3 Not Detected     Parainfluenza Virus 4 Not Detected     RSV, PCR Not Detected     Bordetella pertussis pcr Not Detected     Bordetella parapertussis PCR Not Detected     Chlamydophila pneumoniae PCR Not Detected     Mycoplasma pneumo by PCR Not Detected    Narrative:      In the setting of a positive respiratory panel with a viral infection PLUS a negative procalcitonin without other underlying concern for bacterial infection, consider observing off antibiotics or discontinuation of antibiotics and continue supportive care. If the respiratory panel is positive for atypical bacterial infection (Bordetella pertussis, Chlamydophila pneumoniae, or Mycoplasma pneumoniae), consider antibiotic de-escalation to target atypical bacterial infection.          CT Abdomen Pelvis Without Contrast    Result Date: 1/3/2023  CT ABDOMEN PELVIS WO CONTRAST-, CT CHEST WO CONTRAST DIAGNOSTIC-  Date of Exam: 1/3/2023 10:35 PM  Indication: nausea/ abdominal pain; I50.23-Acute on chronic systolic (congestive) heart failure.  Comparison: 10/14/2018  Technique: Contiguous axial CT images were obtained from the lung bases to the to the  pubic symphysis without contrast.  Sagittal and coronal reconstructions were performed.  Automated exposure control and iterative reconstruction methods were used.     FINDINGS: Chest:  Mediastinum: Heart size is enlarged. No suspicious pericardial fluid collection noted.  Patient is status post median sternotomy and CABG. There is calcification of the native coronary arteries.  Limited noncontrast imaging of the aorta and the origin of the great vessels is grossly unremarkable in appearance.  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  Main pulmonary artery is normal in caliber  No suspicious hilar or mediastinal adenopathy is noted. There is evidence of old granulomatous disease. The Limited imaging of the base of the neck and the esophagus are grossly unremarkable  Lungs/pleura: There is a small right-sided pleural effusion.  Central airways are patent.  Partially calcified pleural plaques are again noted.  Partially calcified 4 mm nodule right middle lobe compatible granulomatous disease. Additional evidence of old granulomatous disease noted. Minimal groundglass and irregular opacities noted at the lung bases. No suspicious areas of consolidation noted. No significant overt pulmonary edema.  Soft Tissues/Bones: No destructive bone lesion.  Abdomen/Pelvis: Organs: Stones and sludge are noted within the gallbladder. There is mild stranding surrounding the gallbladder.  Limited noncontrast imaging of the liver, spleen, pancreas and right adrenal gland are unremarkable in appearance. Indication of the left stable compatible sequela prior. Low-attenuation lesion within the left kidney compatible with cysts. Perinephric stranding noted bilaterally.  GI/Bowel: There is a small hiatal hernia. The stomach is incompletely distended and otherwise grossly unremarkable on limited noncontrast imaging.  Small bowel demonstrates no acute abnormality  There is diverticulosis more prominently within the sigmoid  colon. Stranding is noted within the mesentery and small amount of free fluid noted within the pelvis.  Pelvis: Urinary bladder is mildly thick-walled this is accentuated by incomplete distention.  Bilateral fat-containing inguinal hernias are noted. The prostate is mildly enlarged.   Peritoneum/Retroperitoneum: Atherosclerotic changes are noted of the aorta which is tortuous. No suspicious retroperitoneal adenopathy noted  Bones/Soft Tissues: Mild degree of subcutaneous edema is noted. Multilevel degenerative changes are noted of the spine      Impression: Chest: 1. Cardiomegaly without significant overt pulmonary edema 2. Small right-sided pleural effusion 3. Evidence of old granulomatous disease 4. Partially calcified pleural plaques similar to the prior study.  Abdomen and pelvis:  1. Small amount of ascites and fluid within the abdomen and pelvis limited evaluation for acute inflammatory changes. Please correlate for cardiac, liver or renal dysfunction 2. There is cholelithiasis and small amount of fluid surrounding the gallbladder which is mentioned above is indeterminate. If there is clinical concern for acute cholecystitis follow-up can always be considered with a nuclear medicine hepatobiliary scan 3. Diverticulosis is noted. Evaluation for diverticulitis is limited by ascites      This report was finalized on 1/3/2023 11:28 PM by Wayne West.      XR Foot 3+ View Right    Result Date: 1/4/2023  XR FOOT 3+ VW RIGHT Date of Exam: 1/4/2023 6:33 AM EST Indication: wound on left foot. Comparison: None available. Findings: No acute fracture is identified. There is a marginal erosion along the first metatarsal head. Mild to moderate degenerative changes are present along the metatarsophalangeal and interphalangeal joints. There are tiny enthesophytes along the posterior and  inferior calcaneus. Mild degenerative changes are present along the dorsal midfoot. There is soft tissue swelling along the forefoot.      Impression: Impression: 1.Degenerative changes as described above. 2.Marginal erosion along first metatarsal head. This could be degenerative such as in inflammatory arthritis, although if there is acute infection in this vicinity osteomyelitis cannot be excluded. 3.Nonspecific soft tissue swelling along forefoot. Electronically Signed: Antonino Alexis  1/4/2023 8:27 AM EST  Workstation ID: [id number]    CT Chest Without Contrast Diagnostic    Result Date: 1/3/2023  CT ABDOMEN PELVIS WO CONTRAST-, CT CHEST WO CONTRAST DIAGNOSTIC-  Date of Exam: 1/3/2023 10:35 PM  Indication: nausea/ abdominal pain; I50.23-Acute on chronic systolic (congestive) heart failure.  Comparison: 10/14/2018  Technique: Contiguous axial CT images were obtained from the lung bases to the to the pubic symphysis without contrast.  Sagittal and coronal reconstructions were performed.  Automated exposure control and iterative reconstruction methods were used.     FINDINGS: Chest:  Mediastinum: Heart size is enlarged. No suspicious pericardial fluid collection noted.  Patient is status post median sternotomy and CABG. There is calcification of the native coronary arteries.  Limited noncontrast imaging of the aorta and the origin of the great vessels is grossly unremarkable in appearance.  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  Main pulmonary artery is normal in caliber  No suspicious hilar or mediastinal adenopathy is noted. There is evidence of old granulomatous disease. The Limited imaging of the base of the neck and the esophagus are grossly unremarkable  Lungs/pleura: There is a small right-sided pleural effusion.  Central airways are patent.  Partially calcified pleural plaques are again noted.  Partially calcified 4 mm nodule right middle lobe compatible granulomatous disease. Additional evidence of old granulomatous disease noted. Minimal groundglass and irregular opacities noted at the lung bases. No suspicious areas of  consolidation noted. No significant overt pulmonary edema.  Soft Tissues/Bones: No destructive bone lesion.  Abdomen/Pelvis: Organs: Stones and sludge are noted within the gallbladder. There is mild stranding surrounding the gallbladder.  Limited noncontrast imaging of the liver, spleen, pancreas and right adrenal gland are unremarkable in appearance. Indication of the left stable compatible sequela prior. Low-attenuation lesion within the left kidney compatible with cysts. Perinephric stranding noted bilaterally.  GI/Bowel: There is a small hiatal hernia. The stomach is incompletely distended and otherwise grossly unremarkable on limited noncontrast imaging.  Small bowel demonstrates no acute abnormality  There is diverticulosis more prominently within the sigmoid colon. Stranding is noted within the mesentery and small amount of free fluid noted within the pelvis.  Pelvis: Urinary bladder is mildly thick-walled this is accentuated by incomplete distention.  Bilateral fat-containing inguinal hernias are noted. The prostate is mildly enlarged.   Peritoneum/Retroperitoneum: Atherosclerotic changes are noted of the aorta which is tortuous. No suspicious retroperitoneal adenopathy noted  Bones/Soft Tissues: Mild degree of subcutaneous edema is noted. Multilevel degenerative changes are noted of the spine      Impression: Chest: 1. Cardiomegaly without significant overt pulmonary edema 2. Small right-sided pleural effusion 3. Evidence of old granulomatous disease 4. Partially calcified pleural plaques similar to the prior study.  Abdomen and pelvis:  1. Small amount of ascites and fluid within the abdomen and pelvis limited evaluation for acute inflammatory changes. Please correlate for cardiac, liver or renal dysfunction 2. There is cholelithiasis and small amount of fluid surrounding the gallbladder which is mentioned above is indeterminate. If there is clinical concern for acute cholecystitis follow-up can always be  considered with a nuclear medicine hepatobiliary scan 3. Diverticulosis is noted. Evaluation for diverticulitis is limited by ascites      This report was finalized on 1/3/2023 11:28 PM by Wayne West.      XR Chest 1 View    Result Date: 1/3/2023  DATE OF EXAM: 1/3/2023 8:20 PM  PROCEDURE: XR CHEST 1 VW-  INDICATIONS: SOA triage protocol  COMPARISON: 2022  TECHNIQUE: Single radiographic AP view of the chest was obtained.  FINDINGS: Left subclavian triple lead pacer. Cardiomegaly. Prominence of the central pulmonary vasculature. Lungs clear. Slight blunting of both costophrenic angles      Impression: Cardiomegaly with pulmonary vascular congestion and trace pleural effusions  This report was finalized on 1/3/2023 8:52 PM by Santy Garcia.      US Abdomen Limited    Result Date: 2023  US ABDOMEN LIMITED Date of Exam: 2023 8:49 AM EST Indication: right upper quadrant. Comparison: No comparisons available. Technique: Grayscale and color Doppler ultrasound evaluation of the right upper quadrant was performed. FINDINGS: Liver: Liver is mildly heterogeneous and increased in echogenicity. Question mild nodular contour No focal lesion or intrahepatic biliary ductal dilation is noted. The hepatic vein and portal vein are patent and demonstrate normal directional flow. Biliary System: There is sludge within the gallbladder. No pericholecystic fluid is noted. Mild wall thickening measuring up to 6 mm noted. Common bile duct is within normal limits measurin mm Right Kidney: The right kidney is grossly unremarkable without evidence of hydronephrosis. Pancreas: Pancreas is obscured by bowel gas Other: No evidence of right upper quadrant ascites.     Impression: 1. Increased echogenicity of the liver. Mild nodular contour which can be seen with cirrhosis. Please correlate with liver function test 2. Wall thickening and sludge within the gallbladder. No sonographic evidence of acute cholecystitis. Findings  can be seen with chronic inflammation. Electronically Signed: Wayne West  1/4/2023 4:58 PM EST  Workstation ID: [id number]      Results for orders placed during the hospital encounter of 11/07/22    Adult Transthoracic Echo Complete W/ Cont if Necessary Per Protocol    Interpretation Summary  •  Left ventricular systolic function is severely decreased. Left ventricular ejection fraction appears to be less than 20%.  •  The left ventricular cavity is moderately dilated.  •  Left ventricular diastolic dysfunction is noted.  •  Left atrial volume is moderately increased.  •  Moderate mitral valve regurgitation is present.  •  The right ventricular cavity is moderately dilated.  •  Estimated right ventricular systolic pressure from tricuspid regurgitation is normal (<35 mmHg).  •  There is a large left pleural effusion.      I have reviewed the medications:  Scheduled Meds:aspirin, 81 mg, Oral, Daily  clopidogrel, 75 mg, Oral, Daily  levothyroxine, 125 mcg, Oral, Q AM  pharmacy consult - MTM, , Does not apply, BID  potassium chloride, 20 mEq, Oral, Daily  sodium chloride, 10 mL, Intravenous, Q12H      Continuous Infusions:   PRN Meds:.•  sodium chloride  •  sodium chloride  •  sodium chloride    Assessment & Plan   Assessment & Plan     Active Hospital Problems    Diagnosis  POA   • **Cardiogenic shock (HCC) [R57.0]  Yes   • 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 [Z95.0]  Yes   • Nausea in adult [R11.0]  Yes   • Ulcer of right foot (HCC) [L97.519]  Yes   • STORM (acute kidney injury) (HCC) [N17.9]  Yes   • Type 2 myocardial infarction due to heart failure (HCC) [I50.9, I21.A1]  Yes   • 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]  Yes   • Stage 3 chronic kidney disease (HCC) [N18.30]  Yes   • Acute on chronic systolic congestive heart failure (HCC) [I50.23]  Yes   • Atrial fib/flutter (not on AC 2* bleeding and patient preference)  [I48.91, I48.92]  Yes   • Coronary artery disease involving native coronary  artery of native heart without angina pectoris [I25.10]  Yes   • Hyperlipidemia LDL goal <70 [E78.5]  Yes      Resolved Hospital Problems    Diagnosis Date Resolved POA   • Acute on chronic systolic congestive heart failure (HCC) [I50.23] 01/04/2023 Yes   • Chronic passive congestion of liver [K76.1] 01/04/2023 Yes        Brief Hospital Course to date:  Saul Sal is a 83 y.o. 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 He has been taking Torsemide daily as instructed. Workup in the ER consistent with decompensated CHF. Cardiology evaluated and feel that given his multi organ failure he would be a good candidate for hospice.     GOC   Today  I had a long conversation with the patient, his wife and daughter. Plan is to consult hospice in the morning and hopefully go home with hospice tomorrow or when it is established.     Lactic acidosis     Cardiogenic Shock  HFrEF with acute exacerbation  Type II MI  Afib  Pacemaker  -Cardiology following, limited treatment options    Elevated LFT  Intractable Nausea   -us abdomin shows echogenicity of liver, maybe cirrhosis- assumed volume overload from heart failure   - no acute cholecystitis   -scheduled compazine         Expected Discharge Location and Transportation: Home with hospice   Expected Discharge 1/6 or 1/7  Expected Discharge Date and Time     Expected Discharge Date Expected Discharge Time    Jan 7, 2023            DVT prophylaxis:  Mechanical DVT prophylaxis orders are present.     AM-PAC 6 Clicks Score (PT): 18 (01/04/23 0800)    CODE STATUS:   Code Status and Medical Interventions:   Ordered at: 01/04/23 0058     Code Status (Patient has no pulse and is not breathing):    CPR (Attempt to Resuscitate)     Medical Interventions (Patient has pulse or is breathing):    Full  Support       Sandra Jones,   01/05/23

## 2023-01-05 NOTE — PLAN OF CARE
Goal Outcome Evaluation:  Plan of Care Reviewed With: patient, spouse        Progress: no change  Outcome Evaluation: New palliative consult for assistance with GOc and hospice discussion per Dr. Jones.  ACP on file lists wife Shelia Sal as HCS.  Dr. Garber saw pt yesterday for initial consult and attempted to discuss GOC yesterday.  Palliative RN saw pt today and provided palliative brochure and palliative meal discount card to wife at bedside.  Pt. sitting up in bedside chair was very pleasant and denied pain, dyspnea and nausea.   He endorsed dyspnea around 3am early this morning until around 5am.  He stated the smothering woke him from his slumber.  He has not been sleeping well recently due to dyspnea.  Educated pt on use of fan at home to help with feeling of dyspnea.  Pt. complained of lack of appetite.  When he does eat he tends to become nauseated.  Pt.. reminisced on what a good life he has shared with his wife of 43 years.  They attended school in the first grade together.  Shelia stated they would like palliative care to follow at home when he discharges.  Palliative care to continue to follow for support, POC and ongoing GOC.      1330 Palliative IDT meeting: MEG Loo, RN, CHPN; VELIA Richmond, APRN; KENNEDY Garber, DO; JULIUS Salinas, [facility], [facility]-; MSW; BEAU Quevedo, MDiv, [facility]; BEAU De La Cruz RN, CHPN;  JULIUS Cain, RN; COREY Barber RN, CHPN    After hours, weekends and holidays, contact Palliative Provider by calling [phone].      Problem: Palliative Care  Goal: Enhanced Quality of Life  Intervention: Promote Advance Care Planning  Flowsheets (Taken 1/5/2023 1506)  Life Transition/Adjustment:   palliative care discussed   palliative care initiated

## 2023-01-06 ENCOUNTER — TELEPHONE (OUTPATIENT)
Dept: INTERNAL MEDICINE | Facility: CLINIC | Age: 83
End: 2023-01-06

## 2023-01-06 PROCEDURE — 97116 GAIT TRAINING THERAPY: CPT

## 2023-01-06 PROCEDURE — 99231 SBSQ HOSP IP/OBS SF/LOW 25: CPT | Performed by: FAMILY MEDICINE

## 2023-01-06 PROCEDURE — 96375 TX/PRO/DX INJ NEW DRUG ADDON: CPT

## 2023-01-06 PROCEDURE — G0378 HOSPITAL OBSERVATION PER HR: HCPCS

## 2023-01-06 PROCEDURE — 97530 THERAPEUTIC ACTIVITIES: CPT

## 2023-01-06 PROCEDURE — 25010000002 PROCHLORPERAZINE 10 MG/2ML SOLUTION: Performed by: FAMILY MEDICINE

## 2023-01-06 PROCEDURE — 96376 TX/PRO/DX INJ SAME DRUG ADON: CPT

## 2023-01-06 PROCEDURE — 25010000002 LORAZEPAM PER 2 MG: Performed by: FAMILY MEDICINE

## 2023-01-06 PROCEDURE — 99214 OFFICE O/P EST MOD 30 MIN: CPT | Performed by: NURSE PRACTITIONER

## 2023-01-06 RX ORDER — LORAZEPAM 2 MG/ML
0.5 INJECTION INTRAMUSCULAR EVERY 4 HOURS PRN
Status: DISCONTINUED | OUTPATIENT
Start: 2023-01-06 | End: 2023-01-09 | Stop reason: HOSPADM

## 2023-01-06 RX ORDER — MORPHINE SULFATE 20 MG/ML
5 SOLUTION ORAL
Status: DISCONTINUED | OUTPATIENT
Start: 2023-01-06 | End: 2023-01-06

## 2023-01-06 RX ORDER — LORAZEPAM 2 MG/ML
1 INJECTION INTRAMUSCULAR EVERY 4 HOURS PRN
Status: DISCONTINUED | OUTPATIENT
Start: 2023-01-06 | End: 2023-01-06

## 2023-01-06 RX ORDER — MORPHINE SULFATE 20 MG/ML
5 SOLUTION ORAL
Status: DISCONTINUED | OUTPATIENT
Start: 2023-01-06 | End: 2023-01-08

## 2023-01-06 RX ORDER — MORPHINE SULFATE 20 MG/ML
10 SOLUTION ORAL
Status: DISCONTINUED | OUTPATIENT
Start: 2023-01-06 | End: 2023-01-06

## 2023-01-06 RX ADMIN — LORAZEPAM 1 MG: 2 INJECTION INTRAMUSCULAR; INTRAVENOUS at 12:05

## 2023-01-06 RX ADMIN — LEVOTHYROXINE SODIUM 125 MCG: 125 TABLET ORAL at 05:40

## 2023-01-06 RX ADMIN — PROCHLORPERAZINE EDISYLATE 5 MG: 5 INJECTION INTRAMUSCULAR; INTRAVENOUS at 09:03

## 2023-01-06 RX ADMIN — POTASSIUM CHLORIDE 20 MEQ: 750 CAPSULE, EXTENDED RELEASE ORAL at 09:03

## 2023-01-06 RX ADMIN — PROCHLORPERAZINE EDISYLATE 5 MG: 5 INJECTION INTRAMUSCULAR; INTRAVENOUS at 16:48

## 2023-01-06 RX ADMIN — ASPIRIN 81 MG: 81 TABLET, COATED ORAL at 09:03

## 2023-01-06 RX ADMIN — MORPHINE SULFATE 10 MG: 20 SOLUTION ORAL at 09:03

## 2023-01-06 RX ADMIN — PROCHLORPERAZINE EDISYLATE 5 MG: 5 INJECTION INTRAMUSCULAR; INTRAVENOUS at 01:50

## 2023-01-06 RX ADMIN — CLOPIDOGREL BISULFATE 75 MG: 75 TABLET ORAL at 09:03

## 2023-01-06 RX ADMIN — MORPHINE SULFATE 5 MG: 100 SOLUTION ORAL at 18:17

## 2023-01-06 NOTE — THERAPY TREATMENT NOTE
Patient Name: Saul Sal  : 1940    MRN: [id number]                              Today's Date: 2023       Admit Date: 1/3/2023    Visit Dx:     ICD-10-CM ICD-9-CM   1. Acute on chronic systolic congestive heart failure ([facility])  I50.23 428.23     428.0   2. Coronary artery disease involving native coronary artery of native heart without angina pectoris  I25.10 414.01   3. Gastroesophageal reflux disease with esophagitis without hemorrhage  K21.00 530.81     530.10   4. STORM (acute kidney injury) ([facility])  N17.9 584.9   5. Type 2 myocardial infarction due to heart failure ([facility])  I50.9 428.9    I21.A1 410.90   6. Stage 3 chronic kidney disease, unspecified whether stage 3a or 3b CKD ([facility])  N18.30 585.3   7. Nausea in adult  R11.0 787.02     Patient Active Problem List   Diagnosis   • Coronary artery disease involving native coronary artery of native heart without angina pectoris   • Atrial fib/flutter (not on AC 2* bleeding and patient preference)    • Hyperlipidemia LDL goal <70   • ELIE not on CPAP   • Hypothyroidism   • GERD (gastroesophageal reflux disease)   • Herpes zoster without complication   • Postherpetic neuralgia   • Primary osteoarthritis involving multiple joints   • Acute on chronic systolic congestive heart failure (HCC)   • Olecranon bursitis of right elbow   • Chronic gout of multiple sites   • NSVT (nonsustained ventricular tachycardia)   • RBBB   • Stage 3 chronic kidney disease (HCC)   • STORM (acute kidney injury) ([facility])   • Type 2 myocardial infarction due to heart failure ([facility])   • Cardiac portal cirrhosis   • Nausea in adult   • Ulcer of right foot ([facility])   • Cardiogenic shock ([facility])   • 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     Past Medical History:   Diagnosis Date   • Arthritis    • Atrial fibrillation ([facility])    • Atrial flutter ([facility])     s/p typical flutter ablation with recurrent atypical flutter   • BPH (benign prostatic hyperplasia)    • Coronary artery disease     • Diverticulosis    • GERD (gastroesophageal reflux disease)    • GI bleed    • HFrEF (heart failure with reduced ejection fraction) ([facility])    • Hyperlipidemia    • Hypothyroidism (acquired)    • Obesity (BMI 30-39.9)    • ELIE (obstructive sleep apnea)    • Osteoarthritis    • Shingles    • Skin cancer     squamous    • Stage 3 chronic kidney disease (HCC) 12/21/2022   • Wears eyeglasses     readers   • Wears hearing aid      Past Surgical History:   Procedure Laterality Date   • ABLATION OF DYSRHYTHMIC FOCUS  12/2018   • ANGIOPLASTY  1985   • CARDIAC CATHETERIZATION     • CARDIAC CATHETERIZATION N/A 11/8/2022    Procedure: Left Heart Cath;  Surgeon: Marco Urena MD;  Location:  [facility];  Service: Cardiovascular;  Laterality: N/A;   • CARDIAC ELECTROPHYSIOLOGY PROCEDURE N/A 11/26/2018    Procedure: Ablation atrial flutter;  Surgeon: Tino Sampson MD;  Location:  [facility];  Service: Cardiovascular   • CARDIAC ELECTROPHYSIOLOGY PROCEDURE N/A 11/10/2022    Procedure: Pacemaker DC new;  Surgeon: Bruce Mera DO;  Location:  [facility];  Service: Cardiology;  Laterality: N/A;   • CARDIAC SURGERY      BYPASS X5   • COLONOSCOPY  2009   • COLONOSCOPY  2005   • CORONARY ANGIOPLASTY  1985   • [facility]ON[facility]   • DENTAL PROCEDURE  2010    dental surg and crowns   • EYE SURGERY Right     cataract   • SKIN BIOPSY     • VASECTOMY        General Information     Row Name 01/06/23 1519          Physical Therapy Time and Intention    Document Type therapy note (daily note)  -SC     Mode of Treatment physical therapy  -SC     Row Name 01/06/23 1519          General Information    Patient Profile Reviewed yes  -SC     Existing Precautions/Restrictions fall  -SC     Row Name 01/06/23 1519          Cognition    Orientation Status (Cognition) oriented x 4  -SC     Row Name 01/06/23 1519          Safety Issues, Functional Mobility     Impairments Affecting Function (Mobility) balance;endurance/activity tolerance;strength  -SC     Comment, Safety Issues/Impairments (Mobility) needed to be awaken prior to therapy. Patient became alert  and participated in therapy.  -SC           User Key  (r) = Recorded By, (t) = Taken By, (c) = Cosigned By    Initials Name Provider Type    SC Jesus Mcdonald PT Physical Therapist               Mobility     Row Name 01/06/23 1520          Bed Mobility    Comment, (Bed Mobility) uic  -SC     Row Name 01/06/23 1520          Transfers    Comment, (Transfers) cue for hand placement. Able to get to standing without assist. Time taken in standing to stabilize balance  -SC     Row Name 01/06/23 1520          Bed-Chair Transfer    Bed-Chair Norman (Transfers) contact guard;verbal cues  -SC     Assistive Device (Bed-Chair Transfers) walker, front-wheeled  -SC     Row Name 01/06/23 1520          Sit-Stand Transfer    Sit-Stand Norman (Transfers) contact guard;verbal cues  -SC     Assistive Device (Sit-Stand Transfers) walker, front-wheeled  -SC     Row Name 01/06/23 1520          Gait/Stairs (Locomotion)    Norman Level (Gait) contact guard;verbal cues  -SC     Assistive Device (Gait) walker, front-wheeled  -SC     Distance in Feet (Gait) 140  -SC     Deviations/Abnormal Patterns (Gait) magy decreased;gait speed decreased;stride length decreased  -SC     Bilateral Gait Deviations forward flexed posture  -SC     Comment, (Gait/Stairs) Gt training focused on controling walker with upright posture. . Cues to stay close into walker . Requried one sit down rest break due to tachycardia. Gait slightly unsteady but no LOB noted  -SC           User Key  (r) = Recorded By, (t) = Taken By, (c) = Cosigned By    Initials Name Provider Type    SC Jesus Mcdonald PT Physical Therapist               Obj/Interventions     Row Name 01/06/23 1523          Balance    Dynamic Standing Balance 1 person to manage  equipment;1-person assist;contact guard  -SC     Position/Device Used, Standing Balance walker, front-wheeled  -SC           User Key  (r) = Recorded By, (t) = Taken By, (c) = Cosigned By    Initials Name Provider Type    Jesus Overton, PT Physical Therapist               Goals/Plan    No documentation.                Clinical Impression     [facility] Name 01/06/23 1523          Pain    Pretreatment Pain Rating 0/10 - no pain  -SC     Posttreatment Pain Rating 0/10 - no pain  -SC     Row Name 01/06/23 1523          Plan of Care Review    Progress no change  -SC     Outcome Evaluation Patient demonstrated ability to control his walker in hallway. He was limited by some tachycardia requireing sitting rest brest.  -[facility] Name 01/06/23 1523          Therapy Assessment/Plan (PT)    Patient/Family Therapy Goals Statement (PT) home  -SC     Rehab Potential (PT) good, to achieve stated therapy goals  -SC     Criteria for Skilled Interventions Met (PT) yes;meets criteria;skilled treatment is necessary  -SC     Therapy Frequency (PT) daily  -SC     Row Name 01/06/23 1523          Vital Signs    Pretreatment Heart Rate (beats/min) 86  -SC     Intratreatment Heart Rate (beats/min) 125  -SC     Posttreatment Heart Rate (beats/min) 98  -SC     Row Name 01/06/23 1523          Positioning and Restraints    Pre-Treatment Position sitting in chair/recliner  -SC     Post Treatment Position chair  -SC     In Chair notified nsg;reclined;sitting;call light within reach;encouraged to call for assist  -SC           User Key  (r) = Recorded By, (t) = Taken By, (c) = Cosigned By    Initials Name Provider Type    Jesus Overton, PT Physical Therapist               Outcome Measures     [facility] Name 01/06/23 1526          How much help from another person do you currently need...    Turning from your back to your side while in flat bed without using bedrails? 4  -SC     Moving from lying on back to sitting on the side of a flat bed without  bedrails? 3  -SC     Moving to and from a bed to a chair (including a wheelchair)? 3  -SC     Standing up from a chair using your arms (e.g., wheelchair, bedside chair)? 3  -SC     Climbing 3-5 steps with a railing? 3  -SC     To walk in hospital room? 3  -SC     AM-PAC 6 Clicks Score (PT) 19  -SC     Highest level of mobility 6 --> Walked 10 steps or more  -SC     Row Name 01/06/23 1526          Functional Assessment    Outcome Measure Options AM-PAC 6 Clicks Basic Mobility (PT)  -SC           User Key  (r) = Recorded By, (t) = Taken By, (c) = Cosigned By    Initials Name Provider Type    SC Jesus Mcdonald PT Physical Therapist                             Physical Therapy Education     Title: PT OT SLP Therapies (Done)     Topic: Physical Therapy (Done)     Point: Mobility training (Done)     Learning Progress Summary           Patient Eager, DAVID, VU by SC at 1/6/2023 1526    Comment: reviewed benefits of activity    Acceptance, E,D, VU,DU by MB at 1/5/2023 1148   Family Acceptance, E,D, VU,DU by MB at 1/5/2023 1148                   Point: Home exercise program (Done)     Learning Progress Summary           Patient Eager, E, VU by SC at 1/6/2023 1526    Comment: reviewed benefits of activity    Acceptance, E,D, VU,DU by MB at 1/5/2023 1148   Family Acceptance, E,D, VU,DU by MB at 1/5/2023 1148                   Point: Body mechanics (Done)     Learning Progress Summary           Patient Eager, E, VU by SC at 1/6/2023 1526    Comment: reviewed benefits of activity    Acceptance, E,D, VU,DU by MB at 1/5/2023 1148   Family Acceptance, E,D, VU,DU by MB at 1/5/2023 1148                   Point: Precautions (Done)     Learning Progress Summary           Patient Eager, E, VU by SC at 1/6/2023 1526    Comment: reviewed benefits of activity    Acceptance, E,D, VU,DU by MB at 1/5/2023 1148   Family Acceptance, E,D, VU,DU by MB at 1/5/2023 1148                               User Key     Initials Effective Dates Name Provider  Type Discipline    SC 06/16/21 -  Jesus Mcdonald PT Physical Therapist PT    MB 06/16/21 -  Irene Bey PT Physical Therapist PT              PT Recommendation and Plan     Progress: no change  Outcome Evaluation: Patient demonstrated ability to control his walker in hallway. He was limited by some tachycardia requireing sitting rest brest.     Time Calculation:    PT Charges     Row Name 01/06/23 1440             Time Calculation    Start Time 1440  -SC      PT Received On 01/06/23  -SC      PT Goal Re-Cert Due Date 01/15/23  -SC         Time Calculation- PT    Total Timed Code Minutes- PT 23 minute(s)  -SC         Timed Charges    20896 - Gait Training Minutes  18  -SC      38037 - PT Therapeutic Activity Minutes 5  -SC         Total Minutes    Timed Charges Total Minutes 23  -SC       Total Minutes 23  -SC            User Key  (r) = Recorded By, (t) = Taken By, (c) = Cosigned By    Initials Name Provider Type    SC Jesus Mcdonald, PT Physical Therapist              Therapy Charges for Today     Code Description Service Date Service Provider Modifiers Qty    45970045132 HC GAIT TRAINING EA 15 MIN 1/6/2023 Jesus Mcdonald, PT GP 1    86443033038 HC PT THERAPEUTIC ACT EA 15 MIN 1/6/2023 Jesus Mcdonald, PT GP 1          PT G-Codes  Outcome Measure Options: AM-PAC 6 Clicks Basic Mobility (PT)  AM-PAC 6 Clicks Score (PT): 19  PT Discharge Summary  Anticipated Discharge Disposition (PT): home with assist, home with home health    Jesus Mcdonald, PT  1/6/2023

## 2023-01-06 NOTE — PROGRESS NOTES
[facility] Medicine Services  PROGRESS NOTE    Patient Name: Saul Sal  : 1940  MRN: [id number]    Date of Admission: 1/3/2023  Primary Care Physician: Saul Campbell MD    Subjective   Subjective     CC:  F/u with cardiogenic shock    HPI:  Pt complains of being short of breath. Morphine has not helped     ROS:  Gen- No fevers, chills  CV- No chest pain, palpitations  Resp- No cough, dyspnea  GI- + N/ no V/D, abd pain        Objective   Objective     Vital Signs:   Temp:  [97.3 °F (36.3 °C)-98.4 °F (36.9 °C)] 98.4 °F (36.9 °C)  Heart Rate:  [66-87] 71  Resp:  [17-18] 18  BP: ()/(56-88) 123/80     Physical Exam:  Constitutional: No acute distress, awake, alert  HENT: NCAT, mucous membranes moist  Respiratory:  respiratory effort normal   Cardiovascular: RRR, no murmurs, rubs, or gallops  Gastrointestinal: Positive bowel sounds, soft, nontender, nondistended  Musculoskeletal: No bilateral ankle edema  Psychiatric: Appropriate affect, cooperative  Neurologic: Oriented x 3,  speech clear  Skin: No rashes      Results Reviewed:  LAB RESULTS:      Lab 23  1348 23  0849 23  0411 23  0023 23  2045   WBC  --   --  7.07  --  6.98   HEMOGLOBIN  --   --  13.2  --  14.8   HEMATOCRIT  --   --  41.9  --  48.3   PLATELETS  --   --  111*  --  144   NEUTROS ABS  --   --  5.33  --  4.65   IMMATURE GRANS (ABS)  --   --  0.06*  --  0.03   LYMPHS ABS  --   --  1.04  --  1.77   MONOS ABS  --   --  0.61  --  0.46   EOS ABS  --   --  0.01  --  0.04   MCV  --   --  93.5  --  95.3   SED RATE  --   --  <1  --   --    CRP  --   --  3.02*  --   --    LACTATE 2.4* 2.1* 2.2* 2.4* 5.8*         Lab 23  0520 23  0411 23   SODIUM 140 140 138   POTASSIUM 4.0 4.3 5.0   CHLORIDE 103 101 98   CO2 21.0* 24.0 19.0*   ANION GAP 16.0* 15.0 21.0*   BUN 53* 57* 53*   CREATININE 1.93* 2.26* 2.33*   EGFR 33.7* 27.9* 27.1*   GLUCOSE 81 97 129*   CALCIUM 8.4* 8.8 9.4    MAGNESIUM  --  2.5*  --    PHOSPHORUS  --  4.6*  --    HEMOGLOBIN A1C  --  5.90*  --          Lab 01/03/23 2045   TOTAL PROTEIN 7.8   ALBUMIN 4.5   GLOBULIN 3.3   ALT (SGPT) 119*   AST (SGOT) 147*   BILIRUBIN 1.6*   ALK PHOS 133*   LIPASE 40         Lab 01/04/23  0411 01/04/23  0205 01/04/23  0023 01/03/23 2045   PROBNP  --   --   --  31,376.0*   TROPONIN T 0.021 0.035* 0.023 0.032*         Lab 01/04/23  0411   CHOLESTEROL 130   LDL CHOL 90   HDL CHOL 28*   TRIGLYCERIDES 56             Brief Urine Lab Results  (Last result in the past 365 days)      Color   Clarity   Blood   Leuk Est   Nitrite   Protein   CREAT   Urine HCG        01/04/23 0105 Yellow   Cloudy   Negative   Negative   Negative   30 mg/dL (1+)                 Microbiology Results Abnormal     Procedure Component Value - Date/Time    Blood Culture - Blood, Arm, Right [[id number]]  (Normal) Collected: 01/04/23 0156    Lab Status: Preliminary result Specimen: Blood from Arm, Right Updated: 01/06/23 0230     Blood Culture No growth at 2 days    Blood Culture - Blood, Arm, Right [[id number]]  (Normal) Collected: 01/04/23 0203    Lab Status: Preliminary result Specimen: Blood from Arm, Right Updated: 01/06/23 0230     Blood Culture No growth at 2 days    COVID PRE-OP / PRE-PROCEDURE SCREENING ORDER (NO ISOLATION) - Swab, Nasopharynx [[id number]]  (Normal) Collected: 01/03/23 2143    Lab Status: Final result Specimen: Swab from Nasopharynx Updated: 01/03/23 4658    Narrative:      The following orders were created for panel order COVID PRE-OP / PRE-PROCEDURE SCREENING ORDER (NO ISOLATION) - Swab, Nasopharynx.  Procedure                               Abnormality         Status                     ---------                               -----------         ------                     Respiratory Panel PCR w/...[[id number]]  Normal              Final result                 Please view results for these tests on the individual orders.    Respiratory Panel PCR  w/COVID-19(SARS-CoV-2) [facility] In-House, NP Swab in UTM/VTM, 3-4 HR TAT - Swab, Nasopharynx [[id number]]  (Normal) Collected: 01/03/23 2647    Lab Status: Final result Specimen: Swab from Nasopharynx Updated: 01/03/23 7407     ADENOVIRUS, PCR Not Detected     Coronavirus 229E Not Detected     Coronavirus HKU1 Not Detected     Coronavirus NL63 Not Detected     Coronavirus OC43 Not Detected     COVID19 Not Detected     Human Metapneumovirus Not Detected     Human Rhinovirus/Enterovirus Not Detected     Influenza A PCR Not Detected     Influenza B PCR Not Detected     Parainfluenza Virus 1 Not Detected     Parainfluenza Virus 2 Not Detected     Parainfluenza Virus 3 Not Detected     Parainfluenza Virus 4 Not Detected     RSV, PCR Not Detected     Bordetella pertussis pcr Not Detected     Bordetella parapertussis PCR Not Detected     Chlamydophila pneumoniae PCR Not Detected     Mycoplasma pneumo by PCR Not Detected    Narrative:      In the setting of a positive respiratory panel with a viral infection PLUS a negative procalcitonin without other underlying concern for bacterial infection, consider observing off antibiotics or discontinuation of antibiotics and continue supportive care. If the respiratory panel is positive for atypical bacterial infection (Bordetella pertussis, Chlamydophila pneumoniae, or Mycoplasma pneumoniae), consider antibiotic de-escalation to target atypical bacterial infection.          No radiology results from the last 24 hrs    Results for orders placed during the hospital encounter of 11/07/22    Adult Transthoracic Echo Complete W/ Cont if Necessary Per Protocol    Interpretation Summary  •  Left ventricular systolic function is severely decreased. Left ventricular ejection fraction appears to be less than 20%.  •  The left ventricular cavity is moderately dilated.  •  Left ventricular diastolic dysfunction is noted.  •  Left atrial volume is moderately increased.  •   Moderate mitral valve regurgitation is present.  •  The right ventricular cavity is moderately dilated.  •  Estimated right ventricular systolic pressure from tricuspid regurgitation is normal (<35 mmHg).  •  There is a large left pleural effusion.      I have reviewed the medications:  Scheduled Meds:aspirin, 81 mg, Oral, Daily  clopidogrel, 75 mg, Oral, Daily  levothyroxine, 125 mcg, Oral, Q AM  pharmacy consult - MTM, , Does not apply, BID  potassium chloride, 20 mEq, Oral, Daily  prochlorperazine, 5 mg, Intravenous, Q8H  sodium chloride, 10 mL, Intravenous, Q12H      Continuous Infusions:   PRN Meds:.•  morphine  •  sodium chloride  •  sodium chloride  •  sodium chloride    Assessment & Plan   Assessment & Plan     Active Hospital Problems    Diagnosis  POA   • **Cardiogenic shock (HCC) [R57.0]  Yes   • 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 [Z95.0]  Yes   • Nausea in adult [R11.0]  Yes   • Ulcer of right foot (HCC) [L97.519]  Yes   • STORM (acute kidney injury) (HCC) [N17.9]  Yes   • Type 2 myocardial infarction due to heart failure (HCC) [I50.9, I21.A1]  Yes   • 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]  Yes   • Stage 3 chronic kidney disease (HCC) [N18.30]  Yes   • Acute on chronic systolic congestive heart failure (HCC) [I50.23]  Yes   • Atrial fib/flutter (not on AC 2* bleeding and patient preference)  [I48.91, I48.92]  Yes   • Coronary artery disease involving native coronary artery of native heart without angina pectoris [I25.10]  Yes   • Hyperlipidemia LDL goal <70 [E78.5]  Yes      Resolved Hospital Problems    Diagnosis Date Resolved POA   • Acute on chronic systolic congestive heart failure (HCC) [I50.23] 01/04/2023 Yes   • Chronic passive congestion of liver [K76.1] 01/04/2023 Yes        Brief Hospital Course to date:  Saul Sal is a 83 y.o. 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 He has been taking Torsemide daily as instructed. Workup in the ER consistent with decompensated CHF. Cardiology evaluated and feel that given his multi organ failure he would be a good candidate for hospice.     [facility]  Hospice following. Palliative adjusting meds. Ativan trial today. Home with hospice tomorrow or the following day     Lactic acidosis     Cardiogenic Shock  HFrEF with acute exacerbation  Type II MI  Afib  Pacemaker  -Cardiology following, limited treatment options    Elevated LFT  Intractable Nausea   -us abdomin shows echogenicity of liver, maybe cirrhosis- assumed volume overload from heart failure   - no acute cholecystitis   -scheduled compazine         Expected Discharge Location and Transportation: Home with hospice   Expected Discharge  1/7 or 1/8  Expected Discharge Date and Time     Expected Discharge Date Expected Discharge Time    Jan 7, 2023            DVT prophylaxis:  Mechanical DVT prophylaxis orders are present.     AM-PAC 6 Clicks Score (PT): 19 (01/05/23 1147)    CODE STATUS:   Code Status and Medical Interventions:   Ordered at: 01/05/23 1500     Medical Intervention Limits:    NO intubation (DNI)     Code Status (Patient has no pulse and is not breathing):    No CPR (Do Not Attempt to Resuscitate)     Medical Interventions (Patient has pulse or is breathing):    Limited Support       Sandra Jones, DO  01/06/23

## 2023-01-06 NOTE — PROGRESS NOTES
Cardiology Progress Note      Reason for visit:    · End-stage heart failure with multiorgan involvement    IDENTIFICATION: 83-year-old gentleman who resides in [facility] Problems    Diagnosis  POA   • **Cardiogenic shock (HCC) [R57.0]  Yes     Priority: High   • STORM (acute kidney injury) (HCC) [N17.9]  Yes     Priority: High   • Type 2 myocardial infarction due to heart failure (HCC) [I50.9, I21.A1]  Yes     Priority: High   • Stage 3 chronic kidney disease (HCC) [N18.30]  Yes     Priority: High   • Acute on chronic systolic congestive heart failure (HCC) [I50.23]  Yes     Priority: High     · Echocardiogram (2/2019): LVEF 25%. Mild TR.  · Echocardiogram (12/1/2020): LVEF 20%.Moderate to severe MR. Moderate TR. Moderate pulmonary hypertension. Saline results negative.  · Echocardiogram (1/19/2022): LVEF 20%. Moderate pulmonic valve regurgitation is present. Mild MR.  · Echocardiogram (11/8/2022): LVEF < 20%.  Moderate MR.    · EP study and CRT pacemaker (BSC) by Dr. Mera for symptomatic VT, 11/10/2022  · [facility] admission for recurrent acute systolic heart failure/cardiogenic shock, 1/3/2023     • Atrial fib/flutter (not on AC 2* bleeding and patient preference)  [I48.91, I48.92]  Yes     Priority: High     · Typical flutter diagnosed 10/14/18  · Chadsvasc 5 (Age>75, HTN, CAD, CHF)  · Apixaban stopped due to bleeding  · Radiofrequency ablation of isthmus dependent atrial flutter by iTno Sampson, 11/26/2018  · 12-day Holter monitor (12/2018): 63% A. fib/flutter/SVT burden  · Echocardiogram, 11/8/2022: Left atrium 5.2 cm     • Coronary artery disease involving native coronary artery of native heart without angina pectoris [I25.10]  Yes     Priority: High     · CABGx5 2003  · Nuclear stress test (11/16/2018): medium-sized infarct located in the inferior wall and basal inferior lateral wall. LVEF 28%.   · Intolerant to beta-blocker, aspirin  · Cardiac cath for VT and syncope  (11/8/2022): PCI to SVG of RPDA.  Widely patent LIMA sequential LAD and diagonal and patent SVG to OM1 and OM 2     • 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 [Z95.0]  Yes     Priority: Medium     · CRT pacemaker placed by Dr. Mera 11/2022     • Ulcer of right foot (HCC) [L97.519]  Yes     Priority: Medium   • Hyperlipidemia LDL goal <70 [E78.5]  Yes     Priority: Medium     · Moderate intensity statin therapy indicated     • Nausea in adult [R11.0]  Yes     Priority: Low   • 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]  Yes            The patient is sitting up in the chair.  He is breathing easy on room air.  He still feels bloated in his abdomen.  His blood pressures have been stable.  He and his wife are transitioning to hospice care and hope to go home today.  They understand the poor prognosis.           Vital Sign Min/Max for last 24 hours  Temp  Min: 97.3 °F (36.3 °C)  Max: 98.4 °F (36.9 °C)   BP  Min: 82/56  Max: 123/80   Pulse  Min: 66  Max: 87   Resp  Min: 17  Max: 18   SpO2  Min: 88 %  Max: 96 %   No data recorded      Intake/Output Summary (Last 24 hours) at 1/6/2023 0834  Last data filed at 1/6/2023 0234  Gross per 24 hour   Intake 840 ml   Output 300 ml   Net 540 ml           Physical Exam  Constitutional:       General: He is awake.      Appearance: He is ill-appearing.   Cardiovascular:      Rate and Rhythm: Normal rate.      Pulses: Normal pulses.           Carotid pulses are 2+ on the right side and 2+ on the left side.       Radial pulses are 2+ on the right side and 2+ on the left side.        Femoral pulses are 2+ on the right side and 2+ on the left side.       Popliteal pulses are 2+ on the right side and 2+ on the left side.        Dorsalis pedis pulses are 2+ on the right side and 2+ on the left side.        Posterior tibial pulses are 2+ on the right side and 2+ on the left side.      Heart sounds: Murmur heard.    Systolic murmur is present with a grade of 2/6.     Comments:  Paced  Abdominal:      General: There is distension.   Musculoskeletal:      Right lower le+ Pitting Edema present.      Left lower le+ Pitting Edema present.   Skin:     General: Skin is warm and dry.   Neurological:      Mental Status: He is alert and oriented to person, place, and time.   Psychiatric:         Behavior: Behavior is cooperative.         Tele: Ventricular paced    Results Review (reviewed the patient's recent labs in the electronic medical record):     EKG (2023): Ventricular paced    CT of the chest without contrast (1/3/2023): Cardiomegaly without significant overt pulmonary edema.  Small right-sided pleural effusion.  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  Partially calcified pleural plaques similar to prior study.  Small amount of ascites and fluid within the abdomen and pelvis.  Diverticulosis noted    ECHO (2022): LVEF less than 20%.  Moderate MR.  Large left pleural effusion    Results from last 7 days   Lab Units 23  0520 23  0411 23  2045   SODIUM mmol/L 140 140 138   POTASSIUM mmol/L 4.0 4.3 5.0   CHLORIDE mmol/L 103 101 98   BUN mg/dL 53* 57* 53*   CREATININE mg/dL 1.93* 2.26* 2.33*   MAGNESIUM mg/dL  --  2.5*  --      Results from last 7 days   Lab Units 23  0411 23  0205 23  0023   TROPONIN T ng/mL 0.021 0.035* 0.023     Results from last 7 days   Lab Units 23  0411 23  2045   WBC 10*3/mm3 7.07 6.98   HEMOGLOBIN g/dL 13.2 14.8   HEMATOCRIT % 41.9 48.3   PLATELETS 10*3/mm3 111* 144       Lab Results   Component Value Date    HGBA1C 5.90 (H) 2023       Lab Results   Component Value Date    CHOL 130 2023    CHLPL 191 2022    TRIG 56 2023    HDL 28 (L) 2023    LDL 90 2023                   Cardiogenic shock  • Patient is essentially ambulatory cardiogenic shock that has not improved with maximally tolerated GDMT or CRT pacing  • With age, comorbidities, and multiorgan system failure, patient deserves  palliative consultation and end-of-life plan  • Hospice to evaluate today 1/6/2023     Stage D HFrEF with acute exacerbation  • EF <20 by echo 11/7/2022  • Multiple medication adjustments by heart and valve clinic  • Entresto discontinued due to hypotension  • Ascites and pulmonary vascular congestion  • proBNP 31,376  • Unable to tolerate diuretics due to hypotension     Type II myocardial infarction due to heart failure/demand ischemia  • Mild troponin elevation in the setting of end-stage heart failure and renal failure  • Recent cardiac catheterization showed acceptable coronary and graft anatomy     Coronary artery disease  · C 11/2022 with PCI to SVG of RPDA, adequately revascularized with patent LIMA and diagonal and patent SVG to OM1 and OM2  · Aspirin 81 mg day  · On Plavix 75 mg daily at home but currently on hold     Valvular heart disease  · Moderate MR on Echo 11/7/2022     NSVT/CRT pacemaker  · S/p EP study and CRT PPM implantation, 11/10/2022  · No inducible VT on EP study 11/2022  · Currently no arrhythmias this admission     Atrial fibrillation/flutter  · Not on anticoagulated due to GI bleed  · Status post ablation of atrial flutter 2018     Hypotension  · Midodrine discontinued 1/4/2023     Elevated LFTs/Cholelithiasis/Nausea  · Elevated LFTs likely related to passive liver congestion due to CHF.   and   · Management Per primary team       Hyperlipidemia  · Total cholesterol 130, triglycerides 56, HDL 28, LDL 90  · Statin on hold due to elevated LFTs    Stage III chronic kidney disease  · Creatinine improved from 2.26-1.93         · Patient and wife transitioning over to hospice care.  · Hopefully home with hospice today  · We will sign off.  Please consult cardiology with any further questions    Electronically signed by DOMONIQUE Nash, 01/05/23, 8:26 AM EST.

## 2023-01-06 NOTE — CASE MANAGEMENT/SOCIAL WORK
Continued Stay Note  [facility]     Patient Name: Saul Sal  MRN: [id number]  Today's Date: 1/6/2023    Admit Date: 1/3/2023    Plan: Home with Hospice   Discharge Plan     Row Name 01/06/23 1719       Plan    Plan Home with Hospice    Plan Comments Case mgt f/u. No intervention today, await Hospice evaluation and arrangments for home.               Discharge Codes    No documentation.               Expected Discharge Date and Time     Expected Discharge Date Expected Discharge Time    Jan 7, 2023             Sonja C Kellerman, RN

## 2023-01-06 NOTE — PROGRESS NOTES
Palliative Care Progress Note    Date of Admission: 1/3/2023    Subjective: Patient states that he does continue to have some dyspnea.  Feels that the 5 mg of morphine did not help but feels that the 10 mg of morphine is making him sleepy and drowsy.  Current Code Status     Date Active Code Status Order ID Comments User Context       1/5/2023 1500 No CPR (Do Not Attempt to Resuscitate) [id number]  Sandra Jones, DO Inpatient      Question Answer    Code Status (Patient has no pulse and is not breathing) No CPR (Do Not Attempt to Resuscitate)    Medical Interventions (Patient has pulse or is breathing) Limited Support    Medical Intervention Limits: NO intubation (DNI)              No current facility-administered medications on file prior to encounter.     Current Outpatient Medications on File Prior to Encounter   Medication Sig Dispense Refill   • aspirin 81 MG EC tablet Take 81 mg by mouth Daily.     • atorvastatin (LIPITOR) 20 MG tablet Take 1 tablet by mouth Every Night. (Patient taking differently: Take 20 mg by mouth 3 (Three) Times a Week. Per pt, taking on MWF) 90 tablet 3   • clopidogrel (PLAVIX) 75 MG tablet Take 1 tablet by mouth Daily. (Patient not taking: Reported on 1/4/2023) 90 tablet 1   • Cod Liver Oil 1000 MG capsule OTC Take 2 capsules in the morning and 1 capsule in the afternoon     • levothyroxine (SYNTHROID, LEVOTHROID) 125 MCG tablet TAKE 1 TABLET EVERY DAY 90 tablet 1   • midodrine (PROAMATINE) 5 MG tablet Take 1 tablet by mouth 3 (Three) Times a Day Before Meals. (Patient taking differently: Take 5 mg by mouth 3 (Three) Times a Day Before Meals. For SBP <100) 270 tablet 1   • Misc Natural Products (Advanced Joint Relief) capsule Take 1 capsule by mouth Daily. OTC     • NON FORMULARY OTC- Cardio-Plus 2080 : Take 4 tablets in the morning and 5 tablets in the evening     • NON FORMULARY OTC- Cataplex B 1250mg: Take 3 tablets BID     • potassium chloride 10 MEQ CR tablet Take 2  "tablets by mouth Daily. 60 tablet 0   • torsemide (DEMADEX) 20 MG tablet Take 2 tablets for 2 days and then one tablet daily (Patient taking differently: Take 20 mg by mouth Daily. Take 2 tablets for 2 days and then one tablet daily) 35 tablet 1   • Turmeric 450 MG capsule Take 1 capsule by mouth Daily. OTC (Turmeric Forte)          •  LORazepam  •  morphine  •  sodium chloride  •  sodium chloride  •  sodium chloride    Objective: /81 (BP Location: Left arm, Patient Position: Sitting)   Pulse 81   Temp 98 °F (36.7 °C) (Oral)   Resp 20   Ht 182.9 cm (72\")   Wt 89.7 kg (197 lb 12.8 oz)   SpO2 96%   BMI 26.83 kg/m²      Intake/Output Summary (Last 24 hours) at 1/6/2023 1339  Last data filed at 1/6/2023 1300  Gross per 24 hour   Intake 825 ml   Output 500 ml   Net 325 ml     Physical Exam:      General Appearance:    Alert, cooperative, in no acute distress   Head:    Normocephalic, without obvious abnormality, atraumatic   Eyes:            Lids and lashes normal, conjunctivae and sclerae normal, no   icterus, no pallor, corneas clear, PERRLA   Ears:    Ears appear intact with no abnormalities noted   Throat:   No oral lesions, no thrush, oral mucosa moist   Neck:   No adenopathy, supple, trachea midline, no thyromegaly, no   carotid bruit, no JVD   Back:     No kyphosis present, no scoliosis present, no skin lesions,      erythema or scars, no tenderness to percussion or                   palpation,   range of motion normal   Lungs:     Clear to auscultation,respirations regular, even and                  unlabored    Heart:    Regular rhythm and normal rate, normal S1 and S2, no            murmur, no gallop, no rub, no click   Chest Wall:    No abnormalities observed   Abdomen:     Normal bowel sounds, no masses, no organomegaly, soft        non-tender, non-distended, no guarding, no rebound                tenderness   Rectal:     Deferred   Extremities:   Moves all extremities well, no edema, no " cyanosis, no             redness   Pulses:   Pulses palpable and equal bilaterally   Skin:   No bleeding, bruising or rash   Lymph nodes:   No palpable adenopathy   Neurologic:   Cranial nerves 2 - 12 grossly intact, sensation intact, DTR       present and equal bilaterally     Results from last 7 days   Lab Units 01/04/23  0411   WBC 10*3/mm3 7.07   HEMOGLOBIN g/dL 13.2   HEMATOCRIT % 41.9   PLATELETS 10*3/mm3 111*     Results from last 7 days   Lab Units 01/05/23  0520 01/04/23  0411 01/03/23 2045   SODIUM mmol/L 140   < > 138   POTASSIUM mmol/L 4.0   < > 5.0   CHLORIDE mmol/L 103   < > 98   CO2 mmol/L 21.0*   < > 19.0*   BUN mg/dL 53*   < > 53*   CREATININE mg/dL 1.93*   < > 2.33*   CALCIUM mg/dL 8.4*   < > 9.4   BILIRUBIN mg/dL  --   --  1.6*   ALK PHOS U/L  --   --  133*   ALT (SGPT) U/L  --   --  119*   AST (SGOT) U/L  --   --  147*   GLUCOSE mg/dL 81   < > 129*    < > = values in this interval not displayed.       Impression: CHF  Dyspnea  Nausea/vomiting  Debility  Restless leg syndrome  Goals of care  Plan: Did talk to the patient as well as the patient's wife about symptom management.  Went into detail about discussing the pros and cons of increasing or decreasing the patient's morphine.  I did tell him that I feel that the sleepiness and drowsiness will improve after a few days of being on the morphine.  Even with this in mind they want to decrease the morphine.  Did talk to them about their overall plans and they are talking to hospice at some point today about the possibility of going home with hospice.      Time: More than 50% of time spent in counseling and coordination of care:  Total face-to-face/floor time 30 min.  Time spent in counseling 20 min. Counseling included the following topics: see above      Sabino Garber DO  01/06/23  13:39 EST

## 2023-01-06 NOTE — CONSULTS
Continued Stay Note  [facility]     Patient Name: Saul Sal  MRN: [id number]  Today's Date: 1/5/2023    Admit Date: 1/3/2023    Plan: To be determine   Discharge Plan     Row Name 01/05/23 1936       Plan    Plan To be determine    Plan Comments Hospice referral received, noted in the referral that pt's wife will be at the bedside tomorrow, 1/6. Will meet with pt and wife to discuss hospice at that time. Please call [phone] if can be of further assistance.               Discharge Codes    No documentation.               Expected Discharge Date and Time     Expected Discharge Date Expected Discharge Time    Jan 7, 2023             Amy Barber RN

## 2023-01-06 NOTE — TELEPHONE ENCOUNTER
Antonino from [facility] called wanted to know if dr ibrahim would like to follow with hospice and the attending.

## 2023-01-06 NOTE — PLAN OF CARE
Goal Outcome Evaluation:           Progress: no change  Outcome Evaluation: Patient demonstrated ability to control his walker in hallway. He was limited by some tachycardia requireing sitting rest brest.

## 2023-01-06 NOTE — PLAN OF CARE
Goal Outcome Evaluation:                 Problem: Fall Injury Risk  Goal: Absence of Fall and Fall-Related Injury  Intervention: Identify and Manage Contributors  Recent Flowsheet Documentation  Taken 1/6/2023 0400 by Kameron Nobles RN  Medication Review/Management: medications reviewed  Taken 1/6/2023 0200 by Kameron Nobles RN  Medication Review/Management: medications reviewed  Taken 1/6/2023 0000 by Kameron Nobles RN  Medication Review/Management: medications reviewed  Taken 1/5/2023 2200 by Kameron Nobles RN  Medication Review/Management: medications reviewed  Taken 1/5/2023 2000 by Kameron Nobles RN  Medication Review/Management: medications reviewed  Intervention: Promote Injury-Free Environment  Recent Flowsheet Documentation  Taken 1/6/2023 0400 by Kameron Nobles RN  Safety Promotion/Fall Prevention:   activity supervised   safety round/check completed   toileting scheduled  Taken 1/6/2023 0200 by Kameron Nobles RN  Safety Promotion/Fall Prevention:   activity supervised   safety round/check completed   toileting scheduled  Taken 1/6/2023 0000 by Kameron Nobles RN  Safety Promotion/Fall Prevention:   activity supervised   safety round/check completed   toileting scheduled  Taken 1/5/2023 2200 by Kameron Nobles RN  Safety Promotion/Fall Prevention:   activity supervised   safety round/check completed   toileting scheduled  Taken 1/5/2023 2000 by Kameron Nobles RN  Safety Promotion/Fall Prevention:   activity supervised   safety round/check completed   toileting scheduled     VSS, voids well, rested throughout the night, pain managed with PRN medications, will continue to monitor for changes.

## 2023-01-06 NOTE — PLAN OF CARE
Goal Outcome Evaluation:  Plan of Care Reviewed With: patient        Progress: no change  Outcome Evaluation: Pt. sitting up in bedside chair complaining of dyspnea.  Pt stated morphine does not help much with shortness of breath and makes him very drowsy.  Pt. stated a shot of bourbon helps his dyspnea at home.  Dr. Jones has added ativan to to his dyspnea regimen to see if it helps ease his breathing.  Daughter and granddaughter at bedside.  Hospice liason to meet with pt and family this afternoon.  Palliative care to continue to follow for support, POC and ongoing GOC.    1330 Palliative IDT meeting: MEG Loo RN, CHPN; VELIA Richmond, APRN; KENNEDY Garber, DO; JULIUS Salinas, [facility], [facility]; MSW; BEAU Quevedo MDiv, [facility]; BEAU De La Cruz RN, CHPN;  Inpatient hospice team; COREY Barber RN, CHPN; MEG Sanchez, DOMONIQUE    After hours, weekends and holidays, contact Palliative Provider by calling [phone].

## 2023-01-06 NOTE — DISCHARGE PLACEMENT REQUEST
"Estefany Marcos (83 y.o. Male)   Referred by Dr. BEAU Jones  PCP-Dr. Estefany Campbell  Dx-CHF, BNP 31,376. One month ago BNP 5,129  Tested negative for covid 19 on1/3/2023      Date of Birth   1940    Social Security Number       Address   [address] [place] [address]  [place] [address]    Home Phone   [phone]    MRN   [id number]       Uatsdin   Taoist    Marital Status                               Admission Date   1/3/23    Admission Type   Emergency    Admitting Provider   Sandra Jones DO    Attending Provider   Sandra Jones DO    Department, Room/Bed   [address], S378/1       Discharge Date       Discharge Disposition       Discharge Destination                               Attending Provider: Sandra Jones DO    Allergies: Aspirin, Eliquis [Apixaban], Amlodipine Besylate, Atenolol, Empagliflozin, Metoprolol, Milk-related Compounds    Isolation: None   Infection: None   Code Status: No CPR    Ht: 182.9 cm (72\")   Wt: 89.7 kg (197 lb 12.8 oz)    Admission Cmt: None   Principal Problem: Cardiogenic shock (HCC) [R57.0]                 Active Insurance as of 1/3/2023     Primary Coverage     Payor Plan Insurance Group Employer/Plan Group    HUMANA MEDICARE REPLACEMENT HUMANA MEDICARE REPLACEMENT [id number]     Payor Plan Address Payor Plan Phone Number Payor Plan Fax Number Effective Dates    [address] [address] [phone]  1/1/2022 - None Entered    [facility] [id number]       Subscriber Name Subscriber Birth Date Member ID       ESTEFANY MARCOS 1940 [id number]                 Emergency Contacts      (Rel.) Home Phone Work Phone Mobile Phone    Shelia Marcos (Spouse) [phone] -- [phone]    Atiya Marcos (Daughter) -- -- [phone]            Emergency Contact Information     Name Relation Home Work Mobile    Shelia Marcos Spouse [phone]    Atiya Marcos Daughter   [phone]          Insurance " Information                HUMANA MEDICARE REPLACEMENT/HUMANA MEDICARE REPLACEMENT Phone: [phone]    Subscriber: Saul Sal Subscriber#: [id number]    Group#: [id number] Precert#: [id number]             History & Physical      June Olson THADDEUS DO at 23 0020              [facility]  HISTORY AND PHYSICAL    Patient Name: Saul Sal  : 1940  MRN: [id number]  Primary Care Physician: Saul Campbell MD  Date of admission: 1/3/2023    Subjective    Subjective     Chief Complaint:  Shortness of breath, nausea    HPI:  Saul Sal is a 83 y.o. male with PMH significant for A. 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  He states that he has been having difficulty with shortness of breath since the placement of his PPM in November.  He notes that he was previously on Lasix which was discontinued and he is now on torsemide daily.  He continues to have lower extremity swelling as well as abdominal bloating with associated nausea and decreased appetite.  He denies chest pain, fever, cough.  He notes that he just overall feels poorly and weak.  He states that he takes midodrine as needed at home and has required 2 doses today secondary to hypotension.  Upon arrival to the ED, he was noted to have elevated troponin and proBNP.  He has STORM and elevated LFTs.  He also has lactic acidosis.  CT chest notes cardiomegaly without overt pulmonary edema and concern for small right-sided pleural effusion.  CT abdomen/pelvis notes small amount of ascites and fluid within the abdomen and pelvis as well as cholelithiasis with small amount of fluid surrounding the gallbladder.  He was given Lasix 80 mg while in the ED.  He will be admitted to hospital medicine for further evaluation.      Review  of Systems   Constitutional: Positive for activity change, appetite change and fatigue. Negative for chills, diaphoresis, fever and unexpected weight change.   HENT: Positive for trouble swallowing. Negative for congestion, facial swelling and sinus pain.    Eyes: Negative.  Negative for visual disturbance.   Respiratory: Positive for shortness of breath. Negative for cough, chest tightness and wheezing.    Cardiovascular: Positive for leg swelling. Negative for chest pain and palpitations.   Gastrointestinal: Positive for abdominal distention and nausea. Negative for abdominal pain, constipation, diarrhea and vomiting.   Endocrine: Negative.  Negative for polyuria.   Genitourinary: Positive for frequency and urgency. Negative for decreased urine volume, difficulty urinating and dysuria.   Musculoskeletal: Positive for gait problem. Negative for arthralgias, back pain and myalgias.   Skin: Positive for wound. Negative for color change, pallor and rash.   Allergic/Immunologic: Negative.  Negative for immunocompromised state.   Neurological: Positive for weakness. Negative for dizziness, seizures, syncope, facial asymmetry, speech difficulty, light-headedness, numbness and headaches.   Hematological: Negative.  Does not bruise/bleed easily.   Psychiatric/Behavioral: Negative.  Negative for confusion. The patient is not nervous/anxious.         All other systems reviewed and are negative.     Personal History     Past Medical History:   Diagnosis Date   • Abnormal ECG    • Arrhythmia    • Arthritis    • Atrial fibrillation (HCC)    • Atrial flutter (HCC)     s/p typical flutter ablation with recurrent atypical flutter   • Benign essential hypertension    • BPH (benign prostatic hyperplasia)    • CHF (congestive heart failure) (HCC)    • Clotting disorder (HCC) too many aspirin   • Congenital heart disease    • Coronary artery disease    • Diverticulosis    • GERD (gastroesophageal reflux disease)    • GI bleed    •  HFrEF (heart failure with reduced ejection fraction) ([facility])    • Hyperlipidemia    • Hypertension    • Hypothyroidism (acquired)    • Obesity (BMI 30-39.9)    • ELIE (obstructive sleep apnea)    • Osteoarthritis    • Shingles    • Skin cancer     squamous    • Stage 3 chronic kidney disease ([facility]) 12/21/2022   • Wears eyeglasses     readers   • Wears hearing aid              Past Surgical History:   Procedure Laterality Date   • ABLATION OF DYSRHYTHMIC FOCUS  12/2018   • ANGIOPLASTY  1985   • CARDIAC CATHETERIZATION     • CARDIAC CATHETERIZATION N/A 11/8/2022    Procedure: Left Heart Cath;  Surgeon: Marco Urena MD;  Location:  [facility] CATH [facility];  Service: Cardiovascular;  Laterality: N/A;   • CARDIAC ELECTROPHYSIOLOGY PROCEDURE N/A 11/26/2018    Procedure: Ablation atrial flutter;  Surgeon: Tino Sampson MD;  Location:  [facility] EP INVASIVE [facility];  Service: Cardiovascular   • CARDIAC ELECTROPHYSIOLOGY PROCEDURE N/A 11/10/2022    Procedure: Pacemaker DC new;  Surgeon: Bruce Mera DO;  Location:  [facility] EP INVASIVE [facility];  Service: Cardiology;  Laterality: N/A;   • CARDIAC SURGERY      BYPASS X5   • COLONOSCOPY  2009   • COLONOSCOPY  2005   • CORONARY ANGIOPLASTY  1985   • [facility]ON[facility]  [facility]   • DENTAL PROCEDURE  2010    dental surg and crowns   • EYE SURGERY Right     cataract   • SKIN BIOPSY     • VASECTOMY         Family History:  family history includes Coronary artery disease in an other family member; Diabetes in his mother and another family member; Heart disease in his mother; Other in an other family member; Rheumatic fever in an other family member; Stroke in an other family member. Otherwise pertinent FHx was reviewed and unremarkable.     Social History:  reports that he quit smoking about 58 years ago. His smoking use included cigarettes. He started smoking about 66 years ago. He has a 8.00 pack-year smoking history. He has never used  smokeless tobacco. He reports that he does not drink alcohol and does not use drugs.  Social History     Social History Narrative    Caffeine: None    Patient lives at his home with   His wife       Medications:  Advanced Joint Relief, Cod Liver Oil, NON FORMULARY, Turmeric, aspirin, atorvastatin, clopidogrel, levothyroxine, midodrine, potassium chloride, and torsemide    Allergies   Allergen Reactions   • Aspirin GI Bleeding   • Eliquis [Apixaban] Other (See Comments)     Excessive bleeding   • Amlodipine Besylate Unknown (See Comments)     Unknown     • Atenolol Unknown (See Comments)     unknown   • Empagliflozin Other (See Comments)     Low BP   • Metoprolol Unknown (See Comments)     Unknown   • Milk-Related Compounds Other (See Comments)     Headache       Objective    Objective     Vital Signs:   Temp:  [98.9 °F (37.2 °C)] 98.9 °F (37.2 °C)  Heart Rate:  [109] 109  Resp:  [18] 18  BP: (110)/(92) 110/92    Physical Exam   Constitutional: Awake, alert, no acute distress   Eyes: PERRLA, sclerae anicteric, no conjunctival injection  HENT: NCAT, mucous membranes moist  Neck: Supple, no thyromegaly, no lymphadenopathy, trachea midline  Respiratory: rales to auscultation bilaterally, nonlabored respirations   Cardiovascular: RRR, no murmurs, rubs, or gallops, palpable pedal pulses bilaterally  Gastrointestinal: Positive bowel sounds, soft, nontender, nondistended  Musculoskeletal: +3-4 bilateral ankle edema, no clubbing or cyanosis to extremities  Psychiatric: Appropriate affect, cooperative  Neurologic: Oriented x 3, strength symmetric in all extremities, Cranial Nerves grossly intact to confrontation, speech clear  Skin: No rashes, wound right 2nd toe with skin blistering and sloughing           Result Review:  I have personally reviewed the results from the time of this admission to 1/4/2023 00:58 EST and agree with these findings:  [x]  Laboratory list / accordion  []  Microbiology  [x]  Radiology  [x]   EKG/Telemetry   []  Cardiology/Vascular   []  Pathology  [x]  Old records  []  Other:  Most notable findings include:     LAB RESULTS:      Lab 01/03/23 2045   WBC 6.98   HEMOGLOBIN 14.8   HEMATOCRIT 48.3   PLATELETS 144   NEUTROS ABS 4.65   IMMATURE GRANS (ABS) 0.03   LYMPHS ABS 1.77   MONOS ABS 0.46   EOS ABS 0.04   MCV 95.3   LACTATE 5.8*         Lab 01/03/23 2045   SODIUM 138   POTASSIUM 5.0   CHLORIDE 98   CO2 19.0*   ANION GAP 21.0*   BUN 53*   CREATININE 2.33*   EGFR 27.1*   GLUCOSE 129*   CALCIUM 9.4         Lab 01/03/23 2045   TOTAL PROTEIN 7.8   ALBUMIN 4.5   GLOBULIN 3.3   ALT (SGPT) 119*   AST (SGOT) 147*   BILIRUBIN 1.6*   ALK PHOS 133*   LIPASE 40         Lab 01/03/23 2045   PROBNP 31,376.0*   TROPONIN T 0.032*                 Brief Urine Lab Results     None        Microbiology Results (last 10 days)     Procedure Component Value - Date/Time    COVID PRE-OP / PRE-PROCEDURE SCREENING ORDER (NO ISOLATION) - Swab, Nasopharynx [[id number]]  (Normal) Collected: 01/03/23 2143    Lab Status: Final result Specimen: Swab from Nasopharynx Updated: 01/03/23 2349    Narrative:      The following orders were created for panel order COVID PRE-OP / PRE-PROCEDURE SCREENING ORDER (NO ISOLATION) - Swab, Nasopharynx.  Procedure                               Abnormality         Status                     ---------                               -----------         ------                     Respiratory Panel PCR w/...[[id number]]  Normal              Final result                 Please view results for these tests on the individual orders.    Respiratory Panel PCR w/COVID-19(SARS-CoV-2) [facility] In-House, NP Swab in UTM/VTM, 3-4 HR TAT - Swab, Nasopharynx [[id number]]  (Normal) Collected: 01/03/23 2143    Lab Status: Final result Specimen: Swab from Nasopharynx Updated: 01/03/23 2349     ADENOVIRUS, PCR Not Detected     Coronavirus 229E Not Detected     Coronavirus HKU1 Not Detected     Coronavirus  NL63 Not Detected     Coronavirus OC43 Not Detected     COVID19 Not Detected     Human Metapneumovirus Not Detected     Human Rhinovirus/Enterovirus Not Detected     Influenza A PCR Not Detected     Influenza B PCR Not Detected     Parainfluenza Virus 1 Not Detected     Parainfluenza Virus 2 Not Detected     Parainfluenza Virus 3 Not Detected     Parainfluenza Virus 4 Not Detected     RSV, PCR Not Detected     Bordetella pertussis pcr Not Detected     Bordetella parapertussis PCR Not Detected     Chlamydophila pneumoniae PCR Not Detected     Mycoplasma pneumo by PCR Not Detected    Narrative:      In the setting of a positive respiratory panel with a viral infection PLUS a negative procalcitonin without other underlying concern for bacterial infection, consider observing off antibiotics or discontinuation of antibiotics and continue supportive care. If the respiratory panel is positive for atypical bacterial infection (Bordetella pertussis, Chlamydophila pneumoniae, or Mycoplasma pneumoniae), consider antibiotic de-escalation to target atypical bacterial infection.          CT Abdomen Pelvis Without Contrast    Result Date: 1/3/2023  CT ABDOMEN PELVIS WO CONTRAST-, CT CHEST WO CONTRAST DIAGNOSTIC-  Date of Exam: 1/3/2023 10:35 PM  Indication: nausea/ abdominal pain; I50.23-Acute on chronic systolic (congestive) heart failure.  Comparison: 10/14/2018  Technique: Contiguous axial CT images were obtained from the lung bases to the to the pubic symphysis without contrast.  Sagittal and coronal reconstructions were performed.  Automated exposure control and iterative reconstruction methods were used.     FINDINGS: Chest:  Mediastinum: Heart size is enlarged. No suspicious pericardial fluid collection noted.  Patient is status post median sternotomy and CABG. There is calcification of the native coronary arteries.  Limited noncontrast imaging of the aorta and the origin of the great vessels is grossly unremarkable in  appearance.  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  Main pulmonary artery is normal in caliber  No suspicious hilar or mediastinal adenopathy is noted. There is evidence of old granulomatous disease. The Limited imaging of the base of the neck and the esophagus are grossly unremarkable  Lungs/pleura: There is a small right-sided pleural effusion.  Central airways are patent.  Partially calcified pleural plaques are again noted.  Partially calcified 4 mm nodule right middle lobe compatible granulomatous disease. Additional evidence of old granulomatous disease noted. Minimal groundglass and irregular opacities noted at the lung bases. No suspicious areas of consolidation noted. No significant overt pulmonary edema.  Soft Tissues/Bones: No destructive bone lesion.  Abdomen/Pelvis: Organs: Stones and sludge are noted within the gallbladder. There is mild stranding surrounding the gallbladder.  Limited noncontrast imaging of the liver, spleen, pancreas and right adrenal gland are unremarkable in appearance. Indication of the left stable compatible sequela prior. Low-attenuation lesion within the left kidney compatible with cysts. Perinephric stranding noted bilaterally.  GI/Bowel: There is a small hiatal hernia. The stomach is incompletely distended and otherwise grossly unremarkable on limited noncontrast imaging.  Small bowel demonstrates no acute abnormality  There is diverticulosis more prominently within the sigmoid colon. Stranding is noted within the mesentery and small amount of free fluid noted within the pelvis.  Pelvis: Urinary bladder is mildly thick-walled this is accentuated by incomplete distention.  Bilateral fat-containing inguinal hernias are noted. The prostate is mildly enlarged.   Peritoneum/Retroperitoneum: Atherosclerotic changes are noted of the aorta which is tortuous. No suspicious retroperitoneal adenopathy noted  Bones/Soft Tissues: Mild degree of subcutaneous edema is  noted. Multilevel degenerative changes are noted of the spine      Impression: Chest: 1. Cardiomegaly without significant overt pulmonary edema 2. Small right-sided pleural effusion 3. Evidence of old granulomatous disease 4. Partially calcified pleural plaques similar to the prior study.  Abdomen and pelvis:  1. Small amount of ascites and fluid within the abdomen and pelvis limited evaluation for acute inflammatory changes. Please correlate for cardiac, liver or renal dysfunction 2. There is cholelithiasis and small amount of fluid surrounding the gallbladder which is mentioned above is indeterminate. If there is clinical concern for acute cholecystitis follow-up can always be considered with a nuclear medicine hepatobiliary scan 3. Diverticulosis is noted. Evaluation for diverticulitis is limited by ascites      This report was finalized on 1/3/2023 11:28 PM by Wayne West.      CT Chest Without Contrast Diagnostic    Result Date: 1/3/2023  CT ABDOMEN PELVIS WO CONTRAST-, CT CHEST WO CONTRAST DIAGNOSTIC-  Date of Exam: 1/3/2023 10:35 PM  Indication: nausea/ abdominal pain; I50.23-Acute on chronic systolic (congestive) heart failure.  Comparison: 10/14/2018  Technique: Contiguous axial CT images were obtained from the lung bases to the to the pubic symphysis without contrast.  Sagittal and coronal reconstructions were performed.  Automated exposure control and iterative reconstruction methods were used.     FINDINGS: Chest:  Mediastinum: Heart size is enlarged. No suspicious pericardial fluid collection noted.  Patient is status post median sternotomy and CABG. There is calcification of the native coronary arteries.  Limited noncontrast imaging of the aorta and the origin of the great vessels is grossly unremarkable in appearance.  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  Main pulmonary artery is normal in caliber  No suspicious hilar or mediastinal adenopathy is noted. There is evidence of  old granulomatous disease. The Limited imaging of the base of the neck and the esophagus are grossly unremarkable  Lungs/pleura: There is a small right-sided pleural effusion.  Central airways are patent.  Partially calcified pleural plaques are again noted.  Partially calcified 4 mm nodule right middle lobe compatible granulomatous disease. Additional evidence of old granulomatous disease noted. Minimal groundglass and irregular opacities noted at the lung bases. No suspicious areas of consolidation noted. No significant overt pulmonary edema.  Soft Tissues/Bones: No destructive bone lesion.  Abdomen/Pelvis: Organs: Stones and sludge are noted within the gallbladder. There is mild stranding surrounding the gallbladder.  Limited noncontrast imaging of the liver, spleen, pancreas and right adrenal gland are unremarkable in appearance. Indication of the left stable compatible sequela prior. Low-attenuation lesion within the left kidney compatible with cysts. Perinephric stranding noted bilaterally.  GI/Bowel: There is a small hiatal hernia. The stomach is incompletely distended and otherwise grossly unremarkable on limited noncontrast imaging.  Small bowel demonstrates no acute abnormality  There is diverticulosis more prominently within the sigmoid colon. Stranding is noted within the mesentery and small amount of free fluid noted within the pelvis.  Pelvis: Urinary bladder is mildly thick-walled this is accentuated by incomplete distention.  Bilateral fat-containing inguinal hernias are noted. The prostate is mildly enlarged.   Peritoneum/Retroperitoneum: Atherosclerotic changes are noted of the aorta which is tortuous. No suspicious retroperitoneal adenopathy noted  Bones/Soft Tissues: Mild degree of subcutaneous edema is noted. Multilevel degenerative changes are noted of the spine      Impression: Chest: 1. Cardiomegaly without significant overt pulmonary edema 2. Small right-sided pleural effusion 3. Evidence of  old granulomatous disease 4. Partially calcified pleural plaques similar to the prior study.  Abdomen and pelvis:  1. Small amount of ascites and fluid within the abdomen and pelvis limited evaluation for acute inflammatory changes. Please correlate for cardiac, liver or renal dysfunction 2. There is cholelithiasis and small amount of fluid surrounding the gallbladder which is mentioned above is indeterminate. If there is clinical concern for acute cholecystitis follow-up can always be considered with a nuclear medicine hepatobiliary scan 3. Diverticulosis is noted. Evaluation for diverticulitis is limited by ascites      This report was finalized on 1/3/2023 11:28 PM by Wayne West.      XR Chest 1 View    Result Date: 1/3/2023  DATE OF EXAM: 1/3/2023 8:20 PM  PROCEDURE: XR CHEST 1 VW-  INDICATIONS: SOA triage protocol  COMPARISON: 12/28/2022  TECHNIQUE: Single radiographic AP view of the chest was obtained.  FINDINGS: Left subclavian triple lead pacer. Cardiomegaly. Prominence of the central pulmonary vasculature. Lungs clear. Slight blunting of both costophrenic angles      Impression: Cardiomegaly with pulmonary vascular congestion and trace pleural effusions  This report was finalized on 1/3/2023 8:52 PM by Santy Garcia.        Results for orders placed during the hospital encounter of 11/07/22    Adult Transthoracic Echo Complete W/ Cont if Necessary Per Protocol    Interpretation Summary  •  Left ventricular systolic function is severely decreased. Left ventricular ejection fraction appears to be less than 20%.  •  The left ventricular cavity is moderately dilated.  •  Left ventricular diastolic dysfunction is noted.  •  Left atrial volume is moderately increased.  •  Moderate mitral valve regurgitation is present.  •  The right ventricular cavity is moderately dilated.  •  Estimated right ventricular systolic pressure from tricuspid regurgitation is normal (<35 mmHg).  •  There is a large left pleural  effusion.      Assessment & Plan   Assessment & Plan       Acute on chronic systolic congestive heart failure (HCC)    Coronary artery disease involving native coronary artery of native heart without angina pectoris    Atrial fib/flutter (not on AC 2* bleeding and patient preference)     Hyperlipidemia LDL goal <70    ELIE not on CPAP    Hypothyroidism    GERD (gastroesophageal reflux disease)    Stage 3 chronic kidney disease (HCC)    STORM (acute kidney injury) (HCC)    Elevated troponin    Elevated LFTs    Other ascites    83 y.o. male with PMH significant for A. 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    Acute on Chronic systolic heart failure   Ascites   -No longer on Entresto secondary to hypotension   -Currently on Torsemide 20mg daily  -Follows outpt with heart valve clinic and has had several medication adjustments/ extra diuresing over the past 2 months   -daily weight   -cardiology consult in am   -Lasix 80 mg given in ED, hold further diuresis pending cardiology eval in am   -CBC, BMP in am       STORM on CKD  -UA pending   -monitor closely given need for continued diuresis   -BMP in am     Elevated LFTs  -likely related to above  -CT abdomen/pelvis notes cholelithiasis without noted obstruction   -ultrasound liver in am   -acute hep panel negative     Elevated troponin   -likely NSTEMI type 2 secondary to renal function and acute CHF   -denies chest pain   -trend troponin  -trend EKG   -Cardiology consult in am     Nausea/cholelithaisis   -likely related to ascites/volume overload   -CT abd/pelvis did note cholelithiasis with gallbladder stranding   -ultrasound gallbladder in am, Consult Gen Surgery based on results.   -not a good surgical  candidate   -consider hidascan     Sepsis  -started on Zosyn   -avoiding aggressive fluid boluses due to CHF exac.   -check UA, further eval foot and gall bladder as sources of sepsis.     Right 2nd Toe wound   -wound consult in am     Hypothyroid   -continue levothyroxine    Atrial Fib/Flutter  -rate controlled   -unable to tolerate BB   -no anticoagulation secondary to GI bleeding     ELIE  -CPAP/BiPAP intolerant     CAD  Dyslipidemia   Hypotension   -continue daily ASA   -continue daily midodrine, uses PRN at home   -hold statin secondary to elevated LFTs  -continue plavix       DVT prophylaxis:  No mechanical secondary to lower extremity edema/heartfailure, no pharmacologics secondary to history of GI bleed with anticoagulation    CODE STATUS:    Code Status (Patient has no pulse and is not breathing): CPR (Attempt to Resuscitate)  Medical Interventions (Patient has pulse or is breathing): Full Support      Expected Discharge   3-4 days     This note has been completed as part of a split-shared workflow.     Signature: Electronically signed by DOMONIQUE Anthony, 01/04/23, 12:59 AM EST.          Attending   Admission Attestation       I have performed an independent face-to-face diagnostic evaluation including performing an independent physical examination as documented here.  The documented plan of care above was reviewed and developed with the advanced practice clinician (APC).      Brief Summary Statement:   Saul Sal is a 83 y.o. 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 Pt reports worsening SOA since PPM placement in November. He reprots he was previously on Lasix, however this was changed to torsemide. C/o increased Lower ext edema and abdominal distension since medication change.  He was noted on arrival to have  acute on chronic CHF exac, ascites, and elevated trop. Also noted on CT to have cholelithiasis and noted to have elevated LFTs. Right upper quadrant ultrasound ordered. Pt was made NPO, however was drinking liquid until approx 5 am.   Lactic at admission was 5.8. no obvious source of sepsis. Possibly secondary to wound on left foot, vs GB. Started on Zosyn. Will image left foot. Xray ordered. Will also check sed rate and CRP. Consider discussion regarding prognosis and consider palative consult due to multiple comorbities. Based on RUQ ultrasound findings, he may not be a candidate for any surgical intervention.     Remainder of detailed HPI is as noted by APC and has been reviewed and/or edited by me for completeness.    Attending Physical Exam:  Temp:  [97.8 °F (36.6 °C)-98.9 °F (37.2 °C)] 97.8 °F (36.6 °C)  Heart Rate:  [] 78  Resp:  [14-18] 14  BP: (102-113)/(76-92) 102/83    Constitutional: Awake, alert  Eyes: PERRLA, sclerae anicteric, no conjunctival injection  HENT: NCAT, mucous membranes moist  Neck: Supple, no thyromegaly, no lymphadenopathy, trachea midline  Respiratory: Clear to auscultation bilaterally, nonlabored respirations   Cardiovascular: irregular, Murmur noted , rubs, or gallops, decreased pulses.   Gastrointestinal: Positive bowel sounds, soft, distended with mild RUQ tenderness. Musculoskeletal: ++ edema, no clubbing or cyanosis to extremities  Psychiatric: Appropriate affect, cooperative  Neurologic: Oriented x 3, strength symmetric in all extremities, Cranial Nerves grossly intact to confrontation, speech clear  Skin: No rashes      Brief Assessment/Plan :  See detailed assessment and plan developed with APC which I have reviewed and/or edited for completeness.      June Olson DO  01/04/23                        Electronically signed by June Olson DO at 01/04/23 0555         Current Facility-Administered Medications   Medication Dose Route Frequency Provider Last Rate Last  Admin   • aspirin EC tablet 81 mg  81 mg Oral Daily Tamanna Green APRN   81 mg at 01/05/23 0938   • clopidogrel (PLAVIX) tablet 75 mg  75 mg Oral Daily Maria Dolores Miranda APRN   75 mg at 01/05/23 1201   • levothyroxine (SYNTHROID, LEVOTHROID) tablet 125 mcg  125 mcg Oral Q AM Tamanna Green APRN   125 mcg at 01/05/23 0530   • morphine concentrated solution 5 mg  5 mg Oral Q3H PRN Sabino Garber, DO   5 mg at 01/05/23 1617   • Pharmacy Consult - MTM   Does not apply BID Sherin Portillo RPH       • potassium chloride (MICRO-K) CR capsule 20 mEq  20 mEq Oral Daily Tamanna Green APRN   20 mEq at 01/05/23 0938   • prochlorperazine (COMPAZINE) injection 5 mg  5 mg Intravenous Q8H Sandra Jones, DO   5 mg at 01/05/23 1601   • sodium chloride 0.9 % flush 10 mL  10 mL Intravenous PRN Bradford Valdez MD       • sodium chloride 0.9 % flush 10 mL  10 mL Intravenous Q12H Tamanna Green APRN   10 mL at 01/04/23 0809   • sodium chloride 0.9 % flush 10 mL  10 mL Intravenous PRN Tamanna Green APRN       • sodium chloride 0.9 % infusion 40 mL  40 mL Intravenous PRN Tamanna Green APRN            Physician Progress Notes (last 72 hours)      Maria Dolores Miranda APRN at 01/05/23 0826          Cardiology Progress Note      Reason for visit:    · Acute CHF exacerbation    IDENTIFICATION: 83-year-old gentleman who resides in [facility] Problems    Diagnosis  POA   • **Cardiogenic shock (HCC) [R57.0]  Yes     Priority: High   • STORM (acute kidney injury) (HCC) [N17.9]  Yes     Priority: High   • Type 2 myocardial infarction due to heart failure (HCC) [I50.9, I21.A1]  Yes     Priority: High   • Stage 3 chronic kidney disease (HCC) [N18.30]  Yes     Priority: High   • Acute on chronic systolic congestive heart failure (HCC) [I50.23]  Yes     Priority: High     · Echocardiogram (2/2019): LVEF 25%. Mild TR.  · Echocardiogram (12/1/2020): LVEF 20%.Moderate to severe MR.  Moderate TR. Moderate pulmonary hypertension. Saline results negative.  · Echocardiogram (1/19/2022): LVEF 20%. Moderate pulmonic valve regurgitation is present. Mild MR.  · Echocardiogram (11/8/2022): LVEF < 20%.  Moderate MR.    · EP study and CRT pacemaker (BSC) by Dr. Mera for symptomatic VT, 11/10/2022  · [facility] admission for recurrent acute systolic heart failure/cardiogenic shock, 1/3/2023     • Atrial fib/flutter (not on AC 2* bleeding and patient preference)  [I48.91, I48.92]  Yes     Priority: High     · Typical flutter diagnosed 10/14/18  · Chadsvasc 5 (Age>75, HTN, CAD, CHF)  · Apixaban stopped due to bleeding  · Radiofrequency ablation of isthmus dependent atrial flutter by Tino Sampson, 11/26/2018  · 12-day Holter monitor (12/2018): 63% A. fib/flutter/SVT burden  · Echocardiogram, 11/8/2022: Left atrium 5.2 cm     • Coronary artery disease involving native coronary artery of native heart without angina pectoris [I25.10]  Yes     Priority: High     · CABGx5 2003  · Nuclear stress test (11/16/2018): medium-sized infarct located in the inferior wall and basal inferior lateral wall. LVEF 28%.   · Intolerant to beta-blocker, aspirin  · Cardiac cath for VT and syncope (11/8/2022): PCI to SVG of RPDA.  Widely patent LIMA sequential LAD and diagonal and patent SVG to OM1 and OM 2     • 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 [Z95.0]  Yes     Priority: Medium     · CRT pacemaker placed by Dr. 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  His wife is currently at bedside.   He is sitting up in the chair on room air with O2 saturations 95%.  He denies any chest pain and thinks his breathing is a little better than when he was admitted.  His main complaints is of extreme weakness, low blood pressures and nausea with abdominal bloating and inability to eat.        Objective   Vital Sign Min/Max for last 24 hours  Temp  Min: 97.3 °F (36.3 °C)  Max: 98.1 °F (36.7 °C)   BP  Min: 94/60  Max: 109/76   Pulse  Min: 60  Max: 71   Resp  Min: 16  Max: 17   SpO2  Min: 80 %  Max: 96 %   No data recorded      Intake/Output Summary (Last 24 hours) at 2023 0805  Last data filed at 2023 0712  Gross per 24 hour   Intake 1080 ml   Output 800 ml   Net 280 ml           Physical Exam  Constitutional:       General: He is awake.      Appearance: He is ill-appearing.   Cardiovascular:      Rate and Rhythm: Normal rate.      Pulses: Normal pulses.           Carotid pulses are 2+ on the right side and 2+ on the left side.       Radial pulses are 2+ on the right side and 2+ on the left side.        Femoral pulses are 2+ on the right side and 2+ on the left side.       Popliteal pulses are 2+ on the right side and 2+ on the left side.        Dorsalis pedis pulses are 2+ on the right side and 2+ on the left side.        Posterior tibial pulses are 2+ on the right side and 2+ on the left side.      Heart sounds: Murmur heard.    Systolic murmur is present with a grade of 2/6.     Comments: Paced  Abdominal:      General: There is distension.   Musculoskeletal:      Right lower le+ Pitting Edema present.      Left lower le+ Pitting Edema present.   Skin:     General: Skin is warm and dry.   Neurological:      Mental Status: He is alert and oriented to person, place, and time.   Psychiatric:         Behavior: Behavior is cooperative.         Tele: Ventricular paced    Results Review (reviewed the patient's recent labs in the electronic medical record):     EKG (2023): Ventricular paced    CT of the  chest without contrast (1/3/2023): Cardiomegaly without significant overt pulmonary edema.  Small right-sided pleural effusion.  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  Partially calcified pleural plaques similar to prior study.  Small amount of ascites and fluid within the abdomen and pelvis.  Diverticulosis noted    ECHO (11/9/2022): LVEF less than 20%.  Moderate MR.  Large left pleural effusion    Results from last 7 days   Lab Units 01/05/23  0520 01/04/23  0411 01/03/23 2045   SODIUM mmol/L 140 140 138   POTASSIUM mmol/L 4.0 4.3 5.0   CHLORIDE mmol/L 103 101 98   BUN mg/dL 53* 57* 53*   CREATININE mg/dL 1.93* 2.26* 2.33*   MAGNESIUM mg/dL  --  2.5*  --      Results from last 7 days   Lab Units 01/04/23  0411 01/04/23  0205 01/04/23  0023   TROPONIN T ng/mL 0.021 0.035* 0.023     Results from last 7 days   Lab Units 01/04/23  0411 01/03/23 2045   WBC 10*3/mm3 7.07 6.98   HEMOGLOBIN g/dL 13.2 14.8   HEMATOCRIT % 41.9 48.3   PLATELETS 10*3/mm3 111* 144       Lab Results   Component Value Date    HGBA1C 5.90 (H) 01/04/2023       Lab Results   Component Value Date    CHOL 130 01/04/2023    CHLPL 191 05/25/2022    TRIG 56 01/04/2023    HDL 28 (L) 01/04/2023    LDL 90 01/04/2023         Assessment          Cardiogenic shock  • Patient is essentially ambulatory cardiogenic shock that has not improved with maximally tolerated GDMT or CRT pacing  • With age, comorbidities, and multiorgan system failure, patient deserves palliative consultation and end-of-life plan  • Palliative care following     Stage D HFrEF with acute exacerbation  • EF <20 by echo 11/7/2022  • Multiple medication adjustments by heart and valve clinic  • Entresto discontinued due to hypotension  • Ascites and pulmonary vascular congestion  • proBNP 31,376  • Unable to tolerate diuretics due to hypotension     Type II myocardial infarction due to heart failure/demand ischemia  • Mild troponin elevation in the setting of end-stage heart failure and renal  failure  • Recent cardiac catheterization showed acceptable coronary and graft anatomy     Coronary artery disease  · [facility] 11/2022 with PCI to SVG of RPDA, adequately revascularized with patent LIMA and diagonal and patent SVG to OM1 and OM2  · Aspirin 81 mg day  · On Plavix 75 mg daily at home but currently on hold     Valvular heart disease  · Moderate MR on Echo 11/7/2022     NSVT/CRT pacemaker  · S/p EP study and CRT PPM implantation, 11/10/2022  · No inducible VT on EP study 11/2022  · Currently no arrhythmias this admission     Atrial fibrillation/flutter  · Not on anticoagulated due to GI bleed  · Status post ablation of atrial flutter 2018     Hypotension  · Midodrine discontinued 1/4/2023     Elevated LFTs/Cholelithiasis/Nausea  · Elevated LFTs likely related to passive liver congestion due to CHF.   and   · Management Per primary team       Hyperlipidemia  · Total cholesterol 130, triglycerides 56, HDL 28, LDL 90  · Statin on hold due to elevated LFTs    Stage III chronic kidney disease  · Creatinine improved from 2.26-1.93    Plan     · Restart Plavix 75 mg daily and continue aspirin 75 mg daily due to recent PCI 11/2022  · Recommend palliative and hospice care.  Discussed goals of care with patient and his wife.  They are currently unsure of their wishes  · Guideline directed medical therapy for heart failure limited by hypotension and multisystem organ involvement    Electronically signed by DOMONIQUE Nash, 01/05/23, 8:26 AM EST.    Electronically signed by Maria Dolores Miranda APRN at 01/05/23 1131     Daisha Gould MD at 01/04/23 1556                [facility] Medicine Services  ADMISSION FOLLOW-UP NOTE          Patient admitted after midnight, H&P by my partner performed earlier on today's date reviewed.  Interim findings, labs, and charting also reviewed.        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]  Yes   • Acute on chronic systolic congestive heart failure (HCC) [I50.23]  Yes   • Atrial fib/flutter (not on AC 2* bleeding and patient preference)  [I48.91, I48.92]  Yes   • Coronary artery disease involving native coronary artery of native heart without angina pectoris [I25.10]  Yes      Resolved Hospital Problems    Diagnosis Date Resolved POA   • Acute on chronic systolic congestive heart failure (HCC) [I50.23] 01/04/2023 Yes   • Chronic passive congestion of liver [K76.1] 01/04/2023 Yes     82 yo M with hx of chronic systolic CHF, CAD s/p CABG, HTN, Afib/Aflutter, BPH, ELIE not on CPAP, hypothyroidism, and 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 He has been taking Torsemide daily as instructed. Workup in the ER consistent with decompensated CHF. Received 80 mg IV Lasix and admitted for further management.    ADDITIONAL PLAN:  --Cardiology consulted. Most recent Echo 11/8/22 shows EF <20%.  --He is on maximally tolerated GDMT and with CRT pacing. Meds limited due to hypotension, on midodrine as needed.   --Cardiology feels Palliative and Hospice consultations are indicated, they have been consulted.   --I do not think patient has sepsis. UA negative. Gallbladder ultrasound pending for further evaluation of cholelithiasis. Lactic acidosis improved, can likely be attributed to severe heart failure. Will stop Zosyn today.    Neurology consult was placed in error on this patient. Discussed with Dr. Cooney.    Expected Discharge   Expected Discharge Date and Time     Expected Discharge Date Expected Discharge Time     Jan 7, 2023            Daisha Gould MD  01/04/23        Electronically signed by Daisha Gould MD at 01/04/23 1609          Consult Notes (last 72 hours)      Sabino Garber DO at 01/04/23 1452      Consult Orders    1. Inpatient Palliative Care MD Consult [[id number]] ordered by Librado Jones IV, MD at 01/04/23 1223               Saul Campbell MD  Consulting physician: Robert    Chief Complaint   Patient presents with   • Shortness of Breath       Reason for consult: [facility]    HPI: Patient 83-year-old male brought to hospital due to increasing dyspnea as well as episodes of nausea and vomiting.  Patient states that he had a pacemaker placed in November initially was starting to get stronger but after a few weeks started to decline becoming progressively weaker.  States that he is now getting the point where eating is difficult as he has no appetite and when he does try to eat he does have some nausea and vomiting that goes along with attempting to eat.    Pain assesment: Denies    Dyspnea: Positive    N/V: Positive    PPS: 40      Past Medical History:   Diagnosis Date   • Abnormal ECG    • Arrhythmia    • Arthritis    • Atrial fibrillation (HCC)    • Atrial flutter ([facility])     s/p typical flutter ablation with recurrent atypical flutter   • Benign essential hypertension    • BPH (benign prostatic hyperplasia)    • CHF (congestive heart failure) ([facility])    • Clotting disorder (HCC) too many aspirin   • Congenital heart disease    • Coronary artery disease    • Diverticulosis    • GERD (gastroesophageal reflux disease)    • GI bleed    • HFrEF (heart failure with reduced ejection fraction) ([facility])    • Hyperlipidemia    • Hypertension    • Hypothyroidism (acquired)    • Obesity (BMI 30-39.9)    • ELIE (obstructive sleep apnea)    • Osteoarthritis    • Shingles    • Skin cancer     squamous    • Stage 3 chronic kidney disease (HCC) 12/21/2022   • Wears eyeglasses     readers   • Wears hearing aid       Past Surgical History:   Procedure Laterality Date   • ABLATION OF DYSRHYTHMIC FOCUS  12/2018   • ANGIOPLASTY  1985   • CARDIAC CATHETERIZATION     • CARDIAC CATHETERIZATION N/A 11/8/2022    Procedure: Left Heart Cath;  Surgeon: Marco Urena MD;  Location:  [facility] CATH [facility]VASIVE [facility];  Service: Cardiovascular;  Laterality: N/A;   • CARDIAC ELECTROPHYSIOLOGY PROCEDURE N/A 11/26/2018    Procedure: Ablation atrial flutter;  Surgeon: Tino Sampson MD;  Location:  [facility] EP INVASIVE LOCATION;  Service: Cardiovascular   • CARDIAC ELECTROPHYSIOLOGY PROCEDURE N/A 11/10/2022    Procedure: Pacemaker DC new;  Surgeon: Bruce Mera DO;  Location:  [facility] EP INVASIVE LOCATION;  Service: Cardiology;  Laterality: N/A;   • CARDIAC SURGERY      BYPASS X5   • COLONOSCOPY  2009   • COLONOSCOPY  2005   • CORONARY ANGIOPLASTY  1985   • CORONARY ARTERY BYPASS GRAFT  2003    x5 se[facility] 2003   • DENTAL PROCEDURE  2010    dental surg and crowns   • EYE SURGERY Right     cataract   • SKIN BIOPSY     • VASECTOMY       Current Code Status     Date Active Code Status Order ID Comments User Context       1/4/2023 0058 CPR (Attempt to Resuscitate) [id number]  Tamnana Green APRN ED      Question Answer    Code Status (Patient has no pulse and is not breathing) CPR (Attempt to Resuscitate)    Medical Interventions (Patient has pulse or is breathing) Full Support              Current Facility-Administered Medications   Medication Dose Route Frequency Provider Last Rate Last Admin   • aspirin EC tablet 81 mg  81 mg Oral Daily Tamanna Green APRN   81 mg at 01/04/23 0952   • clopidogrel (PLAVIX) tablet 75 mg  75 mg Oral Every Other Day Tamanna Green APRN   75 mg at 01/04/23 0952   • levothyroxine (SYNTHROID, LEVOTHROID) tablet 125 mcg  125 mcg Oral Q AM Tamanna Green APRN       • midodrine (PROAMATINE) tablet 5 mg  5 mg Oral TID AC Tamanna Green APRN   5 mg at 01/04/23 0952   • Pharmacy Consult - MTM   Does not  apply BID Sherin Portillo JHON       • piperacillin-tazobactam (ZOSYN) 3.375 g in iso-osmotic dextrose 50 ml (premix)  3.375 g Intravenous Q8H June Olson DO   3.375 g at 23 0817   • potassium chloride (MICRO-K) CR capsule 20 mEq  20 mEq Oral Daily Tamanna Green R, APRN   20 mEq at 23 0952   • sodium chloride 0.9 % flush 10 mL  10 mL Intravenous PRN Bradford Valdez MD       • sodium chloride 0.9 % flush 10 mL  10 mL Intravenous Q12H Tamanna Green R, APRN   10 mL at 23 0809   • sodium chloride 0.9 % flush 10 mL  10 mL Intravenous PRN Tamanna Green, APRN       • sodium chloride 0.9 % infusion 40 mL  40 mL Intravenous PRN Tamanna Green, APRN            •  sodium chloride  •  sodium chloride  •  sodium chloride  Allergies   Allergen Reactions   • Aspirin GI Bleeding   • Eliquis [Apixaban] Other (See Comments)     Excessive bleeding   • Amlodipine Besylate Unknown (See Comments)     Unknown     • Atenolol Unknown (See Comments)     unknown   • Empagliflozin Other (See Comments)     Low BP   • Metoprolol Unknown (See Comments)     Unknown   • Milk-Related Compounds Other (See Comments)     Headache     Family History   Problem Relation Age of Onset   • Diabetes Mother    • Heart disease Mother    • Coronary artery disease Other    • Stroke Other    • Diabetes Other    • Rheumatic fever Other    • Other Other         Valvular CV Disease     Social History     Socioeconomic History   • Marital status:    Tobacco Use   • Smoking status: Former     Packs/day: 1.00     Years: 8.00     Pack years: 8.00     Types: Cigarettes     Start date: 10/1/1956     Quit date: 1964     Years since quittin.1   • Smokeless tobacco: Never   Vaping Use   • Vaping Use: Never used   Substance and Sexual Activity   • Alcohol use: No   • Drug use: Never   • Sexual activity: Not Currently     Review of Systems -all others reviewed and found negative except as mentioned in the HPI      /61 (BP  "Location: Right arm, Patient Position: Lying)   Pulse 71   Temp 97.5 °F (36.4 °C) (Oral)   Resp 16   Ht 182.9 cm (72\")   Wt 89.7 kg (197 lb 12.8 oz)   SpO2 92%   BMI 26.83 kg/m²     Intake/Output Summary (Last 24 hours) at 1/4/2023 1452  Last data filed at 1/4/2023 1100  Gross per 24 hour   Intake 360 ml   Output 1200 ml   Net -840 ml     Physical Exam:      General Appearance:    Alert, cooperative, in no acute distress   Head:    Normocephalic, without obvious abnormality, atraumatic   Eyes:            Lids and lashes normal, conjunctivae and sclerae normal, no   icterus, no pallor, corneas clear, PERRLA   Ears:    Ears appear intact with no abnormalities noted   Throat:   No oral lesions, no thrush, oral mucosa moist   Neck:   No adenopathy, supple, trachea midline, no thyromegaly, no   carotid bruit, no JVD   Back:     No kyphosis present, no scoliosis present, no skin lesions,      erythema or scars, no tenderness to percussion or                   palpation,   range of motion normal   Lungs:     Clear to auscultation,respirations regular, even and                  unlabored    Heart:    Regular rhythm and normal rate, normal S1 and S2, no            murmur, no gallop, no rub, no click   Chest Wall:    No abnormalities observed   Abdomen:     Normal bowel sounds, no masses, no organomegaly, soft        non-tender, non-distended, no guarding, no rebound                tenderness   Rectal:     Deferred   Extremities:   Moves all extremities well, no edema, no cyanosis, no             redness   Pulses:   Pulses palpable and equal bilaterally   Skin:   No bleeding, bruising or rash   Lymph nodes:   No palpable adenopathy   Neurologic:   Cranial nerves 2 - 12 grossly intact, sensation intact, DTR       present and equal bilaterally       Results from last 7 days   Lab Units 01/04/23  0411   WBC 10*3/mm3 7.07   HEMOGLOBIN g/dL 13.2   HEMATOCRIT % 41.9   PLATELETS 10*3/mm3 111*     Results from last 7 days "   Lab Units 01/04/23 0411 01/03/23 2045   SODIUM mmol/L 140 138   POTASSIUM mmol/L 4.3 5.0   CHLORIDE mmol/L 101 98   CO2 mmol/L 24.0 19.0*   BUN mg/dL 57* 53*   CREATININE mg/dL 2.26* 2.33*   CALCIUM mg/dL 8.8 9.4   BILIRUBIN mg/dL  --  1.6*   ALK PHOS U/L  --  133*   ALT (SGPT) U/L  --  119*   AST (SGOT) U/L  --  147*   GLUCOSE mg/dL 97 129*     Results from last 7 days   Lab Units 01/04/23 0411   SODIUM mmol/L 140   POTASSIUM mmol/L 4.3   CHLORIDE mmol/L 101   CO2 mmol/L 24.0   BUN mg/dL 57*   CREATININE mg/dL 2.26*   GLUCOSE mg/dL 97   CALCIUM mg/dL 8.8     Imaging Results (Last 72 Hours)     Procedure Component Value Units Date/Time    XR Foot 3+ View Right [[id number]] Collected: 01/04/23 0824     Updated: 01/04/23 1450    Narrative:      XR FOOT 3+ VW RIGHT    Date of Exam: 1/4/2023 6:33 AM EST    Indication: wound on left foot.    Comparison: None available.    Findings:  No acute fracture is identified. There is a marginal erosion along the first metatarsal head. Mild to moderate degenerative changes are present along the metatarsophalangeal and interphalangeal joints. There are tiny enthesophytes along the posterior and   inferior calcaneus. Mild degenerative changes are present along the dorsal midfoot. There is soft tissue swelling along the forefoot.      Impression:      Impression:  1.Degenerative changes as described above.  2.Marginal erosion along first metatarsal head. This could be degenerative such as in inflammatory arthritis, although if there is acute infection in this vicinity osteomyelitis cannot be excluded.  3.Nonspecific soft tissue swelling along forefoot.    Electronically Signed: Antonino Alexis    1/4/2023 8:27 AM EST    Workstation ID: [id number]    US Abdomen Limited [[id number]] Resulted: 01/04/23 0919     Updated: 01/04/23 0920    CT Abdomen Pelvis Without Contrast [[id number]] Collected: 01/03/23 2311     Updated: 01/03/23 2331    Narrative:      CT ABDOMEN PELVIS WO CONTRAST-, CT  CHEST WO CONTRAST DIAGNOSTIC-     Date of Exam: 1/3/2023 10:35 PM     Indication: nausea/ abdominal pain; I50.23-Acute on chronic systolic  (congestive) heart failure.     Comparison: 10/14/2018     Technique: Contiguous axial CT images were obtained from the lung bases  to the to the pubic symphysis without contrast.  Sagittal and coronal  reconstructions were performed.  Automated exposure control and  iterative reconstruction methods were used.             FINDINGS:  Chest:     Mediastinum: Heart size is enlarged. No suspicious pericardial fluid  collection noted.     Patient is status post median sternotomy and CABG. There is  calcification of the native coronary arteries.     Limited noncontrast imaging of the aorta and the origin of the great  vessels is grossly unremarkable in appearance.     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     Main pulmonary artery is normal in caliber     No suspicious hilar or mediastinal adenopathy is noted. There is  evidence of old granulomatous disease. The Limited imaging of the base  of the neck and the esophagus are grossly unremarkable     Lungs/pleura: There is a small right-sided pleural effusion.     Central airways are patent.     Partially calcified pleural plaques are again noted.     Partially calcified 4 mm nodule right middle lobe compatible  granulomatous disease. Additional evidence of old granulomatous disease  noted. Minimal groundglass and irregular opacities noted at the lung  bases. No suspicious areas of consolidation noted. No significant overt  pulmonary edema.     Soft Tissues/Bones: No destructive bone lesion.     Abdomen/Pelvis:  Organs: Stones and sludge are noted within the gallbladder. There is  mild stranding surrounding the gallbladder.     Limited noncontrast imaging of the liver, spleen, pancreas and right  adrenal gland are unremarkable in appearance. Indication of the left  stable compatible sequela prior. Low-attenuation  lesion within the left  kidney compatible with cysts. Perinephric stranding noted bilaterally.      GI/Bowel: There is a small hiatal hernia. The stomach is incompletely  distended and otherwise grossly unremarkable on limited noncontrast  imaging.     Small bowel demonstrates no acute abnormality     There is diverticulosis more prominently within the sigmoid colon.  Stranding is noted within the mesentery and small amount of free fluid  noted within the pelvis.      Pelvis: Urinary bladder is mildly thick-walled this is accentuated by  incomplete distention.     Bilateral fat-containing inguinal hernias are noted. The prostate is  mildly enlarged.        Peritoneum/Retroperitoneum: Atherosclerotic changes are noted of the  aorta which is tortuous. No suspicious retroperitoneal adenopathy noted     Bones/Soft Tissues: Mild degree of subcutaneous edema is noted.  Multilevel degenerative changes are noted of the spine       Impression:      Chest:  1. Cardiomegaly without significant overt pulmonary edema  2. Small right-sided pleural effusion  3. Evidence of old granulomatous disease  4. Partially calcified pleural plaques similar to the prior study.     Abdomen and pelvis:     1. Small amount of ascites and fluid within the abdomen and pelvis  limited evaluation for acute inflammatory changes. Please correlate for  cardiac, liver or renal dysfunction  2. There is cholelithiasis and small amount of fluid surrounding the  gallbladder which is mentioned above is indeterminate. If there is  clinical concern for acute cholecystitis follow-up can always be  considered with a nuclear medicine hepatobiliary scan  3. Diverticulosis is noted. Evaluation for diverticulitis is limited by  ascites                 This report was finalized on 1/3/2023 11:28 PM by Wayne West.       CT Chest Without Contrast Diagnostic [[id number]] Collected: 01/03/23 2311     Updated: 01/03/23 2331    Narrative:      CT ABDOMEN PELVIS WO  CONTRAST-, CT CHEST WO CONTRAST DIAGNOSTIC-     Date of Exam: 1/3/2023 10:35 PM     Indication: nausea/ abdominal pain; I50.23-Acute on chronic systolic  (congestive) heart failure.     Comparison: 10/14/2018     Technique: Contiguous axial CT images were obtained from the lung bases  to the to the pubic symphysis without contrast.  Sagittal and coronal  reconstructions were performed.  Automated exposure control and  iterative reconstruction methods were used.             FINDINGS:  Chest:     Mediastinum: Heart size is enlarged. No suspicious pericardial fluid  collection noted.     Patient is status post median sternotomy and CABG. There is  calcification of the native coronary arteries.     Limited noncontrast imaging of the aorta and the origin of the great  vessels is grossly unremarkable in appearance.     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     Main pulmonary artery is normal in caliber     No suspicious hilar or mediastinal adenopathy is noted. There is  evidence of old granulomatous disease. The Limited imaging of the base  of the neck and the esophagus are grossly unremarkable     Lungs/pleura: There is a small right-sided pleural effusion.     Central airways are patent.     Partially calcified pleural plaques are again noted.     Partially calcified 4 mm nodule right middle lobe compatible  granulomatous disease. Additional evidence of old granulomatous disease  noted. Minimal groundglass and irregular opacities noted at the lung  bases. No suspicious areas of consolidation noted. No significant overt  pulmonary edema.     Soft Tissues/Bones: No destructive bone lesion.     Abdomen/Pelvis:  Organs: Stones and sludge are noted within the gallbladder. There is  mild stranding surrounding the gallbladder.     Limited noncontrast imaging of the liver, spleen, pancreas and right  adrenal gland are unremarkable in appearance. Indication of the left  stable compatible sequela prior.  Low-attenuation lesion within the left  kidney compatible with cysts. Perinephric stranding noted bilaterally.      GI/Bowel: There is a small hiatal hernia. The stomach is incompletely  distended and otherwise grossly unremarkable on limited noncontrast  imaging.     Small bowel demonstrates no acute abnormality     There is diverticulosis more prominently within the sigmoid colon.  Stranding is noted within the mesentery and small amount of free fluid  noted within the pelvis.      Pelvis: Urinary bladder is mildly thick-walled this is accentuated by  incomplete distention.     Bilateral fat-containing inguinal hernias are noted. The prostate is  mildly enlarged.        Peritoneum/Retroperitoneum: Atherosclerotic changes are noted of the  aorta which is tortuous. No suspicious retroperitoneal adenopathy noted     Bones/Soft Tissues: Mild degree of subcutaneous edema is noted.  Multilevel degenerative changes are noted of the spine       Impression:      Chest:  1. Cardiomegaly without significant overt pulmonary edema  2. Small right-sided pleural effusion  3. Evidence of old granulomatous disease  4. Partially calcified pleural plaques similar to the prior study.     Abdomen and pelvis:     1. Small amount of ascites and fluid within the abdomen and pelvis  limited evaluation for acute inflammatory changes. Please correlate for  cardiac, liver or renal dysfunction  2. There is cholelithiasis and small amount of fluid surrounding the  gallbladder which is mentioned above is indeterminate. If there is  clinical concern for acute cholecystitis follow-up can always be  considered with a nuclear medicine hepatobiliary scan  3. Diverticulosis is noted. Evaluation for diverticulitis is limited by  ascites                 This report was finalized on 1/3/2023 11:28 PM by Wayne West.       XR Chest 1 View [[id number]] Collected: 01/03/23 2050     Updated: 01/03/23 2055    Narrative:      DATE OF EXAM: 1/3/2023 8:20  PM     PROCEDURE: XR CHEST 1 VW-     INDICATIONS: SOA triage protocol     COMPARISON: 12/28/2022     TECHNIQUE: Single radiographic AP view of the chest was obtained.     FINDINGS:  Left subclavian triple lead pacer. Cardiomegaly. Prominence of the  central pulmonary vasculature. Lungs clear. Slight blunting of both  costophrenic angles        Impression:      Cardiomegaly with pulmonary vascular congestion and trace pleural  effusions     This report was finalized on 1/3/2023 8:52 PM by Santy Garcia.           Impression: CHF  Dyspnea  Nausea/vomiting  Debility  Restless leg syndrome  Goals of care  Plan: Did try to talk to the patient about overall goals of care, however states that he does not want know what his options are.  Patient was just admitted to the hospital today and is currently being worked up for his problems so we will plan on touching base with the hospitalist before attempting to have any other conversation with the patient or the patient's wife.  Patient will remain a full code for the time being.    Time: More than 50% of time spent in counseling and coordination of care:  Total face-to-face/floor time 40 min.  Time spent in counseling 25 min. Counseling included the following topics: GOC/Sx mgmnt    Sabino Garber DO  01/04/23  14:52 EST              Electronically signed by Sabino Garber DO at 01/04/23 8758     Bradford Cooney MD at 01/04/23 1343      Consult Orders    1. Inpatient Neurology Consult General [[id number]] ordered by Daisha Gould MD at 01/04/23 1230               Neurology    Referring provider:   No referring provider defined for this encounter.    Reason for Consultation: Restless leg    Chief complaint: Shortness of breath    History of present illness: 83-year-old man seen for Dr. Gould for evaluation of restless leg.    The patient has had this for years and finds that it bothers him mostly in evening.  If he gets up and walks before going to bed does not  bother him and he has no interest in having it treated.    He is also suffering with shortness of breath and has had significant decline in his health following a bout of the shingles in the fall.    He is had a pacemaker put in by Dr. Mera and follows with Dr. Alexsander Jones.    He complains of nausea and vomiting treating.    He also has significant edema and takes Demadex for that.    Has had no blackouts or falls.    No history of stroke or residual pain from the shingles.        Review of Systems: Nausea and vomiting after eating.    Recent onset.    Significant edema.    All other systems reviewed and are negative.        Home meds:   Medications Prior to Admission   Medication Sig Dispense Refill Last Dose   • aspirin 81 MG EC tablet Take 81 mg by mouth Daily.      • atorvastatin (LIPITOR) 20 MG tablet Take 1 tablet by mouth Every Night. (Patient taking differently: Take 20 mg by mouth 3 (Three) Times a Week. Per pt, taking on MWF) 90 tablet 3    • clopidogrel (PLAVIX) 75 MG tablet Take 1 tablet by mouth Daily. (Patient not taking: Reported on 1/4/2023) 90 tablet 1 Not Taking   • Cod Liver Oil 1000 MG capsule OTC Take 2 capsules in the morning and 1 capsule in the afternoon      • levothyroxine (SYNTHROID, LEVOTHROID) 125 MCG tablet TAKE 1 TABLET EVERY DAY 90 tablet 1    • midodrine (PROAMATINE) 5 MG tablet Take 1 tablet by mouth 3 (Three) Times a Day Before Meals. (Patient taking differently: Take 5 mg by mouth 3 (Three) Times a Day Before Meals. For SBP <100) 270 tablet 1    • Misc Natural Products (Advanced Joint Relief) capsule Take 1 capsule by mouth Daily. OTC      • NON FORMULARY OTC- Cardio-Plus 2080 : Take 4 tablets in the morning and 5 tablets in the evening      • NON FORMULARY OTC- Cataplex B 1250mg: Take 3 tablets BID      • potassium chloride 10 MEQ CR tablet Take 2 tablets by mouth Daily. 60 tablet 0    • torsemide (DEMADEX) 20 MG tablet Take 2 tablets for 2 days and then one tablet daily  (Patient taking differently: Take 20 mg by mouth Daily. Take 2 tablets for 2 days and then one tablet daily) 35 tablet 1    • Turmeric 450 MG capsule Take 1 capsule by mouth Daily. OTC (Turmeric Forte)          History  Past Medical History:   Diagnosis Date   • Abnormal ECG    • Arrhythmia    • Arthritis    • Atrial fibrillation (HCC)    • Atrial flutter ([facility])     s/p typical flutter ablation with recurrent atypical flutter   • Benign essential hypertension    • BPH (benign prostatic hyperplasia)    • CHF (congestive heart failure) ([facility])    • Clotting disorder ([facility]) too many aspirin   • Congenital heart disease    • Coronary artery disease    • Diverticulosis    • GERD (gastroesophageal reflux disease)    • GI bleed    • HFrEF (heart failure with reduced ejection fraction) ([facility])    • Hyperlipidemia    • Hypertension    • Hypothyroidism (acquired)    • Obesity (BMI 30-39.9)    • ELIE (obstructive sleep apnea)    • Osteoarthritis    • Shingles    • Skin cancer     squamous    • Stage 3 chronic kidney disease ([facility]) 12/21/2022   • Wears eyeglasses     readers   • Wears hearing aid    ,   Past Surgical History:   Procedure Laterality Date   • ABLATION OF DYSRHYTHMIC FOCUS  12/2018   • ANGIOPLASTY  1985   • CARDIAC CATHETERIZATION     • CARDIAC CATHETERIZATION N/A 11/8/2022    Procedure: Left Heart Cath;  Surgeon: Marco Urena MD;  Location:  [facility];  Service: Cardiovascular;  Laterality: N/A;   • CARDIAC ELECTROPHYSIOLOGY PROCEDURE N/A 11/26/2018    Procedure: Ablation atrial flutter;  Surgeon: Tino Sampson MD;  Location:  [facility];  Service: Cardiovascular   • CARDIAC ELECTROPHYSIOLOGY PROCEDURE N/A 11/10/2022    Procedure: Pacemaker DC new;  Surgeon: Bruce Mera DO;  Location:  [facility];  Service: Cardiology;  Laterality: N/A;   • CARDIAC SURGERY      BYPASS X5   • COLONOSCOPY  2009   • COLONOSCOPY  2005   • CORONARY ANGIOPLASTY  1985   • CORONARY  "ARTERY BYPASS GRAFT  2003    x5 [facility] 2003   • DENTAL PROCEDURE      dental surg and crowns   • EYE SURGERY Right     cataract   • SKIN BIOPSY     • VASECTOMY     ,   Family History   Problem Relation Age of Onset   • Diabetes Mother    • Heart disease Mother    • Coronary artery disease Other    • Stroke Other    • Diabetes Other    • Rheumatic fever Other    • Other Other         Valvular CV Disease   ,   Social History     Tobacco Use   • Smoking status: Former     Packs/day: 1.00     Years: 8.00     Pack years: 8.00     Types: Cigarettes     Start date: 10/1/1956     Quit date: 1964     Years since quittin.1   • Smokeless tobacco: Never   Vaping Use   • Vaping Use: Never used   Substance Use Topics   • Alcohol use: No   • Drug use: Never    and Allergies:  Aspirin, Eliquis [apixaban], Amlodipine besylate, Atenolol, Empagliflozin, Metoprolol, and Milk-related compounds,    Vital Signs   Blood pressure 100/61, pulse 71, temperature 97.5 °F (36.4 °C), temperature source Oral, resp. rate 16, height 182.9 cm (72\"), weight 89.7 kg (197 lb 12.8 oz), SpO2 92 %.  Body mass index is 26.83 kg/m².    Physical Exam:   General: Pleasant white male in no distress              Head: No trauma              Neck:   No bruits              Resp: Normal breath              Cor: Paced              Extremities: 4+ edema              Skin: Warm and dry              Neuro: Mentally the patient is awake alert and oriented to person place and time.  He has normal memory attention and concentration.    Speech is articulate with no word finding problems.    Coordination is normal on finger-nose testing bilaterally.    Cranial nerves show benign fundi equal pupils full eye movements.  He has no ptosis.    Facial movement sensation are normal.    Palate elevates normally tongue protrudes normally.    Reflexes are 2+ and equal bilaterally.    Motor testing shows normal power and tone in all muscle groups.    Sensory testing " is normal.        Results Review: Lactate is 2.21.    Labs:  Lab Results (last 72 hours)     Procedure Component Value Units Date/Time    STAT Lactic Acid, Reflex [[id number]]  (Abnormal) Collected: 01/04/23 0849    Specimen: Blood Updated: 01/04/23 0935     Lactate 2.1 mmol/L      Comment: Falsely depressed results may occur on samples drawn from patients receiving N-Acetylcysteine (NAC) or Metamizole.       C-reactive Protein [[id number]]  (Abnormal) Collected: 01/04/23 0411    Specimen: Blood Updated: 01/04/23 0700     C-Reactive Protein 3.02 mg/dL     Sedimentation Rate [[id number]]  (Normal) Collected: 01/04/23 0411    Specimen: Blood Updated: 01/04/23 0548     Sed Rate <1 mm/hr     STAT Lactic Acid, Reflex [[id number]]  (Abnormal) Collected: 01/04/23 0411    Specimen: Blood Updated: 01/04/23 0546     Lactate 2.2 mmol/L      Comment: Falsely depressed results may occur on samples drawn from patients receiving N-Acetylcysteine (NAC) or Metamizole.       Troponin [[id number]]  (Normal) Collected: 01/04/23 0411    Specimen: Blood Updated: 01/04/23 0535     Troponin T 0.021 ng/mL     Narrative:      Troponin T Reference Range:  <= 0.03 ng/mL-   Negative for AMI  >0.03 ng/mL-     Abnormal for myocardial necrosis.  Clinicians would have to utilize clinical acumen, EKG, Troponin and serial changes to determine if it is an Acute Myocardial Infarction or myocardial injury due to an underlying chronic condition.       Results may be falsely decreased if patient taking Biotin.      Hemoglobin A1c [[id number]]  (Abnormal) Collected: 01/04/23 0411    Specimen: Blood Updated: 01/04/23 0528     Hemoglobin A1C 5.90 %     Narrative:      Hemoglobin A1C Ranges:    Increased Risk for Diabetes  5.7% to 6.4%  Diabetes                     >= 6.5%  Diabetic Goal                < 7.0%    Basic Metabolic Panel [[id number]]  (Abnormal) Collected: 01/04/23 0411    Specimen: Blood Updated: 01/04/23 0528     Glucose 97 mg/dL      BUN 57 mg/dL       Creatinine 2.26 mg/dL      Sodium 140 mmol/L      Potassium 4.3 mmol/L      Chloride 101 mmol/L      CO2 24.0 mmol/L      Calcium 8.8 mg/dL      BUN/Creatinine Ratio 25.2     Anion Gap 15.0 mmol/L      eGFR 27.9 mL/min/1.73      Comment: National Kidney Foundation and American Society of Nephrology (ASN) Task Force recommended calculation based on the Chronic Kidney Disease Epidemiology Collaboration (CKD-EPI) equation refit without adjustment for race.       Narrative:      GFR Normal >60  Chronic Kidney Disease <60  Kidney Failure <15    The GFR formula is only valid for adults with stable renal function between ages 18 and 70.    Lipid Panel [[id number]]  (Abnormal) Collected: 01/04/23 0411    Specimen: Blood Updated: 01/04/23 0528     Total Cholesterol 130 mg/dL      Triglycerides 56 mg/dL      HDL Cholesterol 28 mg/dL      LDL Cholesterol  90 mg/dL      VLDL Cholesterol 12 mg/dL      LDL/HDL Ratio 3.24    Narrative:      Cholesterol Reference Ranges  (U.S. Department of Health and Human Services ATP III Classifications)    Desirable          <200 mg/dL  Borderline High    200-239 mg/dL  High Risk          >240 mg/dL      Triglyceride Reference Ranges  (U.S. Department of Health and Human Services ATP III Classifications)    Normal           <150 mg/dL  Borderline High  150-199 mg/dL  High             200-499 mg/dL  Very High        >500 mg/dL    HDL Reference Ranges  (U.S. Department of Health and Human Services ATP III Classifications)    Low     <40 mg/dl (major risk factor for CHD)  High    >60 mg/dl ('negative' risk factor for CHD)        LDL Reference Ranges  (U.S. Department of Health and Human Services ATP III Classifications)    Optimal          <100 mg/dL  Near Optimal     100-129 mg/dL  Borderline High  130-159 mg/dL  High             160-189 mg/dL  Very High        >189 mg/dL    Phosphorus [[id number]]  (Abnormal) Collected: 01/04/23 0411    Specimen: Blood Updated: 01/04/23 0528     Phosphorus 4.6  mg/dL     Magnesium [[id number]]  (Abnormal) Collected: 01/04/23 0411    Specimen: Blood Updated: 01/04/23 0528     Magnesium 2.5 mg/dL     CBC Auto Differential [[id number]]  (Abnormal) Collected: 01/04/23 0411    Specimen: Blood Updated: 01/04/23 0514     WBC 7.07 10*3/mm3      RBC 4.48 10*6/mm3      Hemoglobin 13.2 g/dL      Hematocrit 41.9 %      MCV 93.5 fL      MCH 29.5 pg      MCHC 31.5 g/dL      RDW 19.9 %      RDW-SD 64.7 fl      MPV 12.6 fL      Platelets 111 10*3/mm3      Neutrophil % 75.5 %      Lymphocyte % 14.7 %      Monocyte % 8.6 %      Eosinophil % 0.1 %      Basophil % 0.3 %      Immature Grans % 0.8 %      Neutrophils, Absolute 5.33 10*3/mm3      Lymphocytes, Absolute 1.04 10*3/mm3      Monocytes, Absolute 0.61 10*3/mm3      Eosinophils, Absolute 0.01 10*3/mm3      Basophils, Absolute 0.02 10*3/mm3      Immature Grans, Absolute 0.06 10*3/mm3      nRBC 0.3 /100 WBC     Troponin [[id number]]  (Abnormal) Collected: 01/04/23 0205    Specimen: Blood Updated: 01/04/23 0249     Troponin T 0.035 ng/mL     Narrative:      Troponin T Reference Range:  <= 0.03 ng/mL-   Negative for AMI  >0.03 ng/mL-     Abnormal for myocardial necrosis.  Clinicians would have to utilize clinical acumen, EKG, Troponin and serial changes to determine if it is an Acute Myocardial Infarction or myocardial injury due to an underlying chronic condition.       Results may be falsely decreased if patient taking Biotin.      Blood Culture - Blood, Arm, Right [[id number]] Collected: 01/04/23 0156    Specimen: Blood from Arm, Right Updated: 01/04/23 0226    Blood Culture - Blood, Arm, Right [[id number]] Collected: 01/04/23 0203    Specimen: Blood from Arm, Right Updated: 01/04/23 0226    Urinalysis With Culture If Indicated - Urine, Clean Catch [[id number]]  (Abnormal) Collected: 01/04/23 0105    Specimen: Urine, Clean Catch Updated: 01/04/23 0149     Color, UA Yellow     Appearance, UA Cloudy     pH, UA 5.5     Specific Gravity, UA 1.011      Glucose, UA Negative     Ketones, UA Negative     Bilirubin, UA Negative     Blood, UA Negative     Protein, UA 30 mg/dL (1+)     Leuk Esterase, UA Negative     Nitrite, UA Negative     Urobilinogen, UA 0.2 E.U./dL    Narrative:      In absence of clinical symptoms, the presence of pyuria, bacteria, and/or nitrites on the urinalysis result does not correlate with infection.    Urinalysis, Microscopic Only - Urine, Clean Catch [[id number]]  (Abnormal) Collected: 01/04/23 0105    Specimen: Urine, Clean Catch Updated: 01/04/23 0149     RBC, UA 3-6 /HPF      WBC, UA 3-5 /HPF      Comment: Urine culture not indicated.        Bacteria, UA None Seen /HPF      Squamous Epithelial Cells, UA 3-6 /HPF      Hyaline Casts, UA 7-12 /LPF      Methodology Manual Light Microscopy    Troponin [[id number]]  (Normal) Collected: 01/04/23 0023    Specimen: Blood Updated: 01/04/23 0118     Troponin T 0.023 ng/mL     Narrative:      Troponin T Reference Range:  <= 0.03 ng/mL-   Negative for AMI  >0.03 ng/mL-     Abnormal for myocardial necrosis.  Clinicians would have to utilize clinical acumen, EKG, Troponin and serial changes to determine if it is an Acute Myocardial Infarction or myocardial injury due to an underlying chronic condition.       Results may be falsely decreased if patient taking Biotin.      STAT Lactic Acid, Reflex [[id number]]  (Abnormal) Collected: 01/04/23 0023    Specimen: Blood Updated: 01/04/23 0116     Lactate 2.4 mmol/L      Comment: Falsely depressed results may occur on samples drawn from patients receiving N-Acetylcysteine (NAC) or Metamizole.       [place] Draw [[id number]] Collected: 01/03/23 2045    Specimen: Blood Updated: 01/04/23 0100    Narrative:      The following orders were created for panel order [place] Draw.  Procedure                               Abnormality         Status                     ---------                               -----------         ------                     Green Top  (Gel)[[id number]]                                  Final result               Lavender Top[[id number]]                                     Final result               Gold Top - SST[[id number]]                                   Final result               Gray Top[[id number]]                                         Final result               Light Blue Top[[id number]]                                   Final result                 Please view results for these tests on the individual orders.    Gray Top [[id number]] Collected: 01/03/23 2045    Specimen: Blood Updated: 01/04/23 0100     Extra Tube Hold for add-ons.     Comment: Auto resulted.       COVID PRE-OP / PRE-PROCEDURE SCREENING ORDER (NO ISOLATION) - Swab, Nasopharynx [[id number]]  (Normal) Collected: 01/03/23 2143    Specimen: Swab from Nasopharynx Updated: 01/03/23 2349    Narrative:      The following orders were created for panel order COVID PRE-OP / PRE-PROCEDURE SCREENING ORDER (NO ISOLATION) - Swab, Nasopharynx.  Procedure                               Abnormality         Status                     ---------                               -----------         ------                     Respiratory Panel PCR w/...[[id number]]  Normal              Final result                 Please view results for these tests on the individual orders.    Respiratory Panel PCR w/COVID-19(SARS-CoV-2) REID/YO/BALBINA/PAD/COR/MAD/DORI In-House, NP Swab in UTM/VTM, 3-4 HR TAT - Swab, Nasopharynx [[id number]]  (Normal) Collected: 01/03/23 2143    Specimen: Swab from Nasopharynx Updated: 01/03/23 2349     ADENOVIRUS, PCR Not Detected     Coronavirus 229E Not Detected     Coronavirus HKU1 Not Detected     Coronavirus NL63 Not Detected     Coronavirus OC43 Not Detected     COVID19 Not Detected     Human Metapneumovirus Not Detected     Human Rhinovirus/Enterovirus Not Detected     Influenza A PCR Not Detected     Influenza B PCR Not Detected     Parainfluenza Virus 1 Not Detected      Parainfluenza Virus 2 Not Detected     Parainfluenza Virus 3 Not Detected     Parainfluenza Virus 4 Not Detected     RSV, PCR Not Detected     Bordetella pertussis pcr Not Detected     Bordetella parapertussis PCR Not Detected     Chlamydophila pneumoniae PCR Not Detected     Mycoplasma pneumo by PCR Not Detected    Narrative:      In the setting of a positive respiratory panel with a viral infection PLUS a negative procalcitonin without other underlying concern for bacterial infection, consider observing off antibiotics or discontinuation of antibiotics and continue supportive care. If the respiratory panel is positive for atypical bacterial infection (Bordetella pertussis, Chlamydophila pneumoniae, or Mycoplasma pneumoniae), consider antibiotic de-escalation to target atypical bacterial infection.    Hepatitis Panel, Acute [[id number]]  (Normal) Collected: 01/03/23 2045    Specimen: Blood Updated: 01/03/23 2304     Hepatitis B Surface Ag Non-Reactive     Hep A IgM Non-Reactive     Hep B C IgM Non-Reactive     Hepatitis C Ab Non-Reactive    Narrative:      Results may be falsely decreased if patient taking Biotin.     Lactic Acid, Plasma [[id number]]  (Abnormal) Collected: 01/03/23 2045    Specimen: Blood Updated: 01/03/23 2255     Lactate 5.8 mmol/L      Comment: Falsely depressed results may occur on samples drawn from patients receiving N-Acetylcysteine (NAC) or Metamizole.       Lipase [[id number]]  (Normal) Collected: 01/03/23 2045    Specimen: Blood Updated: 01/03/23 2203     Lipase 40 U/L     Green Top (Gel) [[id number]] Collected: 01/03/23 2045    Specimen: Blood Updated: 01/03/23 2146     Extra Tube Hold for add-ons.     Comment: Auto resulted.       Lavender Top [[id number]] Collected: 01/03/23 2045    Specimen: Blood Updated: 01/03/23 2146     Extra Tube hold for add-on     Comment: Auto resulted       Light Blue Top [[id number]] Collected: 01/03/23 2045    Specimen: Blood Updated: 01/03/23 2146     Extra Tube  Hold for add-ons.     Comment: Auto resulted       Gold Top - SST [[id number]] Collected: 01/03/23 2045    Specimen: Blood Updated: 01/03/23 2146     Extra Tube Hold for add-ons.     Comment: Auto resulted.       Comprehensive Metabolic Panel [[id number]]  (Abnormal) Collected: 01/03/23 2045    Specimen: Blood Updated: 01/03/23 2127     Glucose 129 mg/dL      BUN 53 mg/dL      Creatinine 2.33 mg/dL      Sodium 138 mmol/L      Potassium 5.0 mmol/L      Comment: Specimen hemolyzed.  Results may be affected.        Chloride 98 mmol/L      CO2 19.0 mmol/L      Calcium 9.4 mg/dL      Total Protein 7.8 g/dL      Albumin 4.5 g/dL      ALT (SGPT) 119 U/L      Comment: Specimen hemolyzed.  Results may be affected.        AST (SGOT) 147 U/L      Alkaline Phosphatase 133 U/L      Total Bilirubin 1.6 mg/dL      Globulin 3.3 gm/dL      Comment: Calculated Result        A/G Ratio 1.4 g/dL      BUN/Creatinine Ratio 22.7     Anion Gap 21.0 mmol/L      eGFR 27.1 mL/min/1.73      Comment: National Kidney Foundation and American Society of Nephrology (ASN) Task Force recommended calculation based on the Chronic Kidney Disease Epidemiology Collaboration (CKD-EPI) equation refit without adjustment for race.       Narrative:      GFR Normal >60  Chronic Kidney Disease <60  Kidney Failure <15    The GFR formula is only valid for adults with stable renal function between ages 18 and 70.    Troponin [[id number]]  (Abnormal) Collected: 01/03/23 2045    Specimen: Blood Updated: 01/03/23 2127     Troponin T 0.032 ng/mL     Narrative:      Troponin T Reference Range:  <= 0.03 ng/mL-   Negative for AMI  >0.03 ng/mL-     Abnormal for myocardial necrosis.  Clinicians would have to utilize clinical acumen, EKG, Troponin and serial changes to determine if it is an Acute Myocardial Infarction or myocardial injury due to an underlying chronic condition.       Results may be falsely decreased if patient taking Biotin.      BNP [[id number]]  (Abnormal)  Collected: 01/03/23 2045    Specimen: Blood Updated: 01/03/23 2123     proBNP 31,376.0 pg/mL     Narrative:      Among patients with dyspnea, NT-proBNP is highly sensitive for the detection of acute congestive heart failure. In addition NT-proBNP of <300 pg/ml effectively rules out acute congestive heart failure with 99% negative predictive value.    Results may be falsely decreased if patient taking Biotin.      CBC & Differential [[id number]]  (Abnormal) Collected: 01/03/23 2045    Specimen: Blood Updated: 01/03/23 2113    Narrative:      The following orders were created for panel order CBC & Differential.  Procedure                               Abnormality         Status                     ---------                               -----------         ------                     CBC Auto Differential[[id number]]        Abnormal            Final result               Scan Slide[[id number]]                                                                    Please view results for these tests on the individual orders.    CBC Auto Differential [[id number]]  (Abnormal) Collected: 01/03/23 2045    Specimen: Blood Updated: 01/03/23 2113     WBC 6.98 10*3/mm3      RBC 5.07 10*6/mm3      Hemoglobin 14.8 g/dL      Hematocrit 48.3 %      MCV 95.3 fL      MCH 29.2 pg      MCHC 30.6 g/dL      RDW 20.5 %      RDW-SD 67.0 fl      MPV 11.9 fL      Platelets 144 10*3/mm3      Neutrophil % 66.6 %      Lymphocyte % 25.4 %      Monocyte % 6.6 %      Eosinophil % 0.6 %      Basophil % 0.4 %      Immature Grans % 0.4 %      Neutrophils, Absolute 4.65 10*3/mm3      Lymphocytes, Absolute 1.77 10*3/mm3      Monocytes, Absolute 0.46 10*3/mm3      Eosinophils, Absolute 0.04 10*3/mm3      Basophils, Absolute 0.03 10*3/mm3      Immature Grans, Absolute 0.03 10*3/mm3      nRBC 0.7 /100 WBC           Rads:  Imaging Results (Last 72 Hours)     Procedure Component Value Units Date/Time    US Abdomen Limited [[id number]] Resulted: 01/04/23 0919      Updated: 01/04/23 0920    XR Foot 3+ View Right [[id number]] Collected: 01/04/23 0824     Updated: 01/04/23 0829    Narrative:      XR FOOT 3+ VW RIGHT    Date of Exam: 1/4/2023 6:33 AM EST    Indication: wound on left foot.    Comparison: None available.    Findings:  No acute fracture is identified. There is a marginal erosion along the first metatarsal head. Mild to moderate degenerative changes are present along the metatarsophalangeal and interphalangeal joints. There are tiny enthesophytes along the posterior and   inferior calcaneus. Mild degenerative changes are present along the dorsal midfoot. There is soft tissue swelling along the forefoot.      Impression:      Impression:  1.Degenerative changes as described above.  2.Marginal erosion along first metatarsal head. This could be degenerative such as in inflammatory arthritis, although if there is acute infection in this vicinity osteomyelitis cannot be excluded.  3.Nonspecific soft tissue swelling along forefoot.    Electronically Signed: Antonino Alexis    1/4/2023 8:27 AM EST    Workstation ID: [id number]    CT Abdomen Pelvis Without Contrast [[id number]] Collected: 01/03/23 2311     Updated: 01/03/23 2331    Narrative:      CT ABDOMEN PELVIS WO CONTRAST-, CT CHEST WO CONTRAST DIAGNOSTIC-     Date of Exam: 1/3/2023 10:35 PM     Indication: nausea/ abdominal pain; I50.23-Acute on chronic systolic  (congestive) heart failure.     Comparison: 10/14/2018     Technique: Contiguous axial CT images were obtained from the lung bases  to the to the pubic symphysis without contrast.  Sagittal and coronal  reconstructions were performed.  Automated exposure control and  iterative reconstruction methods were used.             FINDINGS:  Chest:     Mediastinum: Heart size is enlarged. No suspicious pericardial fluid  collection noted.     Patient is status post median sternotomy and CABG. There is  calcification of the native coronary arteries.     Limited noncontrast  imaging of the aorta and the origin of the great  vessels is grossly unremarkable in appearance.     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     Main pulmonary artery is normal in caliber     No suspicious hilar or mediastinal adenopathy is noted. There is  evidence of old granulomatous disease. The Limited imaging of the base  of the neck and the esophagus are grossly unremarkable     Lungs/pleura: There is a small right-sided pleural effusion.     Central airways are patent.     Partially calcified pleural plaques are again noted.     Partially calcified 4 mm nodule right middle lobe compatible  granulomatous disease. Additional evidence of old granulomatous disease  noted. Minimal groundglass and irregular opacities noted at the lung  bases. No suspicious areas of consolidation noted. No significant overt  pulmonary edema.     Soft Tissues/Bones: No destructive bone lesion.     Abdomen/Pelvis:  Organs: Stones and sludge are noted within the gallbladder. There is  mild stranding surrounding the gallbladder.     Limited noncontrast imaging of the liver, spleen, pancreas and right  adrenal gland are unremarkable in appearance. Indication of the left  stable compatible sequela prior. Low-attenuation lesion within the left  kidney compatible with cysts. Perinephric stranding noted bilaterally.      GI/Bowel: There is a small hiatal hernia. The stomach is incompletely  distended and otherwise grossly unremarkable on limited noncontrast  imaging.     Small bowel demonstrates no acute abnormality     There is diverticulosis more prominently within the sigmoid colon.  Stranding is noted within the mesentery and small amount of free fluid  noted within the pelvis.      Pelvis: Urinary bladder is mildly thick-walled this is accentuated by  incomplete distention.     Bilateral fat-containing inguinal hernias are noted. The prostate is  mildly enlarged.        Peritoneum/Retroperitoneum: Atherosclerotic  changes are noted of the  aorta which is tortuous. No suspicious retroperitoneal adenopathy noted     Bones/Soft Tissues: Mild degree of subcutaneous edema is noted.  Multilevel degenerative changes are noted of the spine       Impression:      Chest:  1. Cardiomegaly without significant overt pulmonary edema  2. Small right-sided pleural effusion  3. Evidence of old granulomatous disease  4. Partially calcified pleural plaques similar to the prior study.     Abdomen and pelvis:     1. Small amount of ascites and fluid within the abdomen and pelvis  limited evaluation for acute inflammatory changes. Please correlate for  cardiac, liver or renal dysfunction  2. There is cholelithiasis and small amount of fluid surrounding the  gallbladder which is mentioned above is indeterminate. If there is  clinical concern for acute cholecystitis follow-up can always be  considered with a nuclear medicine hepatobiliary scan  3. Diverticulosis is noted. Evaluation for diverticulitis is limited by  ascites                 This report was finalized on 1/3/2023 11:28 PM by Wayne West.       CT Chest Without Contrast Diagnostic [[id number]] Collected: 01/03/23 2311     Updated: 01/03/23 2331    Narrative:      CT ABDOMEN PELVIS WO CONTRAST-, CT CHEST WO CONTRAST DIAGNOSTIC-     Date of Exam: 1/3/2023 10:35 PM     Indication: nausea/ abdominal pain; I50.23-Acute on chronic systolic  (congestive) heart failure.     Comparison: 10/14/2018     Technique: Contiguous axial CT images were obtained from the lung bases  to the to the pubic symphysis without contrast.  Sagittal and coronal  reconstructions were performed.  Automated exposure control and  iterative reconstruction methods were used.             FINDINGS:  Chest:     Mediastinum: Heart size is enlarged. No suspicious pericardial fluid  collection noted.     Patient is status post median sternotomy and CABG. There is  calcification of the native coronary arteries.     Limited  noncontrast imaging of the aorta and the origin of the great  vessels is grossly unremarkable in appearance.     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     Main pulmonary artery is normal in caliber     No suspicious hilar or mediastinal adenopathy is noted. There is  evidence of old granulomatous disease. The Limited imaging of the base  of the neck and the esophagus are grossly unremarkable     Lungs/pleura: There is a small right-sided pleural effusion.     Central airways are patent.     Partially calcified pleural plaques are again noted.     Partially calcified 4 mm nodule right middle lobe compatible  granulomatous disease. Additional evidence of old granulomatous disease  noted. Minimal groundglass and irregular opacities noted at the lung  bases. No suspicious areas of consolidation noted. No significant overt  pulmonary edema.     Soft Tissues/Bones: No destructive bone lesion.     Abdomen/Pelvis:  Organs: Stones and sludge are noted within the gallbladder. There is  mild stranding surrounding the gallbladder.     Limited noncontrast imaging of the liver, spleen, pancreas and right  adrenal gland are unremarkable in appearance. Indication of the left  stable compatible sequela prior. Low-attenuation lesion within the left  kidney compatible with cysts. Perinephric stranding noted bilaterally.      GI/Bowel: There is a small hiatal hernia. The stomach is incompletely  distended and otherwise grossly unremarkable on limited noncontrast  imaging.     Small bowel demonstrates no acute abnormality     There is diverticulosis more prominently within the sigmoid colon.  Stranding is noted within the mesentery and small amount of free fluid  noted within the pelvis.      Pelvis: Urinary bladder is mildly thick-walled this is accentuated by  incomplete distention.     Bilateral fat-containing inguinal hernias are noted. The prostate is  mildly enlarged.        Peritoneum/Retroperitoneum:  Atherosclerotic changes are noted of the  aorta which is tortuous. No suspicious retroperitoneal adenopathy noted     Bones/Soft Tissues: Mild degree of subcutaneous edema is noted.  Multilevel degenerative changes are noted of the spine       Impression:      Chest:  1. Cardiomegaly without significant overt pulmonary edema  2. Small right-sided pleural effusion  3. Evidence of old granulomatous disease  4. Partially calcified pleural plaques similar to the prior study.     Abdomen and pelvis:     1. Small amount of ascites and fluid within the abdomen and pelvis  limited evaluation for acute inflammatory changes. Please correlate for  cardiac, liver or renal dysfunction  2. There is cholelithiasis and small amount of fluid surrounding the  gallbladder which is mentioned above is indeterminate. If there is  clinical concern for acute cholecystitis follow-up can always be  considered with a nuclear medicine hepatobiliary scan  3. Diverticulosis is noted. Evaluation for diverticulitis is limited by  ascites                 This report was finalized on 1/3/2023 11:28 PM by Wayne West.       XR Chest 1 View [[id number]] Collected: 01/03/23 2050     Updated: 01/03/23 2055    Narrative:      DATE OF EXAM: 1/3/2023 8:20 PM     PROCEDURE: XR CHEST 1 VW-     INDICATIONS: SOA triage protocol     COMPARISON: 12/28/2022     TECHNIQUE: Single radiographic AP view of the chest was obtained.     FINDINGS:  Left subclavian triple lead pacer. Cardiomegaly. Prominence of the  central pulmonary vasculature. Lungs clear. Slight blunting of both  costophrenic angles        Impression:      Cardiomegaly with pulmonary vascular congestion and trace pleural  effusions     This report was finalized on 1/3/2023 8:52 PM by Santy Garcia.               Assessment: Restless leg syndrome minimally symptomatic  No evidence of altered mentation.      Plan:    Patient does not desire treatment.    Comment:   See the patient on request    I  discussed the patients findings and my recommendations with patient and family      Bradford Cooney MD  01/04/23  13:43 EST          Electronically signed by Bradford Coonye MD at 01/04/23 1349     Librado Jones IV, MD at 01/04/23 1142      Consult Orders    1. Inpatient Cardiology Consult [[id number]] ordered by Tamanna Green APRN               Inpatient Cardiology Consult  Consult performed by: Librado Jones IV, MD  Consult ordered by: Tamanna Green APRN  Reason for consult: Acute on chronic systolic heart failure and hypotension            Cardiology Consult         Reason for consultation:  · CHF exacerbation    Requesting provider: Daisha Gould MD  Consulting provider: Alexsander Jones MD    IDENTIFICATION: Patient is an 83 year-old male resident of [facility] Problems    Diagnosis  POA   • **Cardiogenic shock (HCC) [R57.0]  Yes     Priority: High   • STORM (acute kidney injury) (HCC) [N17.9]  Yes     Priority: Medium   • Acute on chronic systolic congestive heart failure (HCC) [I50.23]  Yes     Priority: Medium     · Echocardiogram (2/2019): LVEF 25%. Mild TR.  · Echocardiogram (12/1/2020): LVEF 20%.Moderate to severe MR. Moderate TR. Moderate pulmonary hypertension. Saline results negative.  · Echocardiogram (1/19/2022): LVEF 20%. Moderate pulmonic valve regurgitation is present. Mild MR.  · Echocardiogram (11/8/2022): LVEF < 20%.  Moderate MR.    · EP study and CRT pacemaker ([facility]) by Dr. Mera for symptomatic VT, 11/10/2022  · [facility] admission for recurrent acute systolic heart failure/cardiogenic shock, 1/3/2023     • Coronary artery disease involving native coronary artery of native heart without angina pectoris [I25.10]  Yes     Priority: Medium     · CABGx5 2003  · Nuclear stress test (11/16/2018): medium-sized infarct located in the inferior wall and basal inferior lateral wall. LVEF 28%.   · Intolerant to beta-blocker,  aspirin  · Cardiac cath for VT and syncope (11/8/2022): PCI to SVG of RPDA.  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 fib/flutter/SVT burden  · Echocardiogram, 11/8/2022: Left atrium 5.2 cm           Subjective     Patient is an 83-year-old gentleman well-known to me.  The patient has history of coronary artery disease, heart failure with reduced ejection fraction (last EF <20%).  Most recently, he was admitted in November with syncope with suspected cardiac etiology.  He underwent CRT pacemaker and was started on amiodarone for ventricular tachycardia.  Cardiac catheterization at that time showed acceptable coronary and graft anatomy.    Since November, he has continued to decline.  He stopped taking amiodarone after several weeks as he was developing nausea.  Uunfortunately, since stopping the amiodarone he continues to have nausea and anorexia.  He states that the food he eats tastes good but after several bites he gets nauseous and vomits.  He has been experiencing worsening shortness of breath as well as abdominal bloating.  His blood pressure has been low and he has had to stop taking Entresto and has actually started midodrine to keep his blood pressure elevated.    He presented to the emergency room where he was found to have mild troponin elevation, elevated lactate, elevated LFTs, and acute kidney injury on chronic kidney disease.   He was admitted to the hospitalist service.        Allergies   Allergen Reactions   • Aspirin GI Bleeding   • Eliquis [Apixaban] Other (See Comments)     Excessive bleeding   • Amlodipine Besylate Unknown (See Comments)     Unknown     • Atenolol Unknown (See Comments)     Unknown       • Empagliflozin Other (See Comments)     Low BP   • Metoprolol Unknown (See Comments)     Unknown   • Milk-Related Compounds Other (See Comments)     Headache       Prior to Admission medications    Medication Sig Start Date End Date Taking? Authorizing Provider   aspirin 81 MG EC tablet Take 81 mg by mouth Daily.    Vineet Zamora MD   atorvastatin (LIPITOR) 20 MG tablet Take 1 tablet by mouth Every Night.  Patient taking differently: Take 20 mg by mouth Every Other Day. 11/14/22   Librado Jones IV, MD   clopidogrel (PLAVIX) 75 MG tablet Take 1 tablet by mouth Daily.  Patient taking differently: Take 75 mg by mouth Every Other Day. 11/15/22   Librado Jones IV, MD   Cod Liver Oil 1000 MG capsule OTC Take 2 capsules in the morning and 1 capsule in the afternoon    Vineet Zamora MD   levothyroxine (SYNTHROID, LEVOTHROID) 125 MCG tablet TAKE 1 TABLET EVERY DAY 9/27/22   Saul Campbell MD   midodrine (PROAMATINE) 5 MG tablet Take 1 tablet by mouth 3 (Three) Times a Day Before Meals.  Patient taking differently: Take 5 mg by mouth 3 (Three) Times a Day Before Meals. For SBP <100 11/14/22   Librado Jones IV, MD   Misc Natural Products (Advanced Joint Relief) capsule Take 1 capsule by mouth Daily. OTC    Vineet Zamora MD   NON FORMULARY OTC- Cardio-Plus 2080 : Take 4 tablets in the morning and 5 tablets in the evening    Vineet Zamora MD   NON FORMULARY OTC- Cataplex B 1250mg: Take 3 tablets BID    Vineet Zamora MD   potassium chloride 10 MEQ CR tablet Take 2 tablets by mouth Daily. 11/21/22   Davis Mendoza APRN   torsemide (DEMADEX) 20 MG tablet Take 2 tablets  for 2 days and then one tablet daily 12/28/22   Sharri Westbrook APRN   Turmeric 450 MG capsule Take 1 capsule by mouth Daily. OTC (Turmeric Forte)    Provider, MD Vineet       Past Medical History:   Diagnosis Date   • Arthritis    • Atrial fibrillation (HCC)    • Atrial flutter ([facility])     s/p typical flutter ablation with recurrent atypical flutter   • BPH (benign prostatic hyperplasia)    • Coronary artery disease    • Diverticulosis    • GERD (gastroesophageal reflux disease)    • GI bleed    • HFrEF (heart failure with reduced ejection fraction) ([facility])    • Hyperlipidemia    • Hypothyroidism (acquired)    • Obesity (BMI 30-39.9)    • ELIE (obstructive sleep apnea)    • Osteoarthritis    • Shingles    • Skin cancer     squamous    • Stage 3 chronic kidney disease ([facility]) 12/21/2022   • Wears eyeglasses     readers   • Wears hearing aid        Past Surgical History:   Procedure Laterality Date   • ABLATION OF DYSRHYTHMIC FOCUS  12/2018   • ANGIOPLASTY  1985   • CARDIAC CATHETERIZATION     • CARDIAC CATHETERIZATION N/A 11/8/2022    Procedure: Left Heart Cath;  Surgeon: Marco Urena MD;  Location:  [facility];  Service: Cardiovascular;  Laterality: N/A;   • CARDIAC ELECTROPHYSIOLOGY PROCEDURE N/A 11/26/2018    Procedure: Ablation atrial flutter;  Surgeon: Tino Sampson MD;  Location:  [facility]IVE [facility];  Service: Cardiovascular   • CARDIAC ELECTROPHYSIOLOGY PROCEDURE N/A 11/10/2022    Procedure: Pacemaker DC new;  Surgeon: Bruce Mera DO;  Location:  [facility];  Service: Cardiology;  Laterality: N/A;   • CARDIAC SURGERY      BYPASS X5   • COLONOSCOPY  2009   • COLONOSCOPY  2005   • CORONARY ANGIOPLASTY  1985   • CORONARY ARTERY BYPASS GRAFT  2003    x5 [facility] 2003   • DENTAL PROCEDURE  2010    dental surg and crowns   • EYE SURGERY Right     cataract   • SKIN BIOPSY     • VASECTOMY         Family History   Problem Relation Age of Onset   • Diabetes  Mother    • Heart disease Mother    • Coronary artery disease Other    • Stroke Other    • Diabetes Other    • Rheumatic fever Other    • Other Other         Valvular CV Disease       Social History     Tobacco Use   Smoking Status Former   • Packs/day: 1.00   • Years: 8.00   • Pack years: 8.00   • Types: Cigarettes   • Start date: 10/1/1956   • Quit date: 1964   • Years since quittin.1   Smokeless Tobacco Never       Social History     Substance and Sexual Activity   Alcohol Use No         Review of Systems:   Review of Systems   Constitutional: Positive for malaise/fatigue.   Cardiovascular: Positive for dyspnea on exertion and orthopnea. Negative for chest pain.   Respiratory: Positive for shortness of breath.    Gastrointestinal: Positive for bloating.       Objective     Vital Sign Min/Max for last 24 hours  Temp  Min: 97.5 °F (36.4 °C)  Max: 98.9 °F (37.2 °C)   BP  Min: 100/61  Max: 113/80   Pulse  Min: 66  Max: 109   Resp  Min: 14  Max: 18   SpO2  Min: 92 %  Max: 100 %   No data recorded      Intake/Output Summary (Last 24 hours) at 2023 1715  Last data filed at 2023 1100  Gross per 24 hour   Intake 360 ml   Output 1200 ml   Net -840 ml           Tele: V paced    Vitals and nursing note reviewed.   Constitutional:       Appearance: Normal appearance. Not in distress.   Eyes:      General: Gaze aligned appropriately.      Conjunctiva/sclera: Conjunctivae normal.   HENT:      Head: Normocephalic and atraumatic.   Neck:      Vascular: JVD normal.   Pulmonary:      Effort: Pulmonary effort is normal.      Breath sounds: Wheezing present.   Chest:      Chest wall: Not tender to palpatation.   Cardiovascular:      PMI at left midclavicular line. Normal rate. Regular rhythm. Paced rhythm      Murmurs: There is a grade 2/6 systolic murmur.   Pulses:     Carotid: 2+ bilaterally.     Radial: 2+ bilaterally.     Femoral: 2+ bilaterally.     Dorsalis pedis: 2+ bilaterally.     Posterior tibial: 2+  bilaterally.  Edema:     Peripheral edema present.     Thigh: bilateral 1+ edema of the thigh.     Pretibial: bilateral 3+ edema of the pretibial area.     Ankle: bilateral 3+ edema of the ankle.     Feet: bilateral 3+ edema of the feet.  Abdominal:      General: There is no distension.      Palpations: Abdomen is soft.      Tenderness: There is no abdominal tenderness.   Musculoskeletal:      Cervical back: Normal range of motion.      Comments: Normal ROM in all 4 extremeities Skin:     General: Skin is warm and dry.      Comments: Healing wound on second toe   Neurological:      Mental Status: Alert and oriented to person, place and time.   Psychiatric:         Mood and Affect: Mood and affect normal.              DATA REVIEW:    EKG (1/4/2023):  Atrial sensed, v paced     ECHO (11/7/2022): EF < 20%, LV and RV cavity moderately dilated, moderate MR, large left pleural effusion      Results from last 7 days   Lab Units 01/04/23  0411 01/03/23  2045   SODIUM mmol/L 140 138   POTASSIUM mmol/L 4.3 5.0   CHLORIDE mmol/L 101 98   BUN mg/dL 57* 53*   CREATININE mg/dL 2.26* 2.33*   MAGNESIUM mg/dL 2.5*  --      Results from last 7 days   Lab Units 01/04/23  0411 01/04/23  0205 01/04/23  0023   TROPONIN T ng/mL 0.021 0.035* 0.023     Results from last 7 days   Lab Units 01/04/23  0411 01/03/23  2045   WBC 10*3/mm3 7.07 6.98   HEMOGLOBIN g/dL 13.2 14.8   HEMATOCRIT % 41.9 48.3   PLATELETS 10*3/mm3 111* 144     Lab Results   Component Value Date    HGBA1C 5.90 (H) 01/04/2023     Lab Results   Component Value Date    CHOL 130 01/04/2023    CHLPL 191 05/25/2022    TRIG 56 01/04/2023    HDL 28 (L) 01/04/2023    LDL 90 01/04/2023           Assessment         Lab Results   Component Value Date    TROPONINT 0.021 01/04/2023    TROPONINT 0.035 (C) 01/04/2023    TROPONINT 0.023 01/04/2023         Assessment     Cardiogenic shock  · Patient is essentially ambulatory cardiogenic shock that has not improved with maximally tolerated  "GDMT or CRT pacing  · With age, comorbidities, and multiorgan system failure, patient deserves palliative consultation and end-of-life plan    Stage D HFrEF with acute exacerbation  · EF <20 by echo 11/7/2022  · Multiple medication adjustment by heart and valve   · Entresto discontinued due to hypotension  · Ascites and pulmonary vascular congestion    Type II myocardial infarction due to heart failure  · Mild troponin elevation in the setting of end-stage heart failure and renal failure  · Recent cardiac catheterization showed acceptable coronary and graft anatomy    Coronary artery disease  · LHC 11/2022 with PCI to SVG, adequately revascularized  · Continue DAPT    Valvular heart disease  · Moderate MR on Echo 11/7/2022    NSVT  · S/p EP study and CRT PPM implantation, 11/10/2022    Atrial fibrillation  · Not on anticoagulated due to GI bleed    Hypotension  · On Midodrine PRN    Elevated LFTs/Cholelithiasis/Nausea  · Elevated LFTs likely related to passive liver congestion due to CHF  · Gallbladder US pending  · Per primary team      I had a long and thoughtful conversation with the patient today regarding his poor prognosis given the entirety of his clinical picture.  He understands his clinical situation is not good and \"does not want to be a burden\" on his wife.      Plan     · Consult palliative care for hospice    Electronically signed by Librado Jones IV, MD, 01/04/23, 12:56 PM SHANE Ponce RN, wound care-Patient states he is not diabetic, has never been told he has poor blood flow to his feet, he has excellent pulses. He states his feet were swollen, causing his shoes to rub the toe \"raw\". He noticed skin peking off and pulled it off. This was a few days ago.     Today, toe has 2.5x1.5 and a dried scab. Good feeling in toe, no numbness or tingling. No purulence, erythema or swelling. Painted with betadine and left bottle of betadine for patient to take home to continue to cleanse " wound. Instructions given to patient and wife about monitoring for further infection/worsening of wound. Advised to wear well fitting shoes, keep feet clean and dry and monitor feet for sores frequently.      Nursing to continue to paint wound daily with betadine.

## 2023-01-07 PROCEDURE — G0378 HOSPITAL OBSERVATION PER HR: HCPCS

## 2023-01-07 PROCEDURE — 96376 TX/PRO/DX INJ SAME DRUG ADON: CPT

## 2023-01-07 PROCEDURE — 25010000002 PROCHLORPERAZINE 10 MG/2ML SOLUTION: Performed by: FAMILY MEDICINE

## 2023-01-07 PROCEDURE — 25010000002 MORPHINE SULF MICROINFUSION PF 500 MG/20ML (25 MG/ML) SOLUTION: Performed by: FAMILY MEDICINE

## 2023-01-07 RX ORDER — MORPHINE SULFATE 1 MG/ML
INJECTION INTRAVENOUS CONTINUOUS
Status: DISCONTINUED | OUTPATIENT
Start: 2023-01-07 | End: 2023-01-09 | Stop reason: HOSPADM

## 2023-01-07 RX ORDER — NALOXONE HCL 0.4 MG/ML
0.1 VIAL (ML) INJECTION
Status: DISCONTINUED | OUTPATIENT
Start: 2023-01-07 | End: 2023-01-09 | Stop reason: HOSPADM

## 2023-01-07 RX ADMIN — PROCHLORPERAZINE EDISYLATE 5 MG: 5 INJECTION INTRAMUSCULAR; INTRAVENOUS at 09:09

## 2023-01-07 RX ADMIN — Medication 10 ML: at 09:13

## 2023-01-07 RX ADMIN — LEVOTHYROXINE SODIUM 125 MCG: 125 TABLET ORAL at 04:46

## 2023-01-07 RX ADMIN — PROCHLORPERAZINE EDISYLATE 5 MG: 5 INJECTION INTRAMUSCULAR; INTRAVENOUS at 00:11

## 2023-01-07 RX ADMIN — POTASSIUM CHLORIDE 20 MEQ: 750 CAPSULE, EXTENDED RELEASE ORAL at 09:08

## 2023-01-07 RX ADMIN — MORPHINE SULFATE: 25 INJECTION, SOLUTION EPIDURAL; INTRATHECAL at 13:42

## 2023-01-07 RX ADMIN — ASPIRIN 81 MG: 81 TABLET, COATED ORAL at 09:08

## 2023-01-07 NOTE — PROGRESS NOTES
"Continued Stay Note  [facility]     Patient Name: Saul Sal  MRN: [id number]  Today's Date: 1/6/2023    Admit Date: 1/3/2023    Plan: Home with [facility]   Discharge Plan     Row Name 01/06/23 1936       Plan    Plan Home with [facility]    Plan Comments Follow up visit made to pt this afternoon, pt's wife and daughter present, pt in recliner, appears to be sleeping. Wife and daughter stated have decided on hospice services, though wants this writer to come back when pt is alert and wife can verify with pt that pt does want hospice. Wife stated does want to take pt home tomorrow. Second visit made to pt later this afternoon, pt alert, sitting up in recliner, stated \"I feel like I can't catch my breath.\" Noted that medication changes were made throughout the day. Discussed with pt and wife waiting another day before discharge allowing time for the physician to continue making medication changes if needed for the dyspnea and then allowing time to assess the effectiveness of the medications. Pt and wife agreeable to this plan. Spoke with Dr. Jones regarding pt's complaint of air hunger and waiting another day before discharging pt, Dr. Jones agreeable to this plan. Will continue to follow. Please call [phone] if can be of further assistance.    Row Name 01/06/23 9548       Plan    Plan     Plan Comments                Discharge Codes    No documentation.               Expected Discharge Date and Time     Expected Discharge Date Expected Discharge Time    Jan 7, 2023             Amy Barber RN    "

## 2023-01-07 NOTE — PLAN OF CARE
Goal Outcome Evaluation:   Alert and oriented. Pleasant. Up in chair most of shift. Offered position changes and to go back to bed, pt declined. Ambulated to toilet x1. UOP x1. Afebrile. Pt was made comfort measures today and began morphine PCA. Tolerating well.  Okay to dc continuous monitoring/vitals per MD. Family at bedside. Plans to go home with hospice Monday.

## 2023-01-07 NOTE — PROGRESS NOTES
Continued Stay Note  [facility]     Patient Name: Saul Sal  MRN: [id number]  Today's Date: 1/6/2023    Admit Date: 1/3/2023    Plan: To be determined   Discharge Plan     Row Name 01/06/23 1948       Plan    Plan To be determined    Plan Comments Visit made to pt, pt's wife present, pt appeared to be sleeping. Teaching done on hospice philosophy, goals of care and services. Wife stated is leaning towards hospice though wants to discuss with pt when awake. Will make follow up visit later today.    Row Name 01/06/23 1936       Plan    Plan                Discharge Codes    No documentation.               Expected Discharge Date and Time     Expected Discharge Date Expected Discharge Time    Jan 7, 2023             Amy Barber RN

## 2023-01-07 NOTE — PLAN OF CARE
Goal Outcome Evaluation:  Plan of Care Reviewed With: patient            Pt alert, oriented and pleasant. Multiple conversations had with pt and wife throughout shift involving pros and cons to medical treatment of pt shortness of breath. PRN medications administered per order to treat SOB. Administered prn meds did make pt extremely drowsy and sedated. Pt placed on oxygen for comfort.

## 2023-01-07 NOTE — PLAN OF CARE
Patient has been drowsy/somewhat lethargic, responds to  voice. Denies pain/discomfort at this time. Spouse reports significantly decreased PO intake. SBP in 90s. Dual paced with frequent PVCs on cardiac mx. Will cont to mx. Call light in reach. Spouse at bedside.   Patient complains of rapid heart rate while eating.

## 2023-01-07 NOTE — PROGRESS NOTES
"Palliative Care Progress Note    Name: Saul Sal, Age: 83 y.o., Sex: male  Date of Admission: 1/3/2023 - LOS: 2 days    Subjective:    Had some dyspnea last night and this morning, none currently but gets SOB with any exertion. 3 doses prn morphine yesterday. Now requiring supplemental O2. Denies pain. Anxious to get home, plan to d/c home with hospice.     Current Code Status     Date Active Code Status Order ID Comments User Context       1/7/2023 1043 No CPR (Do Not Attempt to Resuscitate) [id number]  Sandra Jones, DO Inpatient      Question Answer    Code Status (Patient has no pulse and is not breathing) No CPR (Do Not Attempt to Resuscitate)    Medical Interventions (Patient has pulse or is breathing) Comfort Measures                Objective:   /79 (BP Location: Left arm, Patient Position: Lying)   Pulse 78   Temp 98.5 °F (36.9 °C) (Oral)   Resp 18   Ht 182.9 cm (72\")   Wt 93 kg (205 lb)   SpO2 96%   BMI 27.80 kg/m²      Intake/Output Summary (Last 24 hours) at 1/7/2023 1223  Last data filed at 1/7/2023 0900  Gross per 24 hour   Intake 840 ml   Output 550 ml   Net 290 ml       Physical Exam:    Gen: well nourished, well developed adult male up in recliner, NAD   HEENT: NCAT, EOMI, MMM   CV: HR RRR, +murmur, +edema BLE   Pulm: resp unlabored at rest, O2 per NC, CTA   GI/: abd NTND, +BS   Neuro: alert, oriented, speech clear   Psych: calm    Results from last 7 days   Lab Units 01/04/23  0411   WBC 10*3/mm3 7.07   HEMOGLOBIN g/dL 13.2   HEMATOCRIT % 41.9   PLATELETS 10*3/mm3 111*     Results from last 7 days   Lab Units 01/05/23  0520 01/04/23 0411 01/03/23 2045   SODIUM mmol/L 140   < > 138   POTASSIUM mmol/L 4.0   < > 5.0   CHLORIDE mmol/L 103   < > 98   CO2 mmol/L 21.0*   < > 19.0*   BUN mg/dL 53*   < > 53*   CREATININE mg/dL 1.93*   < > 2.33*   CALCIUM mg/dL 8.4*   < > 9.4   BILIRUBIN mg/dL  --   --  1.6*   ALK PHOS U/L  --   --  133*   ALT (SGPT) U/L  --   --  119*   AST " (SGOT) U/L  --   --  147*   GLUCOSE mg/dL 81   < > 129*    < > = values in this interval not displayed.       Impression: 84yo male with cardiogenic shock    Active Hospital Problems    Diagnosis  POA   • **Cardiogenic shock (HCC) [R57.0]  Yes   • 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 [Z95.0]  Yes     · CRT pacemaker placed by Dr. 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 Mild TR.  · Echocardiogram (12/1/2020): LVEF 20%.Moderate to severe MR. Moderate TR. Moderate pulmonary hypertension. Saline results negative.  · Echocardiogram (1/19/2022): LVEF 20%. Moderate pulmonic valve regurgitation is present. Mild MR.  · Echocardiogram (11/8/2022): LVEF < 20%.  Moderate MR.    · EP study and CRT pacemaker ([facility]) by Dr. Mera for symptomatic VT, 11/10/2022  · [facility] acute systolic heart failure/cardiogenic shock, 1/3/2023     • Atrial fib/flutter (not on AC 2* bleeding and patient preference)  [I48.91, I48.92]  Yes     · Typical flutter diagnosed 10/14/18  · Chadsvasc 5 (Age>75, HTN, CAD, CHF)  · Apixaban stopped due to bleeding  · Radiofrequency ablation of isthmus dependent atrial flutter by Tino Sampson, 11/26/2018  · 12-day Holter monitor (12/2018): 63% A. fib/flutter/SVT burden  · Echocardiogram, 11/8/2022: Left atrium 5.2 cm     • Coronary artery disease involving native coronary artery of native heart without angina pectoris [I25.10]  Yes     · CABGx5 2003  · Nuclear stress test (11/16/2018): medium-sized infarct located in the inferior wall and basal inferior lateral wall.  LVEF 28%.   · Intolerant to beta-blocker, aspirin  · Cardiac cath for VT and syncope (11/8/2022): PCI to SVG of RPDA.  Widely patent LIMA sequential LAD and diagonal and patent SVG to OM1 and OM 2     • Hyperlipidemia LDL goal <70 [E78.5]  Yes     · Moderate intensity statin therapy indicated         Symptoms:   Dyspnea      Plan:   Discussed case with attending and hospice liaison. PCA ordered for dyspnea so pt can control his symptoms. Plan to d/c home with hospice on Monday. Ativan prn for anxiety which likely contributes to his dyspnea. Comfort measures.    Time: 30minutes    Jes Hargrove NP  01/07/23  12:23 EST

## 2023-01-08 PROCEDURE — 25010000002 LORAZEPAM PER 2 MG: Performed by: FAMILY MEDICINE

## 2023-01-08 PROCEDURE — 99231 SBSQ HOSP IP/OBS SF/LOW 25: CPT | Performed by: FAMILY MEDICINE

## 2023-01-08 PROCEDURE — 96376 TX/PRO/DX INJ SAME DRUG ADON: CPT

## 2023-01-08 PROCEDURE — 25010000002 PROCHLORPERAZINE 10 MG/2ML SOLUTION: Performed by: FAMILY MEDICINE

## 2023-01-08 PROCEDURE — G0378 HOSPITAL OBSERVATION PER HR: HCPCS

## 2023-01-08 RX ORDER — MORPHINE SULFATE 20 MG/ML
5 SOLUTION ORAL
Status: DISCONTINUED | OUTPATIENT
Start: 2023-01-08 | End: 2023-01-09 | Stop reason: HOSPADM

## 2023-01-08 RX ADMIN — PROCHLORPERAZINE EDISYLATE 5 MG: 5 INJECTION INTRAMUSCULAR; INTRAVENOUS at 09:40

## 2023-01-08 RX ADMIN — PROCHLORPERAZINE EDISYLATE 5 MG: 5 INJECTION INTRAMUSCULAR; INTRAVENOUS at 16:46

## 2023-01-08 RX ADMIN — ASPIRIN 81 MG: 81 TABLET, COATED ORAL at 09:40

## 2023-01-08 RX ADMIN — POTASSIUM CHLORIDE 20 MEQ: 750 CAPSULE, EXTENDED RELEASE ORAL at 09:40

## 2023-01-08 RX ADMIN — Medication 10 ML: at 09:40

## 2023-01-08 RX ADMIN — LORAZEPAM 0.5 MG: 2 INJECTION INTRAMUSCULAR; INTRAVENOUS at 18:49

## 2023-01-08 RX ADMIN — PROCHLORPERAZINE EDISYLATE 5 MG: 5 INJECTION INTRAMUSCULAR; INTRAVENOUS at 00:21

## 2023-01-08 RX ADMIN — CLOPIDOGREL BISULFATE 75 MG: 75 TABLET ORAL at 09:40

## 2023-01-08 RX ADMIN — LEVOTHYROXINE SODIUM 125 MCG: 125 TABLET ORAL at 06:04

## 2023-01-08 NOTE — PLAN OF CARE
VSS on 2L via NC. No SOA/tachypnea or Dyspnea present at this time. Patient rests in chair without sx/si of pain/acute distress at this time. Morphine PCA in place/utilized. Will cont to mx. Call light in reach. Family at bedside.

## 2023-01-08 NOTE — PLAN OF CARE
Goal Outcome Evaluation:  Plan of Care Reviewed With: patient           Outcome Evaluation: Patient up to chair. Has dyspena but is better mananged with morphine PCA pump. Family is currently at bedside. Awaiting to go home.

## 2023-01-08 NOTE — PROGRESS NOTES
[facility] Medicine Services  PROGRESS NOTE    Patient Name: Saul Sal  : 1940  MRN: [id number]    Date of Admission: 1/3/2023  Primary Care Physician: Saul Campbell MD    Subjective   Subjective     CC:  F/u with cardiogenic shock    HPI:  Pt states he is still short of breath but it is better. He had a better night last night. Daughter and wife are bedside    ROS:  Gen- No fevers, chills  CV- No chest pain, palpitations  Resp- No cough, +dyspnea  GI- + N/ no V/D, abd pain        Objective   Objective     Vital Signs:   Temp:  [96.8 °F (36 °C)-98.1 °F (36.7 °C)] 96.8 °F (36 °C)  Heart Rate:  [74-93] 93  Resp:  [18] 18  BP: ()/(70-73) 100/73  Flow (L/min):  [2] 2     Physical Exam:  Constitutional: No acute distress, awake, alert  HENT: NCAT, mucous membranes moist  Respiratory:  respiratory effort normal   Cardiovascular: RRR, no murmurs, rubs, or gallops  Gastrointestinal: Positive bowel sounds, soft, nontender, nondistended  Musculoskeletal: No bilateral ankle edema  Psychiatric: Appropriate affect, cooperative  Neurologic: Oriented x 3,  speech clear  Skin: No rashes      Results Reviewed:  LAB RESULTS:      Lab 23  1348 23  0849 23  0411 23  0023 23  2045   WBC  --   --  7.07  --  6.98   HEMOGLOBIN  --   --  13.2  --  14.8   HEMATOCRIT  --   --  41.9  --  48.3   PLATELETS  --   --  111*  --  144   NEUTROS ABS  --   --  5.33  --  4.65   IMMATURE GRANS (ABS)  --   --  0.06*  --  0.03   LYMPHS ABS  --   --  1.04  --  1.77   MONOS ABS  --   --  0.61  --  0.46   EOS ABS  --   --  0.01  --  0.04   MCV  --   --  93.5  --  95.3   SED RATE  --   --  <1  --   --    CRP  --   --  3.02*  --   --    LACTATE 2.4* 2.1* 2.2* 2.4* 5.8*         Lab 23  0520 23  0411 23   SODIUM 140 140 138   POTASSIUM 4.0 4.3 5.0   CHLORIDE 103 101 98   CO2 21.0* 24.0 19.0*   ANION GAP 16.0* 15.0 21.0*   BUN 53* 57* 53*   CREATININE 1.93* 2.26*  2.33*   EGFR 33.7* 27.9* 27.1*   GLUCOSE 81 97 129*   CALCIUM 8.4* 8.8 9.4   MAGNESIUM  --  2.5*  --    PHOSPHORUS  --  4.6*  --    HEMOGLOBIN A1C  --  5.90*  --          Lab 01/03/23 2045   TOTAL PROTEIN 7.8   ALBUMIN 4.5   GLOBULIN 3.3   ALT (SGPT) 119*   AST (SGOT) 147*   BILIRUBIN 1.6*   ALK PHOS 133*   LIPASE 40         Lab 01/04/23  0411 01/04/23  0205 01/04/23  0023 01/03/23 2045   PROBNP  --   --   --  31,376.0*   TROPONIN T 0.021 0.035* 0.023 0.032*         Lab 01/04/23 0411   CHOLESTEROL 130   LDL CHOL 90   HDL CHOL 28*   TRIGLYCERIDES 56             Brief Urine Lab Results  (Last result in the past 365 days)      Color   Clarity   Blood   Leuk Est   Nitrite   Protein   CREAT   Urine HCG        01/04/23 0105 Yellow   Cloudy   Negative   Negative   Negative   30 mg/dL (1+)                 Microbiology Results Abnormal     Procedure Component Value - Date/Time    Blood Culture - Blood, Arm, Right [[id number]]  (Normal) Collected: 01/04/23 0156    Lab Status: Preliminary result Specimen: Blood from Arm, Right Updated: 01/08/23 0230     Blood Culture No growth at 4 days    Blood Culture - Blood, Arm, Right [[id number]]  (Normal) Collected: 01/04/23 0203    Lab Status: Preliminary result Specimen: Blood from Arm, Right Updated: 01/08/23 0230     Blood Culture No growth at 4 days    COVID PRE-OP / PRE-PROCEDURE SCREENING ORDER (NO ISOLATION) - Swab, Nasopharynx [[id number]]  (Normal) Collected: 01/03/23 2143    Lab Status: Final result Specimen: Swab from Nasopharynx Updated: 01/03/23 0598    Narrative:      The following orders were created for panel order COVID PRE-OP / PRE-PROCEDURE SCREENING ORDER (NO ISOLATION) - Swab, Nasopharynx.  Procedure                               Abnormality         Status                     ---------                               -----------         ------                     Respiratory Panel PCR w/...[[id number]]  Normal              Final result                 Please view  results for these tests on the individual orders.    Respiratory Panel PCR w/COVID-19(SARS-CoV-2) [facility]/YO/BALBINA/PAD/[facility]/M[facility]/DORI In-House, NP Swab in UTM/VTM, 3-4 HR TAT - Swab, Nasopharynx [[id number]]  (Normal) Collected: 01/03/23 7420    Lab Status: Final result Specimen: Swab from Nasopharynx Updated: 01/03/23 3588     ADENOVIRUS, PCR Not Detected     Coronavirus 229E Not Detected     Coronavirus HKU1 Not Detected     Coronavirus NL63 Not Detected     Coronavirus OC43 Not Detected     COVID19 Not Detected     Human Metapneumovirus Not Detected     Human Rhinovirus/Enterovirus Not Detected     Influenza A PCR Not Detected     Influenza B PCR Not Detected     Parainfluenza Virus 1 Not Detected     Parainfluenza Virus 2 Not Detected     Parainfluenza Virus 3 Not Detected     Parainfluenza Virus 4 Not Detected     RSV, PCR Not Detected     Bordetella pertussis pcr Not Detected     Bordetella parapertussis PCR Not Detected     Chlamydophila pneumoniae PCR Not Detected     Mycoplasma pneumo by PCR Not Detected    Narrative:      In the setting of a positive respiratory panel with a viral infection PLUS a negative procalcitonin without other underlying concern for bacterial infection, consider observing off antibiotics or discontinuation of antibiotics and continue supportive care. If the respiratory panel is positive for atypical bacterial infection (Bordetella pertussis, Chlamydophila pneumoniae, or Mycoplasma pneumoniae), consider antibiotic de-escalation to target atypical bacterial infection.          No radiology results from the last 24 hrs    Results for orders placed during the hospital encounter of 11/07/22    Adult Transthoracic Echo Complete W/ Cont if Necessary Per Protocol    Interpretation Summary  •  Left ventricular systolic function is severely decreased. Left ventricular ejection fraction appears to be less than 20%.  •  The left ventricular cavity is moderately dilated.  •  Left ventricular diastolic  dysfunction is noted.  •  Left atrial volume is moderately increased.  •  Moderate mitral valve regurgitation is present.  •  The right ventricular cavity is moderately dilated.  •  Estimated right ventricular systolic pressure from tricuspid regurgitation is normal (<35 mmHg).  •  There is a large left pleural effusion.      I have reviewed the medications:  Scheduled Meds:aspirin, 81 mg, Oral, Daily  clopidogrel, 75 mg, Oral, Daily  levothyroxine, 125 mcg, Oral, Q AM  pharmacy consult - [facility], , Does not apply, BID  potassium chloride, 20 mEq, Oral, Daily  prochlorperazine, 5 mg, Intravenous, Q8H  sodium chloride, 10 mL, Intravenous, Q12H      Continuous Infusions:Morphine,   Pharmacy Consult,       PRN Meds:.•  LORazepam  •  morphine  •  naloxone  •  Pharmacy Consult  •  sodium chloride    Assessment & Plan   Assessment & Plan     Active Hospital Problems    Diagnosis  POA   • **Cardiogenic shock (HCC) [R57.0]  Yes   • 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 [Z95.0]  Yes   • Nausea in adult [R11.0]  Yes   • Ulcer of right foot (HCC) [L97.519]  Yes   • STORM (acute kidney injury) (HCC) [N17.9]  Yes   • Type 2 myocardial infarction due to heart failure (HCC) [I50.9, I21.A1]  Yes   • 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]  Yes   • Stage 3 chronic kidney disease (HCC) [N18.30]  Yes   • Acute on chronic systolic congestive heart failure (HCC) [I50.23]  Yes   • Atrial fib/flutter (not on AC 2* bleeding and patient preference)  [I48.91, I48.92]  Yes   • Coronary artery disease involving native coronary artery of native heart without angina pectoris [I25.10]  Yes   • Hyperlipidemia LDL goal <70 [E78.5]  Yes      Resolved Hospital Problems    Diagnosis Date Resolved POA   • Acute on chronic systolic congestive heart failure (HCC) [I50.23] 01/04/2023 Yes   • Chronic passive congestion of liver [K76.1] 01/04/2023 Yes        Brief Hospital Course to date:  Saul Sal is a 83 y.o. 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 He has been taking Torsemide daily as instructed. Workup in the ER consistent with decompensated CHF. Cardiology evaluated and feel that given his multi organ failure he would be a good candidate for hospice.     [facility]  Hospice and Palliative following. Comfort measures. Watching on morphine PCA over the weekend. Hospice to evaluate tomorrow      Lactic acidosis     Cardiogenic Shock  HFrEF with acute exacerbation  Type II MI  Afib  Pacemaker  -Cardiology following, limited treatment options    Elevated LFT  Intractable Nausea   -us abdomin shows echogenicity of liver, maybe cirrhosis- assumed volume overload from heart failure   - no acute cholecystitis   -scheduled compazine         Expected Discharge Location and Transportation: Home with hospice   Expected Discharge  1/7 or 1/8  Expected Discharge Date and Time     Expected Discharge Date Expected Discharge Time    Jan 7, 2023            DVT prophylaxis:  Mechanical DVT prophylaxis orders are present.     AM-PAC 6 Clicks Score (PT): 19 (01/07/23 0800)    CODE STATUS:   Code Status and Medical Interventions:   Ordered at: 01/07/23 1043     Code Status (Patient has no pulse and is not breathing):    No CPR (Do Not Attempt to Resuscitate)     Medical Interventions (Patient has pulse or is breathing):    Comfort Measures       Sandra Jones, DO  01/08/23

## 2023-01-09 ENCOUNTER — APPOINTMENT (OUTPATIENT)
Dept: ULTRASOUND IMAGING | Facility: HOSPITAL | Age: 83
End: 2023-01-09
Payer: MEDICARE

## 2023-01-09 VITALS
WEIGHT: 205 LBS | HEIGHT: 72 IN | HEART RATE: 79 BPM | DIASTOLIC BLOOD PRESSURE: 74 MMHG | BODY MASS INDEX: 27.77 KG/M2 | RESPIRATION RATE: 16 BRPM | SYSTOLIC BLOOD PRESSURE: 111 MMHG | TEMPERATURE: 96.5 F | OXYGEN SATURATION: 96 %

## 2023-01-09 LAB
BACTERIA SPEC AEROBE CULT: NORMAL
BACTERIA SPEC AEROBE CULT: NORMAL

## 2023-01-09 PROCEDURE — 99239 HOSP IP/OBS DSCHRG MGMT >30: CPT | Performed by: INTERNAL MEDICINE

## 2023-01-09 PROCEDURE — 96376 TX/PRO/DX INJ SAME DRUG ADON: CPT

## 2023-01-09 PROCEDURE — 25010000002 PROCHLORPERAZINE 10 MG/2ML SOLUTION: Performed by: FAMILY MEDICINE

## 2023-01-09 PROCEDURE — G0378 HOSPITAL OBSERVATION PER HR: HCPCS

## 2023-01-09 RX ORDER — LORAZEPAM 0.5 MG/1
0.5 TABLET ORAL EVERY 6 HOURS PRN
Qty: 10 TABLET | Refills: 0 | Status: SHIPPED | OUTPATIENT
Start: 2023-01-09

## 2023-01-09 RX ADMIN — Medication 10 ML: at 09:48

## 2023-01-09 RX ADMIN — PROCHLORPERAZINE EDISYLATE 5 MG: 5 INJECTION INTRAMUSCULAR; INTRAVENOUS at 00:23

## 2023-01-09 RX ADMIN — PROCHLORPERAZINE EDISYLATE 5 MG: 5 INJECTION INTRAMUSCULAR; INTRAVENOUS at 09:48

## 2023-01-09 RX ADMIN — LEVOTHYROXINE SODIUM 125 MCG: 125 TABLET ORAL at 04:16

## 2023-01-09 RX ADMIN — ASPIRIN 81 MG: 81 TABLET, COATED ORAL at 09:48

## 2023-01-09 RX ADMIN — CLOPIDOGREL BISULFATE 75 MG: 75 TABLET ORAL at 09:48

## 2023-01-09 RX ADMIN — POTASSIUM CHLORIDE 20 MEQ: 750 CAPSULE, EXTENDED RELEASE ORAL at 09:48

## 2023-01-09 NOTE — PROGRESS NOTES
Continued Stay Note  [facility]     Patient Name: Saul Sal  MRN: [id number]  Today's Date: 1/9/2023    Admit Date: 1/3/2023    Plan: Home with [facility]   Discharge Plan     Row Name 01/09/23 1335       Plan    Plan Home with [facility]    Final Discharge Disposition Code 50 - home with hospice    Final Note Visit made to pt this morning, pt's wife and granddaughter present. Wife stated pt wants to go home, wife wanting to take pt home today. Message sent to Dr. Chinchilla regarding discharge, Dr. Chinchilla stated pt may discharge home today. Ambulance transportation arranged with [facility] for today at 1530, informed family, Dr. Chinchilla,  and staff nurse. Discussed equipment needs with wife, hospital bed, overbed table, oxygen, BSC and transport w/c ordered from hospice and will be delivered to the home before 1530. Wife completed the EMS/DNR form, form placed on pt's chartlet along with the PCS form. Pt is currently receiving morphine infusion, the infusion will changed to the Hospice CADD pump priror to pt's discharge. Provided wife with the hospice 24 hr number to call to initiate hospice services when pt arrives home. Please call [phone] if can be of further assistance.               Discharge Codes    No documentation.               Expected Discharge Date and Time     Expected Discharge Date Expected Discharge Time    Jan 9, 2023             Amy Barber RN

## 2023-01-09 NOTE — PLAN OF CARE
Problem: Fall Injury Risk  Goal: Absence of Fall and Fall-Related Injury  Outcome: Adequate for Care Transition  Intervention: Identify and Manage Contributors  Description: Develop a fall prevention plan with the patient and caregiver/family.  Provide reorientation, appropriate sensory stimulation and routines with changes in mental status to decrease risk of fall.  Promote use of personal vision and auditory aids.  Assess assistance level required for safe and effective self-care; provide support as needed, such as toileting, mobilization. For age 65 and older, implement timed toileting with assistance.  Encourage physical activity, such as performance of mobility and self-care at highest level of patient ability, multicomponent exercise program and provision of appropriate assistive devices.  If fall occurs, assess the severity of injury; implement fall injury protocol. Determine the cause and revise fall injury prevention plan.  Regularly review medication contribution to fall risk; adjust medication administration times to minimize risk of falling.  Consider risk related to polypharmacy and age.  Balance adequate pain management with potential for oversedation.  Recent Flowsheet Documentation  Taken 1/9/2023 1400 by Mike Lomas RN  Medication Review/Management: medications reviewed  Taken 1/9/2023 1200 by Mike Lomas, RN  Medication Review/Management: medications reviewed  Taken 1/9/2023 1000 by Mike Lomas, RN  Medication Review/Management: medications reviewed  Taken 1/9/2023 0800 by Mike Lomas, RN  Medication Review/Management: medications reviewed  Intervention: Promote Injury-Free Environment  Description: Provide a safe, barrier-free environment that encourages independent activity.  Keep care area uncluttered and well-lighted.  Determine need for increased observation or monitoring.  Avoid use of devices that minimize mobility, such as restraints or indwelling urinary  catheter.  Recent Flowsheet Documentation  Taken 1/9/2023 1400 by Mike Lomas RN  Safety Promotion/Fall Prevention:   assistive device/personal items within reach   clutter free environment maintained   safety round/check completed  Taken 1/9/2023 1200 by Mike Lomas RN  Safety Promotion/Fall Prevention:   assistive device/personal items within reach   clutter free environment maintained   safety round/check completed  Taken 1/9/2023 1000 by Mike Lomas RN  Safety Promotion/Fall Prevention:   assistive device/personal items within reach   clutter free environment maintained   safety round/check completed  Taken 1/9/2023 0800 by Mike Lomas RN  Safety Promotion/Fall Prevention:   assistive device/personal items within reach   clutter free environment maintained   safety round/check completed     Problem: Adult Inpatient Plan of Care  Goal: Plan of Care Review  Outcome: Adequate for Care Transition  Goal: Patient-Specific Goal (Individualized)  Outcome: Adequate for Care Transition  Goal: Absence of Hospital-Acquired Illness or Injury  Outcome: Adequate for Care Transition  Intervention: Identify and Manage Fall Risk  Description: Perform standard risk assessment on admission using a validated tool or comprehensive approach appropriate to the patient; reassess fall risk frequently, with change in status or transfer to another level of care.  Communicate fall injury risk to interprofessional healthcare team.  Determine need for increased observation, equipment and environmental modification, such as low bed, signage and supportive, nonskid footwear.  Adjust safety measures to individual developmental age, stage and identified risk factors.  Reinforce the importance of safety and physical activity with patient and family.  Perform regular intentional rounding to assess need for position change, pain assessment and personal needs, including assistance with toileting.  Recent Flowsheet  Documentation  Taken 1/9/2023 1400 by Mike Lomas RN  Safety Promotion/Fall Prevention:   assistive device/personal items within reach   clutter free environment maintained   safety round/check completed  Taken 1/9/2023 1200 by Mike Lomas RN  Safety Promotion/Fall Prevention:   assistive device/personal items within reach   clutter free environment maintained   safety round/check completed  Taken 1/9/2023 1000 by Mike Lomas RN  Safety Promotion/Fall Prevention:   assistive device/personal items within reach   clutter free environment maintained   safety round/check completed  Taken 1/9/2023 0800 by Mike Lomas RN  Safety Promotion/Fall Prevention:   assistive device/personal items within reach   clutter free environment maintained   safety round/check completed  Intervention: Prevent Skin Injury  Description: Perform a screening for skin injury risk, such as pressure or moisture associated skin damage on admission and at regular intervals throughout hospital stay.  Keep all areas of skin (especially folds) clean and dry.  Maintain adequate skin hydration.  Relieve and redistribute pressure and protect bony prominences; implement measures based on patient-specific risk factors.  Match turning and repositioning schedule to clinical condition.  Encourage weight shift frequently; assist with reposition if unable to complete independently.  Float heels off bed; avoid pressure on the Achilles tendon.  Keep skin free from extended contact with medical devices.  Encourage functional activity and mobility, as early as tolerated.  Use aids (e.g., slide boards, mechanical lift) during transfer.  Recent Flowsheet Documentation  Taken 1/9/2023 1400 by Mike Lomas RN  Body Position: position changed independently  Taken 1/9/2023 1200 by Mike Lomas RN  Body Position: position changed independently  Taken 1/9/2023 1000 by Mike Lomas RN  Body Position: position changed independently  Taken  1/9/2023 0800 by Mike Lomas RN  Body Position: position changed independently  Intervention: Prevent and Manage VTE (Venous Thromboembolism) Risk  Description: Assess for VTE (venous thromboembolism) risk.  Encourage and assist with early ambulation.  Initiate and maintain compression or other therapy, as indicated, based on identified risk in accordance with organizational protocol and provider order.  Encourage both active and passive leg exercises while in bed, if unable to ambulate.  Recent Flowsheet Documentation  Taken 1/9/2023 1400 by Mike Lomas RN  Activity Management: activity adjusted per tolerance  Taken 1/9/2023 1200 by Mike Lomas RN  Activity Management: activity adjusted per tolerance  Taken 1/9/2023 1000 by Mike Lomas RN  Activity Management: activity adjusted per tolerance  Taken 1/9/2023 0800 by Mike Lomas RN  Activity Management: activity adjusted per tolerance  Intervention: Prevent Infection  Description: Maintain skin and mucous membrane integrity; promote hand, oral and pulmonary hygiene.  Optimize fluid balance, nutrition, sleep and glycemic control to maximize infection resistance.  Identify potential sources of infection early to prevent or mitigate progression of infection (e.g., wound, lines, devices).  Evaluate ongoing need for invasive devices; remove promptly when no longer indicated.  Recent Flowsheet Documentation  Taken 1/9/2023 0800 by Mike Lomas RN  Infection Prevention: environmental surveillance performed  Goal: Optimal Comfort and Wellbeing  Outcome: Adequate for Care Transition  Goal: Readiness for Transition of Care  Outcome: Adequate for Care Transition  Goal: Plan of Care Review  Outcome: Adequate for Care Transition  Goal: Patient-Specific Goal (Individualized)  Outcome: Adequate for Care Transition  Goal: Absence of Hospital-Acquired Illness or Injury  Outcome: Adequate for Care Transition  Intervention: Identify and Manage Fall  Risk  Description: Perform standard risk assessment on admission using a validated tool or comprehensive approach appropriate to the patient; reassess fall risk frequently, with change in status or transfer to another level of care.  Communicate fall injury risk to interprofessional healthcare team.  Determine need for increased observation, equipment and environmental modification, such as low bed, signage and supportive, nonskid footwear.  Adjust safety measures to individual developmental age, stage and identified risk factors.  Reinforce the importance of safety and physical activity with patient and family.  Perform regular intentional rounding to assess need for position change, pain assessment and personal needs, including assistance with toileting.  Recent Flowsheet Documentation  Taken 1/9/2023 1400 by Mike Lomas RN  Safety Promotion/Fall Prevention:   assistive device/personal items within reach   clutter free environment maintained   safety round/check completed  Taken 1/9/2023 1200 by Mike Lomas RN  Safety Promotion/Fall Prevention:   assistive device/personal items within reach   clutter free environment maintained   safety round/check completed  Taken 1/9/2023 1000 by Mike Lomas RN  Safety Promotion/Fall Prevention:   assistive device/personal items within reach   clutter free environment maintained   safety round/check completed  Taken 1/9/2023 0800 by Mike Lomas, RN  Safety Promotion/Fall Prevention:   assistive device/personal items within reach   clutter free environment maintained   safety round/check completed  Intervention: Prevent Skin Injury  Description: Perform a screening for skin injury risk, such as pressure or moisture associated skin damage on admission and at regular intervals throughout hospital stay.  Keep all areas of skin (especially folds) clean and dry.  Maintain adequate skin hydration.  Relieve and redistribute pressure and protect bony prominences;  implement measures based on patient-specific risk factors.  Match turning and repositioning schedule to clinical condition.  Encourage weight shift frequently; assist with reposition if unable to complete independently.  Float heels off bed; avoid pressure on the Achilles tendon.  Keep skin free from extended contact with medical devices.  Encourage functional activity and mobility, as early as tolerated.  Use aids (e.g., slide boards, mechanical lift) during transfer.  Recent Flowsheet Documentation  Taken 1/9/2023 1400 by Mike Lomas RN  Body Position: position changed independently  Taken 1/9/2023 1200 by Mike Lomas RN  Body Position: position changed independently  Taken 1/9/2023 1000 by Mike Lomas RN  Body Position: position changed independently  Taken 1/9/2023 0800 by Mike Lomas RN  Body Position: position changed independently  Intervention: Prevent and Manage VTE (Venous Thromboembolism) Risk  Description: Assess for VTE (venous thromboembolism) risk.  Encourage and assist with early ambulation.  Initiate and maintain compression or other therapy, as indicated, based on identified risk in accordance with organizational protocol and provider order.  Encourage both active and passive leg exercises while in bed, if unable to ambulate.  Recent Flowsheet Documentation  Taken 1/9/2023 1400 by Mike Lomas RN  Activity Management: activity adjusted per tolerance  Taken 1/9/2023 1200 by Mike Lomas RN  Activity Management: activity adjusted per tolerance  Taken 1/9/2023 1000 by Mike Lomas RN  Activity Management: activity adjusted per tolerance  Taken 1/9/2023 0800 by Mike Lomas RN  Activity Management: activity adjusted per tolerance  Intervention: Prevent Infection  Description: Maintain skin and mucous membrane integrity; promote hand, oral and pulmonary hygiene.  Optimize fluid balance, nutrition, sleep and glycemic control to maximize infection resistance.  Identify  potential sources of infection early to prevent or mitigate progression of infection (e.g., wound, lines, devices).  Evaluate ongoing need for invasive devices; remove promptly when no longer indicated.  Recent Flowsheet Documentation  Taken 1/9/2023 0800 by Mike Lomas RN  Infection Prevention: environmental surveillance performed  Goal: Optimal Comfort and Wellbeing  Outcome: Adequate for Care Transition  Goal: Readiness for Transition of Care  Outcome: Adequate for Care Transition     Problem: Palliative Care  Goal: Enhanced Quality of Life  Outcome: Adequate for Care Transition     Problem: Skin Injury Risk Increased  Goal: Skin Health and Integrity  Outcome: Adequate for Care Transition  Intervention: Optimize Skin Protection  Description: Perform a full pressure injury risk assessment, as indicated by screening, upon admission to care unit.  Reassess skin (injury risk, full inspection) frequently (e.g., scheduled interval, with change in condition) to provide optimal early detection and prevention.  Maintain adequate tissue perfusion (e.g., encourage fluid balance; avoid crossing legs, constrictive clothing or devices) to promote tissue oxygenation.  Maintain head of bed at lowest degree of elevation tolerated, considering medical condition and other restrictions.  Avoid positioning onto an area that remains reddened.  Minimize incontinence and moisture (e.g., toileting schedule; moisture-wicking pad, diaper or incontinence collection device; skin moisture barrier).  Cleanse skin promptly and gently when soiled utilizing a pH-balanced cleanser.  Relieve and redistribute pressure (e.g., scheduled position changes, weight shifts, use of support surface, medical device repositioning, protective dressing application, use of positioning device, microclimate control, use of pressure-injury-monitor  Encourage increased activity, such as sitting in a chair at the bedside or early mobilization, when able to  tolerate.  Recent Flowsheet Documentation  Taken 1/9/2023 1400 by Mike Lomas RN  Head of Bed (HOB) Positioning: HOB at 20-30 degrees  Taken 1/9/2023 1200 by Mike Lomas RN  Head of Bed (HOB) Positioning: HOB at 20-30 degrees  Taken 1/9/2023 1000 by Mike Lomas RN  Head of Bed (HOB) Positioning: HOB at 20-30 degrees  Taken 1/9/2023 0800 by Mike Lomas RN  Head of Bed (HOB) Positioning: HOB at 20-30 degrees   Goal Outcome Evaluation:

## 2023-01-09 NOTE — DISCHARGE SUMMARY
[facility] Medicine Services  DISCHARGE SUMMARY    Patient Name: Saul Sal  : 1940  MRN: [id number]    Date of Admission: 1/3/2023  9:33 PM  Date of Discharge:  2023  Primary Care Physician: Saul Campbell MD    Consults     Date and Time Order Name Status Description    2023 12:30 PM Inpatient Neurology Consult General Completed     2023 12:23 PM Inpatient Palliative Care MD Consult Completed     2023  2:49 AM Inpatient Cardiology Consult Completed           Hospital Course     Presenting Problem:   Acute on chronic systolic congestive heart failure (HCC) [I50.23]    Active Hospital Problems    Diagnosis  POA   • **Cardiogenic shock (HCC) [R57.0]  Yes   • 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 [Z95.0]  Yes   • Nausea in adult [R11.0]  Yes   • Ulcer of right foot (HCC) [L97.519]  Yes   • STORM (acute kidney injury) (HCC) [N17.9]  Yes   • Type 2 myocardial infarction due to heart failure (HCC) [I50.9, I21.A1]  Yes   • 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]  Yes   • Stage 3 chronic kidney disease (HCC) [N18.30]  Yes   • Acute on chronic systolic congestive heart failure (HCC) [I50.23]  Yes   • Atrial fib/flutter (not on AC 2* bleeding and patient preference)  [I48.91, I48.92]  Yes   • Coronary artery disease involving native coronary artery of native heart without angina pectoris [I25.10]  Yes   • Hyperlipidemia LDL goal <70 [E78.5]  Yes      Resolved Hospital Problems    Diagnosis Date Resolved POA   • Acute on chronic systolic congestive heart failure (HCC) [I50.23] 2023 Yes   • 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 male w/ hx cad (prior cabg), HFrEF ,afib/flutter/sss (s/p pacemaker 11/2022), mallorie, hypothyroidism who presented due to progressive dyspnea since his pacemaker placement. Had been compliant w/ cardiovascular medications/diuretics. Workup revealed decompensated chf. Cardiology consulted and felt that due to multiorgan dysfunction and advanced age that patient would be good candidate for hospice. Palliative care and hospice consulted. Patient made comfort measures only. Now opting to go home w/ hospice care.    Discharge Follow Up Recommendations for outpatient labs/diagnostics:  Hospice at home    Day of Discharge     HPI:   Dyspnea improved w/ morphine. No current pain. Anxiety mild    Review of Systems  No n/v currently    Vital Signs:   Temp:  [96.5 °F (35.8 °C)-99.5 °F (37.5 °C)] 96.5 °F (35.8 °C)  Heart Rate:  [78-79] 79  Resp:  [16] 16  BP: (109-111)/(72-74) 111/74  Flow (L/min):  [2] 2      Physical Exam:  Alert, elderly appearing, no distress, nasal canula in place  Ncat, oroph clear  rrr  Faint bibasilar crackles  abd soft, nontender  No extremity rash  Pertinent  and/or Most Recent Results     LAB RESULTS:      Lab 01/04/23  1348 01/04/23  0849 01/04/23  0411 01/04/23  0023 01/03/23  2045   WBC  --   --  7.07  --  6.98   HEMOGLOBIN  --   --  13.2  --  14.8   HEMATOCRIT  --   --  41.9  --  48.3   PLATELETS  --   --  111*  --  144   NEUTROS ABS  --   --  5.33  --  4.65   IMMATURE GRANS (ABS)  --   --  0.06*  --  0.03   LYMPHS ABS  --   --  1.04  --  1.77   MONOS ABS  --   --  0.61  --  0.46   EOS ABS  --   --  0.01  --  0.04   MCV  --   --  93.5  --  95.3   SED RATE  --   --  <1  --   --    CRP  --   --  3.02*  --   --    LACTATE 2.4* 2.1* 2.2* 2.4* 5.8*         Lab 01/05/23  0520 01/04/23  0411 01/03/23 2045   SODIUM 140 140 138   POTASSIUM 4.0 4.3 5.0   CHLORIDE 103 101 98   CO2 21.0* 24.0 19.0*   ANION GAP 16.0* 15.0 21.0*   BUN 53* 57* 53*   CREATININE 1.93* 2.26* 2.33*   EGFR 33.7* 27.9*  27.1*   GLUCOSE 81 97 129*   CALCIUM 8.4* 8.8 9.4   MAGNESIUM  --  2.5*  --    PHOSPHORUS  --  4.6*  --    HEMOGLOBIN A1C  --  5.90*  --          Lab 01/03/23 2045   TOTAL PROTEIN 7.8   ALBUMIN 4.5   GLOBULIN 3.3   ALT (SGPT) 119*   AST (SGOT) 147*   BILIRUBIN 1.6*   ALK PHOS 133*   LIPASE 40         Lab 01/04/23  0411 01/04/23  0205 01/04/23  0023 01/03/23 2045   PROBNP  --   --   --  31,376.0*   TROPONIN T 0.021 0.035* 0.023 0.032*         Lab 01/04/23  0411   CHOLESTEROL 130   LDL CHOL 90   HDL CHOL 28*   TRIGLYCERIDES 56             Brief Urine Lab Results  (Last result in the past 365 days)      Color   Clarity   Blood   Leuk Est   Nitrite   Protein   CREAT   Urine HCG        01/04/23 0105 Yellow   Cloudy   Negative   Negative   Negative   30 mg/dL (1+)               Microbiology Results (last 10 days)     Procedure Component Value - Date/Time    Blood Culture - Blood, Arm, Right [[id number]]  (Normal) Collected: 01/04/23 0203    Lab Status: Final result Specimen: Blood from Arm, Right Updated: 01/09/23 0230     Blood Culture No growth at 5 days    Blood Culture - Blood, Arm, Right [[id number]]  (Normal) Collected: 01/04/23 0156    Lab Status: Final result Specimen: Blood from Arm, Right Updated: 01/09/23 0230     Blood Culture No growth at 5 days    COVID PRE-OP / PRE-PROCEDURE SCREENING ORDER (NO ISOLATION) - Swab, Nasopharynx [[id number]]  (Normal) Collected: 01/03/23 2143    Lab Status: Final result Specimen: Swab from Nasopharynx Updated: 01/03/23 5229    Narrative:      The following orders were created for panel order COVID PRE-OP / PRE-PROCEDURE SCREENING ORDER (NO ISOLATION) - Swab, Nasopharynx.  Procedure                               Abnormality         Status                     ---------                               -----------         ------                     Respiratory Panel PCR w/...[[id number]]  Normal              Final result                 Please view results for these tests on the  individual orders.    Respiratory Panel PCR w/COVID-19(SARS-CoV-2) [facility]/BALBINA/[facility]/DORI In-House, NP Swab in UTM/VTM, 3-4 HR TAT - Swab, Nasopharynx [[id number]]  (Normal) Collected: 01/03/23 2143    Lab Status: Final result Specimen: Swab from Nasopharynx Updated: 01/03/23 6878     ADENOVIRUS, PCR Not Detected     Coronavirus 229E Not Detected     Coronavirus HKU1 Not Detected     Coronavirus NL63 Not Detected     Coronavirus OC43 Not Detected     COVID19 Not Detected     Human Metapneumovirus Not Detected     Human Rhinovirus/Enterovirus Not Detected     Influenza A PCR Not Detected     Influenza B PCR Not Detected     Parainfluenza Virus 1 Not Detected     Parainfluenza Virus 2 Not Detected     Parainfluenza Virus 3 Not Detected     Parainfluenza Virus 4 Not Detected     RSV, PCR Not Detected     Bordetella pertussis pcr Not Detected     Bordetella parapertussis PCR Not Detected     Chlamydophila pneumoniae PCR Not Detected     Mycoplasma pneumo by PCR Not Detected    Narrative:      In the setting of a positive respiratory panel with a viral infection PLUS a negative procalcitonin without other underlying concern for bacterial infection, consider observing off antibiotics or discontinuation of antibiotics and continue supportive care. If the respiratory panel is positive for atypical bacterial infection (Bordetella pertussis, Chlamydophila pneumoniae, or Mycoplasma pneumoniae), consider antibiotic de-escalation to target atypical bacterial infection.          CT Abdomen Pelvis Without Contrast    Result Date: 1/3/2023  CT ABDOMEN PELVIS WO CONTRAST-, CT CHEST WO CONTRAST DIAGNOSTIC-  Date of Exam: 1/3/2023 10:35 PM  Indication: nausea/ abdominal pain; I50.23-Acute on chronic systolic (congestive) heart failure.  Comparison: 10/14/2018  Technique: Contiguous axial CT images were obtained from the lung bases to the to the pubic symphysis without contrast.  Sagittal and coronal reconstructions were performed.   Automated exposure control and iterative reconstruction methods were used.     FINDINGS: Chest:  Mediastinum: Heart size is enlarged. No suspicious pericardial fluid collection noted.  Patient is status post median sternotomy and CABG. There is calcification of the native coronary arteries.  Limited noncontrast imaging of the aorta and the origin of the great vessels is grossly unremarkable in appearance.  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  Main pulmonary artery is normal in caliber  No suspicious hilar or mediastinal adenopathy is noted. There is evidence of old granulomatous disease. The Limited imaging of the base of the neck and the esophagus are grossly unremarkable  Lungs/pleura: There is a small right-sided pleural effusion.  Central airways are patent.  Partially calcified pleural plaques are again noted.  Partially calcified 4 mm nodule right middle lobe compatible granulomatous disease. Additional evidence of old granulomatous disease noted. Minimal groundglass and irregular opacities noted at the lung bases. No suspicious areas of consolidation noted. No significant overt pulmonary edema.  Soft Tissues/Bones: No destructive bone lesion.  Abdomen/Pelvis: Organs: Stones and sludge are noted within the gallbladder. There is mild stranding surrounding the gallbladder.  Limited noncontrast imaging of the liver, spleen, pancreas and right adrenal gland are unremarkable in appearance. Indication of the left stable compatible sequela prior. Low-attenuation lesion within the left kidney compatible with cysts. Perinephric stranding noted bilaterally.  GI/Bowel: There is a small hiatal hernia. The stomach is incompletely distended and otherwise grossly unremarkable on limited noncontrast imaging.  Small bowel demonstrates no acute abnormality  There is diverticulosis more prominently within the sigmoid colon. Stranding is noted within the mesentery and small amount of free fluid noted within  the pelvis.  Pelvis: Urinary bladder is mildly thick-walled this is accentuated by incomplete distention.  Bilateral fat-containing inguinal hernias are noted. The prostate is mildly enlarged.   Peritoneum/Retroperitoneum: Atherosclerotic changes are noted of the aorta which is tortuous. No suspicious retroperitoneal adenopathy noted  Bones/Soft Tissues: Mild degree of subcutaneous edema is noted. Multilevel degenerative changes are noted of the spine      Chest: 1. Cardiomegaly without significant overt pulmonary edema 2. Small right-sided pleural effusion 3. Evidence of old granulomatous disease 4. Partially calcified pleural plaques similar to the prior study.  Abdomen and pelvis:  1. Small amount of ascites and fluid within the abdomen and pelvis limited evaluation for acute inflammatory changes. Please correlate for cardiac, liver or renal dysfunction 2. There is cholelithiasis and small amount of fluid surrounding the gallbladder which is mentioned above is indeterminate. If there is clinical concern for acute cholecystitis follow-up can always be considered with a nuclear medicine hepatobiliary scan 3. Diverticulosis is noted. Evaluation for diverticulitis is limited by ascites      This report was finalized on 1/3/2023 11:28 PM by Wayne West.      XR Foot 3+ View Right    Result Date: 1/4/2023  XR FOOT 3+ VW RIGHT Date of Exam: 1/4/2023 6:33 AM EST Indication: wound on left foot. Comparison: None available. Findings: No acute fracture is identified. There is a marginal erosion along the first metatarsal head. Mild to moderate degenerative changes are present along the metatarsophalangeal and interphalangeal joints. There are tiny enthesophytes along the posterior and  inferior calcaneus. Mild degenerative changes are present along the dorsal midfoot. There is soft tissue swelling along the forefoot.     Impression: 1.Degenerative changes as described above. 2.Marginal erosion along first metatarsal  head. This could be degenerative such as in inflammatory arthritis, although if there is acute infection in this vicinity osteomyelitis cannot be excluded. 3.Nonspecific soft tissue swelling along forefoot. Electronically Signed: Antonino Alexis  1/4/2023 8:27 AM EST  Workstation ID: [id number]    CT Chest Without Contrast Diagnostic    Result Date: 1/3/2023  CT ABDOMEN PELVIS WO CONTRAST-, CT CHEST WO CONTRAST DIAGNOSTIC-  Date of Exam: 1/3/2023 10:35 PM  Indication: nausea/ abdominal pain; I50.23-Acute on chronic systolic (congestive) heart failure.  Comparison: 10/14/2018  Technique: Contiguous axial CT images were obtained from the lung bases to the to the pubic symphysis without contrast.  Sagittal and coronal reconstructions were performed.  Automated exposure control and iterative reconstruction methods were used.     FINDINGS: Chest:  Mediastinum: Heart size is enlarged. No suspicious pericardial fluid collection noted.  Patient is status post median sternotomy and CABG. There is calcification of the native coronary arteries.  Limited noncontrast imaging of the aorta and the origin of the great vessels is grossly unremarkable in appearance.  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  Main pulmonary artery is normal in caliber  No suspicious hilar or mediastinal adenopathy is noted. There is evidence of old granulomatous disease. The Limited imaging of the base of the neck and the esophagus are grossly unremarkable  Lungs/pleura: There is a small right-sided pleural effusion.  Central airways are patent.  Partially calcified pleural plaques are again noted.  Partially calcified 4 mm nodule right middle lobe compatible granulomatous disease. Additional evidence of old granulomatous disease noted. Minimal groundglass and irregular opacities noted at the lung bases. No suspicious areas of consolidation noted. No significant overt pulmonary edema.  Soft Tissues/Bones: No destructive bone lesion.   Abdomen/Pelvis: Organs: Stones and sludge are noted within the gallbladder. There is mild stranding surrounding the gallbladder.  Limited noncontrast imaging of the liver, spleen, pancreas and right adrenal gland are unremarkable in appearance. Indication of the left stable compatible sequela prior. Low-attenuation lesion within the left kidney compatible with cysts. Perinephric stranding noted bilaterally.  GI/Bowel: There is a small hiatal hernia. The stomach is incompletely distended and otherwise grossly unremarkable on limited noncontrast imaging.  Small bowel demonstrates no acute abnormality  There is diverticulosis more prominently within the sigmoid colon. Stranding is noted within the mesentery and small amount of free fluid noted within the pelvis.  Pelvis: Urinary bladder is mildly thick-walled this is accentuated by incomplete distention.  Bilateral fat-containing inguinal hernias are noted. The prostate is mildly enlarged.   Peritoneum/Retroperitoneum: Atherosclerotic changes are noted of the aorta which is tortuous. No suspicious retroperitoneal adenopathy noted  Bones/Soft Tissues: Mild degree of subcutaneous edema is noted. Multilevel degenerative changes are noted of the spine      Chest: 1. Cardiomegaly without significant overt pulmonary edema 2. Small right-sided pleural effusion 3. Evidence of old granulomatous disease 4. Partially calcified pleural plaques similar to the prior study.  Abdomen and pelvis:  1. Small amount of ascites and fluid within the abdomen and pelvis limited evaluation for acute inflammatory changes. Please correlate for cardiac, liver or renal dysfunction 2. There is cholelithiasis and small amount of fluid surrounding the gallbladder which is mentioned above is indeterminate. If there is clinical concern for acute cholecystitis follow-up can always be considered with a nuclear medicine hepatobiliary scan 3. Diverticulosis is noted. Evaluation for diverticulitis is  limited by ascites      This report was finalized on 1/3/2023 11:28 PM by Wayne West.      XR Chest 1 View    Result Date: 1/3/2023  DATE OF EXAM: 1/3/2023 8:20 PM  PROCEDURE: XR CHEST 1 VW-  INDICATIONS: SOA triage protocol  COMPARISON: 2022  TECHNIQUE: Single radiographic AP view of the chest was obtained.  FINDINGS: Left subclavian triple lead pacer. Cardiomegaly. Prominence of the central pulmonary vasculature. Lungs clear. Slight blunting of both costophrenic angles      Cardiomegaly with pulmonary vascular congestion and trace pleural effusions  This report was finalized on 1/3/2023 8:52 PM by Santy Garcia.      US Abdomen Limited    Result Date: 2023  US ABDOMEN LIMITED Date of Exam: 2023 8:49 AM EST Indication: right upper quadrant. Comparison: No comparisons available. Technique: Grayscale and color Doppler ultrasound evaluation of the right upper quadrant was performed. FINDINGS: Liver: Liver is mildly heterogeneous and increased in echogenicity. Question mild nodular contour No focal lesion or intrahepatic biliary ductal dilation is noted. The hepatic vein and portal vein are patent and demonstrate normal directional flow. Biliary System: There is sludge within the gallbladder. No pericholecystic fluid is noted. Mild wall thickening measuring up to 6 mm noted. Common bile duct is within normal limits measurin mm Right Kidney: The right kidney is grossly unremarkable without evidence of hydronephrosis. Pancreas: Pancreas is obscured by bowel gas Other: No evidence of right upper quadrant ascites.     1. Increased echogenicity of the liver. Mild nodular contour which can be seen with cirrhosis. Please correlate with liver function test 2. Wall thickening and sludge within the gallbladder. No sonographic evidence of acute cholecystitis. Findings can be seen with chronic inflammation. Electronically Signed: Wayne West  2023 4:58 PM EST  Workstation ID:  [id number]              Results for orders placed during the hospital encounter of 11/07/22    Adult Transthoracic Echo Complete W/ Cont if Necessary Per Protocol    Interpretation Summary  •  Left ventricular systolic function is severely decreased. Left ventricular ejection fraction appears to be less than 20%.  •  The left ventricular cavity is moderately dilated.  •  Left ventricular diastolic dysfunction is noted.  •  Left atrial volume is moderately increased.  •  Moderate mitral valve regurgitation is present.  •  The right ventricular cavity is moderately dilated.  •  Estimated right ventricular systolic pressure from tricuspid regurgitation is normal (<35 mmHg).  •  There is a large left pleural effusion.      Plan for Follow-up of Pending Labs/Results: pcp    Discharge Details        Discharge Medications      Continue These Medications      Instructions Start Date   aspirin 81 MG EC tablet   81 mg, Oral, Daily      levothyroxine 125 MCG tablet  Commonly known as: SYNTHROID, LEVOTHROID   TAKE 1 TABLET EVERY DAY      Morphine subcutaneous (written by palliative care)  Ativan (written by palliative care)   Stop These Medications    Advanced Joint Relief capsule     atorvastatin 20 MG tablet  Commonly known as: LIPITOR     clopidogrel 75 MG tablet  Commonly known as: PLAVIX     Cod Liver Oil 1000 MG capsule     midodrine 5 MG tablet  Commonly known as: PROAMATINE     NON FORMULARY     NON FORMULARY     potassium chloride 10 MEQ CR tablet     torsemide 20 MG tablet  Commonly known as: DEMADEX     Turmeric 450 MG capsule            Allergies   Allergen Reactions   • Aspirin GI Bleeding   • Eliquis [Apixaban] Other (See Comments)     Excessive bleeding   • Amlodipine Besylate Unknown (See Comments)     Unknown     • Atenolol Unknown (See Comments)     Unknown       • Empagliflozin Other (See Comments)     Low BP   • Metoprolol Unknown (See Comments)     Unknown   • Milk-Related Compounds Other (See Comments)      Headache         Discharge Disposition:  Hospice/Home    Diet:  Hospital:  Diet Order   Procedures   • Diet: Regular/House Diet, Cardiac Diets; Healthy Heart (2-3 Na+); Texture: Regular Texture (IDDSI 7); Fluid Consistency: Thin (IDDSI 0)       Activity:           CODE STATUS:    Code Status and Medical Interventions:   Ordered at: 01/07/23 1043     Code Status (Patient has no pulse and is not breathing):    No CPR (Do Not Attempt to Resuscitate)     Medical Interventions (Patient has pulse or is breathing):    Comfort Measures       Future Appointments   Date Time Provider Department Center   1/9/2023  3:30 PM EMS 1 [facility]   1/11/2023 12:30 PM Sharri Westbrook APRN [facility]   2/20/2023 10:45 AM Bruce Mera DO [facility]   2/21/2023  1:15 PM Librado Jones IV, MD [facility]                 Davis Chinchilla MD  01/09/23      Time Spent on Discharge:  I spent 35  minutes on this discharge activity which included: face-to-face encounter with the patient, reviewing the data in the system, coordination of the care with the nursing staff as well as consultants, documentation, and entering orders.

## 2023-01-09 NOTE — PLAN OF CARE
Goal Outcome Evaluation:  Plan of Care Reviewed With: patient, spouse        Progress: no change  Outcome Evaluation: Pt. sitting up in bed on morphine PCA.  Dyspnea somewhat improved with morphine.  No pain nor nausea reported.  Plan for pt to discharge home with hospice services this afternoon at 1530.  Palliative services to follow for support, POC and ongoing GOC until discharge.      1330 Palliative IDT meeting: MEG Loo RN, CHPN; VELIA Richmond, APRN; HUDSON Santos RN; KENNEDY Garber, ; BEAU De La Cruz RN, CHPN ; JULIUS Salinas, YAEL, [facility]; BEAU Quevedo MDiv, [facility]; CLEO Alexis RN, CHPN    After hours, weekends, and holidays, please contact the on-call provider for palliative care at [phone]

## 2023-01-09 NOTE — PLAN OF CARE
Family requested minimal disturbance. VSS on 2L via NC. Patient rests in bed without sx/si of pain/acute distress at this time. Resp even/unlabored. Skin W/D to touch. No SOA/dyspnea noted. Will cont to mx. Call light in reach. Family remain at bedside.

## 2023-01-09 NOTE — PROGRESS NOTES
Palliative Care Progress Note    Date of Admission: 1/3/2023    Subjective: At this point both the patient as well as the patient's wife feels that his symptoms are mostly managed, wife does note that the patient did have an episode of restlessness that happened yesterday however it does appear that the Ativan that he received did help.  Current Code Status     Date Active Code Status Order ID Comments User Context       1/5/2023 1500 No CPR (Do Not Attempt to Resuscitate) [id number]  Sandra Jones, DO Inpatient      Question Answer    Code Status (Patient has no pulse and is not breathing) No CPR (Do Not Attempt to Resuscitate)    Medical Interventions (Patient has pulse or is breathing) Limited Support    Medical Intervention Limits: NO intubation (DNI)              No current facility-administered medications on file prior to encounter.     Current Outpatient Medications on File Prior to Encounter   Medication Sig Dispense Refill   • aspirin 81 MG EC tablet Take 81 mg by mouth Daily.     • atorvastatin (LIPITOR) 20 MG tablet Take 1 tablet by mouth Every Night. (Patient taking differently: Take 20 mg by mouth 3 (Three) Times a Week. Per pt, taking on MWF) 90 tablet 3   • clopidogrel (PLAVIX) 75 MG tablet Take 1 tablet by mouth Daily. (Patient not taking: Reported on 1/4/2023) 90 tablet 1   • Cod Liver Oil 1000 MG capsule OTC Take 2 capsules in the morning and 1 capsule in the afternoon     • levothyroxine (SYNTHROID, LEVOTHROID) 125 MCG tablet TAKE 1 TABLET EVERY DAY 90 tablet 1   • midodrine (PROAMATINE) 5 MG tablet Take 1 tablet by mouth 3 (Three) Times a Day Before Meals. (Patient taking differently: Take 5 mg by mouth 3 (Three) Times a Day Before Meals. For SBP <100) 270 tablet 1   • Misc Natural Products (Advanced Joint Relief) capsule Take 1 capsule by mouth Daily. OTC     • NON FORMULARY OTC- Cardio-Plus 2080 : Take 4 tablets in the morning and 5 tablets in the evening     • NON FORMULARY OTC-  "Cataplex B 1250mg: Take 3 tablets BID     • potassium chloride 10 MEQ CR tablet Take 2 tablets by mouth Daily. 60 tablet 0   • torsemide (DEMADEX) 20 MG tablet Take 2 tablets for 2 days and then one tablet daily (Patient taking differently: Take 20 mg by mouth Daily. Take 2 tablets for 2 days and then one tablet daily) 35 tablet 1   • Turmeric 450 MG capsule Take 1 capsule by mouth Daily. OTC (Turmeric Forte)       Morphine,   Pharmacy Consult,       •  LORazepam  •  morphine  •  naloxone  •  Pharmacy Consult  •  sodium chloride    Objective: /74 (BP Location: Left arm, Patient Position: Lying)   Pulse 79   Temp 96.5 °F (35.8 °C) (Axillary)   Resp 16   Ht 182.9 cm (72\")   Wt 93 kg (205 lb)   SpO2 96%   BMI 27.80 kg/m²      Intake/Output Summary (Last 24 hours) at 1/9/2023 1255  Last data filed at 1/9/2023 0700  Gross per 24 hour   Intake 100 ml   Output --   Net 100 ml     Physical Exam:      General Appearance:     Frail-appearing   Head:    Normocephalic, without obvious abnormality, atraumatic   Eyes:            Lids and lashes normal, conjunctivae and sclerae normal, no   icterus, no pallor, corneas clear, PERRLA   Ears:    Ears appear intact with no abnormalities noted   Throat:   No oral lesions, no thrush, oral mucosa moist   Neck:   No adenopathy, supple, trachea midline, no thyromegaly, no   carotid bruit, no JVD   Back:     No kyphosis present, no scoliosis present, no skin lesions,      erythema or scars, no tenderness to percussion or                   palpation,   range of motion normal   Lungs:     Clear to auscultation,respirations regular, even and                  unlabored    Heart:    Regular rhythm and normal rate, normal S1 and S2, no            murmur, no gallop, no rub, no click   Chest Wall:    No abnormalities observed   Abdomen:     Normal bowel sounds, no masses, no organomegaly, soft        non-tender, non-distended, no guarding, no rebound                tenderness   Rectal: "     Deferred   Extremities:   Moves all extremities well, no edema, no cyanosis, no             redness   Pulses:   Pulses palpable and equal bilaterally   Skin:   No bleeding, bruising or rash   Lymph nodes:   No palpable adenopathy   Neurologic:   Cranial nerves 2 - 12 grossly intact, sensation intact, DTR       present and equal bilaterally     Results from last 7 days   Lab Units 01/04/23  0411   WBC 10*3/mm3 7.07   HEMOGLOBIN g/dL 13.2   HEMATOCRIT % 41.9   PLATELETS 10*3/mm3 111*     Results from last 7 days   Lab Units 01/05/23  0520 01/04/23  0411 01/03/23  2045   SODIUM mmol/L 140   < > 138   POTASSIUM mmol/L 4.0   < > 5.0   CHLORIDE mmol/L 103   < > 98   CO2 mmol/L 21.0*   < > 19.0*   BUN mg/dL 53*   < > 53*   CREATININE mg/dL 1.93*   < > 2.33*   CALCIUM mg/dL 8.4*   < > 9.4   BILIRUBIN mg/dL  --   --  1.6*   ALK PHOS U/L  --   --  133*   ALT (SGPT) U/L  --   --  119*   AST (SGOT) U/L  --   --  147*   GLUCOSE mg/dL 81   < > 129*    < > = values in this interval not displayed.       Impression: CHF  Dyspnea  Nausea/vomiting  Debility  Restless leg syndrome  Goals of care  Plan: At this point time the patient is looking going home with hospice at some point later today.  I will continue the patient on a morphine infusion as well as as needed Ativan.  Did talk to hospice and help coordinate prescriptions for the patient's morphine PCA.  Also did discuss the case with the hospitalist      Sabino Garber DO  01/09/23  12:55 EST

## 2023-01-10 NOTE — PROGRESS NOTES
Continued Stay Note  [facility]     Patient Name: Saul Sal  MRN: [id number]  Today's Date: 1/10/2023    Admit Date: 1/3/2023    Plan: Home [facility]   Discharge Plan     Row Name 01/09/23 2358       Plan    Plan Home [facility]    Final Note Visit made to pt's room with staff nurse, Sandra FRIEDMAN, hospice, to start the CADD pump prior to pt's discharge. Staff nurse disconnected the IV, Sandra inserted subcutaneous line in left upper arm, cadd pump with 250 mL bag of morphine 2 mg/mL concentration, basal rate 1 mg/hr, bolus dose 1 mg every 8 minutes prn started. This writer set the pump settings, which were verified by Sandra, pump settings shown to pt's daughter. [facility] arrived to transport pt home.               Discharge Codes    No documentation.               Expected Discharge Date and Time     Expected Discharge Date Expected Discharge Time    Jan 9, 2023             Amy Barber RN

## 2023-01-10 NOTE — DISCHARGE PLACEMENT REQUEST
"Estefany Marcos (83 y.o. Male)   Discharge Summary    Date of Birth   1940    Social Security Number       Address   [address] [place] [address] [facility] [address]    Home Phone   [phone]    MRN   [id number]       Quaker   Catholic    Marital Status                               Admission Date   1/3/23    Admission Type   Emergency    Admitting Provider   Davis Chinchilla MD    Attending Provider       Department, Room/Bed   [address], S378/1       Discharge Date   2023    Discharge Disposition   Hospice/Home    Discharge Destination                               Attending Provider: (none)   Allergies: Aspirin, Eliquis [Apixaban], Amlodipine Besylate, Atenolol, Empagliflozin, Metoprolol, Milk-related Compounds    Isolation: None   Infection: None   Code Status: Prior    Ht: 182.9 cm (72\")   Wt: 93 kg (205 lb)    Admission Cmt: None   Principal Problem: Cardiogenic shock (HCC) [R57.0]                 Active Insurance as of 1/3/2023     Primary Coverage     Payor Plan Insurance Group Employer/Plan Group    HUMANA MEDICARE REPLACEMENT HUMANA MEDICARE REPLACEMENT [id number]     Payor Plan Address Payor Plan Phone Number Payor Plan Fax Number Effective Dates    [address] [address] [phone]  2022 - None Entered    [facility] [id number]       Subscriber Name Subscriber Birth Date Member ID       ESTEFANY MARCOS 1940 [id number]                 Emergency Contacts      (Rel.) Home Phone Work Phone Mobile Phone    Shelia Marcos (Spouse) [phone] -- [phone]    Atiya Marcos (Daughter) -- -- [phone]    Maria Del CarmenRocio rene -- -- [phone]               Discharge Summary      Davis Chinchilla MD at 23 1045              [facility]  DISCHARGE SUMMARY    Patient Name: Estefany Marcos  : 1940  MRN: [id number]    Date of Admission: 1/3/2023  9:33 PM  Date of Discharge:  2023  Primary Care Physician: " Saul Cambpell MD    Consults     Date and Time Order Name Status Description    1/4/2023 12:30 PM Inpatient Neurology Consult General Completed     1/4/2023 12:23 PM Inpatient Palliative Care MD Consult Completed     1/4/2023  2:49 AM Inpatient Cardiology Consult Completed           Hospital Course     Presenting Problem:   Acute on chronic systolic congestive heart failure (HCC) [I50.23]    Active Hospital Problems    Diagnosis  POA   • **Cardiogenic shock (HCC) [R57.0]  Yes   • 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 [Z95.0]  Yes   • Nausea in adult [R11.0]  Yes   • Ulcer of right foot (HCC) [L97.519]  Yes   • STORM (acute kidney injury) (HCC) [N17.9]  Yes   • Type 2 myocardial infarction due to heart failure (HCC) [I50.9, I21.A1]  Yes   • 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]  Yes   • Stage 3 chronic kidney disease (HCC) [N18.30]  Yes   • Acute on chronic systolic congestive heart failure (HCC) [I50.23]  Yes   • Atrial fib/flutter (not on AC 2* bleeding and patient preference)  [I48.91, I48.92]  Yes   • Coronary artery disease involving native coronary artery of native heart without angina pectoris [I25.10]  Yes   • Hyperlipidemia LDL goal <70 [E78.5]  Yes      Resolved Hospital Problems    Diagnosis Date Resolved POA   • Acute on chronic systolic congestive heart failure (HCC) [I50.23] 01/04/2023 Yes   • Chronic passive congestion of liver [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 male w/ hx cad (prior cabg), HFrEF ,afib/flutter/sss (s/p pacemaker 11/2022), mallorie, hypothyroidism who presented due to progressive dyspnea since his pacemaker placement. Had been compliant w/ cardiovascular  medications/diuretics. Workup revealed decompensated chf. Cardiology consulted and felt that due to multiorgan dysfunction and advanced age that patient would be good candidate for hospice. Palliative care and hospice consulted. Patient made comfort measures only. Now opting to go home w/ hospice care.    Discharge Follow Up Recommendations for outpatient labs/diagnostics:  Hospice at home    Day of Discharge     HPI:   Dyspnea improved w/ morphine. No current pain. Anxiety mild    Review of Systems  No n/v currently    Vital Signs:   Temp:  [96.5 °F (35.8 °C)-99.5 °F (37.5 °C)] 96.5 °F (35.8 °C)  Heart Rate:  [78-79] 79  Resp:  [16] 16  BP: (109-111)/(72-74) 111/74  Flow (L/min):  [2] 2      Physical Exam:  Alert, elderly appearing, no distress, nasal canula in place  Ncat, oroph clear  rrr  Faint bibasilar crackles  abd soft, nontender  No extremity rash  Pertinent  and/or Most Recent Results     LAB RESULTS:      Lab 01/04/23  1348 01/04/23  0849 01/04/23  0411 01/04/23  0023 01/03/23 2045   WBC  --   --  7.07  --  6.98   HEMOGLOBIN  --   --  13.2  --  14.8   HEMATOCRIT  --   --  41.9  --  48.3   PLATELETS  --   --  111*  --  144   NEUTROS ABS  --   --  5.33  --  4.65   IMMATURE GRANS (ABS)  --   --  0.06*  --  0.03   LYMPHS ABS  --   --  1.04  --  1.77   MONOS ABS  --   --  0.61  --  0.46   EOS ABS  --   --  0.01  --  0.04   MCV  --   --  93.5  --  95.3   SED RATE  --   --  <1  --   --    CRP  --   --  3.02*  --   --    LACTATE 2.4* 2.1* 2.2* 2.4* 5.8*         Lab 01/05/23  0520 01/04/23  0411 01/03/23 2045   SODIUM 140 140 138   POTASSIUM 4.0 4.3 5.0   CHLORIDE 103 101 98   CO2 21.0* 24.0 19.0*   ANION GAP 16.0* 15.0 21.0*   BUN 53* 57* 53*   CREATININE 1.93* 2.26* 2.33*   EGFR 33.7* 27.9* 27.1*   GLUCOSE 81 97 129*   CALCIUM 8.4* 8.8 9.4   MAGNESIUM  --  2.5*  --    PHOSPHORUS  --  4.6*  --    HEMOGLOBIN A1C  --  5.90*  --          Lab 01/03/23  2045   TOTAL PROTEIN 7.8   ALBUMIN 4.5   GLOBULIN 3.3   ALT  (SGPT) 119*   AST (SGOT) 147*   BILIRUBIN 1.6*   ALK PHOS 133*   LIPASE 40         Lab 01/04/23  0411 01/04/23  0205 01/04/23  0023 01/03/23 2045   PROBNP  --   --   --  31,376.0*   TROPONIN T 0.021 0.035* 0.023 0.032*         Lab 01/04/23  0411   CHOLESTEROL 130   LDL CHOL 90   HDL CHOL 28*   TRIGLYCERIDES 56             Brief Urine Lab Results  (Last result in the past 365 days)      Color   Clarity   Blood   Leuk Est   Nitrite   Protein   CREAT   Urine HCG        01/04/23 0105 Yellow   Cloudy   Negative   Negative   Negative   30 mg/dL (1+)               Microbiology Results (last 10 days)     Procedure Component Value - Date/Time    Blood Culture - Blood, Arm, Right [[id number]]  (Normal) Collected: 01/04/23 0203    Lab Status: Final result Specimen: Blood from Arm, Right Updated: 01/09/23 0230     Blood Culture No growth at 5 days    Blood Culture - Blood, Arm, Right [[id number]]  (Normal) Collected: 01/04/23 0156    Lab Status: Final result Specimen: Blood from Arm, Right Updated: 01/09/23 0230     Blood Culture No growth at 5 days    COVID PRE-OP / PRE-PROCEDURE SCREENING ORDER (NO ISOLATION) - Swab, Nasopharynx [[id number]]  (Normal) Collected: 01/03/23 2143    Lab Status: Final result Specimen: Swab from Nasopharynx Updated: 01/03/23 2959    Narrative:      The following orders were created for panel order COVID PRE-OP / PRE-PROCEDURE SCREENING ORDER (NO ISOLATION) - Swab, Nasopharynx.  Procedure                               Abnormality         Status                     ---------                               -----------         ------                     Respiratory Panel PCR w/...[[id number]]  Normal              Final result                 Please view results for these tests on the individual orders.    Respiratory Panel PCR w/COVID-19(SARS-CoV-2) REID/YO/BALBINA/PAD/COR/MAD/DORI In-House, NP Swab in UTM/VTM, 3-4 HR TAT - Swab, Nasopharynx [[id number]]  (Normal) Collected: 01/03/23 2143    Lab Status: Final  result Specimen: Swab from Nasopharynx Updated: 01/03/23 2349     ADENOVIRUS, PCR Not Detected     Coronavirus 229E Not Detected     Coronavirus HKU1 Not Detected     Coronavirus NL63 Not Detected     Coronavirus OC43 Not Detected     COVID19 Not Detected     Human Metapneumovirus Not Detected     Human Rhinovirus/Enterovirus Not Detected     Influenza A PCR Not Detected     Influenza B PCR Not Detected     Parainfluenza Virus 1 Not Detected     Parainfluenza Virus 2 Not Detected     Parainfluenza Virus 3 Not Detected     Parainfluenza Virus 4 Not Detected     RSV, PCR Not Detected     Bordetella pertussis pcr Not Detected     Bordetella parapertussis PCR Not Detected     Chlamydophila pneumoniae PCR Not Detected     Mycoplasma pneumo by PCR Not Detected    Narrative:      In the setting of a positive respiratory panel with a viral infection PLUS a negative procalcitonin without other underlying concern for bacterial infection, consider observing off antibiotics or discontinuation of antibiotics and continue supportive care. If the respiratory panel is positive for atypical bacterial infection (Bordetella pertussis, Chlamydophila pneumoniae, or Mycoplasma pneumoniae), consider antibiotic de-escalation to target atypical bacterial infection.          CT Abdomen Pelvis Without Contrast    Result Date: 1/3/2023  CT ABDOMEN PELVIS WO CONTRAST-, CT CHEST WO CONTRAST DIAGNOSTIC-  Date of Exam: 1/3/2023 10:35 PM  Indication: nausea/ abdominal pain; I50.23-Acute on chronic systolic (congestive) heart failure.  Comparison: 10/14/2018  Technique: Contiguous axial CT images were obtained from the lung bases to the to the pubic symphysis without contrast.  Sagittal and coronal reconstructions were performed.  Automated exposure control and iterative reconstruction methods were used.     FINDINGS: Chest:  Mediastinum: Heart size is enlarged. No suspicious pericardial fluid collection noted.  Patient is status post median  sternotomy and CABG. There is calcification of the native coronary arteries.  Limited noncontrast imaging of the aorta and the origin of the great vessels is grossly unremarkable in appearance.  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  Main pulmonary artery is normal in caliber  No suspicious hilar or mediastinal adenopathy is noted. There is evidence of old granulomatous disease. The Limited imaging of the base of the neck and the esophagus are grossly unremarkable  Lungs/pleura: There is a small right-sided pleural effusion.  Central airways are patent.  Partially calcified pleural plaques are again noted.  Partially calcified 4 mm nodule right middle lobe compatible granulomatous disease. Additional evidence of old granulomatous disease noted. Minimal groundglass and irregular opacities noted at the lung bases. No suspicious areas of consolidation noted. No significant overt pulmonary edema.  Soft Tissues/Bones: No destructive bone lesion.  Abdomen/Pelvis: Organs: Stones and sludge are noted within the gallbladder. There is mild stranding surrounding the gallbladder.  Limited noncontrast imaging of the liver, spleen, pancreas and right adrenal gland are unremarkable in appearance. Indication of the left stable compatible sequela prior. Low-attenuation lesion within the left kidney compatible with cysts. Perinephric stranding noted bilaterally.  GI/Bowel: There is a small hiatal hernia. The stomach is incompletely distended and otherwise grossly unremarkable on limited noncontrast imaging.  Small bowel demonstrates no acute abnormality  There is diverticulosis more prominently within the sigmoid colon. Stranding is noted within the mesentery and small amount of free fluid noted within the pelvis.  Pelvis: Urinary bladder is mildly thick-walled this is accentuated by incomplete distention.  Bilateral fat-containing inguinal hernias are noted. The prostate is mildly enlarged.    Peritoneum/Retroperitoneum: Atherosclerotic changes are noted of the aorta which is tortuous. No suspicious retroperitoneal adenopathy noted  Bones/Soft Tissues: Mild degree of subcutaneous edema is noted. Multilevel degenerative changes are noted of the spine      Chest: 1. Cardiomegaly without significant overt pulmonary edema 2. Small right-sided pleural effusion 3. Evidence of old granulomatous disease 4. Partially calcified pleural plaques similar to the prior study.  Abdomen and pelvis:  1. Small amount of ascites and fluid within the abdomen and pelvis limited evaluation for acute inflammatory changes. Please correlate for cardiac, liver or renal dysfunction 2. There is cholelithiasis and small amount of fluid surrounding the gallbladder which is mentioned above is indeterminate. If there is clinical concern for acute cholecystitis follow-up can always be considered with a nuclear medicine hepatobiliary scan 3. Diverticulosis is noted. Evaluation for diverticulitis is limited by ascites      This report was finalized on 1/3/2023 11:28 PM by Wayne West.      XR Foot 3+ View Right    Result Date: 1/4/2023  XR FOOT 3+ VW RIGHT Date of Exam: 1/4/2023 6:33 AM EST Indication: wound on left foot. Comparison: None available. Findings: No acute fracture is identified. There is a marginal erosion along the first metatarsal head. Mild to moderate degenerative changes are present along the metatarsophalangeal and interphalangeal joints. There are tiny enthesophytes along the posterior and  inferior calcaneus. Mild degenerative changes are present along the dorsal midfoot. There is soft tissue swelling along the forefoot.     Impression: 1.Degenerative changes as described above. 2.Marginal erosion along first metatarsal head. This could be degenerative such as in inflammatory arthritis, although if there is acute infection in this vicinity osteomyelitis cannot be excluded. 3.Nonspecific soft tissue swelling along  forefoot. Electronically Signed: Antonino Alexis  1/4/2023 8:27 AM EST  Workstation ID: [id number]    CT Chest Without Contrast Diagnostic    Result Date: 1/3/2023  CT ABDOMEN PELVIS WO CONTRAST-, CT CHEST WO CONTRAST DIAGNOSTIC-  Date of Exam: 1/3/2023 10:35 PM  Indication: nausea/ abdominal pain; I50.23-Acute on chronic systolic (congestive) heart failure.  Comparison: 10/14/2018  Technique: Contiguous axial CT images were obtained from the lung bases to the to the pubic symphysis without contrast.  Sagittal and coronal reconstructions were performed.  Automated exposure control and iterative reconstruction methods were used.     FINDINGS: Chest:  Mediastinum: Heart size is enlarged. No suspicious pericardial fluid collection noted.  Patient is status post median sternotomy and CABG. There is calcification of the native coronary arteries.  Limited noncontrast imaging of the aorta and the origin of the great vessels is grossly unremarkable in appearance.  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  Main pulmonary artery is normal in caliber  No suspicious hilar or mediastinal adenopathy is noted. There is evidence of old granulomatous disease. The Limited imaging of the base of the neck and the esophagus are grossly unremarkable  Lungs/pleura: There is a small right-sided pleural effusion.  Central airways are patent.  Partially calcified pleural plaques are again noted.  Partially calcified 4 mm nodule right middle lobe compatible granulomatous disease. Additional evidence of old granulomatous disease noted. Minimal groundglass and irregular opacities noted at the lung bases. No suspicious areas of consolidation noted. No significant overt pulmonary edema.  Soft Tissues/Bones: No destructive bone lesion.  Abdomen/Pelvis: Organs: Stones and sludge are noted within the gallbladder. There is mild stranding surrounding the gallbladder.  Limited noncontrast imaging of the liver, spleen, pancreas and right  adrenal gland are unremarkable in appearance. Indication of the left stable compatible sequela prior. Low-attenuation lesion within the left kidney compatible with cysts. Perinephric stranding noted bilaterally.  GI/Bowel: There is a small hiatal hernia. The stomach is incompletely distended and otherwise grossly unremarkable on limited noncontrast imaging.  Small bowel demonstrates no acute abnormality  There is diverticulosis more prominently within the sigmoid colon. Stranding is noted within the mesentery and small amount of free fluid noted within the pelvis.  Pelvis: Urinary bladder is mildly thick-walled this is accentuated by incomplete distention.  Bilateral fat-containing inguinal hernias are noted. The prostate is mildly enlarged.   Peritoneum/Retroperitoneum: Atherosclerotic changes are noted of the aorta which is tortuous. No suspicious retroperitoneal adenopathy noted  Bones/Soft Tissues: Mild degree of subcutaneous edema is noted. Multilevel degenerative changes are noted of the spine      Chest: 1. Cardiomegaly without significant overt pulmonary edema 2. Small right-sided pleural effusion 3. Evidence of old granulomatous disease 4. Partially calcified pleural plaques similar to the prior study.  Abdomen and pelvis:  1. Small amount of ascites and fluid within the abdomen and pelvis limited evaluation for acute inflammatory changes. Please correlate for cardiac, liver or renal dysfunction 2. There is cholelithiasis and small amount of fluid surrounding the gallbladder which is mentioned above is indeterminate. If there is clinical concern for acute cholecystitis follow-up can always be considered with a nuclear medicine hepatobiliary scan 3. Diverticulosis is noted. Evaluation for diverticulitis is limited by ascites      This report was finalized on 1/3/2023 11:28 PM by Wayne West.      XR Chest 1 View    Result Date: 1/3/2023  DATE OF EXAM: 1/3/2023 8:20 PM  PROCEDURE: XR CHEST 1 VW-   INDICATIONS: SOA triage protocol  COMPARISON: 2022  TECHNIQUE: Single radiographic AP view of the chest was obtained.  FINDINGS: Left subclavian triple lead pacer. Cardiomegaly. Prominence of the central pulmonary vasculature. Lungs clear. Slight blunting of both costophrenic angles      Cardiomegaly with pulmonary vascular congestion and trace pleural effusions  This report was finalized on 1/3/2023 8:52 PM by Santy Garcia.      US Abdomen Limited    Result Date: 2023  US ABDOMEN LIMITED Date of Exam: 2023 8:49 AM EST Indication: right upper quadrant. Comparison: No comparisons available. Technique: Grayscale and color Doppler ultrasound evaluation of the right upper quadrant was performed. FINDINGS: Liver: Liver is mildly heterogeneous and increased in echogenicity. Question mild nodular contour No focal lesion or intrahepatic biliary ductal dilation is noted. The hepatic vein and portal vein are patent and demonstrate normal directional flow. Biliary System: There is sludge within the gallbladder. No pericholecystic fluid is noted. Mild wall thickening measuring up to 6 mm noted. Common bile duct is within normal limits measurin mm Right Kidney: The right kidney is grossly unremarkable without evidence of hydronephrosis. Pancreas: Pancreas is obscured by bowel gas Other: No evidence of right upper quadrant ascites.     1. Increased echogenicity of the liver. Mild nodular contour which can be seen with cirrhosis. Please correlate with liver function test 2. Wall thickening and sludge within the gallbladder. No sonographic evidence of acute cholecystitis. Findings can be seen with chronic inflammation. Electronically Signed: Wayne West  2023 4:58 PM EST  Workstation ID: [id number]              Results for orders placed during the hospital encounter of 22    Adult Transthoracic Echo Complete W/ Cont if Necessary Per Protocol    Interpretation Summary  •  Left ventricular systolic  function is severely decreased. Left ventricular ejection fraction appears to be less than 20%.  •  The left ventricular cavity is moderately dilated.  •  Left ventricular diastolic dysfunction is noted.  •  Left atrial volume is moderately increased.  •  Moderate mitral valve regurgitation is present.  •  The right ventricular cavity is moderately dilated.  •  Estimated right ventricular systolic pressure from tricuspid regurgitation is normal (<35 mmHg).  •  There is a large left pleural effusion.      Plan for Follow-up of Pending Labs/Results: pcp    Discharge Details        Discharge Medications      Continue These Medications      Instructions Start Date   aspirin 81 MG EC tablet   81 mg, Oral, Daily      levothyroxine 125 MCG tablet  Commonly known as: SYNTHROID, LEVOTHROID   TAKE 1 TABLET EVERY DAY      Morphine subcutaneous (written by palliative care)  Ativan (written by palliative care)   Stop These Medications    Advanced Joint Relief capsule     atorvastatin 20 MG tablet  Commonly known as: LIPITOR     clopidogrel 75 MG tablet  Commonly known as: PLAVIX     Cod Liver Oil 1000 MG capsule     midodrine 5 MG tablet  Commonly known as: PROAMATINE     NON FORMULARY     NON FORMULARY     potassium chloride 10 MEQ CR tablet     torsemide 20 MG tablet  Commonly known as: DEMADEX     Turmeric 450 MG capsule            Allergies   Allergen Reactions   • Aspirin GI Bleeding   • Eliquis [Apixaban] Other (See Comments)     Excessive bleeding   • Amlodipine Besylate Unknown (See Comments)     Unknown     • Atenolol Unknown (See Comments)     Unknown       • Empagliflozin Other (See Comments)     Low BP   • Metoprolol Unknown (See Comments)     Unknown   • Milk-Related Compounds Other (See Comments)     Headache         Discharge Disposition:  Hospice/Home    Diet:  Hospital:  Diet Order   Procedures   • Diet: Regular/House Diet, Cardiac Diets; Healthy Heart (2-3 Na+); Texture: Regular Texture (IDDSI 7); Fluid  Consistency: Thin (IDDSI 0)       Activity:           CODE STATUS:    Code Status and Medical Interventions:   Ordered at: 01/07/23 1043     Code Status (Patient has no pulse and is not breathing):    No CPR (Do Not Attempt to Resuscitate)     Medical Interventions (Patient has pulse or is breathing):    Comfort Measures       Future Appointments   Date Time Provider Department Center   1/9/2023  3:30 PM EMS 1 [facility]   1/11/2023 12:30 PM Sharri Westbrook APRN [facility]   2/20/2023 10:45 AM Bruce Mera DO [facility]   2/21/2023  1:15 PM Librado Jones IV, MD [facility]                 Davis Chinchilla MD  01/09/23      Time Spent on Discharge:  I spent 35  minutes on this discharge activity which included: face-to-face encounter with the patient, reviewing the data in the system, coordination of the care with the nursing staff as well as consultants, documentation, and entering orders.          Electronically signed by Davis Chinchilla MD at 01/09/23 112

## 2023-01-30 ENCOUNTER — TELEPHONE (OUTPATIENT)
Dept: INTERNAL MEDICINE | Facility: CLINIC | Age: 83
End: 2023-01-30
Payer: MEDICARE

## 2023-01-30 NOTE — TELEPHONE ENCOUNTER
"See below.  I spoke with America, the hospice RN.  States patient has 3+ pitting edema bilateral legs from the \"thigh down\".  Legs are weeping.  Patient refuses to elevate his legs and he eats whatever salt he wants.  Wife convinced he needs a diuretic.  Hospice said these were DC'd while hospitalized due to kidney function.  Patient has Lasix 40 mg, torsemide 20 mg, and perhaps Bumex in the home.   "

## 2023-01-30 NOTE — TELEPHONE ENCOUNTER
America from Hospice called and wanted to speak with Dr. Campbell' nurse and stated that the patient has severe edema in both legs with weeping and 3+ pitting.    Patient had been changed from Lasix to Demadex but it was apparently stopped sometime in January while in the hospital.    Please call her regarding this issue at [phone]

## 2023-02-08 ENCOUNTER — TELEPHONE (OUTPATIENT)
Dept: INTERNAL MEDICINE | Facility: CLINIC | Age: 83
End: 2023-02-08
Payer: MEDICARE

## 2023-02-08 NOTE — TELEPHONE ENCOUNTER
Patient's spouse notified via [email or web address] message (in her chart, which is where the original message came through).

## 2023-02-08 NOTE — TELEPHONE ENCOUNTER
If his legs improved while on furosemide, resume every day dosing.  If it didn't, take torsemide 20 mg daily.

## 2023-02-08 NOTE — TELEPHONE ENCOUNTER
[facility]:  It’s Shelia Sal for Saul Sal [birth year]  Saul’s legs are so bad and have gotten red and very swollen just the last couple days.  They are weeping and real red.  He had taken furosemide 5 days and to drop down to every other day.  We did not take yesterday but did this am.  Anything we can do????  Shelia sal [phone]. Or [phone]    I am also forwarding the photo she attached, but it will be in her chart (Shelia Sal).     See phone note of 1/30/23 for additional info

## 2023-02-28 ENCOUNTER — TELEPHONE (OUTPATIENT)
Dept: INTERNAL MEDICINE | Facility: CLINIC | Age: 83
End: 2023-02-28
Payer: MEDICARE

## 2023-02-28 RX ORDER — MEGESTROL ACETATE 20 MG/1
20 TABLET ORAL DAILY
Qty: 60 TABLET | Refills: 2 | OUTPATIENT
Start: 2023-02-28

## 2023-02-28 NOTE — TELEPHONE ENCOUNTER
Per JH:  Message noted about decreased appetite would suggest trying Megace 20 mg twice daily and prescription was sent to the patient's drugstore please inform spouse

## 2023-03-14 ENCOUNTER — TELEPHONE (OUTPATIENT)
Dept: INTERNAL MEDICINE | Facility: CLINIC | Age: 83
End: 2023-03-14
Payer: MEDICARE

## 2023-04-07 NOTE — PATIENT INSTRUCTIONS
Fasting lab of BMP, CMP, CBC lipid TSH and uric acid pending  Continue all current medications with as needed metolazone and Lasix 40 mg daily  He declines statin therapy  Encourage plenty of water for hydration  Encouraged healthy cardiac diet with reduce salt  Careful cardiac exercise and not overdo  Return for annual wellness visit in 6 months or as needed  Handicap sticker is signed and given to patient  
no

## 2024-02-09 NOTE — PLAN OF CARE
Patient to triage c/o frontal headache associated with phonophobia and ringing in the ears that started this morning. Denies any vision changes or dizziness.   Problem: Patient Care Overview  Goal: Plan of Care Review  Outcome: Ongoing (interventions implemented as appropriate)   11/27/18 6327   Coping/Psychosocial   Plan of Care Reviewed With patient   Plan of Care Review   Progress improving   OTHER   Outcome Summary Pt. arrived to the floor around 22:00; VSS; groin sites were soft, CDI; NSR; room air; c/o pain in his right neck/shoulder- Tylenol given; no other problems identified; continue to monitor.

## 2024-03-01 NOTE — TELEPHONE ENCOUNTER
This came in patient's spouse's mychart.       Alice:   It's Shelia Sal for Saul Sal [birth year].  It's New Year's Ana and you probably won't get this until January 3.  Day after    day, Saul is so sick.  He isn't eating much at all as he is nauseated and bloated.  He is sick to his stomach every time he eats or even drinks water.  He has no energy and is so short of breath.  Is there anything I can give him for the nausea?  He has an appointment with Dr. Hughes on January 4, [birth year]39.  Sharri Westbrook (nurse in cardiology) has ordered what say's US [facility] abd comp at your place [address] [place] [address]  but it is not until January 9.  He is scheduled to have some blood work the 4th when he sees Dr Hughes.  Is there anything I can do to move this test to the 4th when he does the blood work.  He just needs some level of comfort now if possible and I need to get him to eating more.  I sincerely appreciate you.  Thank you for everything.  Shelia Sal [phone]    Per , we will address patient's concerns at his appt tomorrow.  He needs to be examined before any changes are made.   
impairments from medications were discussed as applicable.     Call if pattern of symptoms change or persists for an extended time.    This document was prepared by a combination of typing and transcription through a voice recognition software.    All medications have the potential for adverse effects. All medications effect each person differently. Please read and review provided information related to medication. If the medication that you have been prescribed has the potential to cause sedation, do not drive or operate car, truck, or heavy machinery until you know how the medication will effect you. If you experience any adverse effects from the medication, please call the office or report to the emergency department.

## 2024-12-13 NOTE — TELEPHONE ENCOUNTER
See below.  Allopurinol is not on patient's current med list.  Previous dose was 300 mg.  Provider approval and sign-off pending.    Him/He

## (undated) DEVICE — TUBING, SUCTION, 1/4" X 10', STRAIGHT: Brand: MEDLINE

## (undated) DEVICE — LEX ELECTRO PHYSIOLOGY: Brand: MEDLINE INDUSTRIES, INC.

## (undated) DEVICE — CATH EP INQUIRY H CRV 7F 1-7-1MM 110CM

## (undated) DEVICE — SI AVANTI+ 7F STD W/GW  NO OBT: Brand: AVANTI

## (undated) DEVICE — LIMB HOLDER, WRIST/ANKLE: Brand: DEROYAL

## (undated) DEVICE — TEMPERATURE ABLATION CATHETER: Brand: BLAZER® II XP

## (undated) DEVICE — INTRO LAT VEIN WORLEY ADV RENAL 5.5F 62CM

## (undated) DEVICE — CATH EP SUPREME QUADPOLAR CRD2 5F 5MM 120CM

## (undated) DEVICE — GUIDE CORNRY SIN JUMBO STD 9F 40CM

## (undated) DEVICE — RADIFOCUS GLIDEWIRE ADVANTAGE GUIDEWIRE: Brand: GLIDEWIRE ADVANTAGE

## (undated) DEVICE — CATH QUAD CRD 6F 5MM REPR

## (undated) DEVICE — DEV COMPR SAFEGUARDFOCUS POST/OP ADHS/BND 17CM 120ML LF

## (undated) DEVICE — SOL NACL 0.9PCT 1000ML

## (undated) DEVICE — HI-TORQUE WHISPER MS GUIDE WIRE .014 J TIP 3.0 CM X 190 CM: Brand: HI-TORQUE WHISPER

## (undated) DEVICE — Device: Brand: ASAHI SION BLUE

## (undated) DEVICE — SET PRIMARY GRVTY 10DP MALE LL 104IN

## (undated) DEVICE — INTRO TEAR AWAY/LVD W/SD PRT 6F 13CM

## (undated) DEVICE — MEDI-VAC YANKAUER SUCTION HANDLE W/BULBOUS TIP: Brand: CARDINAL HEALTH

## (undated) DEVICE — SI AVANTI+ 8F STD W/GW  NO OBT: Brand: AVANTI

## (undated) DEVICE — MODEL BT2000 P/N 700287-012KIT CONTENTS: MANIFOLD WITH SALINE AND CONTRAST PORTS, SALINE TUBING WITH SPIKE AND HAND SYRINGE, TRANSDUCER: Brand: BT2000 AUTOMATED MANIFOLD KIT

## (undated) DEVICE — ST EXT IV SMARTSITE 2VLV SP M LL 5ML IV1

## (undated) DEVICE — ADULT, W/LG. BACK PAD, RADIOTRANSPARENT ELEMENT AND LEAD WIRE: Brand: DEFIBRILLATION ELECTRODES

## (undated) DEVICE — CATH DIAG EXPO .056 FL5 6F 100CM

## (undated) DEVICE — DEV COMP RAD PRELUDESYNC 24CM

## (undated) DEVICE — ST INF PRI SMRTSTE 20DRP 2VLV 24ML 117

## (undated) DEVICE — DECANT BG O JET

## (undated) DEVICE — DEV INFL MONARCH 25W

## (undated) DEVICE — MODEL AT P65, P/N 701554-001KIT CONTENTS: HAND CONTROLLER, 3-WAY HIGH-PRESSURE STOPCOCK WITH ROTATING END AND PREMIUM HIGH-PRESSURE TUBING: Brand: ANGIOTOUCH® KIT

## (undated) DEVICE — TBG PENCL TELESCP MEGADYNE SMOKE EVAC 10FT

## (undated) DEVICE — INTRO SHEATH TRNSEP .032 SL0 8.5F 63CM

## (undated) DEVICE — DRSNG SURESITE123 4X4.8IN

## (undated) DEVICE — CANN NASL CO2 DIVIDED A/

## (undated) DEVICE — HI-TORQUE VERSACORE MODIFIED J GUIDE WIRE SYSTEM 260 CM: Brand: HI-TORQUE VERSACORE

## (undated) DEVICE — GW FC FLOP/TP .035 260CM 3MM

## (undated) DEVICE — PK CATH CARD 10

## (undated) DEVICE — CATH DIAG EXPO .045 FL3  5F 100CM

## (undated) DEVICE — AVANTI + 5F STD W/GW: Brand: AVANTI

## (undated) DEVICE — GUIDE CATHETER: Brand: MACH1™

## (undated) DEVICE — INTRO SHEATH ART/FEM ENGAGE .038 6F12CM

## (undated) DEVICE — DRSNG TELFA PAD NONADH STR 1S 3X8IN

## (undated) DEVICE — TRAP FLD MINIVAC MEGADYNE 100ML

## (undated) DEVICE — CATH DIAG EXPO M/ PK 5F FL4/FR4 PIG

## (undated) DEVICE — CATH EP SUPRM QUADPOLAR JSN 5F 5MM 120CM

## (undated) DEVICE — DECANTER: Brand: UNBRANDED

## (undated) DEVICE — CAUTERY TIP POLISHER: Brand: DEVON

## (undated) DEVICE — ELECTRD RETRN/GRND MEGADYNE SGL/PLT W/CORD 9FT DISP

## (undated) DEVICE — KT 2 CONTRST ADMIN

## (undated) DEVICE — GLIDESHEATH SLENDER STAINLESS STEEL KIT: Brand: GLIDESHEATH SLENDER

## (undated) DEVICE — SYS CLS VASC/VENI VASCADE MVP 6TO12F